# Patient Record
Sex: MALE | Race: WHITE | NOT HISPANIC OR LATINO | Employment: OTHER | URBAN - METROPOLITAN AREA
[De-identification: names, ages, dates, MRNs, and addresses within clinical notes are randomized per-mention and may not be internally consistent; named-entity substitution may affect disease eponyms.]

---

## 2021-01-25 ENCOUNTER — IMMUNIZATIONS (OUTPATIENT)
Dept: FAMILY MEDICINE CLINIC | Facility: HOSPITAL | Age: 86
End: 2021-01-25

## 2021-01-25 DIAGNOSIS — Z23 ENCOUNTER FOR IMMUNIZATION: Primary | ICD-10-CM

## 2021-01-25 PROCEDURE — 91301 SARS-COV-2 / COVID-19 MRNA VACCINE (MODERNA) 100 MCG: CPT | Performed by: INTERNAL MEDICINE

## 2021-01-25 PROCEDURE — 0011A SARS-COV-2 / COVID-19 MRNA VACCINE (MODERNA) 100 MCG: CPT | Performed by: INTERNAL MEDICINE

## 2021-02-22 ENCOUNTER — IMMUNIZATIONS (OUTPATIENT)
Dept: FAMILY MEDICINE CLINIC | Facility: HOSPITAL | Age: 86
End: 2021-02-22

## 2021-02-22 DIAGNOSIS — Z23 ENCOUNTER FOR IMMUNIZATION: Primary | ICD-10-CM

## 2021-02-22 PROCEDURE — 91301 SARS-COV-2 / COVID-19 MRNA VACCINE (MODERNA) 100 MCG: CPT

## 2021-02-22 PROCEDURE — 0012A SARS-COV-2 / COVID-19 MRNA VACCINE (MODERNA) 100 MCG: CPT

## 2021-12-16 ENCOUNTER — NURSE TRIAGE (OUTPATIENT)
Dept: OTHER | Facility: OTHER | Age: 86
End: 2021-12-16

## 2021-12-16 DIAGNOSIS — Z20.828 SARS-ASSOCIATED CORONAVIRUS EXPOSURE: Primary | ICD-10-CM

## 2021-12-16 LAB — SARS-COV-2 RNA RESP QL NAA+PROBE: NEGATIVE

## 2021-12-16 PROCEDURE — U0003 INFECTIOUS AGENT DETECTION BY NUCLEIC ACID (DNA OR RNA); SEVERE ACUTE RESPIRATORY SYNDROME CORONAVIRUS 2 (SARS-COV-2) (CORONAVIRUS DISEASE [COVID-19]), AMPLIFIED PROBE TECHNIQUE, MAKING USE OF HIGH THROUGHPUT TECHNOLOGIES AS DESCRIBED BY CMS-2020-01-R: HCPCS | Performed by: FAMILY MEDICINE

## 2021-12-16 PROCEDURE — U0005 INFEC AGEN DETEC AMPLI PROBE: HCPCS | Performed by: FAMILY MEDICINE

## 2021-12-17 ENCOUNTER — TELEPHONE (OUTPATIENT)
Dept: OTHER | Facility: OTHER | Age: 86
End: 2021-12-17

## 2022-05-25 ENCOUNTER — HOSPITAL ENCOUNTER (EMERGENCY)
Facility: HOSPITAL | Age: 87
Discharge: HOME | End: 2022-05-26
Attending: EMERGENCY MEDICINE
Payer: COMMERCIAL

## 2022-05-25 VITALS
OXYGEN SATURATION: 100 % | DIASTOLIC BLOOD PRESSURE: 81 MMHG | RESPIRATION RATE: 16 BRPM | HEART RATE: 63 BPM | TEMPERATURE: 96.9 F | SYSTOLIC BLOOD PRESSURE: 144 MMHG

## 2022-05-25 DIAGNOSIS — T81.9XXA POST-OPERATIVE COMPLICATION: ICD-10-CM

## 2022-05-25 DIAGNOSIS — H92.21 EAR BLEEDING, RIGHT: Primary | ICD-10-CM

## 2022-05-25 PROCEDURE — 99282 EMERGENCY DEPT VISIT SF MDM: CPT | Performed by: EMERGENCY MEDICINE

## 2022-05-25 PROCEDURE — 99283 EMERGENCY DEPT VISIT LOW MDM: CPT

## 2022-05-26 RX ORDER — METOPROLOL SUCCINATE 25 MG/1
25 TABLET, EXTENDED RELEASE ORAL DAILY
COMMUNITY

## 2022-05-26 RX ORDER — EZETIMIBE AND SIMVASTATIN 10; 20 MG/1; MG/1
1 TABLET ORAL
Status: DISCONTINUED | OUTPATIENT
Start: 2022-05-26 | End: 2022-05-26 | Stop reason: HOSPADM

## 2022-05-26 RX ORDER — OLMESARTAN MEDOXOMIL 40 MG/1
40 TABLET ORAL DAILY
COMMUNITY

## 2022-05-26 RX ORDER — EZETIMIBE AND SIMVASTATIN 10; 20 MG/1; MG/1
1 TABLET ORAL
COMMUNITY

## 2022-05-26 RX ORDER — METOPROLOL SUCCINATE 25 MG/1
25 TABLET, EXTENDED RELEASE ORAL DAILY
Status: DISCONTINUED | OUTPATIENT
Start: 2022-05-26 | End: 2022-05-26 | Stop reason: HOSPADM

## 2022-05-26 RX ORDER — OLMESARTAN MEDOXOMIL 20 MG/1
20 TABLET ORAL DAILY
Status: DISCONTINUED | OUTPATIENT
Start: 2022-05-26 | End: 2022-05-26 | Stop reason: HOSPADM

## 2022-05-26 NOTE — ED NOTES
Patient was wondering if we could give his night time medications that he missed while coming to the ED  Medication list from patient entered into the system   Dr Jose Russell made aware     Evelin Gordillo RN  05/26/22 6112

## 2022-05-26 NOTE — ED PROVIDER NOTES
History  Chief Complaint   Patient presents with    Ear Problem     Pt arrives via EMS following biopsy today of ear, pt reports right ear started to bleed tonight and would not stop, also taking blood thinners  Pt in the ER with c/o bleeding to his right ear after a biopsy earlier today  Patient is on aspirin, and an unknown blood thinner daily  Has a prior history of hypertension  She denies other somatic complaints  History provided by:  Patient   used: No        None       Past Medical History:   Diagnosis Date    Hypertension     Renal disorder        Past Surgical History:   Procedure Laterality Date    APPENDECTOMY      CARDIAC SURGERY      EYE SURGERY      HERNIA REPAIR      SKIN BIOPSY         History reviewed  No pertinent family history  I have reviewed and agree with the history as documented  E-Cigarette/Vaping     E-Cigarette/Vaping Substances     Social History     Tobacco Use    Smoking status: Former Smoker     Types: Cigarettes    Smokeless tobacco: Never Used   Substance Use Topics    Alcohol use: Yes     Comment: twice a year       Review of Systems   Constitutional: Negative for chills and fever  Respiratory: Negative for cough, shortness of breath and wheezing  Cardiovascular: Negative for chest pain and palpitations  Gastrointestinal: Negative for abdominal pain, constipation, diarrhea, nausea and vomiting  Genitourinary: Negative for dysuria, flank pain, hematuria and urgency  Musculoskeletal: Negative for back pain  Skin: Positive for wound  Negative for color change and rash  All other systems reviewed and are negative  Physical Exam  Physical Exam  Vitals and nursing note reviewed  Constitutional:       Appearance: He is well-developed  HENT:      Head: Normocephalic and atraumatic  Ears:     Eyes:      Extraocular Movements: Extraocular movements intact        Pupils: Pupils are equal, round, and reactive to light    Pulmonary:      Effort: Pulmonary effort is normal  No respiratory distress  Abdominal:      Tenderness: There is no abdominal tenderness  Musculoskeletal:      Cervical back: Normal range of motion and neck supple  Skin:     General: Skin is warm and dry  Capillary Refill: Capillary refill takes less than 2 seconds  Neurological:      Mental Status: He is alert and oriented to person, place, and time  Psychiatric:         Behavior: Behavior normal          Thought Content: Thought content normal          Judgment: Judgment normal          Vital Signs  ED Triage Vitals [05/25/22 2342]   Temperature Pulse Respirations Blood Pressure SpO2   (!) 96 9 °F (36 1 °C) 63 16 144/81 100 %      Temp Source Heart Rate Source Patient Position - Orthostatic VS BP Location FiO2 (%)   Tympanic Monitor Lying Left arm --      Pain Score       --           Vitals:    05/25/22 2342   BP: 144/81   Pulse: 63   Patient Position - Orthostatic VS: Lying         Visual Acuity      ED Medications  Medications - No data to display    Diagnostic Studies  Results Reviewed     None                 No orders to display              Procedures  Procedures         ED Course                                             MDM  Number of Diagnoses or Management Options  Ear bleeding, right: new and requires workup  Post-operative complication: new and requires workup  Diagnosis management comments: Surgery foam and pressure dressing applied to affected area  Bleeding now controlled  Patient be discharged to follow up with Derm tomorrow      Risk of Complications, Morbidity, and/or Mortality  Presenting problems: high  Diagnostic procedures: high  Management options: high    Patient Progress  Patient progress: improved      Disposition  Final diagnoses:   Ear bleeding, right   Post-operative complication     Time reflects when diagnosis was documented in both MDM as applicable and the Disposition within this note     Time User Action Codes Description Comment    5/26/2022 12:12 AM March Captain Add [H92 21] Ear bleeding, right     5/26/2022 12:12 AM Shirley Rao Add [T81  9XXA] Post-operative complication       ED Disposition     ED Disposition   Discharge    Condition   Stable    Date/Time   Thu May 26, 2022 12:12 AM    Comment   Shirley Puckett discharge to home/self care  Follow-up Information    None         Patient's Medications    No medications on file       No discharge procedures on file      PDMP Review     None          ED Provider  Electronically Signed by           Ivan Sexton DO  05/26/22 5745

## 2022-05-26 NOTE — DISCHARGE INSTRUCTIONS
Call your dermatologist in the morning for follow-up appointment    Return to the ER for further concerns or worsening symptoms  Follow up with your primary care physician in 1-2 days

## 2022-06-23 ENCOUNTER — TELEPHONE (OUTPATIENT)
Dept: DERMATOLOGY | Facility: CLINIC | Age: 87
End: 2022-06-23

## 2022-06-23 NOTE — TELEPHONE ENCOUNTER
Patient is calling stating he has been referred for MOHS surgery and would like a call back to schedule a consult, thanks

## 2022-06-23 NOTE — TELEPHONE ENCOUNTER
Telephone call  To patient  Spoke to patient in regards MOHS referral  Patient is aware we waiting on Path report, Notes and Photos   Patient aware will call to scheduled once we receive path report

## 2022-06-28 ENCOUNTER — CONSULT (OUTPATIENT)
Dept: DERMATOLOGY | Facility: CLINIC | Age: 87
End: 2022-06-28
Payer: COMMERCIAL

## 2022-06-28 VITALS — WEIGHT: 191 LBS | TEMPERATURE: 97.5 F | HEIGHT: 73 IN | BODY MASS INDEX: 25.31 KG/M2

## 2022-06-28 DIAGNOSIS — Z01.818 PRE-OPERATIVE EXAM: Primary | ICD-10-CM

## 2022-06-28 PROCEDURE — 99203 OFFICE O/P NEW LOW 30 MIN: CPT | Performed by: DERMATOLOGY

## 2022-06-28 RX ORDER — DICYCLOMINE HYDROCHLORIDE 10 MG/1
10 CAPSULE ORAL EVERY 6 HOURS
COMMUNITY
Start: 2022-03-28

## 2022-06-28 RX ORDER — ASPIRIN 81 MG/1
81 TABLET ORAL DAILY
COMMUNITY

## 2022-06-28 NOTE — PROGRESS NOTES
MOHS CONSULTATION NOTE #1    Chief Complaint: referral for biopsy proven skin cancer  Date of visit: 06/28/22    Subjective:    Shellie Garcia is a 80 y o  old male who presents in consultation for a biopsy-proven Basal cell carcinoma Right Antihelix on May 25, 2022  Referring physician, biopsy site, accession number of biopsy and diagnosis as follows:    Referring Physician:Dr Fuentes  Biopsy Location: Right Antihelix  Resulting Laboratory:   Case number: [id number]   Biopsy diagnosis: Basal cell carcinoma superficial     Additional Hx: No additional history    CONSTITUTIONAL:   Vitals:    06/28/22 1432   Temp: 97 5 °F (36 4 °C)       PSYCH: Normal mood and affect  EYES: Normal conjunctiva  ENT: Normal lips and oral mucosa  CARDIOVASCULAR: No edema  RESPIRATORY: Normal respirations  HEME/LYMPH/IMMUNO:  No regional lymphadenopathy except as noted below in "ASSESSMENT AND PLAN BY DIAGNOSIS"     SKIN:  FOCUSED ORGAN SYSTEM EXAM  Right Antihelix Normal except as noted below in Assessment     ASSESSMENT AND PLAN, BY DIAGNOSIS:    1  BIOPSY PROVEN Basal cell carcinoma superficial ON THE Right Antihelix    Physical Exam:   Anatomic Location Affected & Morphological Description:  0 5 cm x 0 6 cm Right antihelix; Pink scar       Reviewed directly with patient treatment options, specifically Mohs  Discussed procedure at length including possible surgical complications and alternatives  All questions were answered in full   Repair will require coordination with Plastic Surgery, ENT or Oculoplastic facial Plastic Surgery? No  o If coordination of Care with other provider (Danny Velasquez, ENT) required, our staff will reach out to you once this has been coordinated     Date Mohs was scheduled and provider scheduled with:    July 5, 2022 w/ Dr Jose Main at [address] questions reviewed as follows:     MOHS CONSULTATION NOTE #2    Chief Complaint: referral for biopsy proven skin cancer  Date of visit: 06/28/22    Subjective:    Jarred Sosa is a 80 y o  old male who presents in consultation for a biopsy-proven Left nasal ala; Basal cell carcinoma infiltrative on May 25, 2022  Referring physician, biopsy site, accession number of biopsy and diagnosis as follows:    Referring Physician:Dr Fuentes  Biopsy Location: Left nasal ala  Resulting Laboratory:   Case number: [id number]   Biopsy diagnosis: Basal cell carcinoma Infiltrative    Additional Hx: No additional history  Objective:     CONSTITUTIONAL:   Vitals:    06/28/22 1432   Temp: 97 5 °F (36 4 °C)       PSYCH: Normal mood and affect  EYES: Normal conjunctiva  ENT: Normal lips and oral mucosa  CARDIOVASCULAR: No edema  RESPIRATORY: Normal respirations  HEME/LYMPH/IMMUNO:  No regional lymphadenopathy except as noted below in "ASSESSMENT AND PLAN BY DIAGNOSIS"     SKIN:  FOCUSED ORGAN SYSTEM EXAM  Left nasal Ala  Normal except as noted below in Assessment     ASSESSMENT AND PLAN, BY DIAGNOSIS:    2  BIOPSY PROVEN Basal cell carcinoma Infiltrative ON THE Left nasal ala    Physical Exam:   Anatomic Location Affected & Morphological Description:  0 5 cm x 0 6 cm; Pink scar; Left nasal ala - BCC        Reviewed directly with patient treatment options, specifically Mohs  Discussed procedure at length including possible surgical complications and alternatives  All questions were answered in full   Repair will require coordination with Plastic Surgery, ENT or Oculoplastic facial Plastic Surgery? No  o If coordination of Care with other provider (Usman Rivas, ENT) required, our staff will reach out to you once this has been coordinated   Date Mohs was scheduled and provider scheduled with:    July 5, 2022 w/ Dr Tami Garcia @ 9 am   Pre-op questions reviewed as follows: Do you have a pacemaker or defibrillator? no    Do you take antibiotics before skin or dental procedures? Yes - Keflex 500 mg   Take 4 pills 1 hour before procedure  If yes, will likely require pre-operative antibiotics  Ask  the patient why they take the antibiotics (usually because of joint replacement)  Do you have a history of a joint replacements within the past 2 years? Valve on January 31, 2022   If yes, will likely require pre-operative antibiotics  Ask if orthopaedic surgeon has prescribed pre-operative antibiotics to take before procedures/dental work? Do you take any OTC medications that thin your blood (Aspirin, Aleve, Ibuprofen) or supplements that thin your blood (fish oil, garlic, vitamin E, Ginko Biloba)? yes: ASA 81 mg and no    Do you take any prescribed medications that thin your blood (Coumadin, Plavix, Xarelto, Eliquis or another prescribed blood thinner)? YES - Vasepa - Prescription Fish oil    Do you have an allergy to lidocaine or epinephrine? no    Do you have an allergy to iodine? no    Do you smoke? no      If yes,  patient to try and stop 2 days before surgery and 7 days after the surgery  Minimizing smoking as much as possible during this time will improve healing and the cosmetic result after surgery   Are there remaining tumors to be scheduled? no    BCC on right antihelix and left nasal ala  Given locations, they both qualify for mohs  Discussed treatment options and procedure in detail  Will schedule for next available visit    Scribe Attestation    I,:  Aarti Must am acting as a scribe while in the presence of the attending physician :       I,:  Lisset Oseguera MD personally performed the services described in this documentation    as scribed in my presence :

## 2022-06-29 RX ORDER — CEPHALEXIN 500 MG/1
CAPSULE ORAL
Qty: 8 CAPSULE | Refills: 0 | Status: SHIPPED | OUTPATIENT
Start: 2022-06-29 | End: 2022-06-29

## 2022-07-05 ENCOUNTER — PROCEDURE VISIT (OUTPATIENT)
Dept: DERMATOLOGY | Facility: CLINIC | Age: 87
End: 2022-07-05
Payer: COMMERCIAL

## 2022-07-05 VITALS
SYSTOLIC BLOOD PRESSURE: 114 MMHG | HEART RATE: 97 BPM | HEIGHT: 73 IN | WEIGHT: 197 LBS | BODY MASS INDEX: 26.11 KG/M2 | DIASTOLIC BLOOD PRESSURE: 72 MMHG | TEMPERATURE: 97.6 F

## 2022-07-05 DIAGNOSIS — C44.311 BASAL CELL CARCINOMA (BCC) OF LEFT SIDE OF NOSE: Primary | ICD-10-CM

## 2022-07-05 PROCEDURE — 17311 MOHS 1 STAGE H/N/HF/G: CPT | Performed by: DERMATOLOGY

## 2022-07-05 PROCEDURE — 14060 TIS TRNFR E/N/E/L 10 SQ CM/<: CPT | Performed by: DERMATOLOGY

## 2022-07-05 PROCEDURE — 17312 MOHS ADDL STAGE: CPT | Performed by: DERMATOLOGY

## 2022-07-05 NOTE — PATIENT INSTRUCTIONS
Mohs Microscopic Surgery After Care    WOUND CARE AFTER SURGERY:    Do NOT to remove the pressure bandage for 48 hours  Keep the area clean and dry while this bandage is on  After removing the bandage for the first time, gently clean the area with soap and water  If the bandage is difficult to remove, getting the bandage wet in the shower will sometimes help soften the adhesive and allow it to be removed more easily  You will now need to cleanse this area daily in the shower with gentle soap  There is no need to scrub the area  Apply plain Vaseline ointment (this is over the counter and not a prescription) to the site followed by a clean appropriately sized bandage to area  Non stick dressing and paper tape (or Hypafix) are recommended for sensitive skin but a bandaid is fine if it covers the area well  You will need to continue the above daily wound care until you return for suture removal in 7 days (generally 7 days for the face, 10-14 days off the face)      RESTRICTIONS:     For two DAYS:   - You will need to take it very easy as this time is highest risk for bleeding  Being a "couch potato" during these two days is generally recommended  - For surgeries on the face/neck/scalp: Avoid leaning down to pick things up off the floor as this brings blood up to your head  Instead, squat down to pick things up  For two WEEKS:   - No heavy lifting (anything greater than 10 pounds)   - You can start to do slow, gentle activities such as slow walking but nothing to increase your heart rate and blood pressure too much (such as cardiovascular exercise)  It is important to take it easy as there is still a risk for bleeding and a high risk popping of stitches open during this time  If we did surgery near the eyes (including the nose, forehead, front part of your scalp, cheeks): It is VERY common to get a large amount of swelling around your eyes (puffy eyes)   Although less frequent, this can be enough to swell your eyes shut and can also come along with bruising  This should not hurt and is very expected and normal  It is typically worst at ~ 3 days out from your surgery and dramatically better 1 week post-operatively  If we did surgery around your nose: No blowing your nose as this puts you at higher risk of popping stitches durign this time  Instead dab under your nose with a tissue or use a Q-tip inside your nose  this uses a lot of the muscles around your mouth and increases the risk of popping stitches during this time  MANAGING YOUR PAIN AFTER SURGERY     You can expect to have some pain after surgery  This is normal  The pain is typically worse the first two days after surgery, and quickly begins to get better  The best strategy for controlling your pain after surgery is around the clock pain control  You can take over the counter Acetaminophen (Tylenol) for discomfort, if no contraindications  If you are taking this at the maximum dose, you can alternate this with Motrin (ibuprofen or Advil) as well  Alternating these medications with each other allows you to maximize your pain control  In addition to Tylenol and Motrin, you can use heating pads or ice packs on your incisions to help reduce your pain  How will I alternate your regular strength over-the-counter pain medication? You will take a dose of pain medication every three hours  Start by taking 650 mg of Tylenol (2 pills of 325 mg)   3 hours later take 600 mg of Motrin (3 pills of 200 mg)   3 hours after taking the Motrin take 650 mg of Tylenol   3 hours after that take 600 mg of Motrin      See example - if your first dose of Tylenol is at 12:00 PM     12:00 PM  Tylenol 650 mg (2 pills of 325 mg)    3:00 PM  Motrin 600 mg (3 pills of 200 mg)    6:00 PM  Tylenol 650 mg (2 pills of 325 mg)    9:00 PM  Motrin 600 mg (3 pills of 200 mg)    Continue alternating every 3 hours      Important:   Do not take more than 4000mg of Tylenol or 3200mg of Motrin in a 24-hour period  What if I still have pain? If you have pain that is not controlled with the over-the-counter pain medications (Tylenol and Motrin or Advil), don't hesitate to call our staff using the number provided  We will help make sure you are managing your pain in the best way possible, and if necessary, we can provide a prescription for additional pain medication  CALL OUR OFFICE IMMEDIATELY FOR ANY SIGNS OF INFECTION:    This includes fever, chills, increased redness, warmth, tenderness, severe discomfort/pain, or pus or foul smell coming from the wound  [facility] directly at [phone] (SKIN)    IF BLEEDING IS NOTICED:    Place a clean cloth over the area and apply firm pressure for thirty minutes  Check the wound ONLY after 30 minutes of direct pressure; do not cheat and sneak a peak, as that does not count  If bleeding persists after 30 minutes of legitimate direct pressure, then try one more round of direct pressure to the area  Should the bleeding become heavier or not stop after the second attempt, call [facility] directly at [phone] (SKIN)  Your call will get routed to the dermatology surgeon on call even after hours

## 2022-07-05 NOTE — PROGRESS NOTES
MOHS Procedure Note    Patient: Elise Ortiz  : 12/3/1932  MRN: [id number]  Date: 2022    History of Present Illness:  The patient is a 80 y o  male who presents with complaints of Basal Cell Carcinoma, left nasal ala     Past Medical History:   Diagnosis Date    Basal cell carcinoma 2022    Left nasal ala    Basal cell carcinoma (BCC) of antihelix of right ear 2022    Right antihelix    Hypertension     Renal disorder        Past Surgical History:   Procedure Laterality Date    APPENDECTOMY      CARDIAC SURGERY      EYE SURGERY      HERNIA REPAIR      MOHS SURGERY Left 2022    Left nasal ala    MOHS SURGERY Right 2022    Right antihelix    SKIN BIOPSY           Current Outpatient Medications:     aspirin (ECOTRIN LOW STRENGTH) 81 mg EC tablet, Take 81 mg by mouth daily, Disp: , Rfl:     dicyclomine (BENTYL) 10 mg capsule, Take 10 mg by mouth every 6 (six) hours, Disp: , Rfl:     ezetimibe-simvastatin (VYTORIN) 10-20 mg per tablet, Take 1 tablet by mouth daily at bedtime, Disp: , Rfl:     metoprolol succinate (TOPROL-XL) 25 mg 24 hr tablet, Take 25 mg by mouth daily, Disp: , Rfl:     olmesartan (BENICAR) 40 mg tablet, Take 40 mg by mouth daily, Disp: , Rfl:     No Known Allergies    Physical Exam:   Vitals:    22 0904   BP: 114/72   Pulse: 97   Temp: 97 6 °F (36 4 °C)     General: Awake, Alert, Oriented x 3, Mood and affect appropriate  Respiratory: Respirations even and unlabored  Cardiovascular: Peripheral pulses intact; no edema  Musculoskeletal Exam: N/A    Assessment: 0 6 cm x0 6 cm pink scar, biopsy proven Basal Cell Carcinoma infitrative type     Plan: MOHS     MOHS Procedure Timeout    Flowsheet Row Most Recent Value   Timeout: 0679   Patient Identity Verified: Yes   Correct Site Verified: Yes   Correct Procedure Verified: Yes          MOHS Diagnosis/Indication/Location/ID    Flowsheet Row Most Recent Value   Pathology Type Basal cell carcinoma   Anatomic Site left nasal ala   Indications for MOHS tumor location   MOHS ID [id number]          MOHS Site/Accession/Pre-Post    Flowsheet Row Most Recent Value   Original Site Identified (as submitted by referring clinician) Referral  [Dr Harmon Pert   Biopsy Accession/Specimen # (as submitted by referring clincian) [id number]   Pre-MOHS Size Length (cm) 0 6   Pre-MOHS Size Width (cm) 0 6   Repair Type Bilobed transposition flap   Suture Type Vicryl, Prolene   Prolene Suture Size 5   Vicryl Suture Size 5   Flap/Graft area (cm2) 5 5   Anesthetic Used 1% Lidocaine with epinephrine          MOHS Tumor Stage 1 Information    Flowsheet Row Most Recent Value   Tissue Sections (blocks) 2   Microscopic Exam Section 1: Irregularly shaped islands of basaloid keratinocytes with peripheral palisading and retraction artifact consistent with basal cell carcinoma were noted on microscopic analysis  The cells have scant cytoplasm and round dark nuclei , Irregularly shaped cords and strands of basaloid keratinocytes infiltrate the dermis with a spiky growth pattern  The nuclei at the periphery of the islands have a palisaded arrangement  [address] are associated with a fibromyxoid stroma and clefting  Microscopic Exam Section 2: Irregularly shaped islands of basaloid keratinocytes with peripheral palisading and retraction artifact consistent with basal cell carcinoma were noted on microscopic analysis  The cells have scant cytoplasm and round dark nuclei , Irregularly shaped cords and strands of basaloid keratinocytes infiltrate the dermis with a spiky growth pattern  The nuclei at the periphery of the islands have a palisaded arrangement  [address] are associated with a fibromyxoid stroma and clefting  Tumor Clear After Stage I? No          MOHS Tumor Stage 2 Information    Flowsheet Row Most Recent Value   Tissue Sections (blocks) 2   Microscopic Exam Section 1: No tumor identified in section     Microscopic Exam Section 2: No tumor identified in section  Tumor Clear After Stage II? Yes                    Patient identified, procedure verified, site identified and verified  Time out completed  Surgical removal of the lesion discussed with the patient (risks and benefits, including possibility of scarring, infection, recurrence or potential for further treatment)  I have specifically identified the site with the patient  I have discussed the fact that the patient will have a scar after the procedure regardless of granulation or repair with sutures  I have discussed that the repair options can range from granulation in some cases to linear or curvilinear closures to larger flaps or grafts  There are sometimes flaps or grafts used that require multiples stages of surgery and will not be completed today, rather be completed over a series of appointments  I have discussed that occasionally due to location, size or depth of the lesion I may recommend consultation with and transfer of care for further removal or the reconstruction to another provider such as ophthalmology surgery, plastic surgery, ENT surgery, or surgical oncology  There are cases in which other testing such as imaging with MRI or CT scan or testing of lymph nodes is recommended because of the nature/depth/location of tumor seen during the removal  There is a risk of injury to nerves causing temporary or permanent numbness or the inability to move muscles full such as the inability to lift eyebrows  Questions answered and verbal and written consent was obtained  The tumor qualifies for Mohs based on AUC criteria  Dr Adrianna Dover served as the surgeon and pathologist during the procedure  With the patient in the supine position and under adequate local anesthesia with  1% lidocaine with epinephrine 1:200,000, the defect was scrubbed with Hibiclens  Sterile drapes were placed from the sterile tray        Because of the location of the surgical defect, a transposition flap was judged to give the best possible cosmetic and functional result  The edges of the defect were carefully debrided removing any dead or coagulated tissue  The edges of the defect and the area to be used for transposition of tissue at the base of the flap were widely undermined  A geometric form of the laterally based bilobed type was outlined on the skin with gentian violet  The outline of the geometric form was carefully planned so that the final scar lines would lie in the most favorable relaxed skin tension lines  The outlined flap was cut with a #15 scalpel blade  The flaps were undermined and hemostasis was carefully obtained with pinpoint electrocautery  The geometric flap was transposed into the defect and a tension bearing suture was placed  The flap was secured in place by a deep layer of buried sutures and a layer of superficial sutures to approximate the cutaneous margins, as noted above  Estimated blood loss was less than 5 mL  The patient tolerated the procedure well  The wound was dressed with petrolatum, a non-stick pad, and a compression dressing  Campos Herzog MD served as the surgeon and pathologist during the procedure  Postoperative care: Wound care discussed at length, and a wound care instruction sheet was given  Patient urged the patient to call us if any problems or question should arise  Complications: none  Post-op medications: none  Patient condition after procedure: stable  Discharge plans: Plan for suture removal 7 days at next scheduled appointment  BCC cleared with 2 stages of mohs and repaired with 5 5cm2 bilobed flap after detailed discussion of repair options  Well tolerated  S/R in 1 week  Scribe Attestation    I,:  Tatyana Diez MA am acting as a scribe while in the presence of the attending physician :       I,:  Campos Herzog MD personally performed the services described in this documentation    as scribed in my presence  :       '

## 2022-07-12 ENCOUNTER — PROCEDURE VISIT (OUTPATIENT)
Dept: DERMATOLOGY | Facility: CLINIC | Age: 87
End: 2022-07-12
Payer: COMMERCIAL

## 2022-07-12 VITALS
TEMPERATURE: 97.5 F | SYSTOLIC BLOOD PRESSURE: 118 MMHG | DIASTOLIC BLOOD PRESSURE: 72 MMHG | HEIGHT: 73 IN | WEIGHT: 195 LBS | BODY MASS INDEX: 25.84 KG/M2 | HEART RATE: 99 BPM

## 2022-07-12 DIAGNOSIS — Z48.02 ENCOUNTER FOR REMOVAL OF SUTURES: ICD-10-CM

## 2022-07-12 DIAGNOSIS — C44.212 BASAL CELL CARCINOMA (BCC) OF ANTIHELIX OF RIGHT EAR: Primary | ICD-10-CM

## 2022-07-12 PROCEDURE — 17312 MOHS ADDL STAGE: CPT | Performed by: DERMATOLOGY

## 2022-07-12 PROCEDURE — 17311 MOHS 1 STAGE H/N/HF/G: CPT | Performed by: DERMATOLOGY

## 2022-07-12 NOTE — PROGRESS NOTES
MOHS Procedure Note    Patient: Elise Ortiz  : 12/3/1932  MRN: [id number]  Date: 2022    History of Present Illness:  The patient is a 80 y o  male who presents with complaints of Basal Cell Carcinoma, right antihelix     Past Medical History:   Diagnosis Date    Basal cell carcinoma 2022    Left nasal ala    Basal cell carcinoma (BCC) of antihelix of right ear 2022    Right antihelix    Hypertension     Renal disorder        Past Surgical History:   Procedure Laterality Date    APPENDECTOMY      CARDIAC SURGERY      EYE SURGERY      HERNIA REPAIR      MOHS SURGERY Left 2022    Left nasal ala    MOHS SURGERY Right 2022    Right antihelix    MOHS SURGERY Right 2022    Right antihelix    SKIN BIOPSY           Current Outpatient Medications:     aspirin (ECOTRIN LOW STRENGTH) 81 mg EC tablet, Take 81 mg by mouth daily, Disp: , Rfl:     dicyclomine (BENTYL) 10 mg capsule, Take 10 mg by mouth every 6 (six) hours, Disp: , Rfl:     ezetimibe-simvastatin (VYTORIN) 10-20 mg per tablet, Take 1 tablet by mouth daily at bedtime, Disp: , Rfl:     metoprolol succinate (TOPROL-XL) 25 mg 24 hr tablet, Take 25 mg by mouth daily, Disp: , Rfl:     olmesartan (BENICAR) 40 mg tablet, Take 40 mg by mouth daily, Disp: , Rfl:     No Known Allergies    Physical Exam:   Vitals:    22 1126   BP: 118/72   Pulse: 99   Temp: 97 5 °F (36 4 °C)     General: Awake, Alert, Oriented x 3, Mood and affect appropriate  Respiratory: Respirations even and unlabored  Cardiovascular: Peripheral pulses intact; no edema  Musculoskeletal Exam: N/A    Assessment:  0 8 cm x 0 8 cm pink scar, biopsy proven Basal Cell Carcinoma, superficial type    Plan: MOHS     MOHS Procedure Timeout    Flowsheet Row Most Recent Value   Timeout: 1140   Patient Identity Verified: Yes   Correct Site Verified: Yes   Correct Procedure Verified: Yes          MOHS Diagnosis/Indication/Location/ID    Flowsheet Row Most Recent Value   Pathology Type Basal cell carcinoma   Anatomic Site --  [Right antihelix]   Indications for MOHS tumor location   MOHS ID [id number]          MOHS Site/Accession/Pre-Post    Flowsheet Row Most Recent Value   Original Site Identified (as submitted by referring clinician) Referral  [Dr Alisa Kevin   Biopsy Accession/Specimen # (as submitted by referring clincian) [id number]   Pre-MOHS Size Length (cm) 0 8   Pre-MOHS Size Width (cm) 0 8   Post-MOHS Size-Length (cm) 1   Post MOHS Size-Width (cm) 1 4   Repair Type Second intention   Anesthetic Used 1% Lidocaine with epinephrine          MOHS Tumor Stage 1 Information    Flowsheet Row Most Recent Value   Tissue Sections (blocks) 2   Microscopic Exam Section 1: No tumor identified in section  Microscopic Exam Section 2: Arising from the epidermis and superficial follicles are small, superficial, multicentric buds of basaloid keratinocytes associated with a fibromyxoid stroma and clefting  Nuclei at the periphery of the islands have a palisaded arrangement  Tumor Clear After Stage I? No          MOHS Tumor Stage 2 Information    Flowsheet Row Most Recent Value   Tissue Sections (blocks) 1   Microscopic Exam Section 1: No tumor identified in section  Microscopic Exam Section 2: No tumor identified in section  Tumor Clear After Stage II? Yes                   Patient identified, procedure verified, site identified and verified  Time out completed  Surgical removal of the lesion discussed with the patient (risks and benefits, including possibility of scarring, infection, recurrence or potential for further treatment)  I have specifically identified the site with the patient  I have discussed the fact that the patient will have a scar after the procedure regardless of granulation or repair with sutures  I have discussed that the repair options can range from granulation in some cases to linear or curvilinear closures to larger flaps or grafts    There are sometimes flaps or grafts used that require multiples stages of surgery and will not be completed today, rather be completed over a series of appointments  I have discussed that occasionally due to location, size or depth of the lesion I may recommend consultation with and transfer of care for further removal or the reconstruction to another provider such as ophthalmology surgery, plastic surgery, ENT surgery, or surgical oncology  There are cases in which other testing such as imaging with MRI or CT scan or testing of lymph nodes is recommended because of the nature/depth/location of tumor seen during the removal  There is a risk of injury to nerves causing temporary or permanent numbness or the inability to move muscles full such as the inability to lift eyebrows  Questions answered and verbal and written consent was obtained  The tumor qualifies for Mohs based on AUC criteria  Dr Chandra Jackson served as the surgeon and pathologist during the procedure  Suture removal    Date/Time: 7/12/2022 1:44 PM  Performed by: Isaac Corcoran MA  Authorized by: Yahir Paniagua MD   [facility] Protocol:  Procedure performed by:  Consent given by: patient  Timeout called at: 7/12/2022 11:00 AM   Patient understanding: patient states understanding of the procedure being performed  Patient consent: the patient's understanding of the procedure matches consent given  Procedure consent: procedure consent matches procedure scheduled  Relevant documents: relevant documents present and verified  Test results: test results not available  Site marked: the operative site was not marked  Radiology Images displayed and confirmed  If images not available, report reviewed: imaging studies not available  Patient identity confirmed: verbally with patient        Patient location:  Clinic  Location:     Laterality:  Left    Location:  Head/neck    Head/neck location:  Nose (Left nasal ala)  Procedure details:      Tools used:  Suture removal kit    Number of sutures removed:  10  Post-procedure details:     Post-removal:  Antibiotic ointment applied (vaseline)  Comments: Follow Up as scheduled 2 weeks         BCC cleared with 2 stages of mohs and allowed to heal secondarily given location  Well tolerated  Wound check in 2 weeks  Well healing scar, sutures removed  Discussed the need for ILK or revision in the future given bulkiness of anterior portion of the flap    Scribe Attestation    I,:  Josue Anderson MA am acting as a scribe while in the presence of the attending physician :       I,:  Jany Garcia MD personally performed the services described in this documentation    as scribed in my presence :

## 2022-07-12 NOTE — PATIENT INSTRUCTIONS
Mohs Surgery After Care    WOUND CARE AFTER SURGERY:    Try NOT to remove the pressure bandage for 48 hours  Keep the area clean and dry while this bandage is on  After removing the bandage for the first time, gently clean the area with soap and water  If the bandage is difficult to remove, getting the bandage wet in the shower will sometimes help soften the adhesive and allow it to be removed more easily  You will now need to cleanse this area daily in the shower with gentle soap  There is no need to scrub the area  There is no need for further wound care for 2 weeks  You will notice over this time that the glue will start to flake off  Do not apply and Vaseline or Aquaphor to the area as this will dissolve your skin glue  If you would like to keep the area covered, non stick dressing and paper tape (or Hypafix) are recommended for sensitive skin but a bandaid is fine if it covers the area well  After 2 weeks, you can go ahead and use some Vaseline or Aquaphor to help dissolve any remaining glue  RESTRICTIONS:     For two DAYS:   - You will need to take it very easy as this time is highest risk for bleeding  Being a "couch potato" during these two days is generally recommended  - For surgeries on the face/neck/scalp: Avoid leaning down to pick things up off the floor as this brings blood up to your head  Instead, squat down to pick things up  For two WEEKS:   - No heavy lifting (anything greater than 10 pounds)   - You can start to do slow, gentle activities such as slow walking but nothing to increase your heart rate and blood pressure too much (such as cardiovascular exercise)  It is important to take it easy as there is still a risk for bleeding and a high risk popping of stitches open during this time  If we did surgery near the eyes (including the nose, forehead, front part of your scalp, cheeks): It is VERY common to get a large amount of swelling around your eyes (puffy eyes)  Although less frequent, this can be enough to swell your eyes shut and can also come along with bruising  This should not hurt and is very expected and normal  It is typically worst at ~ 3 days out from your surgery and dramatically better 1 week post-operatively  MANAGING YOUR PAIN AFTER SURGERY     You can expect to have some pain after surgery  This is normal  The pain is typically worse the first two days after surgery, and quickly begins to get better  The best strategy for controlling your pain after surgery is around the clock pain control  You can take over the counter Acetaminophen (Tylenol) for discomfort, if no contraindications  If you are taking this at the maximum dose, you can alternate this with Motrin (ibuprofen or Advil) as well  Alternating these medications with each other allows you to maximize your pain control  In addition to Tylenol and Motrin, you can use heating pads or ice packs on your incisions to help reduce your pain  How will I alternate your regular strength over-the-counter pain medication? You will take a dose of pain medication every three hours  Start by taking 650 mg of Tylenol (2 pills of 325 mg)   3 hours later take 600 mg of Motrin (3 pills of 200 mg)   3 hours after taking the Motrin take 650 mg of Tylenol   3 hours after that take 600 mg of Motrin  See example - if your first dose of Tylenol is at 12:00 PM     12:00 PM  Tylenol 650 mg (2 pills of 325 mg)    3:00 PM  Motrin 600 mg (3 pills of 200 mg)    6:00 PM  Tylenol 650 mg (2 pills of 325 mg)    9:00 PM  Motrin 600 mg (3 pills of 200 mg)    Continue alternating every 3 hours      Important:   Do not take more than 4000mg of Tylenol or 3200mg of Motrin in a 24-hour period  What if I still have pain? If you have pain that is not controlled with the over-the-counter pain medications (Tylenol and Motrin or Advil), don't hesitate to call our staff using the number provided   We will help make sure you are managing your pain in the best way possible, and if necessary, we can provide a prescription for additional pain medication  CALL OUR OFFICE IMMEDIATELY FOR ANY SIGNS OF INFECTION:    This includes fever, chills, increased redness, warmth, tenderness, severe discomfort/pain, or pus or foul smell coming from the wound  [facility] directly at [phone] (SKIN)    IF BLEEDING IS NOTICED:    Place a clean cloth over the area and apply firm pressure for thirty minutes  Check the wound ONLY after 30 minutes of direct pressure; do not cheat and sneak a peak, as that does not count  If bleeding persists after 30 minutes of legitimate direct pressure, then try one more round of direct pressure to the area  Should the bleeding become heavier or not stop after the second attempt, call [facility] directly at [phone] (SKIN)  Your call will get routed to the dermatology surgeon on call even after hours

## 2022-08-01 ENCOUNTER — OFFICE VISIT (OUTPATIENT)
Dept: DERMATOLOGY | Facility: CLINIC | Age: 87
End: 2022-08-01

## 2022-08-01 DIAGNOSIS — Z48.89 ENCOUNTER FOR POST SURGICAL WOUND CHECK: Primary | ICD-10-CM

## 2022-08-01 PROCEDURE — 99024 POSTOP FOLLOW-UP VISIT: CPT | Performed by: DERMATOLOGY

## 2022-08-01 NOTE — PROGRESS NOTES
WOUND CHECK    Physical Exam:   Anatomic Location Affected:  Right Antihelix   Description of wound: Hard Black Crusting present on arrival  Over Granulation Tissue Present  Non-Tender, Non-Purulent  Wound Bed was pink and no signs of infection present at this time   Closure Type: Second Intention    Additional History of Present Condition:  S/P Mohs Procedure on 07/12/2022  Assessment and Plan:  Based on a thorough discussion of this condition and the management approach to it (including a comprehensive discussion of the known risks, side effects and potential benefits of treatment), the patient (family) agrees to implement the following specific plan:   Hard crusting was debrided from the wound using saline   Silver Nitrate was used on the over granulated tissue   Patient was instructed to continue to care for the wound until fully healed  As well as to avoid sun-exposure to the area   Patient was encourage to continue to follow up for regular full body skin exams as scheduled  On Arrival    Post Debridement    On Discharge    Well healing wound with excessive granulation tissue treated with silver nitrate and bandage  Discussed the need for further wound care and to follow up for wound check as needed  Discussed ILK for bulky flap on left nasal ala but patient refused at this time and return prn for bilobed flap follow up    Scribe Attestation    I,:  Trevor Bright RN am acting as a scribe while in the presence of the attending physician :       I,:  Darrius Wilkinson MD personally performed the services described in this documentation    as scribed in my presence :

## 2023-08-14 ENCOUNTER — OFFICE VISIT (OUTPATIENT)
Age: 88
End: 2023-08-14
Payer: COMMERCIAL

## 2023-08-14 VITALS — BODY MASS INDEX: 25.74 KG/M2 | HEIGHT: 73 IN | TEMPERATURE: 97.9 F | WEIGHT: 194.2 LBS

## 2023-08-14 DIAGNOSIS — L57.0 KERATOSIS, ACTINIC: Primary | ICD-10-CM

## 2023-08-14 PROCEDURE — 99214 OFFICE O/P EST MOD 30 MIN: CPT | Performed by: DERMATOLOGY

## 2023-08-14 PROCEDURE — 17004 DESTROY PREMAL LESIONS 15/>: CPT | Performed by: DERMATOLOGY

## 2023-08-14 RX ORDER — METOPROLOL SUCCINATE 25 MG/1
1 TABLET, EXTENDED RELEASE ORAL EVERY 24 HOURS
COMMUNITY

## 2023-08-14 RX ORDER — FAMOTIDINE 20 MG/1
TABLET, FILM COATED ORAL
COMMUNITY

## 2023-08-14 RX ORDER — ASPIRIN 81 MG/1
TABLET ORAL EVERY 24 HOURS
COMMUNITY

## 2023-08-14 RX ORDER — CLOPIDOGREL BISULFATE 75 MG/1
TABLET ORAL
COMMUNITY
Start: 2023-06-01

## 2023-08-14 RX ORDER — ICOSAPENT ETHYL 1000 MG/1
CAPSULE ORAL
COMMUNITY
Start: 2023-07-01

## 2023-08-14 RX ORDER — DUTASTERIDE 0.5 MG/1
CAPSULE, LIQUID FILLED ORAL
COMMUNITY
Start: 2023-06-01

## 2023-08-14 RX ORDER — FUROSEMIDE 20 MG/1
TABLET ORAL
COMMUNITY
Start: 2023-05-28

## 2023-08-14 NOTE — PROGRESS NOTES
[facility] Note     Patient Name: Cindy Riverah  Encounter Date: 8/14/23     Have you been cared for by a [facility]ologist in the last 3 years and, if so, which description applies to you? Yes. I have been here within the last 3 years, and my medical history has NOT changed since that time. I am MALE/not capable of bearing children. REVIEW OF SYSTEMS:  Have you recently had or currently have any of the following? · No changes in my recent health. PAST MEDICAL HISTORY:  Have you personally ever had or currently have any of the following? If "YES," then please provide more detail. · No changes in my medical history. FAMILY HISTORY:  Any "first degree relatives" (parent, brother, sister, or child) with the following? • No changes in my family's known health. PATIENT EXPERIENCE:    • Do you want the Dermatologist to perform a COMPLETE skin exam today including a clinical examination under the "bra and underwear" areas? Yes patient is only interested in getting his face checked. • If necessary, do we have your permission to call and leave a detailed message on your Preferred Phone number that includes your specific medical information?   Yes      No Known Allergies   Current Outpatient Medications:   •  aspirin (ECOTRIN LOW STRENGTH) 81 mg EC tablet, Take 81 mg by mouth daily, Disp: , Rfl:   •  dicyclomine (BENTYL) 10 mg capsule, Take 10 mg by mouth every 6 (six) hours, Disp: , Rfl:   •  ezetimibe-simvastatin (VYTORIN) 10-20 mg per tablet, Take 1 tablet by mouth daily at bedtime, Disp: , Rfl:   •  metoprolol succinate (TOPROL-XL) 25 mg 24 hr tablet, Take 25 mg by mouth daily, Disp: , Rfl:   •  olmesartan (BENICAR) 40 mg tablet, Take 40 mg by mouth daily, Disp: , Rfl:   •  apixaban (ELIQUIS) 2.5 mg, Take 2.5 mg by mouth 2 (two) times a day, Disp: , Rfl:   •  aspirin (Aspir-Low) 81 mg EC tablet, every 24 hours, Disp: , Rfl:   •  clopidogrel (PLAVIX) 75 mg tablet, , Disp: , Rfl:   • dutasteride (AVODART) 0.5 mg capsule, , Disp: , Rfl:   •  famotidine (Pepcid) 20 mg tablet, Pepcid, Disp: , Rfl:   •  FOLIC ACID-VITAMIN C PO, Take by mouth, Disp: , Rfl:   •  furosemide (LASIX) 20 mg tablet, , Disp: , Rfl:   •  metoprolol succinate (TOPROL-XL) 25 mg 24 hr tablet, 1 capsule every 24 hours, Disp: , Rfl:   •  Vascepa 1 g CAPS, , Disp: , Rfl:           • Whom besides the patient is providing clinical information about today's encounter?   o NO ADDITIONAL HISTORIAN (patient alone provided history)    Physical Exam and Assessment/Plan by Diagnosis:    ACTINIC KERATOSIS    Physical Exam:  • Anatomic Location Affected:  face  • Morphological Description:  Scaly pink papules  • Pertinent Positives: history of Basal cell carcinoma, history of actinic keratosis  • Pertinent Negatives:      Assessment and Plan:  Based on a thorough discussion of this condition and the management approach to it (including a comprehensive discussion of the known risks, side effects and potential benefits of treatment), the patient (family) agrees to implement the following specific plan:  • When outside we recommend using a wide brim hat, sunglasses, long sleeve and pants, sunscreen with SPF 02+ with reapplication every 2 hours, or SPF specific clothing   liquid nitrogen to treat areas. Consent obtained. Expect area to blister, crust, and then fall off within 2 weeks. Please use vaseline. PROCEDURE:  DESTRUCTION OF PRE-MALIGNANT LESIONS  After a thorough discussion of treatment options and risk/benefits/alternatives (including but not limited to local pain, scarring, dyspigmentation, blistering, and possible superinfection), verbal and written consent were obtained and the aforementioned lesions were treated on with cryotherapy using liquid nitrogen x 1 cycle for 5-10 seconds. TOTAL NUMBER of 16 pre-malignant lesions were treated today on the ANATOMIC LOCATION: face.      The patient tolerated the procedure well, and after-care instructions were provided.      Scribe Attestation    I,:  Rene Ly am acting as a scribe while in the presence of the attending physician.:       I,:  Kaden Grimes MD personally performed the services described in this documentation    as scribed in my presence.:

## 2024-03-05 ENCOUNTER — HOSPITAL ENCOUNTER (INPATIENT)
Facility: HOSPITAL | Age: 89
LOS: 15 days | Discharge: NURSING, REHAB OR LONG-TERM CARE | DRG: 243 | End: 2024-03-20
Attending: EMERGENCY MEDICINE | Admitting: FAMILY MEDICINE
Payer: COMMERCIAL

## 2024-03-05 ENCOUNTER — APPOINTMENT (EMERGENCY)
Dept: RADIOLOGY | Facility: HOSPITAL | Age: 89
DRG: 243 | End: 2024-03-05
Payer: COMMERCIAL

## 2024-03-05 DIAGNOSIS — N17.9 AKI (ACUTE KIDNEY INJURY) (HCC): ICD-10-CM

## 2024-03-05 DIAGNOSIS — I44.2 THIRD DEGREE HEART BLOCK (HCC): ICD-10-CM

## 2024-03-05 DIAGNOSIS — N17.9 ACUTE RENAL FAILURE SUPERIMPOSED ON STAGE 3B CHRONIC KIDNEY DISEASE (HCC): ICD-10-CM

## 2024-03-05 DIAGNOSIS — I21.4 NSTEMI (NON-ST ELEVATED MYOCARDIAL INFARCTION) (HCC): ICD-10-CM

## 2024-03-05 DIAGNOSIS — R07.9 CHEST PAIN: Primary | ICD-10-CM

## 2024-03-05 DIAGNOSIS — K21.9 GERD (GASTROESOPHAGEAL REFLUX DISEASE): ICD-10-CM

## 2024-03-05 DIAGNOSIS — I25.110 CORONARY ARTERY DISEASE INVOLVING NATIVE CORONARY ARTERY OF NATIVE HEART WITH UNSTABLE ANGINA PECTORIS (HCC): ICD-10-CM

## 2024-03-05 DIAGNOSIS — I48.92 ATRIAL FLUTTER, UNSPECIFIED TYPE (HCC): ICD-10-CM

## 2024-03-05 DIAGNOSIS — N18.32 ACUTE RENAL FAILURE SUPERIMPOSED ON STAGE 3B CHRONIC KIDNEY DISEASE (HCC): ICD-10-CM

## 2024-03-05 DIAGNOSIS — K59.00 CONSTIPATION: ICD-10-CM

## 2024-03-05 DIAGNOSIS — I21.9 TYPE 1 MYOCARDIAL INFARCTION (HCC): ICD-10-CM

## 2024-03-05 PROBLEM — I10 ESSENTIAL HYPERTENSION: Status: ACTIVE | Noted: 2024-03-05

## 2024-03-05 PROBLEM — N40.0 BPH (BENIGN PROSTATIC HYPERPLASIA): Status: ACTIVE | Noted: 2024-03-05

## 2024-03-05 PROBLEM — I25.10 CAD (CORONARY ARTERY DISEASE): Status: ACTIVE | Noted: 2024-03-05

## 2024-03-05 PROBLEM — N18.9 CKD (CHRONIC KIDNEY DISEASE): Status: ACTIVE | Noted: 2024-03-05

## 2024-03-05 PROBLEM — R79.89 ELEVATED LFTS: Status: ACTIVE | Noted: 2024-03-05

## 2024-03-05 PROBLEM — I35.0 NONRHEUMATIC AORTIC VALVE STENOSIS: Status: ACTIVE | Noted: 2017-12-27

## 2024-03-05 PROBLEM — I50.9 ACUTE CONGESTIVE HEART FAILURE (HCC): Status: ACTIVE | Noted: 2024-03-05

## 2024-03-05 PROBLEM — E87.5 HYPERKALEMIA: Status: ACTIVE | Noted: 2024-03-05

## 2024-03-05 LAB
2HR DELTA HS TROPONIN: 325 NG/L
4HR DELTA HS TROPONIN: 532 NG/L
ALBUMIN SERPL BCP-MCNC: 3.9 G/DL (ref 3.5–5)
ALBUMIN SERPL BCP-MCNC: 4.1 G/DL (ref 3.5–5)
ALP SERPL-CCNC: 76 U/L (ref 34–104)
ALP SERPL-CCNC: 77 U/L (ref 34–104)
ALT SERPL W P-5'-P-CCNC: 167 U/L (ref 7–52)
ALT SERPL W P-5'-P-CCNC: 198 U/L (ref 7–52)
ANION GAP SERPL CALCULATED.3IONS-SCNC: 8 MMOL/L
ANION GAP SERPL CALCULATED.3IONS-SCNC: 9 MMOL/L
AST SERPL W P-5'-P-CCNC: 200 U/L (ref 13–39)
AST SERPL W P-5'-P-CCNC: 210 U/L (ref 13–39)
BASOPHILS # BLD AUTO: 0.04 THOUSANDS/ÂΜL (ref 0–0.1)
BASOPHILS NFR BLD AUTO: 1 % (ref 0–1)
BILIRUB SERPL-MCNC: 0.45 MG/DL (ref 0.2–1)
BILIRUB SERPL-MCNC: 0.48 MG/DL (ref 0.2–1)
BNP SERPL-MCNC: 282 PG/ML (ref 0–100)
BUN SERPL-MCNC: 41 MG/DL (ref 5–25)
BUN SERPL-MCNC: 45 MG/DL (ref 5–25)
CALCIUM SERPL-MCNC: 9 MG/DL (ref 8.4–10.2)
CALCIUM SERPL-MCNC: 9.1 MG/DL (ref 8.4–10.2)
CARDIAC TROPONIN I PNL SERPL HS: 1276 NG/L
CARDIAC TROPONIN I PNL SERPL HS: 1601 NG/L
CARDIAC TROPONIN I PNL SERPL HS: 1808 NG/L
CHLORIDE SERPL-SCNC: 104 MMOL/L (ref 96–108)
CHLORIDE SERPL-SCNC: 106 MMOL/L (ref 96–108)
CO2 SERPL-SCNC: 20 MMOL/L (ref 21–32)
CO2 SERPL-SCNC: 22 MMOL/L (ref 21–32)
CREAT SERPL-MCNC: 2.01 MG/DL (ref 0.6–1.3)
CREAT SERPL-MCNC: 2.24 MG/DL (ref 0.6–1.3)
EOSINOPHIL # BLD AUTO: 0.11 THOUSAND/ÂΜL (ref 0–0.61)
EOSINOPHIL NFR BLD AUTO: 2 % (ref 0–6)
ERYTHROCYTE [DISTWIDTH] IN BLOOD BY AUTOMATED COUNT: 14.6 % (ref 11.6–15.1)
GFR SERPL CREATININE-BSD FRML MDRD: 24 ML/MIN/1.73SQ M
GFR SERPL CREATININE-BSD FRML MDRD: 28 ML/MIN/1.73SQ M
GLUCOSE SERPL-MCNC: 135 MG/DL (ref 65–140)
GLUCOSE SERPL-MCNC: 231 MG/DL (ref 65–140)
HCT VFR BLD AUTO: 34.6 % (ref 36.5–49.3)
HGB BLD-MCNC: 11.5 G/DL (ref 12–17)
IMM GRANULOCYTES # BLD AUTO: 0.03 THOUSAND/UL (ref 0–0.2)
IMM GRANULOCYTES NFR BLD AUTO: 1 % (ref 0–2)
LYMPHOCYTES # BLD AUTO: 0.71 THOUSANDS/ÂΜL (ref 0.6–4.47)
LYMPHOCYTES NFR BLD AUTO: 11 % (ref 14–44)
MCH RBC QN AUTO: 33.4 PG (ref 26.8–34.3)
MCHC RBC AUTO-ENTMCNC: 33.2 G/DL (ref 31.4–37.4)
MCV RBC AUTO: 101 FL (ref 82–98)
MONOCYTES # BLD AUTO: 0.57 THOUSAND/ÂΜL (ref 0.17–1.22)
MONOCYTES NFR BLD AUTO: 9 % (ref 4–12)
NEUTROPHILS # BLD AUTO: 5.05 THOUSANDS/ÂΜL (ref 1.85–7.62)
NEUTS SEG NFR BLD AUTO: 76 % (ref 43–75)
NRBC BLD AUTO-RTO: 0 /100 WBCS
PLATELET # BLD AUTO: 148 THOUSANDS/UL (ref 149–390)
PMV BLD AUTO: 11 FL (ref 8.9–12.7)
POTASSIUM SERPL-SCNC: 4.9 MMOL/L (ref 3.5–5.3)
POTASSIUM SERPL-SCNC: 5.4 MMOL/L (ref 3.5–5.3)
PROT SERPL-MCNC: 6.8 G/DL (ref 6.4–8.4)
PROT SERPL-MCNC: 7 G/DL (ref 6.4–8.4)
RBC # BLD AUTO: 3.44 MILLION/UL (ref 3.88–5.62)
SODIUM SERPL-SCNC: 134 MMOL/L (ref 135–147)
SODIUM SERPL-SCNC: 135 MMOL/L (ref 135–147)
WBC # BLD AUTO: 6.51 THOUSAND/UL (ref 4.31–10.16)

## 2024-03-05 PROCEDURE — 1123F ACP DISCUSS/DSCN MKR DOCD: CPT | Performed by: STUDENT IN AN ORGANIZED HEALTH CARE EDUCATION/TRAINING PROGRAM

## 2024-03-05 PROCEDURE — 36415 COLL VENOUS BLD VENIPUNCTURE: CPT | Performed by: EMERGENCY MEDICINE

## 2024-03-05 PROCEDURE — 71045 X-RAY EXAM CHEST 1 VIEW: CPT

## 2024-03-05 PROCEDURE — 93005 ELECTROCARDIOGRAM TRACING: CPT

## 2024-03-05 PROCEDURE — 85025 COMPLETE CBC W/AUTO DIFF WBC: CPT | Performed by: EMERGENCY MEDICINE

## 2024-03-05 PROCEDURE — 84484 ASSAY OF TROPONIN QUANT: CPT | Performed by: EMERGENCY MEDICINE

## 2024-03-05 PROCEDURE — 99285 EMERGENCY DEPT VISIT HI MDM: CPT

## 2024-03-05 PROCEDURE — 80053 COMPREHEN METABOLIC PANEL: CPT | Performed by: FAMILY MEDICINE

## 2024-03-05 PROCEDURE — 99223 1ST HOSP IP/OBS HIGH 75: CPT | Performed by: FAMILY MEDICINE

## 2024-03-05 PROCEDURE — 99285 EMERGENCY DEPT VISIT HI MDM: CPT | Performed by: EMERGENCY MEDICINE

## 2024-03-05 PROCEDURE — 83880 ASSAY OF NATRIURETIC PEPTIDE: CPT | Performed by: FAMILY MEDICINE

## 2024-03-05 PROCEDURE — 80053 COMPREHEN METABOLIC PANEL: CPT | Performed by: EMERGENCY MEDICINE

## 2024-03-05 RX ORDER — FUROSEMIDE 10 MG/ML
40 INJECTION INTRAMUSCULAR; INTRAVENOUS 2 TIMES DAILY
Status: DISCONTINUED | OUTPATIENT
Start: 2024-03-05 | End: 2024-03-06

## 2024-03-05 RX ORDER — METOPROLOL SUCCINATE 25 MG/1
25 TABLET, EXTENDED RELEASE ORAL EVERY 24 HOURS
Status: DISCONTINUED | OUTPATIENT
Start: 2024-03-05 | End: 2024-03-11

## 2024-03-05 RX ORDER — CHLORAL HYDRATE 500 MG
2000 CAPSULE ORAL DAILY
Status: DISCONTINUED | OUTPATIENT
Start: 2024-03-06 | End: 2024-03-20 | Stop reason: HOSPADM

## 2024-03-05 RX ORDER — ASPIRIN 81 MG/1
81 TABLET, CHEWABLE ORAL DAILY
Status: DISCONTINUED | OUTPATIENT
Start: 2024-03-06 | End: 2024-03-20 | Stop reason: HOSPADM

## 2024-03-05 RX ORDER — OXYCODONE HCL 5 MG/5 ML
5 SOLUTION, ORAL ORAL EVERY 8 HOURS PRN
Status: DISCONTINUED | OUTPATIENT
Start: 2024-03-05 | End: 2024-03-11

## 2024-03-05 RX ORDER — ONDANSETRON 2 MG/ML
4 INJECTION INTRAMUSCULAR; INTRAVENOUS EVERY 6 HOURS PRN
Status: DISCONTINUED | OUTPATIENT
Start: 2024-03-05 | End: 2024-03-09

## 2024-03-05 RX ORDER — LOSARTAN POTASSIUM 50 MG/1
50 TABLET ORAL DAILY
Status: DISCONTINUED | OUTPATIENT
Start: 2024-03-06 | End: 2024-03-05

## 2024-03-05 RX ORDER — FINASTERIDE 5 MG/1
5 TABLET, FILM COATED ORAL DAILY
Status: DISCONTINUED | OUTPATIENT
Start: 2024-03-06 | End: 2024-03-20 | Stop reason: HOSPADM

## 2024-03-05 RX ORDER — ENOXAPARIN SODIUM 100 MG/ML
1 INJECTION SUBCUTANEOUS
Status: DISCONTINUED | OUTPATIENT
Start: 2024-03-06 | End: 2024-03-06

## 2024-03-05 RX ORDER — PRAVASTATIN SODIUM 40 MG
40 TABLET ORAL
Status: DISCONTINUED | OUTPATIENT
Start: 2024-03-06 | End: 2024-03-20 | Stop reason: HOSPADM

## 2024-03-05 RX ORDER — EZETIMIBE 10 MG/1
10 TABLET ORAL
Status: DISCONTINUED | OUTPATIENT
Start: 2024-03-06 | End: 2024-03-20 | Stop reason: HOSPADM

## 2024-03-05 RX ORDER — ACETAMINOPHEN 325 MG/1
650 TABLET ORAL EVERY 4 HOURS PRN
Status: DISCONTINUED | OUTPATIENT
Start: 2024-03-05 | End: 2024-03-14

## 2024-03-05 RX ADMIN — ACETAMINOPHEN 650 MG: 325 TABLET ORAL at 23:35

## 2024-03-05 NOTE — Clinical Note
The PACER GENERATOR REVA XT DR KATIE TATUM - [id number] device was inserted. The leads were placed into the connector and visually verified to be in correct position. Injury current obtained.

## 2024-03-05 NOTE — Clinical Note
The saphenous vein graft was selectively engaged, injected and visualized. Multiple views of the injected vessel were taken.

## 2024-03-05 NOTE — Clinical Note
The ECG shows a paced rhythm. Pacer turned on. The patient has temporary pacemaker. Pacemaker at 3 mA. ECG rate  = 70 bpm.

## 2024-03-05 NOTE — ED PROVIDER NOTES
History  Chief Complaint   Patient presents with    Chest Pain     Cp prior to arrival, long cardiac history. 324 asa given by EMS     HPI  Patient is a 89-year-old male history of hypertension, renal disorder, CAD with CABG presenting for evaluation of chest pain.  Patient states that he had relatively mild substernal chest pain at approximately 1530 which lasted for about 30 minutes and resolved.  Patient is chest pain-free at this time, denies any associated nausea, vomiting, diaphoresis.  Patient states that he felt some lightheadedness earlier in the day which is since resolved.  Patient transiently hypotensive for EMS, intermittently in a junctional rhythm.  Patient with recent admission for NSTEMI at outside hospital.  Prior to Admission Medications   Prescriptions Last Dose Informant Patient Reported? Taking?   FOLIC ACID-VITAMIN C PO   Yes No   Sig: Take by mouth   Vascepa 1 g CAPS 3/5/2024  Yes Yes   apixaban (ELIQUIS) 2.5 mg   Yes No   Sig: Take 2.5 mg by mouth 2 (two) times a day   aspirin (Aspir-Low) 81 mg EC tablet   Yes No   Sig: every 24 hours   aspirin (ECOTRIN LOW STRENGTH) 81 mg EC tablet 3/5/2024  Yes Yes   Sig: Take 81 mg by mouth daily   clopidogrel (PLAVIX) 75 mg tablet   Yes No   dicyclomine (BENTYL) 10 mg capsule   Yes No   Sig: Take 10 mg by mouth every 6 (six) hours   dutasteride (AVODART) 0.5 mg capsule 3/4/2024  Yes Yes   ezetimibe-simvastatin (VYTORIN) 10-20 mg per tablet   Yes No   Sig: Take 1 tablet by mouth daily at bedtime   famotidine (Pepcid) 20 mg tablet   Yes No   Sig: Pepcid   furosemide (LASIX) 20 mg tablet   Yes No   metoprolol succinate (TOPROL-XL) 25 mg 24 hr tablet   Yes No   Sig: Take 25 mg by mouth daily   metoprolol succinate (TOPROL-XL) 25 mg 24 hr tablet 3/4/2024  Yes Yes   Si capsule every 24 hours   olmesartan (BENICAR) 40 mg tablet 3/4/2024  Yes Yes   Sig: Take 40 mg by mouth daily      Facility-Administered Medications: None       Past Medical History:    Diagnosis Date    Basal cell carcinoma 05/25/2022    Left nasal ala    Basal cell carcinoma (BCC) of antihelix of right ear 05/25/2022    Right antihelix    Coronary artery disease     Hypertension     Renal disorder        Past Surgical History:   Procedure Laterality Date    APPENDECTOMY      CARDIAC SURGERY      EYE SURGERY      HERNIA REPAIR      MOHS SURGERY Left 07/05/2022    Left nasal ala    MOHS SURGERY Right 07/05/2022    Right antihelix    MOHS SURGERY Right 07/12/2022    Right antihelix    SKIN BIOPSY         Family History   Problem Relation Age of Onset    Squamous cell carcinoma Daughter      I have reviewed and agree with the history as documented.    E-Cigarette/Vaping    E-Cigarette Use Never User      E-Cigarette/Vaping Substances    Nicotine No     THC No     CBD No     Flavoring No     Other No     Unknown No      Social History     Tobacco Use    Smoking status: Former     Types: Cigarettes    Smokeless tobacco: Never   Vaping Use    Vaping status: Never Used   Substance Use Topics    Alcohol use: Yes     Comment: twice a year       Review of Systems   Constitutional:  Negative for chills, fatigue and fever.   Respiratory:  Negative for cough and shortness of breath.    Cardiovascular:  Positive for chest pain.   Gastrointestinal:  Negative for diarrhea, nausea and vomiting.   Genitourinary:  Negative for flank pain.   Musculoskeletal:  Negative for arthralgias and myalgias.   Neurological:  Positive for light-headedness. Negative for syncope, weakness, numbness and headaches.   Psychiatric/Behavioral:  Negative for confusion.    All other systems reviewed and are negative.      Physical Exam  Physical Exam  Vitals and nursing note reviewed.   Constitutional:       General: He is not in acute distress.     Appearance: Normal appearance. He is not ill-appearing, toxic-appearing or diaphoretic.      Comments: Appears younger than stated age.  Nontoxic, nondistressed.   HENT:      Head:  Normocephalic and atraumatic.      Comments: Moist mucous membranes     Right Ear: External ear normal.      Left Ear: External ear normal.   Eyes:      General:         Right eye: No discharge.         Left eye: No discharge.   Cardiovascular:      Comments: Regular rate and rhythm however intermittently becoming bradycardic to a rate of about 50.  Patient denies any symptoms during this.  Pulmonary:      Effort: No respiratory distress.      Comments: No increased work of breathing.  Speaking in complete sentences.  Lungs clear to auscultation bilaterally without wheezes, rales, rhonchi.  Satting 98% on room air indicating adequate oxygenation.  Abdominal:      General: There is no distension.      Comments: Abdomen soft, nontender, nondistended without rigidity, rebound, guarding   Musculoskeletal:         General: No deformity.      Cervical back: Normal range of motion.   Skin:     Findings: No lesion or rash.   Neurological:      Mental Status: He is alert and oriented to person, place, and time. Mental status is at baseline.      Comments: Awake, alert, pleasant, interactive   Psychiatric:         Mood and Affect: Mood and affect normal.         Vital Signs  ED Triage Vitals   Temperature Pulse Respirations Blood Pressure SpO2   03/05/24 1821 03/05/24 1821 03/05/24 1821 03/05/24 1821 03/05/24 1821   98.6 °F (37 °C) 57 18 124/60 96 %      Temp Source Heart Rate Source Patient Position - Orthostatic VS BP Location FiO2 (%)   03/05/24 2036 03/05/24 1900 03/05/24 1900 03/05/24 1900 --   Oral Monitor Sitting Right arm       Pain Score       03/05/24 1821       No Pain           Vitals:    03/05/24 1821 03/05/24 1900 03/05/24 1930 03/05/24 2036   BP: 124/60 109/53 110/54 104/50   Pulse: 57 (!) 50 (!) 49 (!) 48   Patient Position - Orthostatic VS:  Sitting Sitting Lying         Visual Acuity      ED Medications  Medications   aspirin chewable tablet 81 mg (has no administration in time range)   finasteride (PROSCAR)  tablet 5 mg (has no administration in time range)   ezetimibe (ZETIA) tablet 10 mg (has no administration in time range)     And   pravastatin (PRAVACHOL) tablet 40 mg (has no administration in time range)   metoprolol succinate (TOPROL-XL) 24 hr tablet 25 mg (25 mg Oral Not Given 3/5/24 2129)   fish oil capsule 2,000 mg (has no administration in time range)   ondansetron (ZOFRAN) injection 4 mg (has no administration in time range)   enoxaparin (LOVENOX) subcutaneous injection 90 mg (has no administration in time range)   acetaminophen (TYLENOL) tablet 650 mg (has no administration in time range)   oxyCODONE (ROXICODONE) oral solution 5 mg (has no administration in time range)   furosemide (LASIX) injection 40 mg (40 mg Intravenous Not Given 3/5/24 2127)       Diagnostic Studies  Results Reviewed       Procedure Component Value Units Date/Time    HS Troponin I 2hr [[id number]]  (Abnormal) Collected: 03/05/24 2023    Lab Status: Final result Specimen: Blood from Arm, Right Updated: 03/05/24 2054     hs TnI 2hr 1,601 ng/L      Delta 2hr hsTnI 325 ng/L     B-Type Natriuretic Peptide(BNP) [[id number]]  (Abnormal) Collected: 03/05/24 1830    Lab Status: Final result Specimen: Blood from Arm, Left Updated: 03/05/24 2024      pg/mL     Comprehensive metabolic panel [[id number]]     Lab Status: No result Specimen: Blood     HS Troponin I 4hr [[id number]]     Lab Status: No result Specimen: Blood     HS Troponin 0hr (reflex protocol) [[id number]]  (Abnormal) Collected: 03/05/24 1830    Lab Status: Final result Specimen: Blood from Arm, Left Updated: 03/05/24 1909     hs TnI 0hr 1,276 ng/L     Comprehensive metabolic panel [[id number]]  (Abnormal) Collected: 03/05/24 1830    Lab Status: Final result Specimen: Blood from Arm, Left Updated: 03/05/24 1902     Sodium 134 mmol/L      Potassium 5.4 mmol/L      Chloride 104 mmol/L      CO2 22 mmol/L      ANION GAP 8 mmol/L      BUN 41 mg/dL      Creatinine 2.01 mg/dL      Glucose  231 mg/dL      Calcium 9.1 mg/dL       U/L       U/L      Alkaline Phosphatase 77 U/L      Total Protein 7.0 g/dL      Albumin 4.1 g/dL      Total Bilirubin 0.48 mg/dL      eGFR 28 ml/min/1.73sq m     Narrative:      National Kidney Disease Foundation guidelines for Chronic Kidney Disease (CKD):     Stage 1 with normal or high GFR (GFR > 90 mL/min/1.73 square meters)    Stage 2 Mild CKD (GFR = 60-89 mL/min/1.73 square meters)    Stage 3A Moderate CKD (GFR = 45-59 mL/min/1.73 square meters)    Stage 3B Moderate CKD (GFR = 30-44 mL/min/1.73 square meters)    Stage 4 Severe CKD (GFR = 15-29 mL/min/1.73 square meters)    Stage 5 End Stage CKD (GFR <15 mL/min/1.73 square meters)  Note: GFR calculation is accurate only with a steady state creatinine    CBC and differential [[id number]]  (Abnormal) Collected: 03/05/24 1830    Lab Status: Final result Specimen: Blood from Arm, Left Updated: 03/05/24 1838     WBC 6.51 Thousand/uL      RBC 3.44 Million/uL      Hemoglobin 11.5 g/dL      Hematocrit 34.6 %       fL      MCH 33.4 pg      MCHC 33.2 g/dL      RDW 14.6 %      MPV 11.0 fL      Platelets 148 Thousands/uL      nRBC 0 /100 WBCs      Neutrophils Relative 76 %      Immat GRANS % 1 %      Lymphocytes Relative 11 %      Monocytes Relative 9 %      Eosinophils Relative 2 %      Basophils Relative 1 %      Neutrophils Absolute 5.05 Thousands/µL      Immature Grans Absolute 0.03 Thousand/uL      Lymphocytes Absolute 0.71 Thousands/µL      Monocytes Absolute 0.57 Thousand/µL      Eosinophils Absolute 0.11 Thousand/µL      Basophils Absolute 0.04 Thousands/µL                    XR chest 1 view portable    (Results Pending)              Procedures  Procedures         ED Course                               SBIRT 20yo+      Flowsheet Row Most Recent Value   Initial Alcohol Screen: US AUDIT-C     1. How often do you have a drink containing alcohol? 0 Filed at: 03/05/2024 1821   2. How many drinks containing  alcohol do you have on a typical day you are drinking?  0 Filed at: 03/05/2024 1821   3a. Male UNDER 65: How often do you have five or more drinks on one occasion? 0 Filed at: 03/05/2024 1821   3b. FEMALE Any Age, or MALE 65+: How often do you have 4 or more drinks on one occassion? 0 Filed at: 03/05/2024 1821   Audit-C Score 0 Filed at: 03/05/2024 1821   DEBBIE: How many times in the past year have you...    Used an illegal drug or used a prescription medication for non-medical reasons? Never Filed at: 03/05/2024 1821                      Medical Decision Making  I obtained history from the patient.  I reviewed external medical documentation regarding patient's recent evaluation for NSTEMI.  Patient pain-free at time of evaluation with transient bradycardia, otherwise normal vital signs.  Differential diagnosis includes but is not limited to ACS, pneumonia, pneumothorax, musculoskeletal pain, GERD.  I ordered and reviewed lab work including CBC, CMP, troponin.  Patient with a grossly elevated initial high-sensitivity troponin.  I ordered and independently interpreted an EKG which demonstrates sinus bradycardia rate of 57 with a first-degree heart block and a right bundle branch block.  I discussed patient with Dr. Rehman with cardiology who reviewed the patient's EKG. Patient had received aspirin prehospital.  Patient remaining symptom-free in the emergency department.  I discussed patient with the hospitalist and admitted patient for further evaluation and management.    Amount and/or Complexity of Data Reviewed  Labs: ordered.  Radiology: ordered.    Risk  Decision regarding hospitalization.             Disposition  Final diagnoses:   Chest pain   NSTEMI (non-ST elevated myocardial infarction) (HCC)     Time reflects when diagnosis was documented in both MDM as applicable and the Disposition within this note       Time User Action Codes Description Comment    3/5/2024  7:31 PM Giorgio Quach Add [R07.9] Chest  pain     3/5/2024  7:31 PM Giorgio Quach Add [I21.4] NSTEMI (non-ST elevated myocardial infarction) (HCC)     3/5/2024  8:10 PM Ahsan Washburn Add [I48.92] Atrial flutter, unspecified type ([facility])           ED Disposition       ED Disposition   Admit    Condition   Stable    Date/Time   Tue Mar 5, 2024 1931    Comment   Case was discussed with LEO and the patient's admission status was agreed to be Admission Status: inpatient status to the service of Dr. Washburn .               Follow-up Information    None         Current Discharge Medication List        CONTINUE these medications which have NOT CHANGED    Details   !! aspirin (ECOTRIN LOW STRENGTH) 81 mg EC tablet Take 81 mg by mouth daily      dutasteride (AVODART) 0.5 mg capsule       !! metoprolol succinate (TOPROL-XL) 25 mg 24 hr tablet 1 capsule every 24 hours      olmesartan (BENICAR) 40 mg tablet Take 40 mg by mouth daily      Vascepa 1 g CAPS       apixaban (ELIQUIS) 2.5 mg Take 2.5 mg by mouth 2 (two) times a day      !! aspirin (Aspir-Low) 81 mg EC tablet every 24 hours      clopidogrel (PLAVIX) 75 mg tablet       dicyclomine (BENTYL) 10 mg capsule Take 10 mg by mouth every 6 (six) hours      ezetimibe-simvastatin (VYTORIN) 10-20 mg per tablet Take 1 tablet by mouth daily at bedtime      famotidine (Pepcid) 20 mg tablet Pepcid      FOLIC ACID-VITAMIN C PO Take by mouth      furosemide (LASIX) 20 mg tablet       !! metoprolol succinate (TOPROL-XL) 25 mg 24 hr tablet Take 25 mg by mouth daily       !! - Potential duplicate medications found. Please discuss with provider.          No discharge procedures on file.    PDMP Review       None            ED Provider  Electronically Signed by             Giorgio Quach MD  03/05/24 4177

## 2024-03-05 NOTE — Clinical Note
Temp pacer inserted via right femoral venous sheath. Temp pacer turned on and rate set to 70, with mA set to 2.5.

## 2024-03-06 ENCOUNTER — APPOINTMENT (INPATIENT)
Dept: NON INVASIVE DIAGNOSTICS | Facility: HOSPITAL | Age: 89
DRG: 243 | End: 2024-03-06
Payer: COMMERCIAL

## 2024-03-06 ENCOUNTER — APPOINTMENT (OUTPATIENT)
Dept: RADIOLOGY | Facility: HOSPITAL | Age: 89
DRG: 243 | End: 2024-03-06
Payer: COMMERCIAL

## 2024-03-06 ENCOUNTER — APPOINTMENT (INPATIENT)
Dept: RADIOLOGY | Facility: HOSPITAL | Age: 89
DRG: 243 | End: 2024-03-06
Payer: COMMERCIAL

## 2024-03-06 LAB
ANION GAP SERPL CALCULATED.3IONS-SCNC: 11 MMOL/L
AORTIC ROOT: 3 CM
AORTIC VALVE MEAN VELOCITY: 10.9 M/S
APICAL FOUR CHAMBER EJECTION FRACTION: 54 %
APTT PPP: 29 SECONDS (ref 23–37)
APTT PPP: 78 SECONDS (ref 23–37)
ATRIAL RATE: 23 BPM
ATRIAL RATE: 312 BPM
ATRIAL RATE: 42 BPM
ATRIAL RATE: 57 BPM
AV AREA BY CONTINUOUS VTI: 1.4 CM2
AV AREA PEAK VELOCITY: 1.2 CM2
AV LVOT MEAN GRADIENT: 1 MMHG
AV LVOT PEAK GRADIENT: 2 MMHG
AV MEAN GRADIENT: 6 MMHG
AV PEAK GRADIENT: 10 MMHG
AV VALVE AREA: 1.44 CM2
AV VELOCITY RATIO: 0.43
BSA FOR ECHO PROCEDURE: 2.17 M2
BUN SERPL-MCNC: 49 MG/DL (ref 5–25)
CALCIUM SERPL-MCNC: 8.4 MG/DL (ref 8.4–10.2)
CARDIAC TROPONIN I PNL SERPL HS: 3126 NG/L (ref 8–18)
CARDIAC TROPONIN I PNL SERPL HS: 5951 NG/L (ref 8–18)
CARDIAC TROPONIN I PNL SERPL HS: ABNORMAL NG/L (ref 8–18)
CARDIAC TROPONIN I PNL SERPL HS: ABNORMAL NG/L (ref 8–18)
CHLORIDE SERPL-SCNC: 107 MMOL/L (ref 96–108)
CO2 SERPL-SCNC: 17 MMOL/L (ref 21–32)
CREAT SERPL-MCNC: 2.65 MG/DL (ref 0.6–1.3)
DOP CALC AO PEAK VEL: 1.57 M/S
DOP CALC AO VTI: 26.96 CM
DOP CALC LVOT AREA: 2.83 CM2
DOP CALC LVOT CARDIAC INDEX: 1.07 L/MIN/M2
DOP CALC LVOT CARDIAC OUTPUT: 2.33 L/MIN
DOP CALC LVOT DIAMETER: 1.9 CM
DOP CALC LVOT PEAK VEL VTI: 13.74 CM
DOP CALC LVOT PEAK VEL: 0.68 M/S
DOP CALC LVOT STROKE INDEX: 17.5 ML/M2
DOP CALC LVOT STROKE VOLUME: 38.94
E WAVE DECELERATION TIME: 133 MS
E/A RATIO: 2.4
ERYTHROCYTE [DISTWIDTH] IN BLOOD BY AUTOMATED COUNT: 14.7 % (ref 11.6–15.1)
FRACTIONAL SHORTENING: 26 (ref 28–44)
GFR SERPL CREATININE-BSD FRML MDRD: 20 ML/MIN/1.73SQ M
GLUCOSE SERPL-MCNC: 175 MG/DL (ref 65–140)
HCT VFR BLD AUTO: 35 % (ref 36.5–49.3)
HGB BLD-MCNC: 11.5 G/DL (ref 12–17)
INTERVENTRICULAR SEPTUM IN DIASTOLE (PARASTERNAL SHORT AXIS VIEW): 1.3 CM
INTERVENTRICULAR SEPTUM: 1.3 CM (ref 0.6–1.1)
LAAS-AP2: 27.5 CM2
LAAS-AP4: 27.7 CM2
LEFT ATRIUM SIZE: 4.3 CM
LEFT ATRIUM VOLUME (MOD BIPLANE): 88 ML
LEFT ATRIUM VOLUME INDEX (MOD BIPLANE): 40.6 ML/M2
LEFT INTERNAL DIMENSION IN SYSTOLE: 2.5 CM (ref 2.1–4)
LEFT VENTRICULAR INTERNAL DIMENSION IN DIASTOLE: 3.4 CM (ref 3.5–6)
LEFT VENTRICULAR POSTERIOR WALL IN END DIASTOLE: 1.4 CM
LEFT VENTRICULAR STROKE VOLUME: 25 ML
LVSV (TEICH): 25 ML
MAGNESIUM SERPL-MCNC: 2.2 MG/DL (ref 1.9–2.7)
MCH RBC QN AUTO: 32.9 PG (ref 26.8–34.3)
MCHC RBC AUTO-ENTMCNC: 32.9 G/DL (ref 31.4–37.4)
MCV RBC AUTO: 100 FL (ref 82–98)
MV E'TISSUE VEL-SEP: 5 CM/S
MV PEAK A VEL: 0.47 M/S
MV PEAK E VEL: 113 CM/S
MV STENOSIS PRESSURE HALF TIME: 38 MS
MV VALVE AREA P 1/2 METHOD: 5.79
P AXIS: 52 DEGREES
P AXIS: 58 DEGREES
P AXIS: 70 DEGREES
PLATELET # BLD AUTO: 167 THOUSANDS/UL (ref 149–390)
PMV BLD AUTO: 11.1 FL (ref 8.9–12.7)
POTASSIUM SERPL-SCNC: 5.3 MMOL/L (ref 3.5–5.3)
PR INTERVAL: 380 MS
QRS AXIS: 19 DEGREES
QRS AXIS: 21 DEGREES
QRS AXIS: 28 DEGREES
QRS AXIS: 34 DEGREES
QRSD INTERVAL: 134 MS
QRSD INTERVAL: 138 MS
QRSD INTERVAL: 144 MS
QRSD INTERVAL: 146 MS
QT INTERVAL: 382 MS
QT INTERVAL: 404 MS
QT INTERVAL: 414 MS
QT INTERVAL: 420 MS
QTC INTERVAL: 353 MS
QTC INTERVAL: 371 MS
QTC INTERVAL: 375 MS
QTC INTERVAL: 379 MS
RBC # BLD AUTO: 3.5 MILLION/UL (ref 3.88–5.62)
RIGHT ATRIUM AREA SYSTOLE A4C: 42 CM2
RIGHT VENTRICLE ID DIMENSION: 4.7 CM
SL CV LEFT ATRIUM LENGTH A2C: 6.5 CM
SL CV LV EF: 55
SL CV PED ECHO LEFT VENTRICLE DIASTOLIC VOLUME (MOD BIPLANE) 2D: 49 ML
SL CV PED ECHO LEFT VENTRICLE SYSTOLIC VOLUME (MOD BIPLANE) 2D: 23 ML
SODIUM SERPL-SCNC: 135 MMOL/L (ref 135–147)
T WAVE AXIS: -35 DEGREES
T WAVE AXIS: 26 DEGREES
T WAVE AXIS: 54 DEGREES
T WAVE AXIS: 73 DEGREES
TRICUSPID ANNULAR PLANE SYSTOLIC EXCURSION: 0.8 CM
VENTRICULAR RATE: 44 BPM
VENTRICULAR RATE: 48 BPM
VENTRICULAR RATE: 53 BPM
VENTRICULAR RATE: 57 BPM
WBC # BLD AUTO: 7.92 THOUSAND/UL (ref 4.31–10.16)

## 2024-03-06 PROCEDURE — 93010 ELECTROCARDIOGRAM REPORT: CPT | Performed by: INTERNAL MEDICINE

## 2024-03-06 PROCEDURE — 84484 ASSAY OF TROPONIN QUANT: CPT | Performed by: FAMILY MEDICINE

## 2024-03-06 PROCEDURE — 76775 US EXAM ABDO BACK WALL LIM: CPT

## 2024-03-06 PROCEDURE — 93306 TTE W/DOPPLER COMPLETE: CPT | Performed by: INTERNAL MEDICINE

## 2024-03-06 PROCEDURE — A9539 TC99M PENTETATE: HCPCS

## 2024-03-06 PROCEDURE — 99223 1ST HOSP IP/OBS HIGH 75: CPT | Performed by: INTERNAL MEDICINE

## 2024-03-06 PROCEDURE — 84484 ASSAY OF TROPONIN QUANT: CPT | Performed by: NURSE PRACTITIONER

## 2024-03-06 PROCEDURE — 78582 LUNG VENTILAT&PERFUS IMAGING: CPT

## 2024-03-06 PROCEDURE — 85730 THROMBOPLASTIN TIME PARTIAL: CPT | Performed by: FAMILY MEDICINE

## 2024-03-06 PROCEDURE — 93306 TTE W/DOPPLER COMPLETE: CPT

## 2024-03-06 PROCEDURE — A9540 TC99M MAA: HCPCS

## 2024-03-06 PROCEDURE — 85730 THROMBOPLASTIN TIME PARTIAL: CPT | Performed by: STUDENT IN AN ORGANIZED HEALTH CARE EDUCATION/TRAINING PROGRAM

## 2024-03-06 PROCEDURE — 83735 ASSAY OF MAGNESIUM: CPT | Performed by: FAMILY MEDICINE

## 2024-03-06 PROCEDURE — 80048 BASIC METABOLIC PNL TOTAL CA: CPT | Performed by: FAMILY MEDICINE

## 2024-03-06 PROCEDURE — 85027 COMPLETE CBC AUTOMATED: CPT | Performed by: FAMILY MEDICINE

## 2024-03-06 PROCEDURE — 99232 SBSQ HOSP IP/OBS MODERATE 35: CPT | Performed by: STUDENT IN AN ORGANIZED HEALTH CARE EDUCATION/TRAINING PROGRAM

## 2024-03-06 RX ORDER — HEPARIN SODIUM 1000 [USP'U]/ML
4000 INJECTION, SOLUTION INTRAVENOUS; SUBCUTANEOUS EVERY 6 HOURS PRN
Status: DISCONTINUED | OUTPATIENT
Start: 2024-03-06 | End: 2024-03-10

## 2024-03-06 RX ORDER — NITROGLYCERIN 0.1MG/HR
0.1 PATCH, TRANSDERMAL 24 HOURS TRANSDERMAL DAILY
Status: DISCONTINUED | OUTPATIENT
Start: 2024-03-06 | End: 2024-03-20 | Stop reason: HOSPADM

## 2024-03-06 RX ORDER — NITROGLYCERIN 0.1MG/HR
0.1 PATCH, TRANSDERMAL 24 HOURS TRANSDERMAL DAILY
Status: DISCONTINUED | OUTPATIENT
Start: 2024-03-06 | End: 2024-03-06

## 2024-03-06 RX ORDER — HEPARIN SODIUM 1000 [USP'U]/ML
2000 INJECTION, SOLUTION INTRAVENOUS; SUBCUTANEOUS EVERY 6 HOURS PRN
Status: DISCONTINUED | OUTPATIENT
Start: 2024-03-06 | End: 2024-03-10

## 2024-03-06 RX ORDER — HEPARIN SODIUM 10000 [USP'U]/100ML
3-20 INJECTION, SOLUTION INTRAVENOUS
Status: DISCONTINUED | OUTPATIENT
Start: 2024-03-06 | End: 2024-03-10

## 2024-03-06 RX ADMIN — PRAVASTATIN SODIUM 40 MG: 40 TABLET ORAL at 17:52

## 2024-03-06 RX ADMIN — EZETIMIBE 10 MG: 10 TABLET ORAL at 17:52

## 2024-03-06 RX ADMIN — ASPIRIN 81 MG 81 MG: 81 TABLET ORAL at 09:38

## 2024-03-06 RX ADMIN — METOPROLOL SUCCINATE 25 MG: 25 TABLET, EXTENDED RELEASE ORAL at 21:53

## 2024-03-06 RX ADMIN — HEPARIN SODIUM 11.1 UNITS/KG/HR: 10000 INJECTION, SOLUTION INTRAVENOUS at 12:08

## 2024-03-06 RX ADMIN — OMEGA-3 FATTY ACIDS CAP 1000 MG 2000 MG: 1000 CAP at 09:38

## 2024-03-06 RX ADMIN — FINASTERIDE 5 MG: 5 TABLET, FILM COATED ORAL at 09:38

## 2024-03-06 RX ADMIN — MORPHINE SULFATE 2 MG: 2 INJECTION, SOLUTION INTRAMUSCULAR; INTRAVENOUS at 01:22

## 2024-03-06 RX ADMIN — SODIUM BICARBONATE 100 ML/HR: 84 INJECTION, SOLUTION INTRAVENOUS at 09:48

## 2024-03-06 RX ADMIN — NITROGLYCERIN 0.1 MG: 0.1 PATCH TRANSDERMAL at 01:10

## 2024-03-06 RX ADMIN — NITROGLYCERIN 0.1 MG: 0.1 PATCH TRANSDERMAL at 09:38

## 2024-03-06 NOTE — CASE MANAGEMENT
Case Management Assessment & Discharge Planning Note    Patient name Vladimir [facility]  Location [facility] [place] [facility] [place] [facility]-* MRN [id number]  : 12/3/1932 Date 3/6/2024       Current Admission Date: 3/5/2024  Current Admission Diagnosis:NSTEMI (non-ST elevated myocardial infarction) ([facility])   Patient Active Problem List    Diagnosis Date Noted    BPH (benign prostatic hyperplasia) 2024    CAD (coronary artery disease) 2024    CKD (chronic kidney disease) 2024    Essential hypertension 2024    Atrial flutter (HCC) 2024    TUAN (acute kidney injury) ([facility]) 2024    Hyperkalemia 2024    Elevated LFTs 2024    NSTEMI (non-ST elevated myocardial infarction) ([facility]) 2024    Nonrheumatic aortic valve stenosis 2017    S/P CABG (coronary artery bypass graft) 2016    Hyperlipidemia 2016      LOS (days): 1  Geometric Mean LOS (GMLOS) (days): 2.4  Days to GMLOS:1.5     OBJECTIVE:    Risk of Unplanned Readmission Score: 15.52         Current admission status: Inpatient       Preferred Pharmacy:   PATIENT/FAMILY REPORTS NO PREFERRED PHARMACY  No address on file      [id number] - [place], [place] - [address]  [address] [address]  Phone: [phone] Fax: [phone]    Primary Care Provider: No primary care provider on file.    Primary Insurance: [employer] REP  Secondary Insurance:     ASSESSMENT:  Active Health Care Proxies    There are no active Health Care Proxies on file.  Readmission Root Cause  30 Day Readmission: No    Patient Information  Admitted from:: Home  Mental Status: Alert  During Assessment patient was accompanied by: Not accompanied during assessment  Assessment information provided by:: Patient  Primary Caregiver: Self  Support Systems: Self, Son, Daughter, Friends/neighbors  County of Residence: [place]  What city do you live in?: [place], [place]  Home entry access options. Select all that apply.: No steps to enter  home  Type of Current Residence: [facility]  Living Arrangements: Lives Alone    Activities of Daily Living Prior to Admission  Functional Status: Independent  Completes ADLs independently?: Yes  Ambulates independently?: Yes  Does patient use assisted devices?: No  Does patient currently own DME?: Yes  What DME does the patient currently own?: [employer]  Does the patient have a history of Short-Term Rehab?: Yes  Does patient have a history of HHC?: No  Does patient currently have HHC?: No     Patient Information Continued  Income Source: Pension/[employer]  Does patient have prescription coverage?: Yes  Does patient receive dialysis treatments?: No     Means of Transportation  Means of Transport to Appts:: Drives Self    Social Determinants of Health (SDOH)      Flowsheet Row Most Recent Value   Housing Stability    In the last 12 months, was there a time when you were not able to pay the mortgage or rent on time? N   In the last 12 months, how many places have you lived? 1   In the last 12 months, was there a time when you did not have a steady place to sleep or slept in a shelter (including now)? N   Transportation Needs    In the past 12 months, has lack of transportation kept you from medical appointments or from getting medications? no   In the past 12 months, has lack of transportation kept you from meetings, work, or from getting things needed for daily living? No   Food Insecurity    Within the past 12 months, you worried that your food would run out before you got the money to buy more. Never true   Within the past 12 months, the food you bought just didn't last and you didn't have money to get more. Never true   Utilities    In the past 12 months has the electric, gas, oil, or water company threatened to shut off services in your home? No          DISCHARGE DETAILS:    Discharge planning discussed with:: Patient  Freedom of Choice: Yes  Comments - Freedom of Choice: SW met bedside with patient to introduce  role, complete assessment, and discuss discharge planning needs. Patient states he is choosing to return home at discharge. Patient is open to [facility] if needed, but states he would like to have further discussion with providers regarding their recommendations.     Were Treatment Team discharge recommendations reviewed with patient/caregiver?: Yes  Did patient/caregiver verbalize understanding of patient care needs?: Yes  Were patient/caregiver advised of the risks associated with not following Treatment Team discharge recommendations?: Yes    Contacts  Patient Contacts: Vladimir Jr Stew. (son)  Relationship to Patient:: Family  Contact Method: Phone  Phone Number: [phone]  Reason/Outcome: Emergency Contact    Discharge Destination Plan:: Home, Home with Home Health Care  Transport at Discharge : Family

## 2024-03-06 NOTE — QUICK NOTE
In follow-up with the patient, having some chest discomfort.  Will place the patient on a nitro patch.  Patient's blood pressure has dipped a little will discontinue his Lasix at this time.

## 2024-03-06 NOTE — CONSULTS
Consultation - Cardiology   Vladimir Mathias 89 y.o. male MRN: [id number]  Unit/Bed#: [address] [place] [address] Encounter: [id number]    Assessment/Plan     Assessment:  1. MI type I  2. Coronary artery disease with history of CABG  3. TUAN on chronic kidney disease  4. Hypertension  5. Paroxysmal atrial fibrillation flutter: patient has watchmen device  6. Aortic stenosis status post TAVR  7. History of epitaxis with anticoagulation (Xarelto): patient has watchmen device        Plan:  Patient has been admitted to the hospitalist service  1. Continue trend troponins until they peak    2. Will obtain 2D echocardiogram to evaluate cardiac function, EF on cardiac CTA in 2021 was documented as 70%.    3. Will consult nephrology to evaluate new acute kidney injury as patient may need diagnostic left heart Catheterization    4. Patient refusing any type of anticoagulation due to history of bleeding. Did discuss with him concerned that he is having an MI and that is recommended he be on anticoagulation. Patient still adamantly does not want heparin drip    5. Continue Toprol-XL 25 mg once a day    6. Continue low-dose nitroglycerin patch at 0.1 mg/hour on in the a.m. often the p.m.    7. Patient on Vytorin 10/40 mg at home, will continue at this time    8. Continue aspirin 81 mg once a day    9. Further orders as patient condition and testing warrant      History of Present Illness   Physician Requesting Consult: Shae Camacho DO  Reason for Consult / Principal Problem: chest discomfort with elevated high-sensitivity troponins concerning for MI type I      HPI: Vladimir Mathias is a 89 y.o. year old male who presented to the emergency room by EMS with complaints of intermittent chest heaviness associated with shortness of breath and general malaise. Patient also notes that he has been more short of breath lately with general activity. Patient lives by himself after his wife recently passed away late last year and he was concerned so he called EMS.  EMS administered full dose aspirin and brought him to [facility] for evaluation. Initial high-sensitivity troponin was 1276, this morning it was 3126. Will continue to monitor his troponins.    Of note patient was seen at [facility] on 3/3/2024 where he presented with similar symptoms. His admitted under observation, his discomfort was attributed to atrial flutter and he was discharged home. Of note high-sensitivity troponins on 3/3 were 196, 234 and 346.    Patient has a history for coronary artery disease with two vessel CABG in 2009 (Lima to LAD and saphenous vein graft marginal). He also has a history of aortic stenosis for which she underwent TAVR on 1/31/2021, paroxysmal atrial fibrillation flutter, bleeding with Xarelto and eliquis for which he now underwent a watchmen device in 2023. He follows with Dr. Walton at [facility], [phone] - [phone].    Other history is for hypertension, dyslipidemia, BPH, chronic kidney disease where baseline creatinine appears to be 1.44.    Patient had cardiac CTA on 12/15/2021 at that time it was noted that both bypass graphs were patent, he had chronic total occlusion of his LAD, diffuse plaque in both left circumflex and RCA.        Inpatient consult to Cardiology  Consult performed by: JESSICA Charles  Consult ordered by: Ahsan Washburn MD        Review of Systems   Constitutional: Negative.  Negative for activity change and fatigue.   HENT: Negative.  Negative for congestion, facial swelling, sinus pain and tinnitus.    Eyes: Negative.  Negative for photophobia and visual disturbance.   Respiratory:  Positive for chest tightness. Negative for shortness of breath.    Cardiovascular:  Positive for chest pain. Negative for palpitations and leg swelling.   Gastrointestinal:  Negative for abdominal distention, constipation, diarrhea, nausea and vomiting.   Endocrine: Negative.  Negative for polydipsia, polyphagia and  polyuria.   Genitourinary: Negative.  Negative for difficulty urinating.   Musculoskeletal: Negative.    Skin: Negative.    Neurological: Negative.  Negative for dizziness, syncope, weakness and light-headedness.   Hematological: Negative.    Psychiatric/Behavioral: Negative.         Historical Information   Past Medical History:   Diagnosis Date    Basal cell carcinoma 05/25/2022    Left nasal ala    Basal cell carcinoma (BCC) of antihelix of right ear 05/25/2022    Right antihelix    Coronary artery disease     Hypertension     Renal disorder      Past Surgical History:   Procedure Laterality Date    APPENDECTOMY      CARDIAC SURGERY      EYE SURGERY      HERNIA REPAIR      MOHS SURGERY Left 07/05/2022    Left nasal ala    MOHS SURGERY Right 07/05/2022    Right antihelix    MOHS SURGERY Right 07/12/2022    Right antihelix    SKIN BIOPSY       Social History     Substance and Sexual Activity   Alcohol Use Yes    Comment: twice a year     Social History     Substance and Sexual Activity   Drug Use Not on file     E-Cigarette/Vaping    E-Cigarette Use Never User      E-Cigarette/Vaping Substances    Nicotine No     THC No     CBD No     Flavoring No     Other No     Unknown No      Social History     Tobacco Use   Smoking Status Former    Types: Cigarettes   Smokeless Tobacco Never     Family History:   Family History   Problem Relation Age of Onset    Squamous cell carcinoma Daughter        Meds/Allergies   all current active meds have been reviewed, current meds:   Current Facility-Administered Medications   Medication Dose Route Frequency    acetaminophen (TYLENOL) tablet 650 mg  650 mg Oral Q4H PRN    aspirin chewable tablet 81 mg  81 mg Oral Daily    ezetimibe (ZETIA) tablet 10 mg  10 mg Oral Daily With Dinner    And    pravastatin (PRAVACHOL) tablet 40 mg  40 mg Oral Daily With Dinner    finasteride (PROSCAR) tablet 5 mg  5 mg Oral Daily    fish oil capsule 2,000 mg  2,000 mg Oral Daily    heparin (porcine)  25,000 units in 0.45% NaCl 250 mL infusion (premix)  3-20 Units/kg/hr (Order-Specific) Intravenous Titrated    heparin (porcine) injection 2,000 Units  2,000 Units Intravenous Q6H PRN    heparin (porcine) injection 4,000 Units  4,000 Units Intravenous Q6H PRN    metoprolol succinate (TOPROL-XL) 24 hr tablet 25 mg  25 mg Oral Q24H    morphine injection 2 mg  2 mg Intravenous Q6H PRN    nitroglycerin (NITRODUR) 0.1 mg/hr TD 24 hr patch  0.1 mg Transdermal Daily    ondansetron (ZOFRAN) injection 4 mg  4 mg Intravenous Q6H PRN    oxyCODONE (ROXICODONE) oral solution 5 mg  5 mg Oral Q8H PRN    sodium bicarbonate 150 mEq in dextrose 5 % 1,000 mL infusion  100 mL/hr Intravenous Continuous   , and PTA meds:   Prior to Admission Medications   Prescriptions Last Dose Informant Patient Reported? Taking?   FOLIC ACID-VITAMIN C PO Not Taking  Yes No   Sig: Take by mouth   Patient not taking: Reported on 3/6/2024   Vascepa 1 g CAPS 3/5/2024  Yes Yes   apixaban (ELIQUIS) 2.5 mg   Yes No   Sig: Take 2.5 mg by mouth 2 (two) times a day   aspirin (Aspir-Low) 81 mg EC tablet   Yes No   Sig: every 24 hours   aspirin (ECOTRIN LOW STRENGTH) 81 mg EC tablet 3/5/2024  Yes Yes   Sig: Take 81 mg by mouth daily   clopidogrel (PLAVIX) 75 mg tablet Unknown  Yes No   dicyclomine (BENTYL) 10 mg capsule Not Taking  Yes No   Sig: Take 10 mg by mouth every 6 (six) hours   Patient not taking: Reported on 3/6/2024   dutasteride (AVODART) 0.5 mg capsule 3/4/2024  Yes Yes   ezetimibe-simvastatin (VYTORIN) 10-20 mg per tablet 3/4/2024  Yes No   Sig: Take 1 tablet by mouth daily at bedtime   famotidine (Pepcid) 20 mg tablet Unknown  Yes No   Sig: Pepcid   furosemide (LASIX) 20 mg tablet Not Taking  Yes No   Patient not taking: Reported on 3/6/2024   metoprolol succinate (TOPROL-XL) 25 mg 24 hr tablet   Yes No   Sig: Take 25 mg by mouth daily   metoprolol succinate (TOPROL-XL) 25 mg 24 hr tablet 3/4/2024  Yes Yes   Si capsule every 24 hours  "  olmesartan (BENICAR) 40 mg tablet 3/4/2024  Yes Yes   Sig: Take 40 mg by mouth daily      Facility-Administered Medications: None     No Known Allergies    Objective   Vitals: Blood pressure 115/62, pulse 63, temperature (!) 97.1 °F (36.2 °C), resp. rate 18, height 6' 1\" (1.854 m), weight 93 kg (205 lb), SpO2 98%.  Orthostatic Blood Pressures      Flowsheet Row Most Recent Value   Blood Pressure 115/62 filed at 03/06/2024 0708   Patient Position - Orthostatic VS Lying filed at 03/06/2024 0100              Intake/Output Summary (Last 24 hours) at 3/6/2024 0800  Last data filed at 3/6/2024 0501  Gross per 24 hour   Intake 200 ml   Output 200 ml   Net 0 ml       Invasive Devices       Peripheral Intravenous Line  Duration             Peripheral IV 03/05/24 Left Antecubital <1 day    Peripheral IV 03/05/24 Left Forearm <1 day                    Physical Exam  Vitals and nursing note reviewed.   Constitutional:       General: He is not in acute distress.     Appearance: Normal appearance. He is normal weight.   HENT:      Right Ear: External ear normal.      Left Ear: External ear normal.   Eyes:      General: No scleral icterus.        Right eye: No discharge.         Left eye: No discharge.   Cardiovascular:      Rate and Rhythm: Normal rate and regular rhythm.      Pulses: Normal pulses.   Pulmonary:      Effort: Pulmonary effort is normal. No respiratory distress.      Breath sounds: Normal breath sounds.   Abdominal:      General: Bowel sounds are normal. There is no distension.      Palpations: Abdomen is soft.   Musculoskeletal:      Right lower leg: No edema.      Left lower leg: No edema.   Skin:     General: Skin is warm and dry.      Capillary Refill: Capillary refill takes less than 2 seconds.   Neurological:      General: No focal deficit present.      Mental Status: He is alert and oriented to person, place, and time. Mental status is at baseline.   Psychiatric:         Mood and Affect: Mood normal. "         Lab Results: I have personally reviewed pertinent lab results.    CBC with diff:   Results from last 7 days   Lab Units 03/06/24  0540   WBC Thousand/uL 7.92   RBC Million/uL 3.50*   HEMOGLOBIN g/dL 11.5*   HEMATOCRIT % 35.0*   MCV fL 100*   MCH pg 32.9   MCHC g/dL 32.9   RDW % 14.7   MPV fL 11.1   PLATELETS Thousands/uL 167     CMP:   Results from last 7 days   Lab Units 03/06/24  0540 03/05/24  2239   SODIUM mmol/L 135 135   CHLORIDE mmol/L 107 106   CO2 mmol/L 17* 20*   BUN mg/dL 49* 45*   CREATININE mg/dL 2.65* 2.24*   CALCIUM mg/dL 8.4 9.0   AST U/L  --  210*   ALT U/L  --  198*   ALK PHOS U/L  --  76   EGFR ml/min/1.73sq m 20 24     HS Troponin:   0   Lab Value Date/Time    HSTNI 3,126 (H) 03/06/2024 0540    HSTNI0 1,276 (H) 03/05/2024 1830    HSTNI2 1,601 (H) 03/05/2024 2023    HSTNI4 1,808 (H) 03/05/2024 2239     BNP:   Results from last 7 days   Lab Units 03/06/24  0540   POTASSIUM mmol/L 5.3   CHLORIDE mmol/L 107   CO2 mmol/L 17*   BUN mg/dL 49*   CREATININE mg/dL 2.65*   CALCIUM mg/dL 8.4   EGFR ml/min/1.73sq m 20       Magnesium:   Results from last 7 days   Lab Units 03/06/24  0540   MAGNESIUM mg/dL 2.2     Lipid Profile:     Imaging: I have personally reviewed pertinent reports.    EKG: initial EKG demonstrates sinus bradycardia with the marked first-degree AV heart block with MI interval measuring greater than 0.34 and right bundle branch block. I did find documentation from 1/31/2022 and a note from Dr. Franz that he at that time had right bundle branch block with first-degree heart block.  VTE Prophylaxis: Sequential compression device (Venodyne)  and Heparin    Code Status: Level 3 - DNAR and DNI  Advance Directive and Living Will:      Power of :    POLST:      Roula LOPEZ  Cardiology

## 2024-03-06 NOTE — H&P
[facility]  H&P  Name: Vladimir Mathias 89 y.o. male I MRN: [id number]  Unit/Bed#: [address] [place] [address] I Date of Admission: 3/5/2024   Date of Service: 3/5/2024 I Hospital Day: 0      Assessment/Plan   * NSTEMI (non-ST elevated myocardial infarction) (HCC)  Assessment & Plan  Patient with chest pain and dyspnea exertion x 4 days.  Recent admission at [facility] on March 3 and discharged same day.  At that time noticed that his chest pain was secondary to atrial flutter.  His troponins at that time were 196, 234, 346    Current troponin 1276, trend troponins  Patient received 324 mg of aspirin via EMS  Will place on Lovenox 1 mg/kg every 12 hours  Consult cardiology  Echocardiogram in the morning        Acute congestive heart failure (HCC)  Assessment & Plan  Chest x-ray shortness of pulmonary vascular congestion, the patient states that he has been having dyspnea on exertion x 4 days.  He also has elevated LFTs which could suggest hepatic congestion he used to be on Lasix which was discontinued because he did not have any further edema.  Also noting that his heart rate is in the 50s and his baseline heart rate at home is in the 80s.  Will do Lasix 40 mg IV every 12 hours  Obtain echocardiogram    Wt Readings from Last 3 Encounters:   03/05/24 86.2 kg (190 lb)   08/14/23 88.1 kg (194 lb 3.2 oz)   07/12/22 88.5 kg (195 lb)               Atrial flutter (HCC)  Assessment & Plan  Continue with Lopressor.  Patient states his heart rates are usually in the 80s, he is currently in the 50s.  Status post watchman flex (24 mm) in 2023    On his hospitalization at [facility] March 3, it seems likely discontinue Xarelto.  Unclear why    TUAN (acute kidney injury) (HCC)  Assessment & Plan  Current creatinine 2.01.  Baseline creatinine is 1.4  Will stop ACE inhibitor    CKD (chronic kidney disease)  Assessment & Plan  Patient's baseline creatinine is 1.4, currently 2.01.  He follows with  nephrology every 6 months      Lab Results   Component Value Date    EGFR 28 03/05/2024    CREATININE 2.01 (H) 03/05/2024       Hyperkalemia  Assessment & Plan  Current potassium is 5.4.  Will stop his ACE inhibitor at this time  Repeat CMP in the morning    Elevated LFTs  Assessment & Plan  Elevated AST and ALT which I think is secondary to hepatic congestion.  Will repeat LFTs in the morning    CAD (coronary artery disease)  Assessment & Plan  CAD status post CABG 2009. Lima to LAD and saphenous vein graft to circumflex marginal, Cardiac CTA 12/15/2021 revealed patent LIMA to LAD, patent vein graft to circumflex obtuse marginal #1 an extensive plaque in the native RCA,     Nonrheumatic aortic valve stenosis  Assessment & Plan  Status post Medtr[employer] evolute valve in 2022    S/P CABG (coronary artery bypass graft)  Assessment & Plan  CAD status post CABG 2009. Lima to LAD and saphenous vein graft to circumflex marginal, Cardiac CTA 12/15/2021 revealed patent LIMA to LAD, patent vein graft to circumflex obtuse marginal #1 an extensive plaque in the native RCA,     Continue aspirin.  Used to be on Plavix which was discontinued  Continue Vytorin    Essential hypertension  Assessment & Plan  Continue Lopressor, ACE inhibitor discontinued to TUAN and hyperkalemia    Hyperlipidemia  Assessment & Plan  Continue Vytorin    BPH (benign prostatic hyperplasia)  Assessment & Plan  Avodart substituted for finasteride         Disposition  #1 trend troponins  #2 Lovenox 1 mg/kg every 12 hours  #3 cardiology consultation  #4 Lasix 40 mg IV every 12 hours  #5 echocardiogram  #6 repeat CBC, CMP and magnesium in the morning  #7 monitor renal function        VTE Prophylaxis: Enoxaparin (Lovenox)  / sequential compression device   Code Status: Level 3 - DNAR and DNI       Anticipated Length of Stay:  Patient will be admitted on an Inpatient basis with an anticipated length of stay of greater than 2 midnights.   Justification for Hospital  Stay: Please see detailed plans noted above.    Chief Complaint:     Chest pain shortness of breath x 4 days  History of Present Illness:  Vladimir Mathias is a 89 y.o. male who has past medical history significant for CAD status post CABG 2009-2 vessels, atrial fibs/flutter status post Watchman in 2023, hearing stenosis status post valve replacement in 2022, hypertension, hyperlipidemia, BPH, who comes in for shortness of breath and just not feeling right today.  He lives by himself and call 9 1.  He is independent.  Patient was given aspirin via EMS and is chest pain-free.  He had elevated troponins in the emergency room.  Patient recently had a observation stay at [facility] on March 3, 2024 where they thought his chest pain was secondary to atrial flutter.  He was discharged home.  Patient has all his consultants at [facility] including cardiology and nephrology.  He tells me that he sees his cardiologist every 3 months and nephrologist every 6 months.  He has a home blood pressure monitor and he monitors blood pressure at home and his heart rate.  He states his heart rates are usually in the 80s, currently in the 50s here at this time.  He used to be on Lasix, but stopped taking that because he did not have any further edema.  He currently is not on anticoagulation.  He states that he is getting short of breath when walking which is new for him.  The gentleman is very independent, lives by himself, and drives a vehicle.      Review of Systems:    Constitutional:  Denies fever or chills   Eyes:  Denies change in visual acuity   HENT:  Denies nasal congestion or sore throat   Respiratory: Shortness of breath x 4 days on exertion  Cardiovascular: Chest pain  GI:  Denies abdominal pain or bloody stools  :  Denies dysuria   Musculoskeletal:  Denies back pain or joint pain   Integument:  Denies rash   Neurologic:  Denies headache or sensory changes   Endocrine:  Denies polyuria or  polydipsia   Lymphatic:  Denies swollen glands   Psychiatric:  Denies depression or anxiety     Past Medical and Surgical History:   Past Medical History:   Diagnosis Date    Basal cell carcinoma 05/25/2022    Left nasal ala    Basal cell carcinoma (BCC) of antihelix of right ear 05/25/2022    Right antihelix    Coronary artery disease     Hypertension     Renal disorder      Past Surgical History:   Procedure Laterality Date    APPENDECTOMY      CARDIAC SURGERY      EYE SURGERY      HERNIA REPAIR      MOHS SURGERY Left 07/05/2022    Left nasal ala    MOHS SURGERY Right 07/05/2022    Right antihelix    MOHS SURGERY Right 07/12/2022    Right antihelix    SKIN BIOPSY         Meds/Allergies:  Medications Prior to Admission   Medication Sig Dispense Refill Last Dose    apixaban (ELIQUIS) 2.5 mg Take 2.5 mg by mouth 2 (two) times a day       aspirin (Aspir-Low) 81 mg EC tablet every 24 hours       aspirin (ECOTRIN LOW STRENGTH) 81 mg EC tablet Take 81 mg by mouth daily       clopidogrel (PLAVIX) 75 mg tablet        dicyclomine (BENTYL) 10 mg capsule Take 10 mg by mouth every 6 (six) hours       dutasteride (AVODART) 0.5 mg capsule        ezetimibe-simvastatin (VYTORIN) 10-20 mg per tablet Take 1 tablet by mouth daily at bedtime       famotidine (Pepcid) 20 mg tablet Pepcid       FOLIC ACID-VITAMIN C PO Take by mouth       furosemide (LASIX) 20 mg tablet        metoprolol succinate (TOPROL-XL) 25 mg 24 hr tablet Take 25 mg by mouth daily       metoprolol succinate (TOPROL-XL) 25 mg 24 hr tablet 1 capsule every 24 hours       olmesartan (BENICAR) 40 mg tablet Take 40 mg by mouth daily       Vascepa 1 g CAPS           Allergies: No Known Allergies    History:  Marital Status: /Civil Union     Substance Use History:   Social History     Substance and Sexual Activity   Alcohol Use Yes    Comment: twice a year     Social History     Tobacco Use   Smoking Status Former    Types: Cigarettes   Smokeless Tobacco Never  "    Social History     Substance and Sexual Activity   Drug Use Not on file       Family History:  Family History   Problem Relation Age of Onset    Squamous cell carcinoma Daughter        Physical Exam:     Vitals:   Blood Pressure: 110/54 (Simultaneous filing. User may not have seen previous data.) (03/05/24 1930)  Pulse: (!) 49 (Simultaneous filing. User may not have seen previous data.) (03/05/24 1930)  Temperature: 98.6 °F (37 °C) (03/05/24 1821)  Respirations: 19 (Simultaneous filing. User may not have seen previous data.) (03/05/24 1930)  Height: 6' 1\" (185.4 cm) (03/05/24 1821)  Weight - Scale: 86.2 kg (190 lb) (03/05/24 1821)  SpO2: 99 % (Simultaneous filing. User may not have seen previous data.) (03/05/24 1930)    Constitutional:  Non-toxic appearance  Eyes:  EOMI, No scleral icterus   HENT:   oropharynx moist, external ears normal, external nose normal   Respiratory:  No respiratory distress, no wheezing decreased breath sounds  Cardiovascular: Bradycardic, irregular  GI:  Soft, nondistended, no guarding   :  No costovertebral angle tenderness   Musculoskeletal:  no tenderness, no deformities.   Integument:  no jaundice, no rash   Neurologic:  Alert &awake, communicative, CN 2-12 normal,  no focal deficits noted   Psychiatric:  Speech and behavior appropriate       Lab Results: I have personally reviewed pertinent reports.   and I have personally reviewed pertinent films in PACS    Results from last 7 days   Lab Units 03/05/24  1830   WBC Thousand/uL 6.51   HEMOGLOBIN g/dL 11.5*   HEMATOCRIT % 34.6*   PLATELETS Thousands/uL 148*   NEUTROS PCT % 76*   LYMPHS PCT % 11*   MONOS PCT % 9   EOS PCT % 2     Results from last 7 days   Lab Units 03/05/24  1830   POTASSIUM mmol/L 5.4*   CHLORIDE mmol/L 104   CO2 mmol/L 22   BUN mg/dL 41*   CREATININE mg/dL 2.01*   CALCIUM mg/dL 9.1   ALK PHOS U/L 77   ALT U/L 167*   AST U/L 200*             Imaging: I have personally reviewed pertinent reports.   and I have " personally reviewed pertinent films in PACS    XR chest 1 view    Result Date: 3/3/2024  Narrative: CHEST X-RAY  SINGLE VIEW: HISTORY: chest pain;   PRIORS: Chest radiograph on October 8, 2010. FINDINGS: LUNGS: Clear.  No pleural abnormality seen. HEART: Mild cardiomegaly. Sternotomy. MEDIASTINUM: Normal. OTHER FINDINGS:  None.        Impression:  No acute pulmonary abnormality.    MDM: High  Patient requires hospitalization for non-STEMI which if not treated can lead to death in a patient with extensive cardiac history  Reviewed CBC, CMP, troponin levels, chest x-ray on my read reveals some pulmonary vascular congestion  Reviewed echo cardiology, previous history and physical and admission to [facility]  Prescription drug management with IV Lasix and subcu Lovenox  Ordered CBC, CMP, magnesium and echocardiogram  Consult to cardiology    Epic Records Reviewed as well as Records in Care Everywhere    ** Please Note: Dragon 360 Dictation voice to text software was used in the creation of this document. **

## 2024-03-06 NOTE — ASSESSMENT & PLAN NOTE
"Patient with chest pain and dyspnea exertion x 4 days.  Recent admission at [facility] on March 3 and discharged same day.  At that time noticed that his chest pain was secondary to atrial flutter.  His troponins at that time were 196, 234, 346     Latest Reference Range & Units 03/05/24 18:30 03/05/24 20:23 03/05/24 22:39 03/06/24 05:40 03/06/24 08:12 03/06/24 12:31   hs TnI 0hr \"Refer to ACS Flowchart\"- see link ng/L 1,276 (H)        hs TnI 2hr \"Refer to ACS Flowchart\"- see link ng/L  1,601 (H)       Delta 2hr hsTnI <20 ng/L  325 (H)       hs TnI 4hr \"Refer to ACS Flowchart\"- see link ng/L   1,808 (H)      Delta 4hr hsTnI <20 ng/L   532 (H)      HS TnI random 8 - 18 ng/L    3,126 (H) 5,951 (H) 11,193 (H)   BNP 0 - 100 pg/mL 282 (H)        (H): Data is abnormally high    Patient received 324 mg of aspirin via EMS  Consult cardiology  TTE 3/6/24: EF 50% There is systolic flattening of the interventricular septum consistent with right ventricle pressure overload, There is systolic flattening of the interventricular septum consistent with right ventricle pressure overload. RA severely dilated, RV mildly dilated,     VQ scan ordered to assess for PE, not eligible for CT PE due to adam  Initially refused anticoagulation due to concerns for GI bleed but appears that patient had epistaxis.  Now agreeable to starting heparin drip      "

## 2024-03-06 NOTE — ASSESSMENT & PLAN NOTE
Patient with chest pain and dyspnea exertion x 4 days.  Recent admission at [facility] on March 3 and discharged same day.  At that time noticed that his chest pain was secondary to atrial flutter.  His troponins at that time were 196, 234, 346    Current troponin 1276, trend troponins  Patient received 324 mg of aspirin via EMS  Will place on Lovenox 1 mg/kg every 12 hours  Consult cardiology  Echocardiogram in the morning

## 2024-03-06 NOTE — ASSESSMENT & PLAN NOTE
CAD status post CABG 2009. [place]ima to LAD and saphenous vein graft to circumflex marginal, Cardiac CTA 12/15/2021 revealed patent LIMA to LAD, patent vein graft to circumflex obtuse marginal #1 an extensive plaque in the native RCA,     Continue aspirin.  Used to be on Plavix which was discontinued  Continue Vytorin

## 2024-03-06 NOTE — ASSESSMENT & PLAN NOTE
Chest x-ray shortness of pulmonary vascular congestion, the patient states that he has been having dyspnea on exertion x 4 days.  He also has elevated LFTs which could suggest hepatic congestion he used to be on Lasix which was discontinued because he did not have any further edema.  Also noting that his heart rate is in the 50s and his baseline heart rate at home is in the 80s.  Will do Lasix 40 mg IV every 12 hours  Obtain echocardiogram    Wt Readings from Last 3 Encounters:   03/05/24 86.2 kg (190 lb)   08/14/23 88.1 kg (194 lb 3.2 oz)   07/12/22 88.5 kg (195 lb)

## 2024-03-06 NOTE — ASSESSMENT & PLAN NOTE
Continue with Lopressor.  Patient states his heart rates are usually in the 80s, he is currently in the 50s.  Status post watchman flex (24 mm) in 2023    On his hospitalization at [facility] March 3, it seems likely discontinue Xarelto.  Unclear why

## 2024-03-06 NOTE — ASSESSMENT & PLAN NOTE
Elevated AST and ALT which I think is secondary to hepatic congestion.  Will repeat LFTs in the morning

## 2024-03-06 NOTE — ASSESSMENT & PLAN NOTE
Admission creatinine 2.01.  Baseline creatinine is 1.4  Trended up to 2.65  Nephrology consulted  Continue sodium bicarb gtt @75 ml/hr  F/U Am Cr  Monitor I/O  Retention protocol

## 2024-03-06 NOTE — ASSESSMENT & PLAN NOTE
CAD status post CABG 2009. [place]ima to LAD and saphenous vein graft to circumflex marginal, Cardiac CTA 12/15/2021 revealed patent LIMA to LAD, patent vein graft to circumflex obtuse marginal #1 an extensive plaque in the native RCA,     Continue aspirin.  Continue Vytorin

## 2024-03-06 NOTE — PLAN OF CARE
Problem: PAIN - ADULT  Goal: Verbalizes/displays adequate comfort level or baseline comfort level  Description: Interventions:  - Encourage patient to monitor pain and request assistance  - Assess pain using appropriate pain scale  - Administer analgesics based on type and severity of pain and evaluate response  - Implement non-pharmacological measures as appropriate and evaluate response  - Consider cultural and social influences on pain and pain management  - Notify physician/advanced practitioner if interventions unsuccessful or patient reports new pain  Outcome: Progressing     Problem: INFECTION - ADULT  Goal: Absence or prevention of progression during hospitalization  Description: INTERVENTIONS:  - Assess and monitor for signs and symptoms of infection  - Monitor lab/diagnostic results  - Monitor all insertion sites, i.e. indwelling lines, tubes, and drains  - Monitor endotracheal if appropriate and nasal secretions for changes in amount and color  - [place] appropriate cooling/warming therapies per order  - Administer medications as ordered  - Instruct and encourage patient and family to use good hand hygiene technique  - Identify and instruct in appropriate isolation precautions for identified infection/condition  Outcome: Progressing  Goal: Absence of fever/infection during neutropenic period  Description: INTERVENTIONS:  - Monitor WBC    Outcome: Progressing     Problem: SAFETY ADULT  Goal: Patient will remain free of falls  Description: INTERVENTIONS:  - Educate patient/family on patient safety including physical limitations  - Instruct patient to call for assistance with activity   - Consult OT/PT to assist with strengthening/mobility   - Keep Call bell within reach  - Keep bed low and locked with side rails adjusted as appropriate  - Keep care items and personal belongings within reach  - Initiate and maintain comfort rounds  - Make Fall Risk Sign visible to staff  - Offer Toileting every 2 Hours,  in advance of need  - Initiate/Maintain bed alarm  - Obtain necessary fall risk management equipment: yellow socks  - Apply yellow socks and bracelet for high fall risk patients  - Consider moving patient to room near nurses station  Outcome: Progressing  Goal: Maintain or return to baseline ADL function  Description: INTERVENTIONS:  -  Assess patient's ability to carry out ADLs; assess patient's baseline for ADL function and identify physical deficits which impact ability to perform ADLs (bathing, care of mouth/teeth, toileting, grooming, dressing, etc.)  - Assess/evaluate cause of self-care deficits   - Assess range of motion  - Assess patient's mobility; develop plan if impaired  - Assess patient's need for assistive devices and provide as appropriate  - Encourage maximum independence but intervene and supervise when necessary  - Involve family in performance of ADLs  - Assess for home care needs following discharge   - Consider OT consult to assist with ADL evaluation and planning for discharge  - Provide patient education as appropriate  Outcome: Progressing  Goal: Maintains/Returns to pre admission functional level  Description: INTERVENTIONS:  - Perform AM-PAC 6 Click Basic Mobility/ Daily Activity assessment daily.  - Set and communicate daily mobility goal to care team and patient/family/caregiver.   - Collaborate with rehabilitation services on mobility goals if consulted  - Perform Range of Motion 3 times a day.  - Dangle patient 3 times a day  - Stand patient 3 times a day  - Ambulate patient 3 times a day  - Out of bed to chair 3 times a day   - Out of bed for meals 3 times a day  - Out of bed for toileting  - Record patient progress and toleration of activity level   Outcome: Progressing     Problem: DISCHARGE PLANNING  Goal: Discharge to home or other facility with appropriate resources  Description: INTERVENTIONS:  - Identify barriers to discharge w/patient and caregiver  - Arrange for needed  discharge resources and transportation as appropriate  - Identify discharge learning needs (meds, wound care, etc.)  - Arrange for interpretive services to assist at discharge as needed  - Refer to Case Management Department for coordinating discharge planning if the patient needs post-hospital services based on physician/advanced practitioner order or complex needs related to functional status, cognitive ability, or social support system  Outcome: Progressing     Problem: Knowledge Deficit  Goal: Patient/family/caregiver demonstrates understanding of disease process, treatment plan, medications, and discharge instructions  Description: Complete learning assessment and assess knowledge base.  Interventions:  - Provide teaching at level of understanding  - Provide teaching via preferred learning methods  Outcome: Progressing

## 2024-03-06 NOTE — ASSESSMENT & PLAN NOTE
Continue with Lopressor.    Status post watchman flex (24 mm) in 2023    On his hospitalization at [facility] March 3, Xeralto discontinued due to epistaxis

## 2024-03-06 NOTE — ASSESSMENT & PLAN NOTE
Patient's baseline creatinine is 1.4, Admission Cr 2.01  Nephrology consulted due to worsening Cr      Lab Results   Component Value Date    EGFR 20 03/06/2024    EGFR 24 03/05/2024    EGFR 28 03/05/2024    CREATININE 2.65 (H) 03/06/2024    CREATININE 2.24 (H) 03/05/2024    CREATININE 2.01 (H) 03/05/2024

## 2024-03-06 NOTE — ASSESSMENT & PLAN NOTE
Patient's baseline creatinine is 1.4, currently 2.01.  He follows with nephrology every 6 months      Lab Results   Component Value Date    EGFR 28 03/05/2024    CREATININE 2.01 (H) 03/05/2024

## 2024-03-06 NOTE — PROGRESS NOTES
"[facility]  Progress Note  Name: Vladimir Mathias I  MRN: [id number]  Unit/Bed#: 4 [place] 414-01 I Date of Admission: 3/5/2024   Date of Service: 3/6/2024 I Hospital Day: 1    Assessment/Plan   * NSTEMI (non-ST elevated myocardial infarction) (HCC)  Assessment & Plan  Patient with chest pain and dyspnea exertion x 4 days.  Recent admission at [facility] on March 3 and discharged same day.  At that time noticed that his chest pain was secondary to atrial flutter.  His troponins at that time were 196, 234, 346     Latest Reference Range & Units 03/05/24 18:30 03/05/24 20:23 03/05/24 22:39 03/06/24 05:40 03/06/24 08:12 03/06/24 12:31   hs TnI 0hr \"Refer to ACS Flowchart\"- see link ng/L 1,276 (H)        hs TnI 2hr \"Refer to ACS Flowchart\"- see link ng/L  1,601 (H)       Delta 2hr hsTnI <20 ng/L  325 (H)       hs TnI 4hr \"Refer to ACS Flowchart\"- see link ng/L   1,808 (H)      Delta 4hr hsTnI <20 ng/L   532 (H)      HS TnI random 8 - 18 ng/L    3,126 (H) 5,951 (H) 11,193 (H)   BNP 0 - 100 pg/mL 282 (H)        (H): Data is abnormally high    Patient received 324 mg of aspirin via EMS  Consult cardiology  TTE 3/6/24: EF 50% There is systolic flattening of the interventricular septum consistent with right ventricle pressure overload, There is systolic flattening of the interventricular septum consistent with right ventricle pressure overload. RA severely dilated, RV mildly dilated,     VQ scan ordered to assess for PE, not eligible for CT PE due to tuan  Initially refused anticoagulation due to concerns for GI bleed but appears that patient had epistaxis.  Now agreeable to starting heparin drip        TUAN (acute kidney injury) (HCC)  Assessment & Plan  Admission creatinine 2.01.  Baseline creatinine is 1.4  Trended up to 2.65  Nephrology consulted  Continue sodium bicarb gtt @75 ml/hr  F/U Am Cr  Monitor I/O  Retention protocol    Atrial flutter (HCC)  Assessment & Plan  Continue with " Lopressor.    Status post watchman flex (24 mm) in 2023    On his hospitalization at [facility] March 3, Xeralto discontinued due to epistaxis    Nonrheumatic aortic valve stenosis  Assessment & Plan  Status post Medtronic evolute valve in 2022    S/P CABG (coronary artery bypass graft)  Assessment & Plan  CAD status post CABG 2009. [place] to LAD and saphenous vein graft to circumflex marginal, Cardiac CTA 12/15/2021 revealed patent LIMA to LAD, patent vein graft to circumflex obtuse marginal #1 an extensive plaque in the native RCA,     Continue aspirin.  Continue Vytorin    Essential hypertension  Assessment & Plan  Continue Lopressor, ACE inhibitor discontinued to TUAN     CKD (chronic kidney disease)  Assessment & Plan  Patient's baseline creatinine is 1.4, Admission Cr 2.01  Nephrology consulted due to worsening Cr      Lab Results   Component Value Date    EGFR 20 03/06/2024    EGFR 24 03/05/2024    EGFR 28 03/05/2024    CREATININE 2.65 (H) 03/06/2024    CREATININE 2.24 (H) 03/05/2024    CREATININE 2.01 (H) 03/05/2024       CAD (coronary artery disease)  Assessment & Plan  CAD status post CABG 2009. [place] to LAD and saphenous vein graft to circumflex marginal, Cardiac CTA 12/15/2021 revealed patent LIMA to LAD, patent vein graft to circumflex obtuse marginal #1 an extensive plaque in the native RCA,     BPH (benign prostatic hyperplasia)  Assessment & Plan  Avodart substituted for finasteride               VTE Pharmacologic Prophylaxis: VTE Score: 5 High Risk (Score >/= 5) - Pharmacological DVT Prophylaxis Ordered: heparin drip. Sequential Compression Devices Ordered.    Mobility:   Basic Mobility Inpatient Raw Score: 18  -HLM Goal: 6: Walk 10 steps or more  -HLM Achieved: 3: Sit at edge of bed  HLM Goal NOT achieved. Continue with multidisciplinary rounding and encourage appropriate mobility to improve upon HLM goals.    Patient Centered Rounds: I performed bedside rounds with nursing staff  today.   Discussions with Specialists or Other Care Team Provider: cardiology    Education and Discussions with Family / Patient: Patient declined call to .     Total Time Spent on Date of Encounter in care of patient: 35 mins. This time was spent on one or more of the following: performing physical exam; counseling and coordination of care; obtaining or reviewing history; documenting in the medical record; reviewing/ordering tests, medications or procedures; communicating with other healthcare professionals and discussing with patient's family/caregivers.    Current Length of Stay: 1 day(s)  Current Patient Status: Inpatient   Certification Statement: The patient will continue to require additional inpatient hospital stay due to NSTEMI  Discharge Plan: Anticipate discharge in 48-72 hrs to discharge location to be determined pending rehab evaluations.    Code Status: Level 3 - DNAR and DNI    Subjective:   Patient states that he is feeling better denies having chest pain or palpitations    Objective:     Vitals:   Temp (24hrs), Av.5 °F (36.4 °C), Min:97.1 °F (36.2 °C), Max:98.6 °F (37 °C)    Temp:  [97.1 °F (36.2 °C)-98.6 °F (37 °C)] 97.1 °F (36.2 °C)  HR:  [48-63] 58  Resp:  [18-20] 18  BP: ()/(49-62) 115/62  SpO2:  [94 %-100 %] 98 %  Body mass index is 27.05 kg/m².     Input and Output Summary (last 24 hours):     Intake/Output Summary (Last 24 hours) at 3/6/2024 1443  Last data filed at 3/6/2024 0501  Gross per 24 hour   Intake 200 ml   Output 200 ml   Net 0 ml       Physical Exam:   Physical Exam  Vitals and nursing note reviewed.   Constitutional:       General: He is not in acute distress.     Appearance: He is well-developed.   HENT:      Head: Normocephalic and atraumatic.   Eyes:      Conjunctiva/sclera: Conjunctivae normal.   Cardiovascular:      Rate and Rhythm: Normal rate and regular rhythm.      Heart sounds: No murmur heard.  Pulmonary:      Effort: Pulmonary effort is normal.  No respiratory distress.      Breath sounds: Normal breath sounds.   Abdominal:      Palpations: Abdomen is soft.      Tenderness: There is no abdominal tenderness.   Musculoskeletal:         General: No swelling.      Cervical back: Neck supple.   Skin:     General: Skin is warm and dry.   Neurological:      Mental Status: He is alert.   Psychiatric:         Mood and Affect: Mood normal.          Additional Data:     Labs:  Results from last 7 days   Lab Units 03/06/24  0540 03/05/24  1830   WBC Thousand/uL 7.92 6.51   HEMOGLOBIN g/dL 11.5* 11.5*   HEMATOCRIT % 35.0* 34.6*   PLATELETS Thousands/uL 167 148*   NEUTROS PCT %  --  76*   LYMPHS PCT %  --  11*   MONOS PCT %  --  9   EOS PCT %  --  2     Results from last 7 days   Lab Units 03/06/24  0540 03/05/24  2239   SODIUM mmol/L 135 135   POTASSIUM mmol/L 5.3 4.9   CHLORIDE mmol/L 107 106   CO2 mmol/L 17* 20*   BUN mg/dL 49* 45*   CREATININE mg/dL 2.65* 2.24*   ANION GAP mmol/L 11 9   CALCIUM mg/dL 8.4 9.0   ALBUMIN g/dL  --  3.9   TOTAL BILIRUBIN mg/dL  --  0.45   ALK PHOS U/L  --  76   ALT U/L  --  198*   AST U/L  --  210*   GLUCOSE RANDOM mg/dL 175* 135                       Lines/Drains:  Invasive Devices       Peripheral Intravenous Line  Duration             Peripheral IV 03/05/24 Left Antecubital <1 day    Peripheral IV 03/05/24 Left Forearm <1 day                      Telemetry:  Telemetry Orders (From admission, onward)               24 Hour Telemetry Monitoring  Continuous x 24 Hours (Telem)        Question:  Reason for 24 Hour Telemetry  Answer:  PCI/EP study (including pacer and ICD implementation), Cardiac surgery, MI, abnormal cardiac cath, and chest pain- rule out MI                     Telemetry Reviewed: Sinus Bradycardia  Indication for Continued Telemetry Use: Acute MI/Unstable Angina/Rule out ACS             Imaging: Reviewed radiology reports from this admission including: chest xray    Recent Cultures (last 7 days):         Last 24 Hours  Medication List:   Current Facility-Administered Medications   Medication Dose Route Frequency Provider Last Rate    acetaminophen  650 mg Oral Q4H PRN Ahsan Washburn MD      aspirin  81 mg Oral Daily Ahsan Washburn MD      ezetimibe  10 mg Oral Daily With Dinner Ahsan Washburn MD      And    pravastatin  40 mg Oral Daily With Dinner Ahsan Washburn MD      finasteride  5 mg Oral Daily Ahsan Washburn MD      fish oil  2,000 mg Oral Daily Ahsan Washburn MD      heparin (porcine)  3-20 Units/kg/hr (Order-Specific) Intravenous Titrated Ahsan Washburn MD 11.1 Units/kg/hr (03/06/24 1208)    heparin (porcine)  2,000 Units Intravenous Q6H PRN Ahsan Washburn MD      heparin (porcine)  4,000 Units Intravenous Q6H PRN Ahsan Washburn MD      metoprolol succinate  25 mg Oral Q24H Ahsan Washburn MD      morphine injection  2 mg Intravenous Q6H PRN Ahsan Washburn MD      nitroglycerin  0.1 mg Transdermal Daily Ahsan Washburn MD      ondansetron  4 mg Intravenous Q6H PRN Ahsan Washburn MD      oxyCODONE  5 mg Oral Q8H PRN Ahsan Washburn MD      sodium bicarbonate 150 mEq in dextrose 5 % 1,000 mL infusion  75 mL/hr Intravenous Continuous Adán Sheppard MD 75 mL/hr (03/06/24 1108)        Today, Patient Was Seen By: Shae Camacho DO    **Please Note: This note may have been constructed using a voice recognition system.**

## 2024-03-06 NOTE — ASSESSMENT & PLAN NOTE
CAD status post CABG 2009. [place] to LAD and saphenous vein graft to circumflex marginal, Cardiac CTA 12/15/2021 revealed patent LIMA to LAD, patent vein graft to circumflex obtuse marginal #1 an extensive plaque in the native RCA,

## 2024-03-06 NOTE — UTILIZATION REVIEW
Initial Clinical Review    Admission: Date/Time/Statement:   Admission Orders (From admission, onward)       Ordered        03/05/24 1932  INPATIENT ADMISSION  Once                          Orders Placed This Encounter   Procedures    INPATIENT ADMISSION     Standing Status:   Standing     Number of Occurrences:   1     Order Specific Question:   Level of Care     Answer:   Med Surg [16]     Order Specific Question:   Estimated length of stay     Answer:   More than 2 Midnights     Order Specific Question:   Certification     Answer:   I certify that inpatient services are medically necessary for this patient for a duration of greater than two midnights. See H&P and MD Progress Notes for additional information about the patient's course of treatment.     ED Arrival Information       Expected   -    Arrival   3/5/2024 18:14    Acuity   Emergent              Means of arrival   Ambulance    Escorted by   [facility]   Hospitalist    Admission type   Emergency              Arrival complaint   chest pain             Chief Complaint   Patient presents with    Chest Pain     Cp prior to arrival, long cardiac history. 324 asa given by EMS       Initial Presentation:   89 yom to ER from home via EMS for chest pain with resolved lightheadedness earlier in the day. Hx hypertension, renal disorder, CAD with CABG. Patient transiently hypotensive for EMS, intermittently in a junctional rhythm. Patient with 3/6 admission for NSTEMI at outside hospital.  Presents intermittently bradycardic to a rate of about 50 (asymptomatic). Admission Hgb 11.5, , Na 134, K 5.4, BUN 41, Cr 2.01, , , troponin 1276>1601, 325>1808, 532, .  Admitted to inpatient status for NSTEMI. Refusing IV Heparin gtt 2nd hx life threatening GI bleed on anticoagulation    Date: 3/6/24   Day 2:   Episode chest discomfort, IV Morphine given, nitro patch placed, lasix d/c'd 2nd hypotension. Started on Bicarb gtt for  metabolic acidosis. Now agreeable to IV Heparin gtt.    Per cardio: chest discomfort with elevated high-sensitivity troponins concerning for MI type   1. MI type I  2. Coronary artery disease with history of CABG  3. TUAN on chronic kidney disease  4. Hypertension  5. Paroxysmal atrial fibrillation flutter: patient has watchmen device  6. Aortic stenosis status post TAVR  7. History of life-threatening G.I. bleed with anticoagulation (Xarelto): patient has watchmen device  Plan:  1. Continue trend troponins until they peak  2. Will obtain 2D echocardiogram to evaluate cardiac function, EF on cardiac CTA in 2021 was documented as 70%.  3. Will consult nephrology to evaluate new acute kidney injury as patient may need diagnostic left heart Catheterization  4. Patient refusing any type of anticoagulation due to his history of life-threatening G.I. bleed. Did discuss with him concerned that he is having an MI and that is recommended he be on anticoagulation. Patient still adamantly does not want heparin drip  5. Continue Toprol-XL 25 mg once a day  6. Continue low-dose nitroglycerin patch at 0.1 mg/hour on in the a.m. often the p.m.  7. Patient on Vytorin 10/40 mg at home, will continue at this time  8. Continue aspirin 81 mg once a day    Per renal: Acute kidney injury, POA  Baseline creatinine:1.5-1.7, Admission creatinine: 2.01   -Etiology: Acute kidney injury likely due to hemodynamic changes/hypotension in the setting of NSTEMI  -Hospital Course: Renal Function has worsened to creatinine 2.65 mg/dL today likely due to hemodynamic changes/low blood pressure and prior to admission being on losartan which can contribute to autoregulatory failure.  Patient was ordered IV Lasix but never received and it was stopped today  -Plan:   Check UA urine microscopy, kidney ultrasound, postvoid residual/bladder scan  Continue to hold diuretics, losartan  Agree with bicarbonate drip but decreased rate to 75 mill per hour next 24  hours at least in the setting of metabolic acidosis and TUAN  Recommend avoiding cardiac catheterization for now unless it is urgent..  Renal function currently has been worsening, would recommend allowing it to stabilize before proceeding with cardiac catheterization which carry significant risk of further worsening of renal function and possibility of dialysis.  Discussed with patient about risk of further worsening of renal function and risk of dialysis and he verbalized understanding. Discussed with cardiology   Avoid nephrotoxins and dose all medications per EGFR.    Avoid hypotension.         Date: 3/7/24    Day 3: Has surpassed a 2nd midnight with active treatments and services.  Dx: NSTEMI. Mgt per cardio. Continue Toprol XL, Vytorin, ASA, NTG patch, Telemetry monitoring. High-sensitivity troponins greater than 23,000. Case discussed with nephrology, pt tentatively scheduled for diagnostic left heart catheterization on 3/11/2024. Renal following for TUAN. Cr improved to 2.4, diuretics & ARB's on hold at this time.  IV bicarb gtt changed to plasmalyte IVF@ 75cc/hr. IV Heparin gtt continued.     ED Triage Vitals   Temperature Pulse Respirations Blood Pressure SpO2   03/05/24 1821 03/05/24 1821 03/05/24 1821 03/05/24 1821 03/05/24 1821   98.6 °F (37 °C) 57 18 124/60 96 %      Temp Source Heart Rate Source Patient Position - Orthostatic VS BP Location FiO2 (%)   03/05/24 2036 03/05/24 1900 03/05/24 1900 03/05/24 1900 --   Oral Monitor Sitting Right arm       Pain Score       03/05/24 1821       No Pain          Wt Readings from Last 1 Encounters:   03/07/24 93.9 kg (207 lb)     Additional Vital Signs:  Date/Time Temp Pulse Resp BP MAP (mmHg) SpO2 O2 Device Patient Position - Orthostatic VS   03/06/24 0830 -- 58 -- 115/62 -- -- -- --   03/06/24 07:08:01 97.1 °F (36.2 °C) Abnormal  63 18 115/62 80 98 % -- --   03/06/24 03:31:59 97.1 °F (36.2 °C) Abnormal  54 Abnormal  18 108/59 75 97 % -- --   03/06/24 0100 -- 56 --  105/58 74 100 % -- Lying   03/06/24 00:46:13 -- 52 Abnormal  -- 90/49 Abnormal  63 Abnormal  100 % -- --   03/05/24 2300 -- -- -- -- -- 99 % None (Room air) --   03/05/24 20:36:45 97.3 °F (36.3 °C) Abnormal  48 Abnormal  18 104/50 68 98 % Nasal cannula Lying   03/05/24 2002 -- -- -- -- -- -- None (Room air) --   03/05/24 1930 -- 49 Abnormal   19  110/54  78  99 %  None (Room air) Sitting   03/05/24 1900 -- 50 Abnormal  20 109/53 77 94 % None (Room air) Sitting   03/05/24 1821 98.6 °F (37 °C) 57 18 124/60 -- 96 % -- --     Pertinent Labs/Diagnostic Test Results:   3/6 Echo=    Left Ventricle: Left ventricular cavity size is lower normal Wall thickness is moderately increased. The left ventricular ejection fraction is 55%. Systolic function is normal. Dyskinetic septum otherwise normal wall motion Unable to assess diastolic function due to atrial flutter.    IVS: There is abnormal septal motion consistent with post-operative status. There is systolic flattening of the interventricular septum consistent with right ventricle pressure overload.    Right Ventricle: Right ventricular cavity size is moderately dilated. Systolic function is moderately to severely reduced.    Left Atrium: The atrium is mildly dilated.    Right Atrium: The atrium is severely dilated.    Aortic Valve: There is a TAVR bioprosthetic valve. The prosthetic valve appears well-seated. There is no evidence of paravalvular regurgitation. The gradient recorded across the prosthetic aortic valve is within the expected range. The aortic valve peak velocity is 1.57 m/s. The aortic valve mean gradient is 6 mmHg.    Mitral Valve: There is mild regurgitation.    Tricuspid Valve: There is mild regurgitation.     Abnormal study, compared to prior echo report increased RV size and decreased systolic function. Elevated pulmonary pressures are suggested by LV systolic flattening.    XR chest 1 view portable   Final Result  (03/06 0605)      No radiographic  evidence of acute intrathoracic process.     3/5 EKG=  Sinus bradycardia with sinus arrhythmia with 1st degree A-V block  Right bundle branch block    Results from last 7 days   Lab Units 03/07/24 0604 03/06/24 0540 03/05/24  1830   WBC Thousand/uL 7.10 7.92 6.51   HEMOGLOBIN g/dL 10.7* 11.5* 11.5*   HEMATOCRIT % 31.4* 35.0* 34.6*   PLATELETS Thousands/uL 127* 167 148*   NEUTROS ABS Thousands/µL  --   --  5.05     Results from last 7 days   Lab Units 03/07/24 0604 03/06/24 0540 03/05/24 2239 03/05/24  1830   SODIUM mmol/L 133* 135 135 134*   POTASSIUM mmol/L 4.2 5.3 4.9 5.4*   CHLORIDE mmol/L 101 107 106 104   CO2 mmol/L 23 17* 20* 22   ANION GAP mmol/L 9 11 9 8   BUN mg/dL 58* 49* 45* 41*   CREATININE mg/dL 2.40* 2.65* 2.24* 2.01*   EGFR ml/min/1.73sq m 22 20 24 28   CALCIUM mg/dL 8.0* 8.4 9.0 9.1   MAGNESIUM mg/dL 2.1 2.2  --   --    PHOSPHORUS mg/dL 3.4  --   --   --      Results from last 7 days   Lab Units 03/07/24 0604 03/05/24 2239 03/05/24  1830   AST U/L  --  210* 200*   ALT U/L  --  198* 167*   ALK PHOS U/L  --  76 77   TOTAL PROTEIN g/dL  --  6.8 7.0   ALBUMIN g/dL 3.6 3.9 4.1   TOTAL BILIRUBIN mg/dL  --  0.45 0.48     Results from last 7 days   Lab Units 03/07/24 0604 03/06/24 0540 03/05/24 2239 03/05/24  1830   GLUCOSE RANDOM mg/dL 125 175* 135 231*     Results from last 7 days   Lab Units 03/05/24 2239 03/05/24 2023 03/05/24  1830   HS TNI 0HR ng/L  --   --  1,276*   HS TNI 2HR ng/L  --  1,601*  --    HSTNI D2 ng/L  --  325*  --    HS TNI 4HR ng/L 1,808*  --   --    HSTNI D4 ng/L 532*  --   --      Results from last 7 days   Lab Units 03/07/24  0604 03/07/24  0003 03/06/24  1812   PTT seconds 60* 74* 78*     Results from last 7 days   Lab Units 03/05/24  1830   BNP pg/mL 282*       Past Medical History:   Diagnosis Date    Basal cell carcinoma 05/25/2022    Left nasal ala    Basal cell carcinoma (BCC) of antihelix of right ear 05/25/2022    Right antihelix    Coronary artery disease      Hypertension     Renal disorder      Present on Admission:   Atrial flutter (HCC)   TUAN (acute kidney injury) ([facility])   CAD (coronary artery disease)   NSTEMI (non-ST elevated myocardial infarction) ([facility])   CKD (chronic kidney disease)   Essential hypertension   Nonrheumatic aortic valve stenosis   BPH (benign prostatic hyperplasia)      Admitting Diagnosis: Chest pain [R07.9]  NSTEMI (non-ST elevated myocardial infarction) ([facility]) [I21.4]  Atrial flutter, unspecified type ([facility]) [I48.92]  Age/Sex: 89 y.o. male  Admission Orders:  Telemetry  Consult cardio  Consult renal  Scd/foot pumps    Scheduled Medications:  Medications 02/27 02/28 02/29 03/01 03/02 03/03 03/04 03/05 03/06 03/07   aspirin chewable tablet 81 mg  Dose: 81 mg  Freq: Daily Route: PO  Start: 03/06/24 0900            0938      0942        enoxaparin (LOVENOX) subcutaneous injection 90 mg  Dose: 1 mg/kg  Weight Dosing Info: 86.2 kg  Freq: Every 24 hours scheduled Route: SC  Start: 03/06/24 0900 End: 03/06/24 0644   Admin Instructions:               0644-D/C'd        ezetimibe (ZETIA) tablet 10 mg  Dose: 10 mg  Freq: Daily with dinner Route: PO  Start: 03/06/24 1630   Admin Instructions:               1752      1630        And  pravastatin (PRAVACHOL) tablet 40 mg  Dose: 40 mg  Freq: Daily with dinner Route: PO  Start: 03/06/24 1630            1752      1630        finasteride (PROSCAR) tablet 5 mg  Dose: 5 mg  Freq: Daily Route: PO  Start: 03/06/24 0900   Admin Instructions:               0938      0942        fish oil capsule 2,000 mg  Dose: 2,000 mg  Freq: Daily Route: PO  Start: 03/06/24 0900   Admin Instructions:               0938      0942        furosemide (LASIX) injection 40 mg  Dose: 40 mg  Freq: 2 times daily Route: IV  Start: 03/05/24 2030 End: 03/06/24 0104   Admin Instructions:      Order specific questions:              (2127) [C]      0104-D/C'd       heparin (ACS LOW)  Freq: Once Route: IV  Start: 03/06/24 0700 End: 03/06/24 0655   Admin  Instructions:      Order specific questions:               0655-D/C'd       losartan (COZAAR) tablet 50 mg  Dose: 50 mg  Freq: Daily Route: PO  Start: 03/06/24 0900 End: 03/05/24 2023   Order specific questions:              2023-D/C'd        metoprolol succinate (TOPROL-XL) 24 hr tablet 25 mg  Dose: 25 mg  Freq: Every 24 hours Route: PO  Start: 03/05/24 2030   Admin Instructions:      Order specific questions:              [id number]) [C]     2153 [C]      2030        nitroglycerin (NITRODUR) 0.1 mg/hr TD 24 hr patch  Dose: 0.1 mg  Freq: Daily Route: TD  Start: 03/06/24 0100   Admin Instructions:               0110 [C]     0928     0984 1131 1007     2143        nitroglycerin (NITRODUR) 0.1 mg/hr TD 24 hr patch  Dose: 0.1 mg  Freq: Daily Route: TD  Start: 03/06/24 0900 End: 03/06/24 0053   Admin Instructions:               0053-D/C'd       Legend:       [id number]/2802/2903/0103/0203/0303/0403/0503/0603/07        Continuous Meds Sorted by Name  for Stew Vladimir as of 02/27/24 through 3/7/24  Legend:       Medications 02/27 02/28 02/29 03/01 03/02 03/03 03/04 03/05 03/06 03/07   heparin (porcine) 25,000 units in 0.45% NaCl 250 mL infusion (premix)  Rate: 2.7-18 mL/hr Dose: 3-20 Units/kg/hr  Weight Dosing Info: 90 kg (Order-Specific)  Freq: Titrated Route: IV  Last Dose: 11.1 Units/kg/hr (03/07/24 0049)  Start: 03/06/24 0700   Admin Instructions:      Order specific questions:               9858 [phone]      0003 [C]     0049 [C]        multi-electrolyte (PLASMALYTE-A/ISOLYTE-S PH 7.4) IV solution  Rate: 75 mL/hr Dose: 75 mL/hr  Freq: Continuous Route: IV  Start: 03/07/24 1030             1030        sodium bicarbonate 150 mEq in dextrose 5 % 1,000 mL infusion  Rate: 75 mL/hr Dose: 75 mL/hr  Freq: Continuous Route: IV  Last Dose: 75 mL/hr (03/07/24 0001)  Start: 03/06/24 0900 End: 03/07/24 1024   Admin Instructions:               0948     1108     2000      0001     1024-D/C'd       Legend:       [id number]/2702/2802/2903/0103/0203/0303/0403/0503/0603/07        PRN Meds Sorted by Name  for Vladimir Mathias as of 02/27/24 through 3/7/24  Legend:         Medications 02/27 02/28 02/29 03/01 03/02 03/03 03/04 03/05 03/06 03/07   acetaminophen (TYLENOL) tablet 650 mg  Dose: 650 mg  Freq: Every 4 hours PRN Route: PO  PRN Reasons: mild pain,headaches,fever  Start: 03/05/24 2016           2335          heparin (porcine) injection 2,000 Units  Dose: 2,000 Units  Freq: Every 6 hours PRN Route: IV  PRN Comment: PTT 43 - 59 seconds  Start: 03/06/24 0654   Admin Instructions:                   heparin (porcine) injection 4,000 Units  Dose: 4,000 Units  Freq: Every 6 hours PRN Route: IV  PRN Comment: PTT less than or equal to 42 seconds  Start: 03/06/24 0654   Admin Instructions:                   morphine injection 2 mg  Dose: 2 mg  Freq: Every 6 hours PRN Route: IV  PRN Reasons: moderate pain,severe pain  PRN Comment: chest pain  Start: 03/06/24 0053   Admin Instructions:               0122         ondansetron (ZOFRAN) injection 4 mg  Dose: 4 mg  Freq: Every 6 hours PRN Route: IV  PRN Reasons: nausea,vomiting  Start: 03/05/24 2016   Admin Instructions:                   oxyCODONE (ROXICODONE) oral solution 5 mg  Dose: 5 mg  Freq: Every 8 hours PRN Route: PO  PRN Reason: moderate pain  Start: 03/05/24 2016   Admin Instructions:                       Network Utilization Review Department  ATTENTION: Please call with any questions or concerns to [phone] and carefully listen to the prompts so that you are directed to the right person. All voicemails are confidential.   For Discharge needs, contact Care Management DC Support Team at [phone] opt. 2  Send all requests for admission clinical reviews, approved or denied determinations and any other requests to dedicated fax number below belonging to the campus where the patient is receiving treatment. List of dedicated fax numbers for the  Facilities:  FACILITY NAME UR FAX NUMBER   ADMISSION DENIALS (Administrative/Medical Necessity) [phone]   DISCHARGE SUPPORT TEAM (NETWORK) [phone]   PARENT CHILD HEALTH (Maternity/NICU/Pediatrics) [phone]   [facility] [phone]   [facility] [phone]   [facility] [phone]   [facility] [phone]   [facility] [phone]   [facility] [phone]   [facility] [phone]   [facility] [phone]   [facility] [phone]   [facility] [phone]   [facility] [phone]   [facility] [phone]

## 2024-03-06 NOTE — CONSULTS
Consultation - Nephrology   Vladimir Mathias 89 y.o. male MRN: [id number]  Unit/Bed#: [address] [place] [address] Encounter: [id number]    ASSESSMENT and PLAN:  Acute kidney injury, POA  -Baseline creatinine:1.5-1.7 mg/dl based on labs from [facility] dating back to 2019.  Creatinine was 1.47 mg/dL in March 2024  -Admission creatinine: 2.01mg/dl  - Work up:   UA with microscopy: Ordered  -Etiology: Acute kidney injury likely due to hemodynamic changes/hypotension in the setting of NSTEMI  -Hospital Course: Renal Function has worsened to creatinine 2.65 mg/dL today likely due to hemodynamic changes/low blood pressure and prior to admission being on losartan which can contribute to autoregulatory failure.  Patient was ordered IV Lasix but never received and it was stopped today  -Plan:   Check UA urine microscopy, kidney ultrasound, postvoid residual/bladder scan  Continue to hold diuretics, losartan  Agree with bicarbonate drip but decreased rate to 75 mill per hour next 24 hours at least in the setting of metabolic acidosis and TUAN  Recommend avoiding cardiac catheterization for now unless it is urgent..  Renal function currently has been worsening, would recommend allowing it to stabilize before proceeding with cardiac catheterization which carry significant risk of further worsening of renal function and possibility of dialysis.  Discussed with patient about risk of further worsening of renal function and risk of dialysis and he verbalized understanding. Discussed with cardiology   Avoid nephrotoxins and dose all medications per EGFR.    Avoid hypotension.                                            Chronic Kidney Disease Stage 3b  -Outpatient Nephrologist:  Dr Barrientos at [facility]  -Baseline Creatinine: 1.5-1.7   -Etiology: Chronic kidney disease likely due to hypertensive nephrosclerosis and age-related nephron loss  -Avoid Nephrotoxins and Dose all medications per eGFR  -Will need outpatient follow up after  discharge.    Metabolic acidosis: Admission bicarb level was normal at 22 but today has trended down to 17 with normal anion gap  -Agree with treatment with bicarbonate drip but will decrease rate to 75 mill per hour to prevent fluid overload    Primary hypertension: Blood pressure currently soft continue to hold losartan in the setting of acute kidney injury.  Continue metoprolol with hold parameters    Hyperkalemia: Admission potassium was 5.4  -Due to acute kidney injury and use of losartan prior to admission.  Also due to metabolic acidosis  -Currently still on the higher side at 5.3 meq/L recommend low potassium diet    Anemia, unspecified   hemoglobin stable at 11.5 g/dL    Volume status: Chest x-ray without any evidence of fluid overload    History of BPH: On Flomax.  Check bladder scan for possibility of retention    Above plan regarding management of acute kidney injury and acidosis with bicarbonate drip and plan for kidney ultrasound was discussed with primary team and agreed with the plan      HISTORY OF PRESENT ILLNESS:  Requesting Physician: Shae Camacho DO  Reason for Consult: Acute kidney injury    Vladimir Mathias is a 89 y.o. male with history of CAD status post CABG, A-fib/a flutter, hypertension, hyperlipidemia, BPH, aortic valve stenosis status post valve replacement in 2022,  who was admitted to [facility] after presenting with complaint of  chest heaviness and shortness of breath and not feeling well.  He called EMS who gave him aspirin.  Workup done in the ED suggestive of elevated troponin.  Patient follows with nephrologist at [facility].  He was suspected of having NSTEMI, was started on anticoagulation.  Cardiology consulted was planning for echocardiogram.  As per cardiology note patient may need left heart cath as well and also cardiology note mentions that patient refusing anticoagulation due to life-threatening GI bleed in the past.  Admission creatinine was 2.01 mg/dL  and is trended up to 2.65 mg/dL today and so nephrology consulted for TUNA  No NSAIds intake     PAST MEDICAL HISTORY:  Past Medical History:   Diagnosis Date    Basal cell carcinoma 05/25/2022    Left nasal ala    Basal cell carcinoma (BCC) of antihelix of right ear 05/25/2022    Right antihelix    Coronary artery disease     Hypertension     Renal disorder        PAST SURGICAL HISTORY:  Past Surgical History:   Procedure Laterality Date    APPENDECTOMY      CARDIAC SURGERY      EYE SURGERY      HERNIA REPAIR      MOHS SURGERY Left 07/05/2022    Left nasal ala    MOHS SURGERY Right 07/05/2022    Right antihelix    MOHS SURGERY Right 07/12/2022    Right antihelix    SKIN BIOPSY         SOCIAL HISTORY:  Social History     Substance and Sexual Activity   Alcohol Use Yes    Comment: twice a year     Social History     Substance and Sexual Activity   Drug Use Not on file     Social History     Tobacco Use   Smoking Status Former    Types: Cigarettes   Smokeless Tobacco Never       FAMILY HISTORY:  Family History   Problem Relation Age of Onset    Squamous cell carcinoma Daughter        ALLERGIES:  No Known Allergies    MEDICATIONS:    Current Facility-Administered Medications:     acetaminophen (TYLENOL) tablet 650 mg, 650 mg, Oral, Q4H PRN, Ahsan Washburn MD, 650 mg at 03/05/24 2335    aspirin chewable tablet 81 mg, 81 mg, Oral, Daily, Ahsan Washburn MD, 81 mg at 03/06/24 0938    ezetimibe (ZETIA) tablet 10 mg, 10 mg, Oral, Daily With Dinner **AND** pravastatin (PRAVACHOL) tablet 40 mg, 40 mg, Oral, Daily With Dinner, Ahsan Washburn MD    finasteride (PROSCAR) tablet 5 mg, 5 mg, Oral, Daily, Ahsan Washburn MD, 5 mg at 03/06/24 0938    fish oil capsule 2,000 mg, 2,000 mg, Oral, Daily, Ahsan Washburn MD, 2,000 mg at 03/06/24 0938    heparin (porcine) 25,000 units in 0.45% NaCl 250 mL infusion (premix), 3-20 Units/kg/hr (Order-Specific), Intravenous, Titrated, Ahsan Washburn MD     heparin (porcine) injection 2,000 Units, 2,000 Units, Intravenous, Q6H PRN, Ahsan Washburn MD    heparin (porcine) injection 4,000 Units, 4,000 Units, Intravenous, Q6H PRN, Ahsan Washburn MD    metoprolol succinate (TOPROL-XL) 24 hr tablet 25 mg, 25 mg, Oral, Q24H, Ahsan Washburn MD    morphine injection 2 mg, 2 mg, Intravenous, Q6H PRN, Ahsan Washburn MD, 2 mg at 03/06/24 0122    nitroglycerin (NITRODUR) 0.1 mg/hr TD 24 hr patch, 0.1 mg, Transdermal, Daily, Ahsan Washburn MD, 0.1 mg at 03/06/24 0938    ondansetron (ZOFRAN) injection 4 mg, 4 mg, Intravenous, Q6H PRN, Ahsan Washburn MD    oxyCODONE (ROXICODONE) oral solution 5 mg, 5 mg, Oral, Q8H PRN, Ahsan Washburn MD    sodium bicarbonate 150 mEq in dextrose 5 % 1,000 mL infusion, 100 mL/hr, Intravenous, Continuous, Shae Camacho DO    REVIEW OF SYSTEMS:   Review of Systems   Constitutional:  Negative for chills and fever.   HENT:  Negative for ear pain and sore throat.    Eyes:  Negative for pain and visual disturbance.   Respiratory:  Negative for cough and shortness of breath.    Cardiovascular:  Positive for chest pain. Negative for palpitations.   Gastrointestinal:  Negative for abdominal pain and vomiting.   Genitourinary:  Negative for dysuria and hematuria.   Musculoskeletal:  Negative for arthralgias and back pain.   Skin:  Negative for color change and rash.   Neurological:  Negative for seizures and syncope.   All other systems reviewed and are negative.      All the systems were reviewed and were negative except as documented on the HPI.    PHYSICAL EXAM:  Current Weight: Weight - Scale: 93 kg (205 lb)  First Weight: Weight - Scale: 86.2 kg (190 lb)  Vitals:    03/06/24 0331 03/06/24 0542 03/06/24 0708 03/06/24 0830   BP: 108/59  115/62 115/62   BP Location:       Pulse: (!) 54  63 58   Resp: 18  18    Temp: (!) 97.1 °F (36.2 °C)  (!) 97.1 °F (36.2 °C)    TempSrc:       SpO2: 97%  98%    Weight:  93 kg (205 lb)   "93 kg (205 lb)   Height:    6' 1\" (1.854 m)       Intake/Output Summary (Last 24 hours) at 3/6/2024 0920  Last data filed at 3/6/2024 0501  Gross per 24 hour   Intake 200 ml   Output 200 ml   Net 0 ml     Physical Exam  Constitutional:       General: He is not in acute distress.     Appearance: He is well-developed. He is not diaphoretic.   HENT:      Head: Normocephalic and atraumatic.      Mouth/Throat:      Mouth: Mucous membranes are moist.   Eyes:      General: No scleral icterus.     Conjunctiva/sclera: Conjunctivae normal.      Pupils: Pupils are equal, round, and reactive to light.   Neck:      Thyroid: No thyromegaly.   Cardiovascular:      Rate and Rhythm: Normal rate and regular rhythm.      Heart sounds: Normal heart sounds. No murmur heard.     No friction rub.   Pulmonary:      Effort: Pulmonary effort is normal. No respiratory distress.      Breath sounds: Normal breath sounds. No wheezing or rales.   Abdominal:      General: Bowel sounds are normal. There is no distension.      Palpations: Abdomen is soft.      Tenderness: There is no abdominal tenderness.   Musculoskeletal:         General: No deformity.      Cervical back: Neck supple.      Right lower leg: No edema.      Left lower leg: No edema.   Lymphadenopathy:      Cervical: No cervical adenopathy.   Skin:     Coloration: Skin is not pale.      Nails: There is no clubbing.   Neurological:      Mental Status: He is alert and oriented to person, place, and time. He is not disoriented.   Psychiatric:         Mood and Affect: Mood normal. Mood is not anxious. Affect is not inappropriate.         Behavior: Behavior normal.         Thought Content: Thought content normal.           Invasive Devices:        Lab Results:   Results from last 7 days   Lab Units 03/06/24  0540 03/05/24  2239 03/05/24  1830   WBC Thousand/uL 7.92  --  6.51   HEMOGLOBIN g/dL 11.5*  --  11.5*   HEMATOCRIT % 35.0*  --  34.6*   PLATELETS Thousands/uL 167  --  148* " "  POTASSIUM mmol/L 5.3 4.9 5.4*   CHLORIDE mmol/L 107 106 104   CO2 mmol/L 17* 20* 22   BUN mg/dL 49* 45* 41*   CREATININE mg/dL 2.65* 2.24* 2.01*   CALCIUM mg/dL 8.4 9.0 9.1   MAGNESIUM mg/dL 2.2  --   --    ALK PHOS U/L  --  76 77   ALT U/L  --  198* 167*   AST U/L  --  210* 200*       Other Studies:  XR CHEST PORTABLE     INDICATION: chest pain.     COMPARISON: None     FINDINGS:     No airspace consolidation, pneumothorax, pulmonary edema, or pleural effusion. Trace left apical scarring..     Prior CABG. Mild cardiomegaly allowing for portable AP technique. Aortic calcification is present.     Mild degenerative changes of bilateral shoulders.     Normal upper abdomen.     IMPRESSION:     No radiographic evidence of acute intrathoracic process.      Portions of the record may have been created with voice recognition software. Occasional wrong word or \"sound a like\" substitutions may have occurred due to the inherent limitations of voice recognition software. Read the chart carefully and recognize, using context, where substitutions have occurred.If you have any questions, please contact the dictating provider.   "

## 2024-03-06 NOTE — PLAN OF CARE
Problem: PAIN - ADULT  Goal: Verbalizes/displays adequate comfort level or baseline comfort level  Description: Interventions:  - Encourage patient to monitor pain and request assistance  - Assess pain using appropriate pain scale  - Administer analgesics based on type and severity of pain and evaluate response  - Implement non-pharmacological measures as appropriate and evaluate response  - Consider cultural and social influences on pain and pain management  - Notify physician/advanced practitioner if interventions unsuccessful or patient reports new pain  Outcome: Progressing     Problem: INFECTION - ADULT  Goal: Absence or prevention of progression during hospitalization  Description: INTERVENTIONS:  - Assess and monitor for signs and symptoms of infection  - Monitor lab/diagnostic results  - Monitor all insertion sites, i.e. indwelling lines, tubes, and drains  - Monitor endotracheal if appropriate and nasal secretions for changes in amount and color  - Delta appropriate cooling/warming therapies per order  - Administer medications as ordered  - Instruct and encourage patient and family to use good hand hygiene technique  - Identify and instruct in appropriate isolation precautions for identified infection/condition  Outcome: Progressing  Goal: Absence of fever/infection during neutropenic period  Description: INTERVENTIONS:  - Monitor WBC    Outcome: Progressing     Problem: SAFETY ADULT  Goal: Patient will remain free of falls  Description: INTERVENTIONS:  - Educate patient/family on patient safety including physical limitations  - Instruct patient to call for assistance with activity   - Consult OT/PT to assist with strengthening/mobility   - Keep Call bell within reach  - Keep bed low and locked with side rails adjusted as appropriate  - Keep care items and personal belongings within reach  - Initiate and maintain comfort rounds  - Make Fall Risk Sign visible to staff  - Offer Toileting every 2 Hours,  in advance of need  - Initiate/Maintain  alarm  - Obtain necessary fall risk management equipment: yes  - Apply yellow socks and bracelet for high fall risk patients  - Consider moving patient to room near nurses station  Outcome: Progressing  Goal: Maintain or return to baseline ADL function  Description: INTERVENTIONS:  -  Assess patient's ability to carry out ADLs; assess patient's baseline for ADL function and identify physical deficits which impact ability to perform ADLs (bathing, care of mouth/teeth, toileting, grooming, dressing, etc.)  - Assess/evaluate cause of self-care deficits   - Assess range of motion  - Assess patient's mobility; develop plan if impaired  - Assess patient's need for assistive devices and provide as appropriate  - Encourage maximum independence but intervene and supervise when necessary  - Involve family in performance of ADLs  - Assess for home care needs following discharge   - Consider OT consult to assist with ADL evaluation and planning for discharge  - Provide patient education as appropriate  Outcome: Progressing  Goal: Maintains/Returns to pre admission functional level  Description: INTERVENTIONS:  - Perform AM-PAC 6 Click Basic Mobility/ Daily Activity assessment daily.  - Set and communicate daily mobility goal to care team and patient/family/caregiver.   - Collaborate with rehabilitation services on mobility goals if consulted  - Perform Range of Motion  times a day.  - Reposition patient every 3 hours.  - Dangle patient 3 times a day  - Stand patient 3 times a day  - Out of bed to chair 3 times a day   - Out of bed for meals 3 times a day  - Out of bed for toileting  - Record patient progress and toleration of activity level   Outcome: Progressing     Problem: DISCHARGE PLANNING  Goal: Discharge to home or other facility with appropriate resources  Description: INTERVENTIONS:  - Identify barriers to discharge w/patient and caregiver  - Arrange for needed discharge  resources and transportation as appropriate  - Identify discharge learning needs (meds, wound care, etc.)  - Arrange for interpretive services to assist at discharge as needed  - Refer to Case Management Department for coordinating discharge planning if the patient needs post-hospital services based on physician/advanced practitioner order or complex needs related to functional status, cognitive ability, or social support system  Outcome: Progressing     Problem: Knowledge Deficit  Goal: Patient/family/caregiver demonstrates understanding of disease process, treatment plan, medications, and discharge instructions  Description: Complete learning assessment and assess knowledge base.  Interventions:  - Provide teaching at level of understanding  - Provide teaching via preferred learning methods  Outcome: Progressing     Problem: Prexisting or High Potential for Compromised Skin Integrity  Goal: Skin integrity is maintained or improved  Description: INTERVENTIONS:  - Identify patients at risk for skin breakdown  - Assess and monitor skin integrity  - Assess and monitor nutrition and hydration status  - Monitor labs   - Assess for incontinence   - Turn and reposition patient  - Assist with mobility/ambulation  - Relieve pressure over bony prominences  - Avoid friction and shearing  - Provide appropriate hygiene as needed including keeping skin clean and dry  - Evaluate need for skin moisturizer/barrier cream  - Collaborate with interdisciplinary team   - Patient/family teaching  - Consider wound care consult   Outcome: Progressing

## 2024-03-07 ENCOUNTER — PREP FOR PROCEDURE (OUTPATIENT)
Dept: NON INVASIVE DIAGNOSTICS | Facility: HOSPITAL | Age: 89
End: 2024-03-07

## 2024-03-07 DIAGNOSIS — I25.110 CORONARY ARTERY DISEASE INVOLVING NATIVE CORONARY ARTERY OF NATIVE HEART WITH UNSTABLE ANGINA PECTORIS (HCC): Primary | ICD-10-CM

## 2024-03-07 LAB
ALBUMIN SERPL BCP-MCNC: 3.6 G/DL (ref 3.5–5)
ANION GAP SERPL CALCULATED.3IONS-SCNC: 9 MMOL/L
APTT PPP: 60 SECONDS (ref 23–37)
APTT PPP: 74 SECONDS (ref 23–37)
BACTERIA UR QL AUTO: ABNORMAL /HPF
BILIRUB UR QL STRIP: NEGATIVE
BUN SERPL-MCNC: 58 MG/DL (ref 5–25)
CALCIUM SERPL-MCNC: 8 MG/DL (ref 8.4–10.2)
CARDIAC TROPONIN I PNL SERPL HS: ABNORMAL NG/L (ref 8–18)
CHLORIDE SERPL-SCNC: 101 MMOL/L (ref 96–108)
CLARITY UR: CLEAR
CO2 SERPL-SCNC: 23 MMOL/L (ref 21–32)
COLOR UR: ABNORMAL
CREAT SERPL-MCNC: 2.4 MG/DL (ref 0.6–1.3)
ERYTHROCYTE [DISTWIDTH] IN BLOOD BY AUTOMATED COUNT: 14.9 % (ref 11.6–15.1)
GFR SERPL CREATININE-BSD FRML MDRD: 22 ML/MIN/1.73SQ M
GLUCOSE SERPL-MCNC: 125 MG/DL (ref 65–140)
GLUCOSE UR STRIP-MCNC: NEGATIVE MG/DL
HCT VFR BLD AUTO: 31.4 % (ref 36.5–49.3)
HGB BLD-MCNC: 10.7 G/DL (ref 12–17)
HGB UR QL STRIP.AUTO: NEGATIVE
KETONES UR STRIP-MCNC: NEGATIVE MG/DL
LEUKOCYTE ESTERASE UR QL STRIP: NEGATIVE
MAGNESIUM SERPL-MCNC: 2.1 MG/DL (ref 1.9–2.7)
MCH RBC QN AUTO: 33.8 PG (ref 26.8–34.3)
MCHC RBC AUTO-ENTMCNC: 34.1 G/DL (ref 31.4–37.4)
MCV RBC AUTO: 99 FL (ref 82–98)
NITRITE UR QL STRIP: NEGATIVE
NON-SQ EPI CELLS URNS QL MICRO: ABNORMAL /HPF
PH UR STRIP.AUTO: 6 [PH]
PHOSPHATE SERPL-MCNC: 3.4 MG/DL (ref 2.3–4.1)
PLATELET # BLD AUTO: 127 THOUSANDS/UL (ref 149–390)
PMV BLD AUTO: 11 FL (ref 8.9–12.7)
POTASSIUM SERPL-SCNC: 4.2 MMOL/L (ref 3.5–5.3)
PROT UR STRIP-MCNC: NEGATIVE MG/DL
RBC # BLD AUTO: 3.17 MILLION/UL (ref 3.88–5.62)
RBC #/AREA URNS AUTO: ABNORMAL /HPF
SODIUM SERPL-SCNC: 133 MMOL/L (ref 135–147)
SP GR UR STRIP.AUTO: 1.01 (ref 1–1.03)
UROBILINOGEN UR QL STRIP.AUTO: 0.2 E.U./DL
WBC # BLD AUTO: 7.1 THOUSAND/UL (ref 4.31–10.16)
WBC #/AREA URNS AUTO: ABNORMAL /HPF

## 2024-03-07 PROCEDURE — 84300 ASSAY OF URINE SODIUM: CPT

## 2024-03-07 PROCEDURE — 99232 SBSQ HOSP IP/OBS MODERATE 35: CPT | Performed by: INTERNAL MEDICINE

## 2024-03-07 PROCEDURE — 85027 COMPLETE CBC AUTOMATED: CPT | Performed by: STUDENT IN AN ORGANIZED HEALTH CARE EDUCATION/TRAINING PROGRAM

## 2024-03-07 PROCEDURE — 80069 RENAL FUNCTION PANEL: CPT | Performed by: STUDENT IN AN ORGANIZED HEALTH CARE EDUCATION/TRAINING PROGRAM

## 2024-03-07 PROCEDURE — 99232 SBSQ HOSP IP/OBS MODERATE 35: CPT | Performed by: STUDENT IN AN ORGANIZED HEALTH CARE EDUCATION/TRAINING PROGRAM

## 2024-03-07 PROCEDURE — 85730 THROMBOPLASTIN TIME PARTIAL: CPT | Performed by: STUDENT IN AN ORGANIZED HEALTH CARE EDUCATION/TRAINING PROGRAM

## 2024-03-07 PROCEDURE — 81001 URINALYSIS AUTO W/SCOPE: CPT | Performed by: INTERNAL MEDICINE

## 2024-03-07 PROCEDURE — 82436 ASSAY OF URINE CHLORIDE: CPT

## 2024-03-07 PROCEDURE — 84484 ASSAY OF TROPONIN QUANT: CPT | Performed by: NURSE PRACTITIONER

## 2024-03-07 PROCEDURE — 83735 ASSAY OF MAGNESIUM: CPT | Performed by: STUDENT IN AN ORGANIZED HEALTH CARE EDUCATION/TRAINING PROGRAM

## 2024-03-07 PROCEDURE — 84133 ASSAY OF URINE POTASSIUM: CPT

## 2024-03-07 RX ORDER — SODIUM CHLORIDE, SODIUM GLUCONATE, SODIUM ACETATE, POTASSIUM CHLORIDE, MAGNESIUM CHLORIDE, SODIUM PHOSPHATE, DIBASIC, AND POTASSIUM PHOSPHATE .53; .5; .37; .037; .03; .012; .00082 G/100ML; G/100ML; G/100ML; G/100ML; G/100ML; G/100ML; G/100ML
75 INJECTION, SOLUTION INTRAVENOUS CONTINUOUS
Status: DISCONTINUED | OUTPATIENT
Start: 2024-03-07 | End: 2024-03-08

## 2024-03-07 RX ADMIN — OMEGA-3 FATTY ACIDS CAP 1000 MG 2000 MG: 1000 CAP at 09:42

## 2024-03-07 RX ADMIN — NITROGLYCERIN 0.1 MG: 0.1 PATCH TRANSDERMAL at 09:42

## 2024-03-07 RX ADMIN — METOPROLOL SUCCINATE 25 MG: 25 TABLET, EXTENDED RELEASE ORAL at 20:28

## 2024-03-07 RX ADMIN — SODIUM CHLORIDE, SODIUM GLUCONATE, SODIUM ACETATE, POTASSIUM CHLORIDE, MAGNESIUM CHLORIDE, SODIUM PHOSPHATE, DIBASIC, AND POTASSIUM PHOSPHATE 75 ML/HR: .53; .5; .37; .037; .03; .012; .00082 INJECTION, SOLUTION INTRAVENOUS at 13:21

## 2024-03-07 RX ADMIN — SODIUM BICARBONATE 75 ML/HR: 84 INJECTION, SOLUTION INTRAVENOUS at 00:01

## 2024-03-07 RX ADMIN — PRAVASTATIN SODIUM 40 MG: 40 TABLET ORAL at 16:39

## 2024-03-07 RX ADMIN — ACETAMINOPHEN 650 MG: 325 TABLET ORAL at 20:28

## 2024-03-07 RX ADMIN — EZETIMIBE 10 MG: 10 TABLET ORAL at 16:39

## 2024-03-07 RX ADMIN — HEPARIN SODIUM 11.1 UNITS/KG/HR: 10000 INJECTION, SOLUTION INTRAVENOUS at 15:16

## 2024-03-07 RX ADMIN — ASPIRIN 81 MG 81 MG: 81 TABLET ORAL at 09:42

## 2024-03-07 RX ADMIN — FINASTERIDE 5 MG: 5 TABLET, FILM COATED ORAL at 09:42

## 2024-03-07 NOTE — PROGRESS NOTES
NEPHROLOGY PROGRESS NOTE   Vladimir Mathias 89 y.o. male MRN: [id number]  Unit/Bed#: [address] [place] [address] Encounter: [id number]    ASSESSMENT & PLAN:  89 y/o male with history of afib s/p watchman, CAD, CKD stage 3, HTN, BPH presenting to the hospital for SOB. Found to have elevated troponin and TUAN.    Acute Kidney Injury  Admission Cr 2.01  UA ordered, has not been collected  No I/Os recorded, patient reports he has been urinating several times a day  PVR 0mL  TUAN likely secondary to hypotension/decreased renal perfusion in setting of NSTEMI  Plan:  Renal function improving, Cr today 2.40  Continue holding diuretics, ARBs  IVFs: Isolyte 75mL/hr  Spoke to RN about obtaining UA and I/Os  Recommend avoiding cardiac catheterization at this time unless emergent. If renal function remains stable or improves can proceed with cardiac catheterization tomorrow. Risks vs. benefits discussed with the patient. Plan discussed with both hospitalist and cardiology team.   BMP in AM  Hold nephrotoxins/dose medications per GFR  Avoid hypotension    CKD stage III-b  Outpatient nephrologist is Dr. Barrientos  Baseline Cr 1.5-1.7  Continue outpatient follow up with nephrology    Volume Status  Appears volume depleted clinically  CXR did not reveal signs of fluid overload  Echo revealing EF 55%, inability to assess diastolic function due to a flutter. IVC appeared dilated.  IVFs Isolyte 75mL/hr    Metabolic Acidosis  CO2 17 on admission requiring bicarb drip  Acidosis improved, CO2 23 today  Will stop bicarb drip and monitor    Hypertension:  Continues to have soft BPs  Home Rx: Metoprolol 25mg daily and olmesartan 40mg daily  Hold Olmesartan in setting of TUAN  Avoid hypotension    NSTEMI:  Elevated troponin on admission  Cardiology following    Hyperkalemia  In setting of TUAN and ARB use  Continue to hold losartan  Low K+ diet    Anemia:  Stable Hgb    BPH  Continue Flomax  PVR 0mL      SUBJECTIVE:  Patient seen and evaluated at bedside, he was in no  acute distress  He is eating and drinking well, denies any chest pain, SOB, palpitations  No other acute concerns    OBJECTIVE:  Current Weight: Weight - Scale: 93.9 kg (207 lb)  Vitals:    03/07/24 0405   BP: 134/57   Pulse: 68   Resp:    Temp:    SpO2: 94%     No intake or output data in the 24 hours ending 03/07/24 1213      Physical Exam  Vitals reviewed.   Constitutional:       General: He is not in acute distress.     Appearance: Normal appearance.   HENT:      Mouth/Throat:      Mouth: Mucous membranes are dry.   Cardiovascular:      Rate and Rhythm: Normal rate and regular rhythm.      Heart sounds: Normal heart sounds.   Pulmonary:      Effort: Pulmonary effort is normal. No respiratory distress.      Breath sounds: Normal breath sounds.   Abdominal:      General: Bowel sounds are normal.      Palpations: Abdomen is soft.      Tenderness: There is no abdominal tenderness.   Musculoskeletal:      Right lower leg: No edema.      Left lower leg: No edema.   Neurological:      Mental Status: He is alert.         Medications:    Current Facility-Administered Medications:     acetaminophen (TYLENOL) tablet 650 mg, 650 mg, Oral, Q4H PRN, Ahsan Washburn MD, 650 mg at 03/05/24 2335    aspirin chewable tablet 81 mg, 81 mg, Oral, Daily, Ahsan Washburn MD, 81 mg at 03/07/24 0942    ezetimibe (ZETIA) tablet 10 mg, 10 mg, Oral, Daily With Dinner, 10 mg at 03/06/24 1752 **AND** pravastatin (PRAVACHOL) tablet 40 mg, 40 mg, Oral, Daily With Dinner, Ahsan Washburn MD, 40 mg at 03/06/24 1752    finasteride (PROSCAR) tablet 5 mg, 5 mg, Oral, Daily, Ahsan Washburn MD, 5 mg at 03/07/24 0942    fish oil capsule 2,000 mg, 2,000 mg, Oral, Daily, Ahsan Washburn MD, 2,000 mg at 03/07/24 0942    heparin (porcine) 25,000 units in 0.45% NaCl 250 mL infusion (premix), 3-20 Units/kg/hr (Order-Specific), Intravenous, Titrated, Ahsan Washburn MD, Last Rate: 10 mL/hr at 03/07/24 0049, 11.1 Units/kg/hr at  "03/07/24 0049    heparin (porcine) injection 2,000 Units, 2,000 Units, Intravenous, Q6H PRN, Ahsan Washburn MD    heparin (porcine) injection 4,000 Units, 4,000 Units, Intravenous, Q6H PRN, Ahsan Washburn MD    metoprolol succinate (TOPROL-XL) 24 hr tablet 25 mg, 25 mg, Oral, Q24H, Ahsan Washburn MD, 25 mg at 03/06/24 2153    morphine injection 2 mg, 2 mg, Intravenous, Q6H PRN, Ahsan Washburn MD, 2 mg at 03/06/24 0122    multi-electrolyte (PLASMALYTE-A/ISOLYTE-S PH 7.4) IV solution, 75 mL/hr, Intravenous, Continuous, Phoebe Quinn MD    nitroglycerin (NITRODUR) 0.1 mg/hr TD 24 hr patch, 0.1 mg, Transdermal, Daily, Ahsan Washburn MD, 0.1 mg at 03/07/24 0942    ondansetron (ZOFRAN) injection 4 mg, 4 mg, Intravenous, Q6H PRN, Ahsan Washburn MD    oxyCODONE (ROXICODONE) oral solution 5 mg, 5 mg, Oral, Q8H PRN, Ahsan Washburn MD    Invasive Devices:        Lab Results:   Results from last 7 days   Lab Units 03/07/24  0604 03/06/24  0540 03/05/24  2239 03/05/24  1830   WBC Thousand/uL 7.10 7.92  --  6.51   HEMOGLOBIN g/dL 10.7* 11.5*  --  11.5*   HEMATOCRIT % 31.4* 35.0*  --  34.6*   PLATELETS Thousands/uL 127* 167  --  148*   POTASSIUM mmol/L 4.2 5.3 4.9 5.4*   CHLORIDE mmol/L 101 107 106 104   CO2 mmol/L 23 17* 20* 22   BUN mg/dL 58* 49* 45* 41*   CREATININE mg/dL 2.40* 2.65* 2.24* 2.01*   CALCIUM mg/dL 8.0* 8.4 9.0 9.1   MAGNESIUM mg/dL 2.1 2.2  --   --    PHOSPHORUS mg/dL 3.4  --   --   --    ALK PHOS U/L  --   --  76 77   ALT U/L  --   --  198* 167*   AST U/L  --   --  210* 200*         Phoebe Quinn MD        Portions of the record may have been created with voice recognition software. Occasional wrong word or \"sound a like\" substitutions may have occurred due to the inherent limitations of voice recognition software. Read the chart carefully and recognize, using context, where substitutions have occurred.If you have any questions, please contact the " dictating provider.

## 2024-03-07 NOTE — ASSESSMENT & PLAN NOTE
"Patient with chest pain and dyspnea exertion x 4 days.  Recent admission at [facility] on March 3 and discharged same day.  At that time noticed that his chest pain was secondary to atrial flutter.  His troponins at that time were 196, 234, 346     Latest Reference Range & Units 03/05/24 18:30 03/05/24 20:23 03/05/24 22:39 03/06/24 05:40 03/06/24 08:12 03/06/24 12:31   hs TnI 0hr \"Refer to ACS Flowchart\"- see link ng/L 1,276 (H)        hs TnI 2hr \"Refer to ACS Flowchart\"- see link ng/L  1,601 (H)       Delta 2hr hsTnI <20 ng/L  325 (H)       hs TnI 4hr \"Refer to ACS Flowchart\"- see link ng/L   1,808 (H)      Delta 4hr hsTnI <20 ng/L   532 (H)      HS TnI random 8 - 18 ng/L    3,126 (H) 5,951 (H) 11,193 (H)   BNP 0 - 100 pg/mL 282 (H)        (H): Data is abnormally high    Patient received 324 mg of aspirin via EMS  Consulted cardiology  TTE 3/6/24: EF 50% There is systolic flattening of the interventricular septum consistent with right ventricle pressure overload, There is systolic flattening of the interventricular septum consistent with right ventricle pressure overload. RA severely dilated, RV mildly dilated,     3/6/2024 VQ Scan: low probability of PE    -   Continue Toprol-XL 25 mg once a day    -   continue Vytorin 10/40 mg daily    -   continue aspirin 81 mg once a day    -start Brillinta as per cardiology  Patient had been on heparin gtt post cath which showed a mid LAD lesion 100% stenosed, first marginal lesion 100% stenosed, OST RCA to proximal RCA lesion 90% stenosed which had thrombus with temporary decrease in flow to RCA.  Patient was also noted to have intermittent A-V block which a TVP was placed which was removed on 3/12 as per cardiology.    -   continue nitroglycerin patch 0.1 mg per hour on in the a.m. often the p.m.  3/8/2024 Lexiscan nuclear stress test: partially reversible inferior defect noted. Patient with known native RCA disease which was not bypass.    -   Patient " loaded with Plavix 300 mg times one and started Plavix 75 mg daily  Cath performed on 3/11 as noted above  Continue fluids per nephro recommendations  Patient did have a bump in his creatinine to 2.11 on 3/13, gentle IV hydration as per nephrology.

## 2024-03-07 NOTE — ASSESSMENT & PLAN NOTE
Baseline creatinine:1.5-1.7 mg/dl based on labs from [facility] dating back to 2019   Admission Cr 2.01  Nephrology consulted due to worsening Cr up to 2.4, Improved to baseline with IVF.  Cr lashaun to 1.9 after lasix 20 mg iv on 3/9  Patient underwent cardiac catheterization 3/11, nephrology following.  Creatinine noted to rise to 2.11, receiving gentle IV hydration on 3/13  Repeat BMP in a.m.        Lab Results   Component Value Date    EGFR 29 03/11/2024    EGFR 28 03/10/2024    EGFR 32 03/09/2024    CREATININE 1.93 (H) 03/11/2024    CREATININE 1.98 (H) 03/10/2024    CREATININE 1.78 (H) 03/09/2024

## 2024-03-07 NOTE — PLAN OF CARE
Problem: PAIN - ADULT  Goal: Verbalizes/displays adequate comfort level or baseline comfort level  Description: Interventions:  - Encourage patient to monitor pain and request assistance  - Assess pain using appropriate pain scale  - Administer analgesics based on type and severity of pain and evaluate response  - Implement non-pharmacological measures as appropriate and evaluate response  - Consider cultural and social influences on pain and pain management  - Notify physician/advanced practitioner if interventions unsuccessful or patient reports new pain  Outcome: Progressing     Problem: INFECTION - ADULT  Goal: Absence or prevention of progression during hospitalization  Description: INTERVENTIONS:  - Assess and monitor for signs and symptoms of infection  - Monitor lab/diagnostic results  - Monitor all insertion sites, i.e. indwelling lines, tubes, and drains  - Monitor endotracheal if appropriate and nasal secretions for changes in amount and color  - [place] appropriate cooling/warming therapies per order  - Administer medications as ordered  - Instruct and encourage patient and family to use good hand hygiene technique  - Identify and instruct in appropriate isolation precautions for identified infection/condition  Outcome: Progressing  Goal: Absence of fever/infection during neutropenic period  Description: INTERVENTIONS:  - Monitor WBC    Outcome: Progressing     Problem: SAFETY ADULT  Goal: Patient will remain free of falls  Description: INTERVENTIONS:  - Educate patient/family on patient safety including physical limitations  - Instruct patient to call for assistance with activity   - Consult OT/PT to assist with strengthening/mobility   - Keep Call bell within reach  - Keep bed low and locked with side rails adjusted as appropriate  - Keep care items and personal belongings within reach  - Initiate and maintain comfort rounds  - Make Fall Risk Sign visible to staff  - Offer Toileting every 3 Hours,  in advance of need  - Initiate/Maintain bed alarm  - Obtain necessary fall risk management equipment: bed/ chair   - Apply yellow socks and bracelet for high fall risk patients  - Consider moving patient to room near nurses station  Outcome: Progressing  Goal: Maintain or return to baseline ADL function  Description: INTERVENTIONS:  -  Assess patient's ability to carry out ADLs; assess patient's baseline for ADL function and identify physical deficits which impact ability to perform ADLs (bathing, care of mouth/teeth, toileting, grooming, dressing, etc.)  - Assess/evaluate cause of self-care deficits   - Assess range of motion  - Assess patient's mobility; develop plan if impaired  - Assess patient's need for assistive devices and provide as appropriate  - Encourage maximum independence but intervene and supervise when necessary  - Involve family in performance of ADLs  - Assess for home care needs following discharge   - Consider OT consult to assist with ADL evaluation and planning for discharge  - Provide patient education as appropriate  Outcome: Progressing  Goal: Maintains/Returns to pre admission functional level  Description: INTERVENTIONS:  - Perform AM-PAC 6 Click Basic Mobility/ Daily Activity assessment daily.  - Set and communicate daily mobility goal to care team and patient/family/caregiver.   - Collaborate with rehabilitation services on mobility goals if consulted  - Perform Range of Motion 3 times a day.  - Reposition patient every 3 hours.  - Dangle patient 3 times a day  - Stand patient 3 times a day  - Ambulate patient 3 times a day  - Out of bed to chair 3 times a day   - Out of bed for meals 3 times a day  - Out of bed for toileting  - Record patient progress and toleration of activity level   Outcome: Progressing     Problem: DISCHARGE PLANNING  Goal: Discharge to home or other facility with appropriate resources  Description: INTERVENTIONS:  - Identify barriers to discharge w/patient and  caregiver  - Arrange for needed discharge resources and transportation as appropriate  - Identify discharge learning needs (meds, wound care, etc.)  - Arrange for interpretive services to assist at discharge as needed  - Refer to Case Management Department for coordinating discharge planning if the patient needs post-hospital services based on physician/advanced practitioner order or complex needs related to functional status, cognitive ability, or social support system  Outcome: Progressing     Problem: Knowledge Deficit  Goal: Patient/family/caregiver demonstrates understanding of disease process, treatment plan, medications, and discharge instructions  Description: Complete learning assessment and assess knowledge base.  Interventions:  - Provide teaching at level of understanding  - Provide teaching via preferred learning methods  Outcome: Progressing     Problem: Prexisting or High Potential for Compromised Skin Integrity  Goal: Skin integrity is maintained or improved  Description: INTERVENTIONS:  - Identify patients at risk for skin breakdown  - Assess and monitor skin integrity  - Assess and monitor nutrition and hydration status  - Monitor labs   - Assess for incontinence   - Turn and reposition patient  - Assist with mobility/ambulation  - Relieve pressure over bony prominences  - Avoid friction and shearing  - Provide appropriate hygiene as needed including keeping skin clean and dry  - Evaluate need for skin moisturizer/barrier cream  - Collaborate with interdisciplinary team   - Patient/family teaching  - Consider wound care consult   Outcome: Progressing

## 2024-03-07 NOTE — ASSESSMENT & PLAN NOTE
CAD status post CABG 2009. [place] to LAD and saphenous vein graft to circumflex marginal, Cardiac CTA 12/15/2021 revealed patent LIMA to LAD, patent vein graft to circumflex obtuse marginal #1 an extensive plaque in the native RCA    Cardiac cath performed on 3/11 as noted above.  Continue aspirin, Vytorin and Brilinta

## 2024-03-07 NOTE — PLAN OF CARE
Problem: INFECTION - ADULT  Goal: Absence or prevention of progression during hospitalization  Description: INTERVENTIONS:  - Assess and monitor for signs and symptoms of infection  - Monitor lab/diagnostic results  - Monitor all insertion sites, i.e. indwelling lines, tubes, and drains  - Monitor endotracheal if appropriate and nasal secretions for changes in amount and color  - [place] appropriate cooling/warming therapies per order  - Administer medications as ordered  - Instruct and encourage patient and family to use good hand hygiene technique  - Identify and instruct in appropriate isolation precautions for identified infection/condition  Outcome: Progressing    Problem: SAFETY ADULT  Goal: Maintain or return to baseline ADL function  Description: INTERVENTIONS:  -  Assess patient's ability to carry out ADLs; assess patient's baseline for ADL function and identify physical deficits which impact ability to perform ADLs (bathing, care of mouth/teeth, toileting, grooming, dressing, etc.)  - Assess/evaluate cause of self-care deficits   - Assess range of motion  - Assess patient's mobility; develop plan if impaired  - Assess patient's need for assistive devices and provide as appropriate  - Encourage maximum independence but intervene and supervise when necessary  - Involve family in performance of ADLs  - Assess for home care needs following discharge   - Consider OT consult to assist with ADL evaluation and planning for discharge  - Provide patient education as appropriate  Outcome: Progressing   of activity level   Outcome: Progressing

## 2024-03-07 NOTE — ASSESSMENT & PLAN NOTE
Continue with Lopressor.    Status post watchman flex (24 mm) in 2023    On his hospitalization at [facility] March 3, Xeralto discontinued due to epistaxis  Heparin gtt had been initiated on admission, stopped on 3/9.  Placed back on heparin drip 3/11 with cardiac catheterization revealing no thrombus noted OST RCA to proximal RCA  Transition to Brilinta and aspirin on 3/13

## 2024-03-07 NOTE — ASSESSMENT & PLAN NOTE
Admission creatinine 2.01.   Baseline Creatinine: 1.5-1.7    Trended up to 2.65 on hospital day 1  Nephrology consulted  Placed on  sodium bicarb gtt @75 ml/hr for 1 day then isolyte for 1 day with Cr improvement down to baseline. 1.7  S/P Lasix 20 mg IV x 1 on 3/9 with slight Cr bump to 1.9  Remains at 1.9

## 2024-03-07 NOTE — ASSESSMENT & PLAN NOTE
CAD status post CABG 2009. [place] to LAD and saphenous vein graft to circumflex marginal, Cardiac CTA 12/15/2021 revealed patent LIMA to LAD, patent vein graft to circumflex obtuse marginal #1 an extensive plaque in the native RCA,     Cardiac cath performed on 3/11 as noted above    Continue aspirin.    Continue Vytorin  Started Brilinta 3/13

## 2024-03-07 NOTE — PROGRESS NOTES
Progress Note - Cardiology   [facility]     Vladimir Mathias 89 y.o. male MRN: [id number]  : 12/3/1932  Unit/Bed#: [address] [place], [place] [id number] Encounter: [id number]    Assessment and Plan:   1. MI type I:  hs trop: 1276 (0hr), 1601 (2hr), 1808 (4hr)    -   hs trops random:  3126, 5951,11,193, >22973 x2    -   patient initially hesitant for IV anticoagulation secondary to history of epitaxsis, but was agreeable after long conversation and IV heparin was initiated on 3/6/2024 at approximately 1 PM.    -   3/6/2024 TTE: EF visibly estimated at 55% with disconnect except him but otherwise abnormal wall motion. IVS is consistent with postoperative status, there is systolic flattening consistent with RV pressure overload. RV cavity is moderately dilated with moderate to severe reduction in systolic function. Left atrium is mildly dilated, right atrium severely dilated and mild mitral and tricuspid valve regurgitation. Bio prosthetic valve in the aortic position appears to be well seated with no evidence of para valvular regurgitation and gradient within expected range.    -   3/6/2024 VQ Scan: low probability of PE    -   Continue Toprol-XL 25 mg once a day    -   continue Vytorin 10/40 mg daily    -   continue aspirin 81 mg once a day    -   continue nitroglycerin patch 0.1 mg per hour on in the a.m. often the p.m.    -   patient tentatively for left heart catheterization a.m. 3/8/2024    2. Coronary artery disease with history of CABG: a history of CABG times two in  (Lima to LAD and saphenous vein graft to marginal)    -   follows with Dr. Steven Walton at [facility] in [place] [phone]    -   12/15/2021 cardiac CTA: noted both bypass grafts were patent with chronic total occlusion of his LAD and diffuse plaque in both left circumflex and RCA.    -   Continue Toprol-XL 25 mg once a day    -   continue Vytorin 10/40 mg daily    -   continue aspirin 81 mg once a day    -   continue  "nitroglycerin patch 0.1 mg per hour on in the a.m. off in the p.m.    3. TUAN on chronic kidney disease: review records from [facility], appears his baseline creatinine is 1.4 - 1.7    -   follows with nephrologist at [facility]    -   seen by Dr. Sheppard nephrology and consultation appreciated    -   creatinine on presentation was 2.65 and improved overnight to 2.4 with bicarb drip.    -   Fluids changed to Isolyte per nephrology today    -   May proceed with cardiac catheterization if creatinine on 3/11/2024.    4. Hypertension: blood pressure stable    -   continue Toprol and nitroglycerin with close monitoring    5. Paroxysmal atrial fibrillation flutter: patient has watchmen device    -   telemetry reviewed and notes bradycardia with intermittent Mobitz 2 type I and Mobitz 2 type II    6. Aortic stenosis status post TAVR: TAVR performed in 3/31/2021 at [facility]    -   3/6/2024 TTE: Bio prosthetic valve in the aortic position appears to be well seated with no evidence of para valvular regurgitation and gradient within expected range.    7. History of epitaxis with anticoagulation (Xarelto): patient has watchmen device    Subjective / Objective:   Patient seen and examined. No further complaints of chest discomfort overnight. High-sensitivity troponins greater than 23,000. Case discussed with nephrology and he is tentatively scheduled for diagnostic left heart catheterization on 3/11/2024.    Vitals: Blood pressure 134/57, pulse 68, temperature (!) 97.3 °F (36.3 °C), temperature source Oral, resp. rate 16, height 6' 1\" (1.854 m), weight 93.9 kg (207 lb), SpO2 94%.  Vitals:    03/06/24 0830 03/07/24 0614   Weight: 93 kg (205 lb) 93.9 kg (207 lb)     Body mass index is 27.31 kg/m².  BP Readings from Last 3 Encounters:   03/07/24 134/57   07/12/22 118/72   07/05/22 114/72     Orthostatic Blood Pressures      Flowsheet Row Most Recent Value   Blood Pressure 134/57 filed at 03/07/2024 0405   Patient Position " - Orthostatic VS Lying filed at 03/07/2024 0307          I/O         03/05 0701  03/06 0700 03/06 0701  03/07 0700 03/07 0701  03/08 0700    P.O. 200      Total Intake(mL/kg) 200 (2.2)      Urine (mL/kg/hr) 200      Total Output 200      Net 0                   Invasive Devices       Peripheral Intravenous Line  Duration             Peripheral IV 03/05/24 Left Antecubital 1 day    Peripheral IV 03/05/24 Left Forearm 1 day                    No intake or output data in the 24 hours ending 03/07/24 1019      Physical Exam:   Physical Exam  Vitals and nursing note reviewed.   Constitutional:       General: He is not in acute distress.     Appearance: Normal appearance. He is normal weight.   HENT:      Right Ear: External ear normal.      Left Ear: External ear normal.   Eyes:      General: No scleral icterus.        Right eye: No discharge.         Left eye: No discharge.   Cardiovascular:      Rate and Rhythm: Normal rate and regular rhythm.      Pulses: Normal pulses.      Heart sounds: Murmur heard.   Pulmonary:      Effort: Pulmonary effort is normal. No respiratory distress.      Breath sounds: Normal breath sounds.   Abdominal:      General: Bowel sounds are normal. There is no distension.      Palpations: Abdomen is soft.   Musculoskeletal:      Right lower leg: No edema.      Left lower leg: No edema.   Skin:     General: Skin is warm and dry.      Capillary Refill: Capillary refill takes less than 2 seconds.   Neurological:      General: No focal deficit present.      Mental Status: He is alert and oriented to person, place, and time. Mental status is at baseline.   Psychiatric:         Mood and Affect: Mood normal.              Medications/ Allergies:     Current Facility-Administered Medications   Medication Dose Route Frequency Provider Last Rate    acetaminophen  650 mg Oral Q4H PRN Ahsan Washburn MD      aspirin  81 mg Oral Daily Ahsan Washburn MD      ezetimibe  10 mg Oral Daily With Dinner  Ahsan Washburn MD      And    pravastatin  40 mg Oral Daily With Dinner Ahsan Washburn MD      finasteride  5 mg Oral Daily Ahsan Washburn MD      fish oil  2,000 mg Oral Daily Ahsan Washburn MD      heparin (porcine)  3-20 Units/kg/hr (Order-Specific) Intravenous Titrated Ahsan Washburn MD 11.1 Units/kg/hr (03/07/24 0049)    heparin (porcine)  2,000 Units Intravenous Q6H PRN Ahsan Washburn MD      heparin (porcine)  4,000 Units Intravenous Q6H PRN Ahsan Washburn MD      metoprolol succinate  25 mg Oral Q24H Ahsan Washburn MD      morphine injection  2 mg Intravenous Q6H PRN Ahsan Washburn MD      nitroglycerin  0.1 mg Transdermal Daily Ahsan Washburn MD      ondansetron  4 mg Intravenous Q6H PRN Ahsan Washburn MD      oxyCODONE  5 mg Oral Q8H PRN Ahsan Washburn MD      sodium bicarbonate 150 mEq in dextrose 5 % 1,000 mL infusion  75 mL/hr Intravenous Continuous Adán Sheppard MD 75 mL/hr (03/07/24 0001)     acetaminophen, 650 mg, Q4H PRN  heparin (porcine), 2,000 Units, Q6H PRN  heparin (porcine), 4,000 Units, Q6H PRN  morphine injection, 2 mg, Q6H PRN  ondansetron, 4 mg, Q6H PRN  oxyCODONE, 5 mg, Q8H PRN      No Known Allergies    VTE Pharmacologic Prophylaxis:   Sequential compression device (Venodyne)     Labs:   Troponins:  Results from last 7 days   Lab Units 03/07/24  0604 03/06/24  1812 03/06/24  1231 03/06/24  0540 03/05/24  2239 03/05/24  2023   HS TNI RAND ng/L >22,973* >22,973* 11,193*   < >  --   --    HSTNI D2 ng/L  --   --   --   --   --  325*   HSTNI D4 ng/L  --   --   --   --  532*  --     < > = values in this interval not displayed.     CBC with diff:  Results from last 7 days   Lab Units 03/07/24  0604 03/06/24  0540 03/05/24  1830   WBC Thousand/uL 7.10 7.92 6.51   HEMOGLOBIN g/dL 10.7* 11.5* 11.5*   HEMATOCRIT % 31.4* 35.0* 34.6*   MCV fL 99* 100* 101*   PLATELETS Thousands/uL 127* 167 148*   RBC Million/uL 3.17* 3.50* 3.44*    MCH pg 33.8 32.9 33.4   MCHC g/dL 34.1 32.9 33.2   RDW % 14.9 14.7 14.6   MPV fL 11.0 11.1 11.0   NRBC AUTO /100 WBCs  --   --  0     CMP:  Results from last 7 days   Lab Units 03/07/24  0604 03/06/24  0540 03/05/24  2239 03/05/24  1830   SODIUM mmol/L 133* 135 135 134*   POTASSIUM mmol/L 4.2 5.3 4.9 5.4*   CHLORIDE mmol/L 101 107 106 104   CO2 mmol/L 23 17* 20* 22   ANION GAP mmol/L 9 11 9 8   BUN mg/dL 58* 49* 45* 41*   CREATININE mg/dL 2.40* 2.65* 2.24* 2.01*   CALCIUM mg/dL 8.0* 8.4 9.0 9.1   AST U/L  --   --  210* 200*   ALT U/L  --   --  198* 167*   ALK PHOS U/L  --   --  76 77   TOTAL PROTEIN g/dL  --   --  6.8 7.0   ALBUMIN g/dL 3.6  --  3.9 4.1   TOTAL BILIRUBIN mg/dL  --   --  0.45 0.48   EGFR ml/min/1.73sq m 22 20 24 28     Magnesium:  Results from last 7 days   Lab Units 03/07/24  0604 03/06/24  0540   MAGNESIUM mg/dL 2.1 2.2     Coags:  Results from last 7 days   Lab Units 03/07/24  0604 03/07/24  0003 03/06/24  1812 03/06/24  0812   PTT seconds 60* 74* 78* 29       Imaging & Testing   I have personally reviewed pertinent reports.    US kidney and bladder    Result Date: 3/6/2024  Narrative: RENAL ULTRASOUND INDICATION: Renal failure. COMPARISON: None TECHNIQUE: Ultrasound of the retroperitoneum was performed with a curvilinear transducer utilizing volumetric sweeps and still imaging techniques. FINDINGS: KIDNEYS: Symmetric and normal size. Right kidney: 11.0 x 5.0 x 5.2 cm. Volume 149.8 mL Left kidney: 10.8 x 5.4 x 4.5 cm. Volume 136.3 mL Right kidney Normal echogenicity and contour. No mass is identified. No hydronephrosis. No shadowing calculi. Trace perinephric simple free fluid. Left kidney Normal echogenicity and contour. No mass is identified. No hydronephrosis. No shadowing calculi. No perinephric fluid collections. URETERS: Nonvisualized. BLADDER: Normally distended. No focal thickening or mass lesions. The right ureteral jet is seen. The left ureteral jet is not visualized.     Impression:  Normal. Workstation performed: [id number]     NM lung ventilation / perfusion    Result Date: 3/6/2024  Narrative: VENTILATION AND PERFUSION SCAN INDICATION: RV enlargement on Echo - new,  Question PE COMPARISON:  Chest radiograph 3/5/2023 TECHNIQUE:  Posterior ventilation imaging was performed after the inhalation of 30.1 mCi nebulized Tc-99m DTPA with deposition of less than 1 mCi of activity within the lungs. Multiplanar perfusion imaging was next performed following the intravenous administration of 4.35 mCi Tc-99m labeled MAA. FINDINGS: Ventilation imaging demonstrates partial deposition of radiopharmaceutical activity within the central bronchi. Perfusion imaging demonstrates a matching subsegmental defect in the posterior aspect of the right lower lobe. There are no suspicious ventilation/perfusion mismatches.     Impression: The probability for pulmonary embolus is low. Workstation performed: [id number]     Echo complete    Result Date: 3/6/2024  Narrative:   Left Ventricle: Left ventricular cavity size is lower normal Wall thickness is moderately increased. The left ventricular ejection fraction is 55%. Systolic function is normal. Dyskinetic septum otherwise normal wall motion Unable to assess diastolic function due to atrial flutter.   IVS: There is abnormal septal motion consistent with post-operative status. There is systolic flattening of the interventricular septum consistent with right ventricle pressure overload.   Right Ventricle: Right ventricular cavity size is moderately dilated. Systolic function is moderately to severely reduced.   Left Atrium: The atrium is mildly dilated.   Right Atrium: The atrium is severely dilated.   Aortic Valve: There is a TAVR bioprosthetic valve. The prosthetic valve appears well-seated. There is no evidence of paravalvular regurgitation. The gradient recorded across the prosthetic aortic valve is within the expected range. The aortic valve peak velocity is 1.57  "m/s. The aortic valve mean gradient is 6 mmHg.   Mitral Valve: There is mild regurgitation.   Tricuspid Valve: There is mild regurgitation. Abnormal study, compared to prior echo report increased RV size and decreased systolic function. Elevated pulmonary pressures are suggested by LV systolic flattening.     XR chest 1 view portable    Result Date: 3/6/2024  Narrative: XR CHEST PORTABLE INDICATION: chest pain. COMPARISON: None FINDINGS: No airspace consolidation, pneumothorax, pulmonary edema, or pleural effusion. Trace left apical scarring.. Prior CABG. Mild cardiomegaly allowing for portable AP technique. Aortic calcification is present. Mild degenerative changes of bilateral shoulders. Normal upper abdomen.     Impression: No radiographic evidence of acute intrathoracic process. Workstation performed: [id number]     XR chest 1 view    Result Date: 3/3/2024  Narrative: CHEST X-RAY  SINGLE VIEW: HISTORY: chest pain;   PRIORS: Chest radiograph on October 8, 2010. FINDINGS: LUNGS: Clear.  No pleural abnormality seen. HEART: Mild cardiomegaly. Sternotomy. MEDIASTINUM: Normal. OTHER FINDINGS:  None.        Impression:  No acute pulmonary abnormality.       EKG / Monitor: Personally reviewed.    Telemetry reviewed, patient was sinus rhythm, occasional unifocal and multifocal PVC and patient did have short run VT approximately six beats.            Roula LOPEZ  Cardiology      \"This note was completed in part utilizing [employer] fluency direct voice recognition software.   Grammatical errors, random word insertion, spelling mistakes, and incomplete sentences may be an occasional consequence of the system secondary to software limitations, ambient noise and hardware issues.    Please read the chart carefully and recognize, using context, where substitutions have occurred.  If you have any questions or concerns about the context, text or information contained within the body of this dictation, please contact myself, " "the provider, for further clarification.\"  "

## 2024-03-07 NOTE — PROGRESS NOTES
"[facility]  Progress Note  Name: Vladimir Mathias I  MRN: [id number]  Unit/Bed#: 4 [address] 414-01 I Date of Admission: 3/5/2024   Date of Service: 3/7/2024 I Hospital Day: 2    Assessment/Plan   * NSTEMI (non-ST elevated myocardial infarction) ([facility])  Assessment & Plan  Patient with chest pain and dyspnea exertion x 4 days.  Recent admission at [facility] on March 3 and discharged same day.  At that time noticed that his chest pain was secondary to atrial flutter.  His troponins at that time were 196, 234, 346     Latest Reference Range & Units 03/05/24 18:30 03/05/24 20:23 03/05/24 22:39 03/06/24 05:40 03/06/24 08:12 03/06/24 12:31   hs TnI 0hr \"Refer to ACS Flowchart\"- see link ng/L 1,276 (H)        hs TnI 2hr \"Refer to ACS Flowchart\"- see link ng/L  1,601 (H)       Delta 2hr hsTnI <20 ng/L  325 (H)       hs TnI 4hr \"Refer to ACS Flowchart\"- see link ng/L   1,808 (H)      Delta 4hr hsTnI <20 ng/L   532 (H)      HS TnI random 8 - 18 ng/L    3,126 (H) 5,951 (H) 11,193 (H)   BNP 0 - 100 pg/mL 282 (H)        (H): Data is abnormally high    Patient received 324 mg of aspirin via EMS  Consult cardiology  TTE 3/6/24: EF 50% There is systolic flattening of the interventricular septum consistent with right ventricle pressure overload, There is systolic flattening of the interventricular septum consistent with right ventricle pressure overload. RA severely dilated, RV mildly dilated,     VQ scan ordered to assess for PE: Low probability of PE  Initially refused anticoagulation due to concerns for GI bleed but appears that patient had epistaxis in the past and was agreeable after cardiology spoke with patient  Continue heparin drip  Continue toprol xl 25 mg daily  Plan for stress test tomorrow        TUAN (acute kidney injury) ([facility])  Assessment & Plan  Admission creatinine 2.01.   Baseline Creatinine: 1.5-1.7    Trended up to 2.65 on hospital day 1  Nephrology consulted  Placed on  sodium " bicarb gtt @75 ml/hr with improvement in Cr to 2.4  Nephrology switched fluids to isolyte today    Atrial flutter (H[facility])  Assessment & Plan  Continue with Lopressor.    Status post watchman flex (24 mm) in 2023    On his hospitalization at [facility] March 3, Xeralto discontinued due to epistaxis    Nonrheumatic aortic valve stenosis  Assessment & Plan  Status post Medtronic evolute valve in 2022    S/P CABG (coronary artery bypass graft)  Assessment & Plan  CAD status post CABG 2009. Lima to LAD and saphenous vein graft to circumflex marginal, Cardiac CTA 12/15/2021 revealed patent LIMA to LAD, patent vein graft to circumflex obtuse marginal #1 an extensive plaque in the native RCA,     Continue aspirin.  Continue Vytorin    Essential hypertension  Assessment & Plan  Continue Lopressor, ACE inhibitor discontinued to TUAN     CKD (chronic kidney disease)  Assessment & Plan  Patient's baseline creatinine is 1.4, Admission Cr 2.01  Nephrology consulted due to worsening Cr  Improving with IV fluid hydration down to 2.4      Lab Results   Component Value Date    EGFR 22 03/07/2024    EGFR 20 03/06/2024    EGFR 24 03/05/2024    CREATININE 2.40 (H) 03/07/2024    CREATININE 2.65 (H) 03/06/2024    CREATININE 2.24 (H) 03/05/2024       CAD (coronary artery disease)  Assessment & Plan  CAD status post CABG 2009. Lima to LAD and saphenous vein graft to circumflex marginal, Cardiac CTA 12/15/2021 revealed patent LIMA to LAD, patent vein graft to circumflex obtuse marginal #1 an extensive plaque in the native RCA,     BPH (benign prostatic hyperplasia)  Assessment & Plan  Avodart substituted for finasteride               VTE Pharmacologic Prophylaxis: VTE Score: 5 High Risk (Score >/= 5) - Pharmacological DVT Prophylaxis Ordered: heparin drip. Sequential Compression Devices Ordered.    Mobility:   Basic Mobility Inpatient Raw Score: 18  JH-HLM Goal: 6: Walk 10 steps or more  JH-HLM Achieved: 3: Sit at edge of  bed  HLM Goal NOT achieved. Continue with multidisciplinary rounding and encourage appropriate mobility to improve upon HLM goals.    Patient Centered Rounds: I performed bedside rounds with nursing staff today.   Discussions with Specialists or Other Care Team Provider: cardiology, nephrology    Education and Discussions with Family / Patient: Patient declined call to .     Total Time Spent on Date of Encounter in care of patient: 35 mins. This time was spent on one or more of the following: performing physical exam; counseling and coordination of care; obtaining or reviewing history; documenting in the medical record; reviewing/ordering tests, medications or procedures; communicating with other healthcare professionals and discussing with patient's family/caregivers.    Current Length of Stay: 2 day(s)  Current Patient Status: Inpatient   Certification Statement: The patient will continue to require additional inpatient hospital stay due to NSTEMI  Discharge Plan: Anticipate discharge in 48-72 hrs to discharge location to be determined pending rehab evaluations.    Code Status: Level 3 - DNAR and DNI    Subjective:   Denies chest pain    Objective:     Vitals:   Temp (24hrs), Av.3 °F (36.3 °C), Min:97.1 °F (36.2 °C), Max:97.8 °F (36.6 °C)    Temp:  [97.1 °F (36.2 °C)-97.8 °F (36.6 °C)] 97.1 °F (36.2 °C)  HR:  [65-72] 72  Resp:  [16-20] 18  BP: ()/(43-70) 114/55  SpO2:  [93 %-100 %] 93 %  Body mass index is 27.31 kg/m².     Input and Output Summary (last 24 hours):   No intake or output data in the 24 hours ending 24 8435      Physical Exam:   Physical Exam  Vitals and nursing note reviewed.   Constitutional:       General: He is not in acute distress.     Appearance: He is well-developed.   HENT:      Head: Normocephalic and atraumatic.   Eyes:      Conjunctiva/sclera: Conjunctivae normal.   Cardiovascular:      Rate and Rhythm: Normal rate and regular rhythm.      Heart sounds: No  murmur heard.  Pulmonary:      Effort: Pulmonary effort is normal. No respiratory distress.      Breath sounds: Normal breath sounds.   Abdominal:      Palpations: Abdomen is soft.      Tenderness: There is no abdominal tenderness.   Musculoskeletal:         General: No swelling.      Cervical back: Neck supple.   Skin:     General: Skin is warm and dry.   Neurological:      Mental Status: He is alert. Mental status is at baseline.   Psychiatric:         Mood and Affect: Mood normal.          Additional Data:     Labs:  Results from last 7 days   Lab Units 03/07/24  0604 03/06/24  0540 03/05/24  1830   WBC Thousand/uL 7.10   < > 6.51   HEMOGLOBIN g/dL 10.7*   < > 11.5*   HEMATOCRIT % 31.4*   < > 34.6*   PLATELETS Thousands/uL 127*   < > 148*   NEUTROS PCT %  --   --  76*   LYMPHS PCT %  --   --  11*   MONOS PCT %  --   --  9   EOS PCT %  --   --  2    < > = values in this interval not displayed.     Results from last 7 days   Lab Units 03/07/24  0604 03/06/24  0540 03/05/24  2239   SODIUM mmol/L 133*   < > 135   POTASSIUM mmol/L 4.2   < > 4.9   CHLORIDE mmol/L 101   < > 106   CO2 mmol/L 23   < > 20*   BUN mg/dL 58*   < > 45*   CREATININE mg/dL 2.40*   < > 2.24*   ANION GAP mmol/L 9   < > 9   CALCIUM mg/dL 8.0*   < > 9.0   ALBUMIN g/dL 3.6  --  3.9   TOTAL BILIRUBIN mg/dL  --   --  0.45   ALK PHOS U/L  --   --  76   ALT U/L  --   --  198*   AST U/L  --   --  210*   GLUCOSE RANDOM mg/dL 125   < > 135    < > = values in this interval not displayed.                       Lines/Drains:  Invasive Devices       Peripheral Intravenous Line  Duration             Peripheral IV 03/05/24 Left Forearm 1 day                      Telemetry:  Telemetry Orders (From admission, onward)               24 Hour Telemetry Monitoring  Continuous x 24 Hours (Telem)        Question:  Reason for 24 Hour Telemetry  Answer:  PCI/EP study (including pacer and ICD implementation), Cardiac surgery, MI, abnormal cardiac cath, and chest pain- rule  out MI                     Telemetry Reviewed:  sinus rhythm, occasional PVC's, 6 beat run of VT  Indication for Continued Telemetry Use: Acute MI/Unstable Angina/Rule out ACS             Imaging: Reviewed radiology reports from this admission including: chest xray    Recent Cultures (last 7 days):         Last 24 Hours Medication List:   Current Facility-Administered Medications   Medication Dose Route Frequency Provider Last Rate    acetaminophen  650 mg Oral Q4H PRN Ahsan Washburn MD      aspirin  81 mg Oral Daily Ahsan Washburn MD      ezetimibe  10 mg Oral Daily With Dinner Ahsan Washburn MD      And    pravastatin  40 mg Oral Daily With Dinner Ahsan Washburn MD      finasteride  5 mg Oral Daily Ahsan Washburn MD      fish oil  2,000 mg Oral Daily Ahsan Washburn MD      heparin (porcine)  3-20 Units/kg/hr (Order-Specific) Intravenous Titrated Ahsan Washburn MD 11.1 Units/kg/hr (03/07/24 1516)    heparin (porcine)  2,000 Units Intravenous Q6H PRN Ahsan Washburn MD      heparin (porcine)  4,000 Units Intravenous Q6H PRN Ahsan Washburn MD      metoprolol succinate  25 mg Oral Q24H Ahsan Washburn MD      morphine injection  2 mg Intravenous Q6H PRN Ahsan Washburn MD      multi-electrolyte  75 mL/hr Intravenous Continuous Phoebe Antonio Quinn MD 75 mL/hr (03/07/24 1321)    nitroglycerin  0.1 mg Transdermal Daily Ahsan Washburn MD      ondansetron  4 mg Intravenous Q6H PRN Ahsan Washburn MD      oxyCODONE  5 mg Oral Q8H PRN Ahsan Washburn MD          Today, Patient Was Seen By: Shae Camacho DO    **Please Note: This note may have been constructed using a voice recognition system.**

## 2024-03-08 ENCOUNTER — APPOINTMENT (INPATIENT)
Dept: RADIOLOGY | Facility: HOSPITAL | Age: 89
DRG: 243 | End: 2024-03-08
Payer: COMMERCIAL

## 2024-03-08 ENCOUNTER — APPOINTMENT (INPATIENT)
Dept: NON INVASIVE DIAGNOSTICS | Facility: HOSPITAL | Age: 89
DRG: 243 | End: 2024-03-08
Payer: COMMERCIAL

## 2024-03-08 ENCOUNTER — HOSPITAL ENCOUNTER (INPATIENT)
Dept: RADIOLOGY | Facility: HOSPITAL | Age: 89
DRG: 243 | End: 2024-03-08
Payer: COMMERCIAL

## 2024-03-08 LAB
ALBUMIN SERPL BCP-MCNC: 3.5 G/DL (ref 3.5–5)
ANION GAP SERPL CALCULATED.3IONS-SCNC: 10 MMOL/L
APTT PPP: 74 SECONDS (ref 23–37)
BUN SERPL-MCNC: 54 MG/DL (ref 5–25)
CALCIUM SERPL-MCNC: 8.1 MG/DL (ref 8.4–10.2)
CHEST PAIN STATEMENT: NORMAL
CHLORIDE SERPL-SCNC: 101 MMOL/L (ref 96–108)
CHLORIDE UR-SCNC: <15 MMOL/L
CO2 SERPL-SCNC: 23 MMOL/L (ref 21–32)
CREAT SERPL-MCNC: 2.07 MG/DL (ref 0.6–1.3)
ERYTHROCYTE [DISTWIDTH] IN BLOOD BY AUTOMATED COUNT: 14.7 % (ref 11.6–15.1)
GFR SERPL CREATININE-BSD FRML MDRD: 27 ML/MIN/1.73SQ M
GLUCOSE SERPL-MCNC: 128 MG/DL (ref 65–140)
HCT VFR BLD AUTO: 32.8 % (ref 36.5–49.3)
HGB BLD-MCNC: 10.7 G/DL (ref 12–17)
MAGNESIUM SERPL-MCNC: 2.2 MG/DL (ref 1.9–2.7)
MAX DIASTOLIC BP: 65 MMHG
MAX PREDICTED HEART RATE: 129 BPM
MCH RBC QN AUTO: 32.6 PG (ref 26.8–34.3)
MCHC RBC AUTO-ENTMCNC: 32.6 G/DL (ref 31.4–37.4)
MCV RBC AUTO: 100 FL (ref 82–98)
PHOSPHATE SERPL-MCNC: 3.5 MG/DL (ref 2.3–4.1)
PLATELET # BLD AUTO: 131 THOUSANDS/UL (ref 149–390)
PMV BLD AUTO: 10.9 FL (ref 8.9–12.7)
POTASSIUM SERPL-SCNC: 4 MMOL/L (ref 3.5–5.3)
POTASSIUM UR-SCNC: 66.6 MMOL/L
PROTOCOL NAME: NORMAL
RBC # BLD AUTO: 3.28 MILLION/UL (ref 3.88–5.62)
REASON FOR TERMINATION: NORMAL
SODIUM 24H UR-SCNC: 17 MOL/L
SODIUM SERPL-SCNC: 134 MMOL/L (ref 135–147)
STRESS POST EXERCISE DUR MIN: 1 MIN
STRESS POST EXERCISE DUR SEC: 0 SEC
STRESS POST PEAK HR: 70 BPM
STRESS POST PEAK SYSTOLIC BP: 147 MMHG
TARGET HR FORMULA: NORMAL
TEST INDICATION: NORMAL
WBC # BLD AUTO: 8.26 THOUSAND/UL (ref 4.31–10.16)

## 2024-03-08 PROCEDURE — 99232 SBSQ HOSP IP/OBS MODERATE 35: CPT | Performed by: INTERNAL MEDICINE

## 2024-03-08 PROCEDURE — 93018 CV STRESS TEST I&R ONLY: CPT | Performed by: INTERNAL MEDICINE

## 2024-03-08 PROCEDURE — 78452 HT MUSCLE IMAGE SPECT MULT: CPT

## 2024-03-08 PROCEDURE — 93016 CV STRESS TEST SUPVJ ONLY: CPT | Performed by: INTERNAL MEDICINE

## 2024-03-08 PROCEDURE — 83735 ASSAY OF MAGNESIUM: CPT | Performed by: STUDENT IN AN ORGANIZED HEALTH CARE EDUCATION/TRAINING PROGRAM

## 2024-03-08 PROCEDURE — 85027 COMPLETE CBC AUTOMATED: CPT | Performed by: STUDENT IN AN ORGANIZED HEALTH CARE EDUCATION/TRAINING PROGRAM

## 2024-03-08 PROCEDURE — A9502 TC99M TETROFOSMIN: HCPCS

## 2024-03-08 PROCEDURE — 78452 HT MUSCLE IMAGE SPECT MULT: CPT | Performed by: INTERNAL MEDICINE

## 2024-03-08 PROCEDURE — 85730 THROMBOPLASTIN TIME PARTIAL: CPT | Performed by: STUDENT IN AN ORGANIZED HEALTH CARE EDUCATION/TRAINING PROGRAM

## 2024-03-08 PROCEDURE — 80069 RENAL FUNCTION PANEL: CPT | Performed by: STUDENT IN AN ORGANIZED HEALTH CARE EDUCATION/TRAINING PROGRAM

## 2024-03-08 PROCEDURE — 93017 CV STRESS TEST TRACING ONLY: CPT

## 2024-03-08 PROCEDURE — 99232 SBSQ HOSP IP/OBS MODERATE 35: CPT | Performed by: STUDENT IN AN ORGANIZED HEALTH CARE EDUCATION/TRAINING PROGRAM

## 2024-03-08 RX ORDER — REGADENOSON 0.08 MG/ML
0.4 INJECTION, SOLUTION INTRAVENOUS ONCE
Status: COMPLETED | OUTPATIENT
Start: 2024-03-08 | End: 2024-03-08

## 2024-03-08 RX ADMIN — METOPROLOL SUCCINATE 25 MG: 25 TABLET, EXTENDED RELEASE ORAL at 21:01

## 2024-03-08 RX ADMIN — PRAVASTATIN SODIUM 40 MG: 40 TABLET ORAL at 17:19

## 2024-03-08 RX ADMIN — EZETIMIBE 10 MG: 10 TABLET ORAL at 17:19

## 2024-03-08 RX ADMIN — REGADENOSON 0.4 MG: 0.08 INJECTION, SOLUTION INTRAVENOUS at 10:00

## 2024-03-08 RX ADMIN — SODIUM CHLORIDE, SODIUM GLUCONATE, SODIUM ACETATE, POTASSIUM CHLORIDE, MAGNESIUM CHLORIDE, SODIUM PHOSPHATE, DIBASIC, AND POTASSIUM PHOSPHATE 75 ML/HR: .53; .5; .37; .037; .03; .012; .00082 INJECTION, SOLUTION INTRAVENOUS at 06:09

## 2024-03-08 RX ADMIN — ASPIRIN 81 MG 81 MG: 81 TABLET ORAL at 11:54

## 2024-03-08 RX ADMIN — NITROGLYCERIN 0.1 MG: 0.1 PATCH TRANSDERMAL at 11:54

## 2024-03-08 RX ADMIN — HEPARIN SODIUM 11.1 UNITS/KG/HR: 10000 INJECTION, SOLUTION INTRAVENOUS at 13:34

## 2024-03-08 RX ADMIN — OMEGA-3 FATTY ACIDS CAP 1000 MG 2000 MG: 1000 CAP at 11:54

## 2024-03-08 RX ADMIN — FINASTERIDE 5 MG: 5 TABLET, FILM COATED ORAL at 11:54

## 2024-03-08 NOTE — PLAN OF CARE
Problem: PAIN - ADULT  Goal: Verbalizes/displays adequate comfort level or baseline comfort level  Description: Interventions:  - Encourage patient to monitor pain and request assistance  - Assess pain using appropriate pain scale  - Administer analgesics based on type and severity of pain and evaluate response  - Implement non-pharmacological measures as appropriate and evaluate response  - Consider cultural and social influences on pain and pain management  - Notify physician/advanced practitioner if interventions unsuccessful or patient reports new pain  Outcome: Progressing     Problem: INFECTION - ADULT  Goal: Absence or prevention of progression during hospitalization  Description: INTERVENTIONS:  - Assess and monitor for signs and symptoms of infection  - Monitor lab/diagnostic results  - Monitor all insertion sites, i.e. indwelling lines, tubes, and drains  - Monitor endotracheal if appropriate and nasal secretions for changes in amount and color  - [place] appropriate cooling/warming therapies per order  - Administer medications as ordered  - Instruct and encourage patient and family to use good hand hygiene technique  - Identify and instruct in appropriate isolation precautions for identified infection/condition  Outcome: Progressing  Goal: Absence of fever/infection during neutropenic period  Description: INTERVENTIONS:  - Monitor WBC    Outcome: Progressing     Problem: SAFETY ADULT  Goal: Patient will remain free of falls  Description: INTERVENTIONS:  - Educate patient/family on patient safety including physical limitations  - Instruct patient to call for assistance with activity   - Consult OT/PT to assist with strengthening/mobility   - Keep Call bell within reach  - Keep bed low and locked with side rails adjusted as appropriate  - Keep care items and personal belongings within reach  - Initiate and maintain comfort rounds  - Make Fall Risk Sign visible to staff  - Offer Toileting every 2 Hours,  in advance of need  - Initiate/Maintain bed alarm  - Obtain necessary fall risk management equipment: yellow socks  - Apply yellow socks and bracelet for high fall risk patients  - Consider moving patient to room near nurses station  Outcome: Progressing  Goal: Maintain or return to baseline ADL function  Description: INTERVENTIONS:  -  Assess patient's ability to carry out ADLs; assess patient's baseline for ADL function and identify physical deficits which impact ability to perform ADLs (bathing, care of mouth/teeth, toileting, grooming, dressing, etc.)  - Assess/evaluate cause of self-care deficits   - Assess range of motion  - Assess patient's mobility; develop plan if impaired  - Assess patient's need for assistive devices and provide as appropriate  - Encourage maximum independence but intervene and supervise when necessary  - Involve family in performance of ADLs  - Assess for home care needs following discharge   - Consider OT consult to assist with ADL evaluation and planning for discharge  - Provide patient education as appropriate  Outcome: Progressing  Goal: Maintains/Returns to pre admission functional level  Description: INTERVENTIONS:  - Perform AM-PAC 6 Click Basic Mobility/ Daily Activity assessment daily.  - Set and communicate daily mobility goal to care team and patient/family/caregiver.   - Collaborate with rehabilitation services on mobility goals if consulted  - Perform Range of Motion 4 times a day.  - Reposition patient every 2 hours.  - Dangle patient 3 times a day  - Stand patient 3 times a day  - Ambulate patient 3 times a day  - Out of bed to chair 3 times a day   - Out of bed for meals 3 times a day  - Out of bed for toileting  - Record patient progress and toleration of activity level   Outcome: Progressing     Problem: DISCHARGE PLANNING  Goal: Discharge to home or other facility with appropriate resources  Description: INTERVENTIONS:  - Identify barriers to discharge w/patient and  caregiver  - Arrange for needed discharge resources and transportation as appropriate  - Identify discharge learning needs (meds, wound care, etc.)  - Arrange for interpretive services to assist at discharge as needed  - Refer to Case Management Department for coordinating discharge planning if the patient needs post-hospital services based on physician/advanced practitioner order or complex needs related to functional status, cognitive ability, or social support system  Outcome: Progressing     Problem: Knowledge Deficit  Goal: Patient/family/caregiver demonstrates understanding of disease process, treatment plan, medications, and discharge instructions  Description: Complete learning assessment and assess knowledge base.  Interventions:  - Provide teaching at level of understanding  - Provide teaching via preferred learning methods  Outcome: Progressing     Problem: Prexisting or High Potential for Compromised Skin Integrity  Goal: Skin integrity is maintained or improved  Description: INTERVENTIONS:  - Identify patients at risk for skin breakdown  - Assess and monitor skin integrity  - Assess and monitor nutrition and hydration status  - Monitor labs   - Assess for incontinence   - Turn and reposition patient  - Assist with mobility/ambulation  - Relieve pressure over bony prominences  - Avoid friction and shearing  - Provide appropriate hygiene as needed including keeping skin clean and dry  - Evaluate need for skin moisturizer/barrier cream  - Collaborate with interdisciplinary team   - Patient/family teaching  - Consider wound care consult   Outcome: Progressing

## 2024-03-08 NOTE — PROGRESS NOTES
NEPHROLOGY PROGRESS NOTE   Vladimir Mathias 89 y.o. male MRN: [id number]  Unit/Bed#: [address] [place] [address] Encounter: [id number]    ASSESSMENT & PLAN:  89 y/o male with history of afib s/p watchman, CAD, CKD stage 3, HTN, BPH presenting to the hospital for SOB. Found to have elevated troponin and TUAN.    Acute Kidney Injury  Admission Cr 2.01  UA wnl  PVR 0mL  Kidney US: No hydronephrosis  TUAN likely secondary to hypotension/decreased renal perfusion in setting of NSTEMI  Plan:  Renal function improving, Cr today 2.07  May proceed with cardiac catheterization as renal function improving. Risks vs. benefits discussed with the patient.  IVFs: Start Isolyte 75mL/hr for hydration 1hr prior to cath  Continue holding diuretics, ARBs  BMP in AM  Hold nephrotoxins/dose medications per GFR  Avoid hypotension    CKD stage III-b  Outpatient nephrologist is Dr. Barrientos  Baseline Cr 1.5-1.7  Continue outpatient follow up with nephrology    Volume Status  Euvolemic clinically  CXR did not reveal signs of fluid overload  Echo revealing EF 55%, inability to assess diastolic function due to a flutter. IVC appeared dilated.    Non Anion Gap Metabolic Acidosis  CO2 17 on admission requiring bicarb drip which was discontinued after improvement of acidosis  Urine electrolytes ordered and Urine AG calculated  UAG elevated pointing as RTA as likely cause of patient's metabolic acidosis  CO2 23 today, continue to monitor    Hypertension:  Continues to have soft BPs  Home Rx: Metoprolol 25mg daily and olmesartan 40mg daily  Hold Olmesartan in setting of TUAN  Avoid hypotension    NSTEMI:  Elevated troponin on admission  Cardiology following    Hyperkalemia  In setting of TUAN and ARB use  Continue to hold losartan  Low K+ diet    Anemia:  Stable Hgb    BPH  Continue Flomax  PVR 0mL      SUBJECTIVE:  Patient seen and evaluated at bedside, he was sitting comfortably in chair  He is eating and drinking well, denies any chest pain, SOB, palpitations  No other  acute concerns    OBJECTIVE:  Current Weight: Weight - Scale: 93.5 kg (206 lb 2.1 oz)  Vitals:    03/08/24 0728   BP: 123/58   Pulse: 68   Resp:    Temp:    SpO2: 91%       Intake/Output Summary (Last 24 hours) at 3/8/2024 1156  Last data filed at 3/7/2024 2026  Gross per 24 hour   Intake --   Output 200 ml   Net -200 ml         Physical Exam  Vitals reviewed.   Constitutional:       General: He is not in acute distress.     Appearance: Normal appearance.   Cardiovascular:      Rate and Rhythm: Normal rate and regular rhythm.      Heart sounds: Normal heart sounds.   Pulmonary:      Effort: Pulmonary effort is normal. No respiratory distress.      Breath sounds: Normal breath sounds.   Abdominal:      General: Bowel sounds are normal.      Palpations: Abdomen is soft.      Tenderness: There is no abdominal tenderness.   Musculoskeletal:      Right lower leg: No edema.      Left lower leg: No edema.   Neurological:      Mental Status: He is alert.         Medications:    Current Facility-Administered Medications:     acetaminophen (TYLENOL) tablet 650 mg, 650 mg, Oral, Q4H PRN, Ahsan Washburn MD, 650 mg at 03/07/24 2028    aspirin chewable tablet 81 mg, 81 mg, Oral, Daily, Ahsan Washburn MD, 81 mg at 03/07/24 0942    ezetimibe (ZETIA) tablet 10 mg, 10 mg, Oral, Daily With Dinner, 10 mg at 03/07/24 1639 **AND** pravastatin (PRAVACHOL) tablet 40 mg, 40 mg, Oral, Daily With Dinner, Ahsan Washburn MD, 40 mg at 03/07/24 1639    finasteride (PROSCAR) tablet 5 mg, 5 mg, Oral, Daily, Ahsan Washburn MD, 5 mg at 03/07/24 0942    fish oil capsule 2,000 mg, 2,000 mg, Oral, Daily, Ahsan Washburn MD, 2,000 mg at 03/07/24 0942    heparin (porcine) 25,000 units in 0.45% NaCl 250 mL infusion (premix), 3-20 Units/kg/hr (Order-Specific), Intravenous, Titrated, Ahsan Washburn MD, Last Rate: 10 mL/hr at 03/07/24 1516, 11.1 Units/kg/hr at 03/07/24 1516    heparin (porcine) injection 2,000 Units, 2,000  "Units, Intravenous, Q6H PRN, Ahsan Washburn MD    heparin (porcine) injection 4,000 Units, 4,000 Units, Intravenous, Q6H PRN, Ahsan Washburn MD    metoprolol succinate (TOPROL-XL) 24 hr tablet 25 mg, 25 mg, Oral, Q24H, Ahsan Washburn MD, 25 mg at 03/07/24 2028    morphine injection 2 mg, 2 mg, Intravenous, Q6H PRN, Ahsan Washburn MD    nitroglycerin (NITRODUR) 0.1 mg/hr TD 24 hr patch, 0.1 mg, Transdermal, Daily, Ahsan Washburn MD, 0.1 mg at 03/07/24 0942    ondansetron (ZOFRAN) injection 4 mg, 4 mg, Intravenous, Q6H PRN, Ahsan Washburn MD    oxyCODONE (ROXICODONE) oral solution 5 mg, 5 mg, Oral, Q8H PRN, Ahsan Washburn MD    Invasive Devices:        Lab Results:   Results from last 7 days   Lab Units 03/08/24  0607 03/07/24  0604 03/06/24  0540 03/05/24  2239 03/05/24  1830   WBC Thousand/uL 8.26 7.10 7.92  --  6.51   HEMOGLOBIN g/dL 10.7* 10.7* 11.5*  --  11.5*   HEMATOCRIT % 32.8* 31.4* 35.0*  --  34.6*   PLATELETS Thousands/uL 131* 127* 167  --  148*   POTASSIUM mmol/L 4.0 4.2 5.3 4.9 5.4*   CHLORIDE mmol/L 101 101 107 106 104   CO2 mmol/L 23 23 17* 20* 22   BUN mg/dL 54* 58* 49* 45* 41*   CREATININE mg/dL 2.07* 2.40* 2.65* 2.24* 2.01*   CALCIUM mg/dL 8.1* 8.0* 8.4 9.0 9.1   MAGNESIUM mg/dL 2.2 2.1 2.2  --   --    PHOSPHORUS mg/dL 3.5 3.4  --   --   --    ALK PHOS U/L  --   --   --  76 77   ALT U/L  --   --   --  198* 167*   AST U/L  --   --   --  210* 200*         Phoebe Quinn MD        Portions of the record may have been created with voice recognition software. Occasional wrong word or \"sound a like\" substitutions may have occurred due to the inherent limitations of voice recognition software. Read the chart carefully and recognize, using context, where substitutions have occurred.If you have any questions, please contact the dictating provider.    "

## 2024-03-08 NOTE — PROGRESS NOTES
Progress Note - Cardiology   [facility]     Vladimir Mathias 89 y.o. male MRN: [id number]  : 12/3/1932  Unit/Bed#: [address] [place], [place] [id number] Encounter: [id number]    Assessment and Plan:   1. MI type I:  hs trop: 1276 (0hr), 1601 (2hr), 1808 (4hr)    -   hs trops random:  3126, 5951,11,193, >22973 x2    -   patient initially hesitant for IV anticoagulation secondary to history of epitaxsis, but was agreeable after long conversation and IV heparin was initiated on 3/6/2024 at approximately 1 PM.    -   3/6/2024 TTE: EF visibly estimated at 55% with disconnect except him but otherwise abnormal wall motion. IVS is consistent with postoperative status, there is systolic flattening consistent with RV pressure overload. RV cavity is moderately dilated with moderate to severe reduction in systolic function. Left atrium is mildly dilated, right atrium severely dilated and mild mitral and tricuspid valve regurgitation. Bio prosthetic valve in the aortic position appears to be well seated with no evidence of para valvular regurgitation and gradient within expected range.    -   3/6/2024 VQ Scan: low probability of PE    -   Continue Toprol-XL 25 mg once a day    -   continue Vytorin 10/40 mg daily    -   continue aspirin 81 mg once a day    -   continue nitroglycerin patch 0.1 mg per hour on in the a.m. often the p.m.    -   patient for Lexiscan nuclear stress test today to evaluate for ischemia versus scar     2. Coronary artery disease with history of CABG: a history of CABG times two in  ([place] to LAD and saphenous vein graft to marginal)    -   follows with Dr. Steven Walton at [facility] in [place],  [phone].    -   12/15/2021 cardiac CTA: noted both bypass grafts were patent with chronic total occlusion of his LAD and diffuse plaque in both left circumflex and RCA.    -   Continue Toprol-XL 25 mg once a day    -   continue Vytorin 10/40 mg daily    -   continue aspirin 81 mg once a day    " -   continue nitroglycerin patch 0.1 mg per hour on in the a.m. off in the p.m.    -   care as per #1     3. TUAN on chronic kidney disease: review records from [facility], appears his baseline creatinine is 1.4 - 1.7    -   follows with nephrologist at [place]    -   seen by Dr. Sheppard nephrology and consultation appreciated    -   creatinine on presentation was 2.65, slowly improving with creatinine of 2.07 today    -   IV fluids discontinued 3/8/2024 by nephrology     4. Hypertension: blood pressure stable    -   continue Toprol and nitroglycerin with close monitoring     5. Paroxysmal atrial fibrillation flutter: patient has watchmen device    -   telemetry reviewed on initial admission, patient appears to be changing between Mobitz II type 1 and Mobitz II Type 2    -   3/8/2024 telemetry reviewed and patient appears to be sinus rhythm with first-degree AV heart block.    -   Continue to monitor telemetry     6. Aortic stenosis status post TAVR: TAVR performed in 3/31/2021 at [facility]    -   3/6/2024 TTE: Bio prosthetic valve in the aortic position appears to be well seated with no evidence of para valvular regurgitation and gradient within expected range.     7. History of epitaxis with anticoagulation (Xarelto): patient has watchmen device       Subjective / Objective:   Chart reviewed and patient seen and evaluated. No further complaints of chest pain. IV heparin continues. Patient for stress test today to evaluate for ischemia versus scar    Vitals: Blood pressure 123/58, pulse 68, temperature (!) 97.4 °F (36.3 °C), resp. rate 18, height 6' 1\" (1.854 m), weight 93.5 kg (206 lb 2.1 oz), SpO2 91%.  Vitals:    03/07/24 0614 03/08/24 0556   Weight: 93.9 kg (207 lb) 93.5 kg (206 lb 2.1 oz)     Body mass index is 27.2 kg/m².  BP Readings from Last 3 Encounters:   03/08/24 123/58   07/12/22 118/72   07/05/22 114/72     Orthostatic Blood Pressures      Flowsheet Row Most Recent Value   Blood Pressure " 123/58 filed at 03/08/2024 0728   Patient Position - Orthostatic VS Lying filed at 03/07/2024 0804          I/O         03/06 0701  03/07 0700 03/07 0701  03/08 0700 03/08 0701 03/09 0700    P.O.       Total Intake(mL/kg)       Urine (mL/kg/hr)  200 (0.1)     Total Output  200     Net  -200                  Invasive Devices       Peripheral Intravenous Line  Duration             Peripheral IV 03/05/24 Left Forearm 2 days    Peripheral IV 03/07/24 Dorsal (posterior);Left Arm 1 day                      Intake/Output Summary (Last 24 hours) at 3/8/2024 0940  Last data filed at 3/7/2024 2026  Gross per 24 hour   Intake --   Output 200 ml   Net -200 ml         Physical Exam:   Physical Exam  Vitals and nursing note reviewed.   Constitutional:       General: He is not in acute distress.     Appearance: Normal appearance. He is normal weight.   HENT:      Right Ear: External ear normal.      Left Ear: External ear normal.   Eyes:      General: No scleral icterus.        Right eye: No discharge.         Left eye: No discharge.   Cardiovascular:      Rate and Rhythm: Normal rate and regular rhythm.      Pulses: Normal pulses.      Heart sounds: Murmur heard.   Pulmonary:      Effort: Pulmonary effort is normal.      Breath sounds: Normal breath sounds.   Abdominal:      General: Bowel sounds are normal. There is no distension.      Palpations: Abdomen is soft.   Musculoskeletal:      Right lower leg: No edema.      Left lower leg: No edema.   Skin:     General: Skin is warm and dry.      Capillary Refill: Capillary refill takes less than 2 seconds.   Neurological:      General: No focal deficit present.      Mental Status: He is alert. Mental status is at baseline.   Psychiatric:         Mood and Affect: Mood normal.              Medications/ Allergies:     Current Facility-Administered Medications   Medication Dose Route Frequency Provider Last Rate    acetaminophen  650 mg Oral Q4H PRN Ahsan Washburn MD      aspirin   81 mg Oral Daily Ahsan Washburn MD      ezetimibe  10 mg Oral Daily With Dinner Ahsan Washburn MD      And    pravastatin  40 mg Oral Daily With Dinner Ahsan Washburn MD      finasteride  5 mg Oral Daily Ahsan Washburn MD      fish oil  2,000 mg Oral Daily Ahsan Washburn MD      heparin (porcine)  3-20 Units/kg/hr (Order-Specific) Intravenous Titrated Ahsan Washburn MD 11.1 Units/kg/hr (03/07/24 1516)    heparin (porcine)  2,000 Units Intravenous Q6H PRN Ahsan Washburn MD      heparin (porcine)  4,000 Units Intravenous Q6H PRN Ahsan Washburn MD      metoprolol succinate  25 mg Oral Q24H Ahsan Washburn MD      morphine injection  2 mg Intravenous Q6H PRN Ahsan Washburn MD      nitroglycerin  0.1 mg Transdermal Daily Ahsan Washburn MD      ondansetron  4 mg Intravenous Q6H PRN Ahsan Washburn MD      oxyCODONE  5 mg Oral Q8H PRN Ahsna Washburn MD       acetaminophen, 650 mg, Q4H PRN  heparin (porcine), 2,000 Units, Q6H PRN  heparin (porcine), 4,000 Units, Q6H PRN  morphine injection, 2 mg, Q6H PRN  ondansetron, 4 mg, Q6H PRN  oxyCODONE, 5 mg, Q8H PRN      No Known Allergies    VTE Pharmacologic Prophylaxis:   Sequential compression device (Venodyne)     Labs:   Troponins:  Results from last 7 days   Lab Units 03/07/24  0604 03/06/24  1812 03/06/24  1231 03/06/24  0540 03/05/24  2239 03/05/24  2023   HS TNI RAND ng/L >22,973* >22,973* 11,193*   < >  --   --    HSTNI D2 ng/L  --   --   --   --   --  325*   HSTNI D4 ng/L  --   --   --   --  532*  --     < > = values in this interval not displayed.     CBC with diff:  Results from last 7 days   Lab Units 03/08/24  0607 03/07/24  0604 03/06/24  0540 03/05/24  1830   WBC Thousand/uL 8.26 7.10 7.92 6.51   HEMOGLOBIN g/dL 10.7* 10.7* 11.5* 11.5*   HEMATOCRIT % 32.8* 31.4* 35.0* 34.6*   MCV fL 100* 99* 100* 101*   PLATELETS Thousands/uL 131* 127* 167 148*   RBC Million/uL 3.28* 3.17* 3.50* 3.44*    MCH pg 32.6 33.8 32.9 33.4   MCHC g/dL 32.6 34.1 32.9 33.2   RDW % 14.7 14.9 14.7 14.6   MPV fL 10.9 11.0 11.1 11.0   NRBC AUTO /100 WBCs  --   --   --  0     CMP:  Results from last 7 days   Lab Units 03/08/24  0607 03/07/24  0604 03/06/24  0540 03/05/24  2239 03/05/24  1830   SODIUM mmol/L 134* 133* 135 135 134*   POTASSIUM mmol/L 4.0 4.2 5.3 4.9 5.4*   CHLORIDE mmol/L 101 101 107 106 104   CO2 mmol/L 23 23 17* 20* 22   ANION GAP mmol/L 10 9 11 9 8   BUN mg/dL 54* 58* 49* 45* 41*   CREATININE mg/dL 2.07* 2.40* 2.65* 2.24* 2.01*   CALCIUM mg/dL 8.1* 8.0* 8.4 9.0 9.1   AST U/L  --   --   --  210* 200*   ALT U/L  --   --   --  198* 167*   ALK PHOS U/L  --   --   --  76 77   TOTAL PROTEIN g/dL  --   --   --  6.8 7.0   ALBUMIN g/dL 3.5 3.6  --  3.9 4.1   TOTAL BILIRUBIN mg/dL  --   --   --  0.45 0.48   EGFR ml/min/1.73sq m 27 22 20 24 28     Magnesium:  Results from last 7 days   Lab Units 03/08/24  0607 03/07/24  0604 03/06/24  0540   MAGNESIUM mg/dL 2.2 2.1 2.2     Coags:  Results from last 7 days   Lab Units 03/08/24  0607 03/07/24  0604 03/07/24  0003 03/06/24  1812 03/06/24  0812   PTT seconds 74* 60* 74* 78* 29       Imaging & Testing   I have personally reviewed pertinent reports.    US kidney and bladder    Result Date: 3/6/2024  Narrative: RENAL ULTRASOUND INDICATION: Renal failure. COMPARISON: None TECHNIQUE: Ultrasound of the retroperitoneum was performed with a curvilinear transducer utilizing volumetric sweeps and still imaging techniques. FINDINGS: KIDNEYS: Symmetric and normal size. Right kidney: 11.0 x 5.0 x 5.2 cm. Volume 149.8 mL Left kidney: 10.8 x 5.4 x 4.5 cm. Volume 136.3 mL Right kidney Normal echogenicity and contour. No mass is identified. No hydronephrosis. No shadowing calculi. Trace perinephric simple free fluid. Left kidney Normal echogenicity and contour. No mass is identified. No hydronephrosis. No shadowing calculi. No perinephric fluid collections. URETERS: Nonvisualized. BLADDER:  Normally distended. No focal thickening or mass lesions. The right ureteral jet is seen. The left ureteral jet is not visualized.     Impression: Normal. Workstation performed: [id number]     NM lung ventilation / perfusion    Result Date: 3/6/2024  Narrative: VENTILATION AND PERFUSION SCAN INDICATION: RV enlargement on Echo - new,  Question PE COMPARISON:  Chest radiograph 3/5/2023 TECHNIQUE:  Posterior ventilation imaging was performed after the inhalation of 30.1 mCi nebulized Tc-99m DTPA with deposition of less than 1 mCi of activity within the lungs. Multiplanar perfusion imaging was next performed following the intravenous administration of 4.35 mCi Tc-99m labeled MAA. FINDINGS: Ventilation imaging demonstrates partial deposition of radiopharmaceutical activity within the central bronchi. Perfusion imaging demonstrates a matching subsegmental defect in the posterior aspect of the right lower lobe. There are no suspicious ventilation/perfusion mismatches.     Impression: The probability for pulmonary embolus is low. Workstation performed: [id number]     Echo complete    Result Date: 3/6/2024  Narrative:   Left Ventricle: Left ventricular cavity size is lower normal Wall thickness is moderately increased. The left ventricular ejection fraction is 55%. Systolic function is normal. Dyskinetic septum otherwise normal wall motion Unable to assess diastolic function due to atrial flutter.   IVS: There is abnormal septal motion consistent with post-operative status. There is systolic flattening of the interventricular septum consistent with right ventricle pressure overload.   Right Ventricle: Right ventricular cavity size is moderately dilated. Systolic function is moderately to severely reduced.   Left Atrium: The atrium is mildly dilated.   Right Atrium: The atrium is severely dilated.   Aortic Valve: There is a TAVR bioprosthetic valve. The prosthetic valve appears well-seated. There is no evidence of  "paravalvular regurgitation. The gradient recorded across the prosthetic aortic valve is within the expected range. The aortic valve peak velocity is 1.57 m/s. The aortic valve mean gradient is 6 mmHg.   Mitral Valve: There is mild regurgitation.   Tricuspid Valve: There is mild regurgitation. Abnormal study, compared to prior echo report increased RV size and decreased systolic function. Elevated pulmonary pressures are suggested by LV systolic flattening.     XR chest 1 view portable    Result Date: 3/6/2024  Narrative: XR CHEST PORTABLE INDICATION: chest pain. COMPARISON: None FINDINGS: No airspace consolidation, pneumothorax, pulmonary edema, or pleural effusion. Trace left apical scarring.. Prior CABG. Mild cardiomegaly allowing for portable AP technique. Aortic calcification is present. Mild degenerative changes of bilateral shoulders. Normal upper abdomen.     Impression: No radiographic evidence of acute intrathoracic process. Workstation performed: [id number]     XR chest 1 view    Result Date: 3/3/2024  Narrative: CHEST X-RAY  SINGLE VIEW: HISTORY: chest pain;   PRIORS: Chest radiograph on October 8, 2010. FINDINGS: LUNGS: Clear.  No pleural abnormality seen. HEART: Mild cardiomegaly. Sternotomy. MEDIASTINUM: Normal. OTHER FINDINGS:  None.        Impression:  No acute pulmonary abnormality.       EKG / Monitor: Personally reviewed.    Appears to be sinus rhythm with first-degree AV heart block, right bundle branch block which is chronic      Roula LOPEZ  Cardiology      \"This note was completed in part utilizing [employer] direct voice recognition software.   Grammatical errors, random word insertion, spelling mistakes, and incomplete sentences may be an occasional consequence of the system secondary to software limitations, ambient noise and hardware issues.    Please read the chart carefully and recognize, using context, where substitutions have occurred.  If you have any questions or concerns " "about the context, text or information contained within the body of this dictation, please contact myself, the provider, for further clarification.\"  "

## 2024-03-08 NOTE — PLAN OF CARE
Problem: PAIN - ADULT  Goal: Verbalizes/displays adequate comfort level or baseline comfort level  Description: Interventions:  - Encourage patient to monitor pain and request assistance  - Assess pain using appropriate pain scale  - Administer analgesics based on type and severity of pain and evaluate response  - Implement non-pharmacological measures as appropriate and evaluate response  - Consider cultural and social influences on pain and pain management  - Notify physician/advanced practitioner if interventions unsuccessful or patient reports new pain  Outcome: Progressing     Problem: INFECTION - ADULT  Goal: Absence or prevention of progression during hospitalization  Description: INTERVENTIONS:  - Assess and monitor for signs and symptoms of infection  - Monitor lab/diagnostic results  - Monitor all insertion sites, i.e. indwelling lines, tubes, and drains  - Monitor endotracheal if appropriate and nasal secretions for changes in amount and color  - Ring[place] appropriate cooling/warming therapies per order  - Administer medications as ordered  - Instruct and encourage patient and family to use good hand hygiene technique  - Identify and instruct in appropriate isolation precautions for identified infection/condition  Outcome: Progressing  Goal: Absence of fever/infection during neutropenic period  Description: INTERVENTIONS:  - Monitor WBC    Outcome: Progressing     Problem: SAFETY ADULT  Goal: Patient will remain free of falls  Description: INTERVENTIONS:  - Educate patient/family on patient safety including physical limitations  - Instruct patient to call for assistance with activity   - Consult OT/PT to assist with strengthening/mobility   - Keep Call bell within reach  - Keep bed low and locked with side rails adjusted as appropriate  - Keep care items and personal belongings within reach  - Initiate and maintain comfort rounds  - Make Fall Risk Sign visible to staff  - Offer Toileting every 2 Hours,  in advance of need  - Initiate/Maintain bed alarm  - Obtain necessary fall risk management equipment: fall risk sign on door  - Apply yellow socks and bracelet for high fall risk patients  - Consider moving patient to room near nurses station  Outcome: Progressing  Goal: Maintain or return to baseline ADL function  Description: INTERVENTIONS:  -  Assess patient's ability to carry out ADLs; assess patient's baseline for ADL function and identify physical deficits which impact ability to perform ADLs (bathing, care of mouth/teeth, toileting, grooming, dressing, etc.)  - Assess/evaluate cause of self-care deficits   - Assess range of motion  - Assess patient's mobility; develop plan if impaired  - Assess patient's need for assistive devices and provide as appropriate  - Encourage maximum independence but intervene and supervise when necessary  - Involve family in performance of ADLs  - Assess for home care needs following discharge   - Consider OT consult to assist with ADL evaluation and planning for discharge  - Provide patient education as appropriate  Outcome: Progressing  Goal: Maintains/Returns to pre admission functional level  Description: INTERVENTIONS:  - Perform AM-PAC 6 Click Basic Mobility/ Daily Activity assessment daily.  - Set and communicate daily mobility goal to care team and patient/family/caregiver.   - Collaborate with rehabilitation services on mobility goals if consulted  - Perform Range of Motion 2 times a day.  - Reposition patient every 2 hours.  - Dangle patient 2 times a day  - Stand patient 2 times a day  - Ambulate patient 3 times a day  - Out of bed to chair 3 times a day   - Out of bed for meals 3 times a day  - Out of bed for toileting  - Record patient progress and toleration of activity level   Outcome: Progressing     Problem: DISCHARGE PLANNING  Goal: Discharge to home or other facility with appropriate resources  Description: INTERVENTIONS:  - Identify barriers to discharge  w/patient and caregiver  - Arrange for needed discharge resources and transportation as appropriate  - Identify discharge learning needs (meds, wound care, etc.)  - Arrange for interpretive services to assist at discharge as needed  - Refer to Case Management Department for coordinating discharge planning if the patient needs post-hospital services based on physician/advanced practitioner order or complex needs related to functional status, cognitive ability, or social support system  Outcome: Progressing     Problem: Knowledge Deficit  Goal: Patient/family/caregiver demonstrates understanding of disease process, treatment plan, medications, and discharge instructions  Description: Complete learning assessment and assess knowledge base.  Interventions:  - Provide teaching at level of understanding  - Provide teaching via preferred learning methods  Outcome: Progressing     Problem: Prexisting or High Potential for Compromised Skin Integrity  Goal: Skin integrity is maintained or improved  Description: INTERVENTIONS:  - Identify patients at risk for skin breakdown  - Assess and monitor skin integrity  - Assess and monitor nutrition and hydration status  - Monitor labs   - Assess for incontinence   - Turn and reposition patient  - Assist with mobility/ambulation  - Relieve pressure over bony prominences  - Avoid friction and shearing  - Provide appropriate hygiene as needed including keeping skin clean and dry  - Evaluate need for skin moisturizer/barrier cream  - Collaborate with interdisciplinary team   - Patient/family teaching  - Consider wound care consult   Outcome: Progressing

## 2024-03-08 NOTE — PROGRESS NOTES
"[facility]  Progress Note  Name: Vladimir Mathias I  MRN: [id number]  Unit/Bed#: 4 [place] 414-01 I Date of Admission: 3/5/2024   Date of Service: 3/8/2024 I Hospital Day: 3    Assessment/Plan   * NSTEMI (non-ST elevated myocardial infarction) ([facility])  Assessment & Plan  Patient with chest pain and dyspnea exertion x 4 days.  Recent admission at [facility] on March 3 and discharged same day.  At that time noticed that his chest pain was secondary to atrial flutter.  His troponins at that time were 196, 234, 346     Latest Reference Range & Units 03/05/24 18:30 03/05/24 20:23 03/05/24 22:39 03/06/24 05:40 03/06/24 08:12 03/06/24 12:31   hs TnI 0hr \"Refer to ACS Flowchart\"- see link ng/L 1,276 (H)        hs TnI 2hr \"Refer to ACS Flowchart\"- see link ng/L  1,601 (H)       Delta 2hr hsTnI <20 ng/L  325 (H)       hs TnI 4hr \"Refer to ACS Flowchart\"- see link ng/L   1,808 (H)      Delta 4hr hsTnI <20 ng/L   532 (H)      HS TnI random 8 - 18 ng/L    3,126 (H) 5,951 (H) 11,193 (H)   BNP 0 - 100 pg/mL 282 (H)        (H): Data is abnormally high    Patient received 324 mg of aspirin via EMS  Consult cardiology  TTE 3/6/24: EF 50% There is systolic flattening of the interventricular septum consistent with right ventricle pressure overload, There is systolic flattening of the interventricular septum consistent with right ventricle pressure overload. RA severely dilated, RV mildly dilated,     VQ scan ordered to assess for PE: Low probability of PE  Initially refused anticoagulation due to concerns for GI bleed but appears that patient had epistaxis in the past and was agreeable after cardiology spoke with patient  Continue heparin drip  Continue toprol xl 25 mg daily  Stress test pending        TUAN (acute kidney injury) ([facility])  Assessment & Plan  Admission creatinine 2.01.   Baseline Creatinine: 1.5-1.7    Trended up to 2.65 on hospital day 1  Nephrology consulted  Placed on  sodium bicarb gtt @75 " ml/hr with improvement in Cr to 2.4  Placed on isolyte 3/7 with Cr imporvement to baseline today  IVF stopped by nephro    Atrial flutter (HCC)  Assessment & Plan  Continue with Lopressor.    Status post watchman flex (24 mm) in 2023    On his hospitalization at [facility] March 3, Xeralto discontinued due to epistaxis  Currently on heparin gtt    Nonrheumatic aortic valve stenosis  Assessment & Plan  Status post Medtronic evolute valve in 2022    S/P CABG (coronary artery bypass graft)  Assessment & Plan  CAD status post CABG 2009. Lima to LAD and saphenous vein graft to circumflex marginal, Cardiac CTA 12/15/2021 revealed patent LIMA to LAD, patent vein graft to circumflex obtuse marginal #1 an extensive plaque in the native RCA,     Continue aspirin.  Continue Vytorin    Essential hypertension  Assessment & Plan  Continue Lopressor, ACE inhibitor discontinued to TUAN     CKD (chronic kidney disease)  Assessment & Plan  Patient's baseline creatinine is 1.4, Admission Cr 2.01  Nephrology consulted due to worsening Cr  Improving with IV fluid hydration down to 2.07      Lab Results   Component Value Date    EGFR 27 03/08/2024    EGFR 22 03/07/2024    EGFR 20 03/06/2024    CREATININE 2.07 (H) 03/08/2024    CREATININE 2.40 (H) 03/07/2024    CREATININE 2.65 (H) 03/06/2024       CAD (coronary artery disease)  Assessment & Plan  CAD status post CABG 2009. Lima to LAD and saphenous vein graft to circumflex marginal, Cardiac CTA 12/15/2021 revealed patent LIMA to LAD, patent vein graft to circumflex obtuse marginal #1 an extensive plaque in the native RCA,     BPH (benign prostatic hyperplasia)  Assessment & Plan  Avodart substituted for finasteride               VTE Pharmacologic Prophylaxis: VTE Score: 5 High Risk (Score >/= 5) - Pharmacological DVT Prophylaxis Ordered: heparin drip. Sequential Compression Devices Ordered.    Mobility:   Basic Mobility Inpatient Raw Score: 18  JH-M Goal: 6: Walk 10 steps  or more  JH-HLM Achieved: 4: Move to chair/commode  HLM Goal NOT achieved. Continue with multidisciplinary rounding and encourage appropriate mobility to improve upon HLM goals.    Patient Centered Rounds: I performed bedside rounds with nursing staff today.   Discussions with Specialists or Other Care Team Provider: cardiology, nephrology    Education and Discussions with Family / Patient: Patient declined call to .     Total Time Spent on Date of Encounter in care of patient: 35 mins. This time was spent on one or more of the following: performing physical exam; counseling and coordination of care; obtaining or reviewing history; documenting in the medical record; reviewing/ordering tests, medications or procedures; communicating with other healthcare professionals and discussing with patient's family/caregivers.    Current Length of Stay: 3 day(s)  Current Patient Status: Inpatient   Certification Statement: The patient will continue to require additional inpatient hospital stay due to pending stress test NSTEMI  Discharge Plan: Anticipate discharge in 48-72 hrs to discharge location to be determined pending rehab evaluations.    Code Status: Level 3 - DNAR and DNI    Subjective:   States he's feeling well. Complimented RN overnight with excellent care.    Objective:     Vitals:   Temp (24hrs), Av.4 °F (36.3 °C), Min:97.1 °F (36.2 °C), Max:97.7 °F (36.5 °C)    Temp:  [97.1 °F (36.2 °C)-97.7 °F (36.5 °C)] 97.4 °F (36.3 °C)  HR:  [66-76] 68  Resp:  [18-20] 18  BP: (114-123)/(55-58) 123/58  SpO2:  [91 %-97 %] 91 %  Body mass index is 27.2 kg/m².     Input and Output Summary (last 24 hours):     Intake/Output Summary (Last 24 hours) at 3/8/2024 0903  Last data filed at 3/7/2024 2026  Gross per 24 hour   Intake --   Output 200 ml   Net -200 ml         Physical Exam:   Physical Exam  Vitals and nursing note reviewed.   Constitutional:       General: He is not in acute distress.     Appearance: He is  well-developed.   HENT:      Head: Normocephalic and atraumatic.   Eyes:      Conjunctiva/sclera: Conjunctivae normal.   Cardiovascular:      Rate and Rhythm: Normal rate and regular rhythm.      Heart sounds: No murmur heard.  Pulmonary:      Effort: Pulmonary effort is normal. No respiratory distress.      Breath sounds: Normal breath sounds.   Abdominal:      Palpations: Abdomen is soft.      Tenderness: There is no abdominal tenderness.   Musculoskeletal:         General: No swelling.      Cervical back: Neck supple.   Skin:     General: Skin is warm and dry.   Neurological:      Mental Status: He is alert. Mental status is at baseline.   Psychiatric:         Mood and Affect: Mood normal.          Additional Data:     Labs:  Results from last 7 days   Lab Units 03/08/24  0607 03/06/24  0540 03/05/24  1830   WBC Thousand/uL 8.26   < > 6.51   HEMOGLOBIN g/dL 10.7*   < > 11.5*   HEMATOCRIT % 32.8*   < > 34.6*   PLATELETS Thousands/uL 131*   < > 148*   NEUTROS PCT %  --   --  76*   LYMPHS PCT %  --   --  11*   MONOS PCT %  --   --  9   EOS PCT %  --   --  2    < > = values in this interval not displayed.     Results from last 7 days   Lab Units 03/08/24  0607 03/06/24  0540 03/05/24  2239   SODIUM mmol/L 134*   < > 135   POTASSIUM mmol/L 4.0   < > 4.9   CHLORIDE mmol/L 101   < > 106   CO2 mmol/L 23   < > 20*   BUN mg/dL 54*   < > 45*   CREATININE mg/dL 2.07*   < > 2.24*   ANION GAP mmol/L 10   < > 9   CALCIUM mg/dL 8.1*   < > 9.0   ALBUMIN g/dL 3.5   < > 3.9   TOTAL BILIRUBIN mg/dL  --   --  0.45   ALK PHOS U/L  --   --  76   ALT U/L  --   --  198*   AST U/L  --   --  210*   GLUCOSE RANDOM mg/dL 128   < > 135    < > = values in this interval not displayed.                       Lines/Drains:  Invasive Devices       Peripheral Intravenous Line  Duration             Peripheral IV 03/05/24 Left Forearm 2 days    Peripheral IV 03/07/24 Dorsal (posterior);Left Arm 1 day                      Telemetry:  Telemetry Orders  (From admission, onward)               24 Hour Telemetry Monitoring  Continuous x 24 Hours (Telem)        Question:  Reason for 24 Hour Telemetry  Answer:  PCI/EP study (including pacer and ICD implementation), Cardiac surgery, MI, abnormal cardiac cath, and chest pain- rule out MI                     Telemetry Reviewed: PVCs and 4 beat run of VT  Indication for Continued Telemetry Use: Acute MI/Unstable Angina/Rule out ACS             Imaging: Reviewed radiology reports from this admission including: chest xray    Recent Cultures (last 7 days):         Last 24 Hours Medication List:   Current Facility-Administered Medications   Medication Dose Route Frequency Provider Last Rate    acetaminophen  650 mg Oral Q4H PRN Ahsan Washburn MD      aspirin  81 mg Oral Daily Ahsan Washburn MD      ezetimibe  10 mg Oral Daily With Dinner Ahsan Washburn MD      And    pravastatin  40 mg Oral Daily With Dinner Ahsan Washburn MD      finasteride  5 mg Oral Daily Ahsan Washburn MD      fish oil  2,000 mg Oral Daily Ahsan Washburn MD      heparin (porcine)  3-20 Units/kg/hr (Order-Specific) Intravenous Titrated Ahsan Washburn MD 11.1 Units/kg/hr (03/07/24 1516)    heparin (porcine)  2,000 Units Intravenous Q6H PRN Ahsan Washburn MD      heparin (porcine)  4,000 Units Intravenous Q6H PRN Ahsan Washburn MD      metoprolol succinate  25 mg Oral Q24H Ahsan Washburn MD      morphine injection  2 mg Intravenous Q6H PRN Ahsan Washburn MD      multi-electrolyte  75 mL/hr Intravenous Continuous Phoebe Quinn MD 75 mL/hr (03/08/24 0609)    nitroglycerin  0.1 mg Transdermal Daily Ahsan Washburn MD      ondansetron  4 mg Intravenous Q6H PRN Ahsan Washburn MD      oxyCODONE  5 mg Oral Q8H PRN Ahsan Washburn MD          Today, Patient Was Seen By: Shae Camacho DO    **Please Note: This note may have been constructed using a voice recognition system.**

## 2024-03-09 LAB
ALBUMIN SERPL BCP-MCNC: 3.6 G/DL (ref 3.5–5)
ANION GAP SERPL CALCULATED.3IONS-SCNC: 10 MMOL/L
APTT PPP: 73 SECONDS (ref 23–37)
BUN SERPL-MCNC: 50 MG/DL (ref 5–25)
CALCIUM SERPL-MCNC: 8.6 MG/DL (ref 8.4–10.2)
CHLORIDE SERPL-SCNC: 102 MMOL/L (ref 96–108)
CO2 SERPL-SCNC: 23 MMOL/L (ref 21–32)
CREAT SERPL-MCNC: 1.78 MG/DL (ref 0.6–1.3)
ERYTHROCYTE [DISTWIDTH] IN BLOOD BY AUTOMATED COUNT: 14.6 % (ref 11.6–15.1)
GFR SERPL CREATININE-BSD FRML MDRD: 32 ML/MIN/1.73SQ M
GLUCOSE SERPL-MCNC: 125 MG/DL (ref 65–140)
HCT VFR BLD AUTO: 31.1 % (ref 36.5–49.3)
HGB BLD-MCNC: 10.5 G/DL (ref 12–17)
MAGNESIUM SERPL-MCNC: 2.2 MG/DL (ref 1.9–2.7)
MAX HR PERCENT: 54 %
MAX HR: 70 BPM
MCH RBC QN AUTO: 33.7 PG (ref 26.8–34.3)
MCHC RBC AUTO-ENTMCNC: 33.8 G/DL (ref 31.4–37.4)
MCV RBC AUTO: 100 FL (ref 82–98)
NUC STRESS EJECTION FRACTION: 65 %
PHOSPHATE SERPL-MCNC: 3.3 MG/DL (ref 2.3–4.1)
PLATELET # BLD AUTO: 137 THOUSANDS/UL (ref 149–390)
PMV BLD AUTO: 10.7 FL (ref 8.9–12.7)
POTASSIUM SERPL-SCNC: 3.9 MMOL/L (ref 3.5–5.3)
RATE PRESSURE PRODUCT: 6510
RBC # BLD AUTO: 3.12 MILLION/UL (ref 3.88–5.62)
SL CV REST NUCLEAR ISOTOPE DOSE: 11 MCI
SL CV STRESS NUCLEAR ISOTOPE DOSE: 32.2 MCI
SL CV STRESS RECOVERY BP: NORMAL MMHG
SL CV STRESS RECOVERY HR: 67 BPM
SL CV STRESS RECOVERY O2 SAT: 94 %
SODIUM SERPL-SCNC: 135 MMOL/L (ref 135–147)
STRESS ANGINA INDEX: 0
STRESS BASELINE BP: NORMAL MMHG
STRESS BASELINE HR: 64 BPM
STRESS O2 SAT REST: 94 %
STRESS PEAK HR: 62 BPM
STRESS POST ESTIMATED WORKLOAD: 1 METS
STRESS POST EXERCISE DUR MIN: 1 MIN
STRESS POST O2 SAT PEAK: 98 %
STRESS POST PEAK BP: 105 MMHG
WBC # BLD AUTO: 7.35 THOUSAND/UL (ref 4.31–10.16)

## 2024-03-09 PROCEDURE — 80069 RENAL FUNCTION PANEL: CPT | Performed by: STUDENT IN AN ORGANIZED HEALTH CARE EDUCATION/TRAINING PROGRAM

## 2024-03-09 PROCEDURE — 99232 SBSQ HOSP IP/OBS MODERATE 35: CPT | Performed by: INTERNAL MEDICINE

## 2024-03-09 PROCEDURE — 99232 SBSQ HOSP IP/OBS MODERATE 35: CPT | Performed by: STUDENT IN AN ORGANIZED HEALTH CARE EDUCATION/TRAINING PROGRAM

## 2024-03-09 PROCEDURE — 85027 COMPLETE CBC AUTOMATED: CPT | Performed by: STUDENT IN AN ORGANIZED HEALTH CARE EDUCATION/TRAINING PROGRAM

## 2024-03-09 PROCEDURE — C9113 INJ PANTOPRAZOLE SODIUM, VIA: HCPCS | Performed by: STUDENT IN AN ORGANIZED HEALTH CARE EDUCATION/TRAINING PROGRAM

## 2024-03-09 PROCEDURE — 83735 ASSAY OF MAGNESIUM: CPT | Performed by: STUDENT IN AN ORGANIZED HEALTH CARE EDUCATION/TRAINING PROGRAM

## 2024-03-09 PROCEDURE — 85730 THROMBOPLASTIN TIME PARTIAL: CPT | Performed by: STUDENT IN AN ORGANIZED HEALTH CARE EDUCATION/TRAINING PROGRAM

## 2024-03-09 RX ORDER — CLOPIDOGREL BISULFATE 75 MG/1
75 TABLET ORAL DAILY
Status: DISCONTINUED | OUTPATIENT
Start: 2024-03-10 | End: 2024-03-13

## 2024-03-09 RX ORDER — MAGNESIUM HYDROXIDE/ALUMINUM HYDROXICE/SIMETHICONE 120; 1200; 1200 MG/30ML; MG/30ML; MG/30ML
30 SUSPENSION ORAL EVERY 4 HOURS PRN
Status: DISCONTINUED | OUTPATIENT
Start: 2024-03-09 | End: 2024-03-20 | Stop reason: HOSPADM

## 2024-03-09 RX ORDER — PANTOPRAZOLE SODIUM 40 MG/1
40 TABLET, DELAYED RELEASE ORAL
Status: DISCONTINUED | OUTPATIENT
Start: 2024-03-10 | End: 2024-03-20 | Stop reason: HOSPADM

## 2024-03-09 RX ORDER — PANTOPRAZOLE SODIUM 40 MG/10ML
40 INJECTION, POWDER, LYOPHILIZED, FOR SOLUTION INTRAVENOUS ONCE
Status: COMPLETED | OUTPATIENT
Start: 2024-03-09 | End: 2024-03-09

## 2024-03-09 RX ORDER — CLOPIDOGREL BISULFATE 75 MG/1
300 TABLET ORAL ONCE
Status: COMPLETED | OUTPATIENT
Start: 2024-03-09 | End: 2024-03-09

## 2024-03-09 RX ORDER — MAGNESIUM HYDROXIDE/ALUMINUM HYDROXICE/SIMETHICONE 120; 1200; 1200 MG/30ML; MG/30ML; MG/30ML
30 SUSPENSION ORAL ONCE
Status: COMPLETED | OUTPATIENT
Start: 2024-03-09 | End: 2024-03-09

## 2024-03-09 RX ORDER — METOCLOPRAMIDE HYDROCHLORIDE 5 MG/ML
10 INJECTION INTRAMUSCULAR; INTRAVENOUS EVERY 6 HOURS PRN
Status: DISCONTINUED | OUTPATIENT
Start: 2024-03-09 | End: 2024-03-14

## 2024-03-09 RX ORDER — FUROSEMIDE 10 MG/ML
20 INJECTION INTRAMUSCULAR; INTRAVENOUS ONCE
Status: COMPLETED | OUTPATIENT
Start: 2024-03-09 | End: 2024-03-09

## 2024-03-09 RX ADMIN — OMEGA-3 FATTY ACIDS CAP 1000 MG 2000 MG: 1000 CAP at 11:23

## 2024-03-09 RX ADMIN — ONDANSETRON 4 MG: 2 INJECTION INTRAMUSCULAR; INTRAVENOUS at 04:54

## 2024-03-09 RX ADMIN — METOPROLOL SUCCINATE 25 MG: 25 TABLET, EXTENDED RELEASE ORAL at 20:34

## 2024-03-09 RX ADMIN — PRAVASTATIN SODIUM 40 MG: 40 TABLET ORAL at 15:32

## 2024-03-09 RX ADMIN — ASPIRIN 81 MG 81 MG: 81 TABLET ORAL at 11:24

## 2024-03-09 RX ADMIN — CLOPIDOGREL 300 MG: 75 TABLET ORAL at 11:23

## 2024-03-09 RX ADMIN — ALUMINUM HYDROXIDE, MAGNESIUM HYDROXIDE, AND DIMETHICONE 30 ML: 200; 20; 200 SUSPENSION ORAL at 11:23

## 2024-03-09 RX ADMIN — PANTOPRAZOLE SODIUM 40 MG: 40 INJECTION, POWDER, FOR SOLUTION INTRAVENOUS at 11:24

## 2024-03-09 RX ADMIN — EZETIMIBE 10 MG: 10 TABLET ORAL at 15:32

## 2024-03-09 RX ADMIN — HEPARIN SODIUM 11.1 UNITS/KG/HR: 10000 INJECTION, SOLUTION INTRAVENOUS at 15:32

## 2024-03-09 RX ADMIN — FUROSEMIDE 20 MG: 10 INJECTION, SOLUTION INTRAMUSCULAR; INTRAVENOUS at 11:24

## 2024-03-09 RX ADMIN — NITROGLYCERIN 0.1 MG: 0.1 PATCH TRANSDERMAL at 11:25

## 2024-03-09 RX ADMIN — ACETAMINOPHEN 650 MG: 325 TABLET ORAL at 20:34

## 2024-03-09 RX ADMIN — FINASTERIDE 5 MG: 5 TABLET, FILM COATED ORAL at 11:24

## 2024-03-09 NOTE — PROGRESS NOTES
Progress Note - Cardiology   [facility]     Vladimir Mathias 89 y.o. male MRN: [id number]  : 12/3/1932  Unit/Bed#: [address] [place], [place] [address] Encounter: [id number]    Assessment and Plan:   1. MI type I:  hs trop: 1276 (0hr), 1601 (2hr), 1808 (4hr)    -   hs trops random:  3126, 5951,11,193, >22973 x2    -   patient initially hesitant for IV anticoagulation secondary to history of epitaxsis, but was agreeable after long conversation and IV heparin was initiated on 3/6/2024 at approximately 1 PM.    -   3/6/2024 TTE: EF visibly estimated at 55% with disconnect except him but otherwise abnormal wall motion. IVS is consistent with postoperative status, there is systolic flattening consistent with RV pressure overload. RV cavity is moderately dilated with moderate to severe reduction in systolic function. Left atrium is mildly dilated, right atrium severely dilated and mild mitral and tricuspid valve regurgitation. Bio prosthetic valve in the aortic position appears to be well seated with no evidence of para valvular regurgitation and gradient within expected range.    -   3/6/2024 VQ Scan: low probability of PE    -   Continue Toprol-XL 25 mg once a day    -   continue Vytorin 10/40 mg daily    -   continue aspirin 81 mg once a day    -   continue nitroglycerin patch 0.1 mg per hour on in the a.m. often the p.m.    -   3/8/2024 Lexiscan nuclear stress test: partially reversible inferior defect noted. Patient with known native RCA disease which was not bypass.    -   Patient loaded with Plavix 300 mg times one and will start Plavix 75 mg daily    -   scheduled for diagnostic left heart catheterization on 3/11/2024: consent sent to Cath Lab.     2. Coronary artery disease with history of CABG: a history of CABG times two in  (Lima to LAD and saphenous vein graft to marginal)    -   follows with Dr. Steven Walton at [facility] in [place],  [phone].    -   12/15/2021 cardiac CTA: noted both  bypass grafts were patent with chronic total occlusion of his LAD and diffuse plaque in both left circumflex and RCA.    -   Continue Toprol-XL 25 mg once a day    -   continue Vytorin 10/40 mg daily    -   continue aspirin 81 mg once a day    -   continue nitroglycerin patch 0.1 mg per hour on in the a.m. off in the p.m.    -   care as per #1     3. TUAN on chronic kidney disease: review records from [facility], appears his baseline creatinine is 1.4 - 1.7    -   follows with nephrologist at [place]    -   seen by Dr. Sheppard nephrology and consultation appreciated    -   creatinine on presentation was 2.65, slowly improving with creatinine of 1.78 today    -   IV fluids discontinued 3/8/2024 by nephrology    -   patient with nausea and vomiting since 5 AM 3/9/2024. Appears to be slightly volume overloaded. Discussed with nephrology, will give Lasix 20 mg IV times one and continue to monitor     4. Hypertension: blood pressure stable    -   continue Toprol and nitroglycerin with close monitoring     5. Paroxysmal atrial fibrillation flutter: patient has watchmen device    -   telemetry reviewed on initial admission, patient appears to be changing between Mobitz II type 1 and Mobitz II Type 2    -   3/8/2024 telemetry reviewed and patient appears to be sinus rhythm with first-degree AV heart block.    -   Continue to monitor telemetry     6. Aortic stenosis status post TAVR: TAVR performed in 3/31/2021 at [facility]    -   3/6/2024 TTE: Bio prosthetic valve in the aortic position appears to be well seated with no evidence of para valvular regurgitation and gradient within expected range.     7. History of epitaxis with anticoagulation (Xarelto): patient has watchmen device     Subjective / Objective:   Chart reviewed, patient seen and examined. He denies any chest discomfort, but notes since approximately 5 AM he has been having persistent nausea which was not relieved with IV Zofran. He did vomit small  "amount of stomach fluid with minimal relief and nausea. Weight and blood pressures elevated, patient denies any abdominal discomfort, but he does appear to be slightly distended. We have been holding his home dose of Lasix 20 mg PO since admission due to TUAN. Did discuss with nephrology by a tiger text and will give patient single dose of IV Lasix and continue to monitor.    Lexiscan nuclear stress test performed yesterday demonstrated partially reversible inferior wall defect. Patient for diagnostic left heart catheterization on 3/11/2024    Vitals: Blood pressure 151/74, pulse 71, temperature (!) 97.3 °F (36.3 °C), resp. rate 18, height 6' 1\" (1.854 m), weight 95.7 kg (211 lb), SpO2 95%.  Vitals:    03/08/24 0556 03/09/24 0600   Weight: 93.5 kg (206 lb 2.1 oz) 95.7 kg (211 lb)     Body mass index is 27.84 kg/m².  BP Readings from Last 3 Encounters:   03/09/24 151/74   07/12/22 118/72   07/05/22 114/72     Orthostatic Blood Pressures      Flowsheet Row Most Recent Value   Blood Pressure 151/74 filed at 03/09/2024 0814   Patient Position - Orthostatic VS Lying filed at 03/07/2024 0804          I/O         03/07 0701  03/08 0700 03/08 0701  03/09 0700 03/09 0701  03/10 0700    Urine (mL/kg/hr) 200 (0.1) 200 (0.1)     Total Output 200 200     Net -200 -200                  Invasive Devices       Peripheral Intravenous Line  Duration             Peripheral IV 03/05/24 Left Forearm 3 days    Peripheral IV 03/07/24 Dorsal (posterior);Left Arm 2 days                      Intake/Output Summary (Last 24 hours) at 3/9/2024 1026  Last data filed at 3/9/2024 0456  Gross per 24 hour   Intake --   Output 200 ml   Net -200 ml         Physical Exam:   Physical Exam         Medications/ Allergies:     Current Facility-Administered Medications   Medication Dose Route Frequency Provider Last Rate    acetaminophen  650 mg Oral Q4H PRN Ahsan Washburn MD      aspirin  81 mg Oral Daily Ahsan Washburn MD      clopidogrel  300 " mg Oral Once JESSICA Charles      [START ON 3/10/2024] clopidogrel  75 mg Oral Daily JESSICA Charles      ezetimibe  10 mg Oral Daily With Dinner Ahsan Washburn MD      And    pravastatin  40 mg Oral Daily With Dinner Ahsan Washburn MD      finasteride  5 mg Oral Daily Ahsan Washburn MD      fish oil  2,000 mg Oral Daily Ahsan Washburn MD      heparin (porcine)  3-20 Units/kg/hr (Order-Specific) Intravenous Titrated Ahsan Washburn MD 11.1 Units/kg/hr (03/08/24 1334)    heparin (porcine)  2,000 Units Intravenous Q6H PRN Ahsan Washburn MD      heparin (porcine)  4,000 Units Intravenous Q6H PRN Ahsan Washburn MD      metoprolol succinate  25 mg Oral Q24H Ahsan Washburn MD      morphine injection  2 mg Intravenous Q6H PRN Ahsan Washburn MD      nitroglycerin  0.1 mg Transdermal Daily Ahsan Washburn MD      ondansetron  4 mg Intravenous Q6H PRN Ahsan Washburn MD      oxyCODONE  5 mg Oral Q8H PRN Ahsan Washburn MD       acetaminophen, 650 mg, Q4H PRN  heparin (porcine), 2,000 Units, Q6H PRN  heparin (porcine), 4,000 Units, Q6H PRN  morphine injection, 2 mg, Q6H PRN  ondansetron, 4 mg, Q6H PRN  oxyCODONE, 5 mg, Q8H PRN      No Known Allergies    VTE Pharmacologic Prophylaxis:   Sequential compression device (Venodyne)     Labs:   Troponins:  Results from last 7 days   Lab Units 03/07/24  0604 03/06/24  1812 03/06/24  1231 03/06/24  0540 03/05/24  2239 03/05/24 2023   HS TNI RAND ng/L >22,973* >22,973* 11,193*   < >  --   --    HSTNI D2 ng/L  --   --   --   --   --  325*   HSTNI D4 ng/L  --   --   --   --  532*  --     < > = values in this interval not displayed.     CBC with diff:  Results from last 7 days   Lab Units 03/09/24  0457 03/08/24  0607 03/07/24  0604 03/06/24  0540 03/05/24  1830   WBC Thousand/uL 7.35 8.26 7.10 7.92 6.51   HEMOGLOBIN g/dL 10.5* 10.7* 10.7* 11.5* 11.5*   HEMATOCRIT % 31.1* 32.8* 31.4* 35.0* 34.6*   MCV fL 100* 100*  99* 100* 101*   PLATELETS Thousands/uL 137* 131* 127* 167 148*   RBC Million/uL 3.12* 3.28* 3.17* 3.50* 3.44*   MCH pg 33.7 32.6 33.8 32.9 33.4   MCHC g/dL 33.8 32.6 34.1 32.9 33.2   RDW % 14.6 14.7 14.9 14.7 14.6   MPV fL 10.7 10.9 11.0 11.1 11.0   NRBC AUTO /100 WBCs  --   --   --   --  0     CMP:  Results from last 7 days   Lab Units 03/09/24  0457 03/08/24  0607 03/07/24  0604 03/06/24  0540 03/05/24  2239 03/05/24  1830   SODIUM mmol/L 135 134* 133* 135 135 134*   POTASSIUM mmol/L 3.9 4.0 4.2 5.3 4.9 5.4*   CHLORIDE mmol/L 102 101 101 107 106 104   CO2 mmol/L 23 23 23 17* 20* 22   ANION GAP mmol/L 10 10 9 11 9 8   BUN mg/dL 50* 54* 58* 49* 45* 41*   CREATININE mg/dL 1.78* 2.07* 2.40* 2.65* 2.24* 2.01*   CALCIUM mg/dL 8.6 8.1* 8.0* 8.4 9.0 9.1   AST U/L  --   --   --   --  210* 200*   ALT U/L  --   --   --   --  198* 167*   ALK PHOS U/L  --   --   --   --  76 77   TOTAL PROTEIN g/dL  --   --   --   --  6.8 7.0   ALBUMIN g/dL 3.6 3.5 3.6  --  3.9 4.1   TOTAL BILIRUBIN mg/dL  --   --   --   --  0.45 0.48   EGFR ml/min/1.73sq m 32 27 22 20 24 28     Magnesium:  Results from last 7 days   Lab Units 03/09/24  0457 03/08/24  0607 03/07/24  0604 03/06/24  0540   MAGNESIUM mg/dL 2.2 2.2 2.1 2.2     Coags:  Results from last 7 days   Lab Units 03/09/24  0457 03/08/24  0607 03/07/24  0604 03/07/24  0003 03/06/24  1812 03/06/24  0812   PTT seconds 73* 74* 60* 74* 78* 29       Imaging & Testing   I have personally reviewed pertinent reports.    Stress strip    Result Date: 3/8/2024  Narrative: Confirmed by MAISHA FREEMAN ([id number]),  Marilu Rothman ([id number]) on 3/8/2024 10:37:07 AM    US kidney and bladder    Result Date: 3/6/2024  Narrative: RENAL ULTRASOUND INDICATION: Renal failure. COMPARISON: None TECHNIQUE: Ultrasound of the retroperitoneum was performed with a curvilinear transducer utilizing volumetric sweeps and still imaging techniques. FINDINGS: KIDNEYS: Symmetric and normal size. Right kidney: 11.0 x 5.0 x 5.2 cm.  Volume 149.8 mL Left kidney: 10.8 x 5.4 x 4.5 cm. Volume 136.3 mL Right kidney Normal echogenicity and contour. No mass is identified. No hydronephrosis. No shadowing calculi. Trace perinephric simple free fluid. Left kidney Normal echogenicity and contour. No mass is identified. No hydronephrosis. No shadowing calculi. No perinephric fluid collections. URETERS: Nonvisualized. BLADDER: Normally distended. No focal thickening or mass lesions. The right ureteral jet is seen. The left ureteral jet is not visualized.     Impression: Normal. Workstation performed: [id number]     NM lung ventilation / perfusion    Result Date: 3/6/2024  Narrative: VENTILATION AND PERFUSION SCAN INDICATION: RV enlargement on Echo - new,  Question PE COMPARISON:  Chest radiograph 3/5/2023 TECHNIQUE:  Posterior ventilation imaging was performed after the inhalation of 30.1 mCi nebulized Tc-99m DTPA with deposition of less than 1 mCi of activity within the lungs. Multiplanar perfusion imaging was next performed following the intravenous administration of 4.35 mCi Tc-99m labeled MAA. FINDINGS: Ventilation imaging demonstrates partial deposition of radiopharmaceutical activity within the central bronchi. Perfusion imaging demonstrates a matching subsegmental defect in the posterior aspect of the right lower lobe. There are no suspicious ventilation/perfusion mismatches.     Impression: The probability for pulmonary embolus is low. Workstation performed: [id number]     Echo complete    Result Date: 3/6/2024  Narrative:   Left Ventricle: Left ventricular cavity size is lower normal Wall thickness is moderately increased. The left ventricular ejection fraction is 55%. Systolic function is normal. Dyskinetic septum otherwise normal wall motion Unable to assess diastolic function due to atrial flutter.   IVS: There is abnormal septal motion consistent with post-operative status. There is systolic flattening of the interventricular septum  "consistent with right ventricle pressure overload.   Right Ventricle: Right ventricular cavity size is moderately dilated. Systolic function is moderately to severely reduced.   Left Atrium: The atrium is mildly dilated.   Right Atrium: The atrium is severely dilated.   Aortic Valve: There is a TAVR bioprosthetic valve. The prosthetic valve appears well-seated. There is no evidence of paravalvular regurgitation. The gradient recorded across the prosthetic aortic valve is within the expected range. The aortic valve peak velocity is 1.57 m/s. The aortic valve mean gradient is 6 mmHg.   Mitral Valve: There is mild regurgitation.   Tricuspid Valve: There is mild regurgitation. Abnormal study, compared to prior echo report increased RV size and decreased systolic function. Elevated pulmonary pressures are suggested by LV systolic flattening.     XR chest 1 view portable    Result Date: 3/6/2024  Narrative: XR CHEST PORTABLE INDICATION: chest pain. COMPARISON: None FINDINGS: No airspace consolidation, pneumothorax, pulmonary edema, or pleural effusion. Trace left apical scarring.. Prior CABG. Mild cardiomegaly allowing for portable AP technique. Aortic calcification is present. Mild degenerative changes of bilateral shoulders. Normal upper abdomen.     Impression: No radiographic evidence of acute intrathoracic process. Workstation performed: [id number]     XR chest 1 view    Result Date: 3/3/2024  Narrative: CHEST X-RAY  SINGLE VIEW: HISTORY: chest pain;   PRIORS: Chest radiograph on October 8, 2010. FINDINGS: LUNGS: Clear.  No pleural abnormality seen. HEART: Mild cardiomegaly. Sternotomy. MEDIASTINUM: Normal. OTHER FINDINGS:  None.        Impression:  No acute pulmonary abnormality.       EKG / Monitor: Personally reviewed.    Sinus rhythm with first-degree AV heart block          Roula LOPEZ  Cardiology      \"This note was completed in part utilizing m-modal fluency direct voice recognition software.   " "Grammatical errors, random word insertion, spelling mistakes, and incomplete sentences may be an occasional consequence of the system secondary to software limitations, ambient noise and hardware issues.    Please read the chart carefully and recognize, using context, where substitutions have occurred.  If you have any questions or concerns about the context, text or information contained within the body of this dictation, please contact myself, the provider, for further clarification.\"  "

## 2024-03-09 NOTE — PLAN OF CARE
Problem: PAIN - ADULT  Goal: Verbalizes/displays adequate comfort level or baseline comfort level  Description: Interventions:  - Encourage patient to monitor pain and request assistance  - Assess pain using appropriate pain scale  - Administer analgesics based on type and severity of pain and evaluate response  - Implement non-pharmacological measures as appropriate and evaluate response  - Consider cultural and social influences on pain and pain management  - Notify physician/advanced practitioner if interventions unsuccessful or patient reports new pain  3/9/2024 1543 by Selene Dinh RN  Outcome: Progressing  3/9/2024 1543 by Selene Dinh RN  Outcome: Progressing       Problem: SAFETY ADULT  Goal: Maintain or return to baseline ADL function  Description: INTERVENTIONS:  -  Assess patient's ability to carry out ADLs; assess patient's baseline for ADL function and identify physical deficits which impact ability to perform ADLs (bathing, care of mouth/teeth, toileting, grooming, dressing, etc.)  - Assess/evaluate cause of self-care deficits   - Assess range of motion  - Assess patient's mobility; develop plan if impaired  - Assess patient's need for assistive devices and provide as appropriate  - Encourage maximum independence but intervene and supervise when necessary  - Involve family in performance of ADLs  - Assess for home care needs following discharge   - Consider OT consult to assist with ADL evaluation and planning for discharge  - Provide patient education as appropriate  3/9/2024 1543 by Selene Dinh RN  Outcome: Progressing  3/9/2024 1543 by Selene Dinh RN  Outcome: Progressing

## 2024-03-09 NOTE — PROGRESS NOTES
"[facility]  Progress Note  Name: Vladimir Mathias I  MRN: [id number]  Unit/Bed#: 4 [place] 414-01 I Date of Admission: 3/5/2024   Date of Service: 3/9/2024 I Hospital Day: 4    Assessment/Plan   * NSTEMI (non-ST elevated myocardial infarction) (HCC)  Assessment & Plan  Patient with chest pain and dyspnea exertion x 4 days.  Recent admission at [facility] on March 3 and discharged same day.  At that time noticed that his chest pain was secondary to atrial flutter.  His troponins at that time were 196, 234, 346     Latest Reference Range & Units 03/05/24 18:30 03/05/24 20:23 03/05/24 22:39 03/06/24 05:40 03/06/24 08:12 03/06/24 12:31   hs TnI 0hr \"Refer to ACS Flowchart\"- see link ng/L 1,276 (H)        hs TnI 2hr \"Refer to ACS Flowchart\"- see link ng/L  1,601 (H)       Delta 2hr hsTnI <20 ng/L  325 (H)       hs TnI 4hr \"Refer to ACS Flowchart\"- see link ng/L   1,808 (H)      Delta 4hr hsTnI <20 ng/L   532 (H)      HS TnI random 8 - 18 ng/L    3,126 (H) 5,951 (H) 11,193 (H)   BNP 0 - 100 pg/mL 282 (H)        (H): Data is abnormally high    Patient received 324 mg of aspirin via EMS  Consult cardiology  TTE 3/6/24: EF 50% There is systolic flattening of the interventricular septum consistent with right ventricle pressure overload, There is systolic flattening of the interventricular septum consistent with right ventricle pressure overload. RA severely dilated, RV mildly dilated,     3/6/2024 VQ Scan: low probability of PE    -   Continue Toprol-XL 25 mg once a day    -   continue Vytorin 10/40 mg daily    -   continue aspirin 81 mg once a day    -   continue nitroglycerin patch 0.1 mg per hour on in the a.m. often the p.m.  3/8/2024 Lexiscan nuclear stress test: partially reversible inferior defect noted. Patient with known native RCA disease which was not bypass.    -   Patient loaded with Plavix 300 mg times one and will start Plavix 75 mg daily    -   scheduled for diagnostic left " heart catheterization on 3/11/2024: consent sent to Cath Lab  On heparin gtt    TUAN (acute kidney injury) (HCC)  Assessment & Plan  Admission creatinine 2.01.   Baseline Creatinine: 1.5-1.7    Trended up to 2.65 on hospital day 1  Nephrology consulted  Placed on  sodium bicarb gtt @75 ml/hr for 1 day then isolyte for 1 day with Cr improvement down to baseline.  S/P Lasix 20 mg IV x 1 today      Atrial flutter (HCC)  Assessment & Plan  Continue with Lopressor.    Status post watchman flex (24 mm) in 2023    On his hospitalization at [facility] March 3, Xeralto discontinued due to epistaxis  Currently on heparin gtt    Nonrheumatic aortic valve stenosis  Assessment & Plan  Status post Medtronic evolute valve in 2022    S/P CABG (coronary artery bypass graft)  Assessment & Plan  CAD status post CABG 2009. [place] to LAD and saphenous vein graft to circumflex marginal, Cardiac CTA 12/15/2021 revealed patent LIMA to LAD, patent vein graft to circumflex obtuse marginal #1 an extensive plaque in the native RCA,     Continue aspirin.  Continue Vytorin    Essential hypertension  Assessment & Plan  Continue Lopressor, ACE inhibitor discontinued to TUAN     CKD (chronic kidney disease)  Assessment & Plan  Patient's baseline creatinine is 1.4, Admission Cr 2.01  Nephrology consulted due to worsening Cr up to 2.4  Improved to baseline with IVF      Lab Results   Component Value Date    EGFR 32 03/09/2024    EGFR 27 03/08/2024    EGFR 22 03/07/2024    CREATININE 1.78 (H) 03/09/2024    CREATININE 2.07 (H) 03/08/2024    CREATININE 2.40 (H) 03/07/2024       CAD (coronary artery disease)  Assessment & Plan  CAD status post CABG 2009. [place] to LAD and saphenous vein graft to circumflex marginal, Cardiac CTA 12/15/2021 revealed patent LIMA to LAD, patent vein graft to circumflex obtuse marginal #1 an extensive plaque in the native RCA,     BPH (benign prostatic hyperplasia)  Assessment & Plan  Avodart substituted for  finasteride               VTE Pharmacologic Prophylaxis: VTE Score: 5 High Risk (Score >/= 5) - Pharmacological DVT Prophylaxis Ordered: heparin drip. Sequential Compression Devices Ordered.    Mobility:   Basic Mobility Inpatient Raw Score: 18  JH-HLM Goal: 6: Walk 10 steps or more  JH-HLM Achieved: 4: Move to chair/commode  HLM Goal NOT achieved. Continue with multidisciplinary rounding and encourage appropriate mobility to improve upon HLM goals.    Patient Centered Rounds: I performed bedside rounds with nursing staff today.   Discussions with Specialists or Other Care Team Provider: cardiology, nephrology    Education and Discussions with Family / Patient: Patient declined call to .     Total Time Spent on Date of Encounter in care of patient: 35 mins. This time was spent on one or more of the following: performing physical exam; counseling and coordination of care; obtaining or reviewing history; documenting in the medical record; reviewing/ordering tests, medications or procedures; communicating with other healthcare professionals and discussing with patient's family/caregivers.    Current Length of Stay: 4 day(s)  Current Patient Status: Inpatient   Certification Statement: The patient will continue to require additional inpatient hospital stay due to planned cath on monday  Discharge Plan: Anticipate discharge in 48-72 hrs to discharge location to be determined pending rehab evaluations.    Code Status: Level 3 - DNAR and DNI    Subjective:   States he's feeling well. Admits to having nausea and one episode of vomiting this morning. Given maalox and protonix 40 iv x 1.    Objective:     Vitals:   Temp (24hrs), Av.6 °F (36.4 °C), Min:97.3 °F (36.3 °C), Max:97.8 °F (36.6 °C)    Temp:  [97.3 °F (36.3 °C)-97.8 °F (36.6 °C)] 97.3 °F (36.3 °C)  HR:  [65-72] 71  Resp:  [18] 18  BP: (117-151)/(54-74) 151/74  SpO2:  [95 %-100 %] 95 %  Body mass index is 27.84 kg/m².     Input and Output Summary  (last 24 hours):     Intake/Output Summary (Last 24 hours) at 3/9/2024 1223  Last data filed at 3/9/2024 0456  Gross per 24 hour   Intake --   Output 200 ml   Net -200 ml         Physical Exam:   Physical Exam  Vitals and nursing note reviewed.   Constitutional:       General: He is not in acute distress.     Appearance: He is well-developed.   HENT:      Head: Normocephalic and atraumatic.   Eyes:      Conjunctiva/sclera: Conjunctivae normal.   Cardiovascular:      Rate and Rhythm: Normal rate and regular rhythm.      Heart sounds: No murmur heard.  Pulmonary:      Effort: Pulmonary effort is normal. No respiratory distress.      Breath sounds: Normal breath sounds.   Abdominal:      Palpations: Abdomen is soft.      Tenderness: There is no abdominal tenderness.   Musculoskeletal:         General: No swelling.      Cervical back: Neck supple.   Skin:     General: Skin is warm and dry.   Neurological:      Mental Status: He is alert. Mental status is at baseline.   Psychiatric:         Mood and Affect: Mood normal.          Additional Data:     Labs:  Results from last 7 days   Lab Units 03/09/24 0457 03/06/24  0540 03/05/24  1830   WBC Thousand/uL 7.35   < > 6.51   HEMOGLOBIN g/dL 10.5*   < > 11.5*   HEMATOCRIT % 31.1*   < > 34.6*   PLATELETS Thousands/uL 137*   < > 148*   NEUTROS PCT %  --   --  76*   LYMPHS PCT %  --   --  11*   MONOS PCT %  --   --  9   EOS PCT %  --   --  2    < > = values in this interval not displayed.     Results from last 7 days   Lab Units 03/09/24 0457 03/06/24  0540 03/05/24  2239   SODIUM mmol/L 135   < > 135   POTASSIUM mmol/L 3.9   < > 4.9   CHLORIDE mmol/L 102   < > 106   CO2 mmol/L 23   < > 20*   BUN mg/dL 50*   < > 45*   CREATININE mg/dL 1.78*   < > 2.24*   ANION GAP mmol/L 10   < > 9   CALCIUM mg/dL 8.6   < > 9.0   ALBUMIN g/dL 3.6   < > 3.9   TOTAL BILIRUBIN mg/dL  --   --  0.45   ALK PHOS U/L  --   --  76   ALT U/L  --   --  198*   AST U/L  --   --  210*   GLUCOSE RANDOM mg/dL  125   < > 135    < > = values in this interval not displayed.                       Lines/Drains:  Invasive Devices       Peripheral Intravenous Line  Duration             Peripheral IV 03/05/24 Left Forearm 3 days    Peripheral IV 03/07/24 Dorsal (posterior);Left Arm 2 days                      Telemetry:  Telemetry Orders (From admission, onward)               24 Hour Telemetry Monitoring  Continuous x 24 Hours (Telem)        Question:  Reason for 24 Hour Telemetry  Answer:  PCI/EP study (including pacer and ICD implementation), Cardiac surgery, MI, abnormal cardiac cath, and chest pain- rule out MI                     Telemetry Reviewed: PVCs and 4 beat run of VT  Indication for Continued Telemetry Use: Acute MI/Unstable Angina/Rule out ACS             Imaging: Reviewed radiology reports from this admission including: chest xray    Recent Cultures (last 7 days):         Last 24 Hours Medication List:   Current Facility-Administered Medications   Medication Dose Route Frequency Provider Last Rate    acetaminophen  650 mg Oral Q4H PRN Ahsan Washburn MD      aluminum-magnesium hydroxide-simethicone  30 mL Oral Q4H PRN Shae Camacho DO      aspirin  81 mg Oral Daily Ahsan Washburn MD      [START ON 3/10/2024] clopidogrel  75 mg Oral Daily JESSICA Charles      ezetimibe  10 mg Oral Daily With Dinner Ahsan Washburn MD      And    pravastatin  40 mg Oral Daily With Dinner Ahsan Washburn MD      finasteride  5 mg Oral Daily Ahsan Washburn MD      fish oil  2,000 mg Oral Daily Ahsan Washburn MD      heparin (porcine)  3-20 Units/kg/hr (Order-Specific) Intravenous Titrated Ahsan Washburn MD 11.1 Units/kg/hr (03/08/24 1334)    heparin (porcine)  2,000 Units Intravenous Q6H PRN Ahsan Washburn MD      heparin (porcine)  4,000 Units Intravenous Q6H PRN Ahsan Washburn MD      metoclopramide  10 mg Intravenous Q6H PRN Shae Camacho DO      metoprolol succinate  25 mg Oral  Q24H Ahsan Washburn MD      morphine injection  2 mg Intravenous Q6H PRN Ahsan Washburn MD      nitroglycerin  0.1 mg Transdermal Daily Ahsan Washburn MD      oxyCODONE  5 mg Oral Q8H PRN Ahsan Washubrn MD      [START ON 3/10/2024] pantoprazole  40 mg Oral Early Morning Shae Camacho DO          Today, Patient Was Seen By: Shae Camacho DO    **Please Note: This note may have been constructed using a voice recognition system.**

## 2024-03-09 NOTE — PLAN OF CARE
Problem: PAIN - ADULT  Goal: Verbalizes/displays adequate comfort level or baseline comfort level  Description: Interventions:  - Encourage patient to monitor pain and request assistance  - Assess pain using appropriate pain scale  - Administer analgesics based on type and severity of pain and evaluate response  - Implement non-pharmacological measures as appropriate and evaluate response  - Consider cultural and social influences on pain and pain management  - Notify physician/advanced practitioner if interventions unsuccessful or patient reports new pain  Outcome: Progressing     Problem: INFECTION - ADULT  Goal: Absence or prevention of progression during hospitalization  Description: INTERVENTIONS:  - Assess and monitor for signs and symptoms of infection  - Monitor lab/diagnostic results  - Monitor all insertion sites, i.e. indwelling lines, tubes, and drains  - Monitor endotracheal if appropriate and nasal secretions for changes in amount and color  - Lane appropriate cooling/warming therapies per order  - Administer medications as ordered  - Instruct and encourage patient and family to use good hand hygiene technique  - Identify and instruct in appropriate isolation precautions for identified infection/condition  Outcome: Progressing  Goal: Absence of fever/infection during neutropenic period  Description: INTERVENTIONS:  - Monitor WBC    Outcome: Progressing     Problem: SAFETY ADULT  Goal: Patient will remain free of falls  Description: INTERVENTIONS:  - Educate patient/family on patient safety including physical limitations  - Instruct patient to call for assistance with activity   - Consult OT/PT to assist with strengthening/mobility   - Keep Call bell within reach  - Keep bed low and locked with side rails adjusted as appropriate  - Keep care items and personal belongings within reach  - Initiate and maintain comfort rounds  - Make Fall Risk Sign visible to staff  - Offer Toileting every 4 Hours,  in advance of need  - Initiate/Maintain bed/chair alarm  - Apply yellow socks and bracelet for high fall risk patients  - Consider moving patient to room near nurses station  Outcome: Progressing  Goal: Maintain or return to baseline ADL function  Description: INTERVENTIONS:  -  Assess patient's ability to carry out ADLs; assess patient's baseline for ADL function and identify physical deficits which impact ability to perform ADLs (bathing, care of mouth/teeth, toileting, grooming, dressing, etc.)  - Assess/evaluate cause of self-care deficits   - Assess range of motion  - Assess patient's mobility; develop plan if impaired  - Assess patient's need for assistive devices and provide as appropriate  - Encourage maximum independence but intervene and supervise when necessary  - Involve family in performance of ADLs  - Assess for home care needs following discharge   - Consider OT consult to assist with ADL evaluation and planning for discharge  - Provide patient education as appropriate  Outcome: Progressing  Goal: Maintains/Returns to pre admission functional level  Description: INTERVENTIONS:  - Perform AM-PAC 6 Click Basic Mobility/ Daily Activity assessment daily.  - Set and communicate daily mobility goal to care team and patient/family/caregiver.   - Collaborate with rehabilitation services on mobility goals if consulted  - Perform Range of Motion 3 times a day.  - Reposition patient every 2 hours.  - Dangle patient 3 times a day  - Stand patient 3 times a day  - Ambulate patient 3 times a day  - Out of bed to chair 3 times a day   - Out of bed for meals 3 times a day  - Out of bed for toileting  - Record patient progress and toleration of activity level   Outcome: Progressing     Problem: DISCHARGE PLANNING  Goal: Discharge to home or other facility with appropriate resources  Description: INTERVENTIONS:  - Identify barriers to discharge w/patient and caregiver  - Arrange for needed discharge resources and  transportation as appropriate  - Identify discharge learning needs (meds, wound care, etc.)  - Arrange for interpretive services to assist at discharge as needed  - Refer to Case Management Department for coordinating discharge planning if the patient needs post-hospital services based on physician/advanced practitioner order or complex needs related to functional status, cognitive ability, or social support system  Outcome: Progressing     Problem: Knowledge Deficit  Goal: Patient/family/caregiver demonstrates understanding of disease process, treatment plan, medications, and discharge instructions  Description: Complete learning assessment and assess knowledge base.  Interventions:  - Provide teaching at level of understanding  - Provide teaching via preferred learning methods  Outcome: Progressing     Problem: Prexisting or High Potential for Compromised Skin Integrity  Goal: Skin integrity is maintained or improved  Description: INTERVENTIONS:  - Identify patients at risk for skin breakdown  - Assess and monitor skin integrity  - Assess and monitor nutrition and hydration status  - Monitor labs   - Assess for incontinence   - Turn and reposition patient  - Assist with mobility/ambulation  - Relieve pressure over bony prominences  - Avoid friction and shearing  - Provide appropriate hygiene as needed including keeping skin clean and dry  - Evaluate need for skin moisturizer/barrier cream  - Collaborate with interdisciplinary team   - Patient/family teaching  - Consider wound care consult   Outcome: Progressing

## 2024-03-09 NOTE — PROGRESS NOTES
NEPHROLOGY PROGRESS NOTE   Vladimir Mathias 89 y.o. male MRN: [id number]  Unit/Bed#: [address] [place] [address] Encounter: [id number]    ASSESSMENT & PLAN:  Acute kidney injury, POA  -Baseline creatinine:1.5-1.7 mg/dl based on labs from [facility] dating back to 2019.  Creatinine was 1.47 mg/dL in March 2024  -Admission creatinine: 2.01mg/dl  - Work up:   UA with microscopy: UA with no RBC, urine sodium 17  Kidney ultrasound did not show any hydronephrosis  -Etiology: Acute kidney injury likely due to hemodynamic changes/hypotension in the setting of NSTEMI  -Hospital Course: Renal Function has worsened to creatinine 2.65 mg/dL  due to hemodynamic changes/low blood pressure and prior to admission being on losartan which can contribute to autoregulatory failure.  Patient  started on bicarb drip on 3/6.  Bicarb drip was changed to Isolyte on 3/7 and stopped on 3/8  -Plan:   Renal function improving to creatinine 1.78 mg/dL today which is back to baseline, weight trending up but clinically appears euvolemic, discussed with cardiology who wanted to give IV Lasix 20 mg, agree with IV Lasix.  Noted plan for cardiac catheterization on Monday, recommend IV hydration with Isolyte at 100 mill per hour 1 hour prior to cardiac cath followed by hydration 6 hours postcardiac cath.  Okay for cardiac catheterization from nephrology side if renal function remains stable at creatinine around 2  Patient is aware about the risk of contrast nephropathy and possibility of dialysis, was discussed in detail on 3/7  Continue to hold losartan and diuretics  Avoid nephrotoxins and dose all medications per EGFR.    Avoid hypotension.                                      Chronic Kidney Disease Stage 3b  -Outpatient Nephrologist:  Dr Barrientos at [facility]  -Baseline Creatinine: 1.5-1.7   -Etiology: Chronic kidney disease likely due to hypertensive nephrosclerosis and age-related nephron loss  -Avoid Nephrotoxins and Dose all medications per eGFR  -Will  need outpatient follow up after discharge.     Metabolic acidosis: Admission bicarb level was normal at 22, dropped to 17 with normal anion gap on 3/6, urine anion gap was found to be positive at 60 indicating RTA, urine pH 6.0 indicating possibility of distal RTA  -Bicarb level currently at normal range status post treatment with bicarbonate drip followed by Isolyte drip.  No need for sodium bicarbonate tablet at this time continue to monitor.     Primary hypertension: Blood pressure improved to normal range.  - Continue metoprolol with hold parameters.  If blood pressure trends up further may need to add calcium channel blocker, would avoid losartan due to hyperkalemia.      Hyperkalemia: Admission potassium was 5.4  -Due to acute kidney injury and use of losartan prior to admission.  Also due to metabolic acidosis  -Potassium level improved to normal range at 3.9, recommend low potassium diet and recommend holding losartan      Hyponatremia: Sodium level improved to normal range with stopping IV fluids, likely due to free water intake in the setting of CKD     Anemia, unspecified   hemoglobin stable at 10.5 g/dL.  Continue to monitor     Volume status: Chest x-ray without any evidence of fluid overload     History of BPH: On Flomax.  Bladder scan negative     Discussed with primary team and cardiology that renal function improving and okay with IV Lasix and agreed with the plan    SUBJECTIVE:  Complain of some nausea vomiting today a.m.  No chest pain or SOB.     OBJECTIVE:  Current Weight: Weight - Scale: 95.7 kg (211 lb)  Vitals:    03/09/24 0814   BP: 151/74   Pulse: 71   Resp:    Temp: (!) 97.3 °F (36.3 °C)   SpO2: 95%       Intake/Output Summary (Last 24 hours) at 3/9/2024 1306  Last data filed at 3/9/2024 0456  Gross per 24 hour   Intake --   Output 200 ml   Net -200 ml       Physical Exam  General:  Ill looking, awake.  Eyes: Conjunctivae pink,  Sclera anicteric  ENT: lips and mucous membranes moist  Neck:  supple   Chest: Clear to Auscultation both lungs,  no crackles, ronchus or wheezing.  CVS: S1 & S2 present, normal rate, regular rhythm,   Abdomen: soft, non-tender, non-distended, Bowel sounds normoactive  Extremities: no edema of  legs  Skin: no rash  Neuro: awake, alert, oriented x 3   Psych: Mood and affect appropriate      Medications:    Current Facility-Administered Medications:     acetaminophen (TYLENOL) tablet 650 mg, 650 mg, Oral, Q4H PRN, Ahsan Washburn MD, 650 mg at 03/07/24 2028    aluminum-magnesium hydroxide-simethicone (MAALOX) oral suspension 30 mL, 30 mL, Oral, Q4H PRN, Shae Camacho DO    aspirin chewable tablet 81 mg, 81 mg, Oral, Daily, Ahsan Washburn MD, 81 mg at 03/09/24 1124    [START ON 3/10/2024] clopidogrel (PLAVIX) tablet 75 mg, 75 mg, Oral, Daily, JESSICA Charles    ezetimibe (ZETIA) tablet 10 mg, 10 mg, Oral, Daily With Dinner, 10 mg at 03/08/24 1719 **AND** pravastatin (PRAVACHOL) tablet 40 mg, 40 mg, Oral, Daily With Dinner, Ahsan Washburn MD, 40 mg at 03/08/24 1719    finasteride (PROSCAR) tablet 5 mg, 5 mg, Oral, Daily, Ahsan Washburn MD, 5 mg at 03/09/24 1124    fish oil capsule 2,000 mg, 2,000 mg, Oral, Daily, Ahsan Washburn MD, 2,000 mg at 03/09/24 1123    heparin (porcine) 25,000 units in 0.45% NaCl 250 mL infusion (premix), 3-20 Units/kg/hr (Order-Specific), Intravenous, Titrated, Ahsan Washburn MD, Last Rate: 10 mL/hr at 03/08/24 1334, 11.1 Units/kg/hr at 03/08/24 1334    heparin (porcine) injection 2,000 Units, 2,000 Units, Intravenous, Q6H PRN, Ahsan Washburn MD    heparin (porcine) injection 4,000 Units, 4,000 Units, Intravenous, Q6H PRN, Ahsan Washburn MD    metoclopramide (REGLAN) injection 10 mg, 10 mg, Intravenous, Q6H PRN, Shae Camacho DO    metoprolol succinate (TOPROL-XL) 24 hr tablet 25 mg, 25 mg, Oral, Q24H, Ahsan Washburn MD, 25 mg at 03/08/24 2101    morphine injection 2 mg, 2 mg, Intravenous, Q6H PRN,  "Ahsan Washburn MD    nitroglycerin (NITRODUR) 0.1 mg/hr TD 24 hr patch, 0.1 mg, Transdermal, Daily, Ahsan Washburn MD, 0.1 mg at 03/09/24 1125    oxyCODONE (ROXICODONE) oral solution 5 mg, 5 mg, Oral, Q8H PRN, Ahsan Washburn MD    [START ON 3/10/2024] pantoprazole (PROTONIX) EC tablet 40 mg, 40 mg, Oral, Early Morning, Shae Camacho DO    Invasive Devices:        Lab Results:   Results from last 7 days   Lab Units 03/09/24  0457 03/08/24  0607 03/07/24  0604 03/06/24  0540 03/05/24  2239 03/05/24  1830   WBC Thousand/uL 7.35 8.26 7.10   < >  --  6.51   HEMOGLOBIN g/dL 10.5* 10.7* 10.7*   < >  --  11.5*   HEMATOCRIT % 31.1* 32.8* 31.4*   < >  --  34.6*   PLATELETS Thousands/uL 137* 131* 127*   < >  --  148*   POTASSIUM mmol/L 3.9 4.0 4.2   < > 4.9 5.4*   CHLORIDE mmol/L 102 101 101   < > 106 104   CO2 mmol/L 23 23 23   < > 20* 22   BUN mg/dL 50* 54* 58*   < > 45* 41*   CREATININE mg/dL 1.78* 2.07* 2.40*   < > 2.24* 2.01*   CALCIUM mg/dL 8.6 8.1* 8.0*   < > 9.0 9.1   MAGNESIUM mg/dL 2.2 2.2 2.1   < >  --   --    PHOSPHORUS mg/dL 3.3 3.5 3.4  --   --   --    ALK PHOS U/L  --   --   --   --  76 77   ALT U/L  --   --   --   --  198* 167*   AST U/L  --   --   --   --  210* 200*    < > = values in this interval not displayed.       Previous work up:         Portions of the record may have been created with voice recognition software. Occasional wrong word or \"sound a like\" substitutions may have occurred due to the inherent limitations of voice recognition software. Read the chart carefully and recognize, using context, where substitutions have occurred.If you have any questions, please contact the dictating provider.    "

## 2024-03-10 PROBLEM — N17.9 AKI (ACUTE KIDNEY INJURY) (HCC): Status: RESOLVED | Noted: 2024-03-05 | Resolved: 2024-03-10

## 2024-03-10 LAB
ANION GAP SERPL CALCULATED.3IONS-SCNC: 7 MMOL/L
APTT PPP: 82 SECONDS (ref 23–37)
BUN SERPL-MCNC: 46 MG/DL (ref 5–25)
CALCIUM SERPL-MCNC: 8.8 MG/DL (ref 8.4–10.2)
CHLORIDE SERPL-SCNC: 102 MMOL/L (ref 96–108)
CO2 SERPL-SCNC: 27 MMOL/L (ref 21–32)
CREAT SERPL-MCNC: 1.98 MG/DL (ref 0.6–1.3)
ERYTHROCYTE [DISTWIDTH] IN BLOOD BY AUTOMATED COUNT: 14.6 % (ref 11.6–15.1)
GFR SERPL CREATININE-BSD FRML MDRD: 28 ML/MIN/1.73SQ M
GLUCOSE SERPL-MCNC: 130 MG/DL (ref 65–140)
HCT VFR BLD AUTO: 31.4 % (ref 36.5–49.3)
HGB BLD-MCNC: 10.7 G/DL (ref 12–17)
MCH RBC QN AUTO: 34.2 PG (ref 26.8–34.3)
MCHC RBC AUTO-ENTMCNC: 34.1 G/DL (ref 31.4–37.4)
MCV RBC AUTO: 100 FL (ref 82–98)
PLATELET # BLD AUTO: 131 THOUSANDS/UL (ref 149–390)
PMV BLD AUTO: 10.4 FL (ref 8.9–12.7)
POTASSIUM SERPL-SCNC: 4 MMOL/L (ref 3.5–5.3)
RBC # BLD AUTO: 3.13 MILLION/UL (ref 3.88–5.62)
SODIUM SERPL-SCNC: 136 MMOL/L (ref 135–147)
WBC # BLD AUTO: 6.57 THOUSAND/UL (ref 4.31–10.16)

## 2024-03-10 PROCEDURE — 99232 SBSQ HOSP IP/OBS MODERATE 35: CPT | Performed by: INTERNAL MEDICINE

## 2024-03-10 PROCEDURE — 85027 COMPLETE CBC AUTOMATED: CPT | Performed by: STUDENT IN AN ORGANIZED HEALTH CARE EDUCATION/TRAINING PROGRAM

## 2024-03-10 PROCEDURE — 85730 THROMBOPLASTIN TIME PARTIAL: CPT | Performed by: STUDENT IN AN ORGANIZED HEALTH CARE EDUCATION/TRAINING PROGRAM

## 2024-03-10 PROCEDURE — 99232 SBSQ HOSP IP/OBS MODERATE 35: CPT | Performed by: STUDENT IN AN ORGANIZED HEALTH CARE EDUCATION/TRAINING PROGRAM

## 2024-03-10 PROCEDURE — 80048 BASIC METABOLIC PNL TOTAL CA: CPT | Performed by: STUDENT IN AN ORGANIZED HEALTH CARE EDUCATION/TRAINING PROGRAM

## 2024-03-10 RX ORDER — DOCUSATE SODIUM 100 MG/1
100 CAPSULE, LIQUID FILLED ORAL DAILY
Status: DISCONTINUED | OUTPATIENT
Start: 2024-03-10 | End: 2024-03-20 | Stop reason: HOSPADM

## 2024-03-10 RX ORDER — POLYETHYLENE GLYCOL 3350 17 G/17G
17 POWDER, FOR SOLUTION ORAL DAILY PRN
Status: DISCONTINUED | OUTPATIENT
Start: 2024-03-10 | End: 2024-03-12

## 2024-03-10 RX ORDER — SODIUM CHLORIDE, SODIUM GLUCONATE, SODIUM ACETATE, POTASSIUM CHLORIDE, MAGNESIUM CHLORIDE, SODIUM PHOSPHATE, DIBASIC, AND POTASSIUM PHOSPHATE .53; .5; .37; .037; .03; .012; .00082 G/100ML; G/100ML; G/100ML; G/100ML; G/100ML; G/100ML; G/100ML
100 INJECTION, SOLUTION INTRAVENOUS CONTINUOUS
Status: DISCONTINUED | OUTPATIENT
Start: 2024-03-11 | End: 2024-03-10

## 2024-03-10 RX ORDER — SODIUM CHLORIDE 9 MG/ML
75 INJECTION, SOLUTION INTRAVENOUS CONTINUOUS
Status: DISCONTINUED | OUTPATIENT
Start: 2024-03-10 | End: 2024-03-11

## 2024-03-10 RX ADMIN — FINASTERIDE 5 MG: 5 TABLET, FILM COATED ORAL at 08:49

## 2024-03-10 RX ADMIN — PRAVASTATIN SODIUM 40 MG: 40 TABLET ORAL at 15:31

## 2024-03-10 RX ADMIN — POLYETHYLENE GLYCOL 3350 17 G: 17 POWDER, FOR SOLUTION ORAL at 15:12

## 2024-03-10 RX ADMIN — ASPIRIN 81 MG 81 MG: 81 TABLET ORAL at 08:49

## 2024-03-10 RX ADMIN — OMEGA-3 FATTY ACIDS CAP 1000 MG 2000 MG: 1000 CAP at 08:49

## 2024-03-10 RX ADMIN — EZETIMIBE 10 MG: 10 TABLET ORAL at 15:31

## 2024-03-10 RX ADMIN — SODIUM CHLORIDE 75 ML/HR: 0.9 INJECTION, SOLUTION INTRAVENOUS at 21:41

## 2024-03-10 RX ADMIN — PANTOPRAZOLE SODIUM 40 MG: 40 TABLET, DELAYED RELEASE ORAL at 06:08

## 2024-03-10 RX ADMIN — DOCUSATE SODIUM 100 MG: 100 CAPSULE, LIQUID FILLED ORAL at 15:12

## 2024-03-10 RX ADMIN — CLOPIDOGREL 75 MG: 75 TABLET ORAL at 08:49

## 2024-03-10 RX ADMIN — NITROGLYCERIN 0.1 MG: 0.1 PATCH TRANSDERMAL at 08:51

## 2024-03-10 NOTE — PROGRESS NOTES
"[facility]  Progress Note  Name: Vladimir Mathias I  MRN: [id number]  Unit/Bed#: 4 [place] 414-01 I Date of Admission: 3/5/2024   Date of Service: 3/10/2024 I Hospital Day: 5    Assessment/Plan   * NSTEMI (non-ST elevated myocardial infarction) (HCC)  Assessment & Plan  Patient with chest pain and dyspnea exertion x 4 days.  Recent admission at [facility] on March 3 and discharged same day.  At that time noticed that his chest pain was secondary to atrial flutter.  His troponins at that time were 196, 234, 346     Latest Reference Range & Units 03/05/24 18:30 03/05/24 20:23 03/05/24 22:39 03/06/24 05:40 03/06/24 08:12 03/06/24 12:31   hs TnI 0hr \"Refer to ACS Flowchart\"- see link ng/L 1,276 (H)        hs TnI 2hr \"Refer to ACS Flowchart\"- see link ng/L  1,601 (H)       Delta 2hr hsTnI <20 ng/L  325 (H)       hs TnI 4hr \"Refer to ACS Flowchart\"- see link ng/L   1,808 (H)      Delta 4hr hsTnI <20 ng/L   532 (H)      HS TnI random 8 - 18 ng/L    3,126 (H) 5,951 (H) 11,193 (H)   BNP 0 - 100 pg/mL 282 (H)        (H): Data is abnormally high    Patient received 324 mg of aspirin via EMS  Consult cardiology  TTE 3/6/24: EF 50% There is systolic flattening of the interventricular septum consistent with right ventricle pressure overload, There is systolic flattening of the interventricular septum consistent with right ventricle pressure overload. RA severely dilated, RV mildly dilated,     3/6/2024 VQ Scan: low probability of PE    -   Continue Toprol-XL 25 mg once a day    -   continue Vytorin 10/40 mg daily    -   continue aspirin 81 mg once a day    -   continue nitroglycerin patch 0.1 mg per hour on in the a.m. often the p.m.  3/8/2024 Lexiscan nuclear stress test: partially reversible inferior defect noted. Patient with known native RCA disease which was not bypass.    -   Patient loaded with Plavix 300 mg times one and will start Plavix 75 mg daily  Heparin gtt stopped evening of " 3/9  scheduled for diagnostic left heart catheterization on 3/11/2024  Precath isolyte 100ml/hr one hour prior to procedure has been ordered      TUAN (acute kidney injury) (HCC)-resolved as of 3/10/2024  Assessment & Plan  Admission creatinine 2.01.   Baseline Creatinine: 1.5-1.7    Trended up to 2.65 on hospital day 1  Nephrology consulted  Placed on  sodium bicarb gtt @75 ml/hr for 1 day then isolyte for 1 day with Cr improvement down to baseline. 1.7  S/P Lasix 20 mg IV x 1 on 3/9 with slight Cr bump to 1.9      Atrial flutter (HCC)  Assessment & Plan  Continue with Lopressor.    Status post watchman flex (24 mm) in 2023    On his hospitalization at [facility] March 3, Xeralto discontinued due to epistaxis  Heparin gtt finished on 3/9/24    Nonrheumatic aortic valve stenosis  Assessment & Plan  Status post [employer] evolute valve in 2022    S/P CABG (coronary artery bypass graft)  Assessment & Plan  CAD status post CABG 2009. [place] to LAD and saphenous vein graft to circumflex marginal, Cardiac CTA 12/15/2021 revealed patent LIMA to LAD, patent vein graft to circumflex obtuse marginal #1 an extensive plaque in the native RCA,     Continue aspirin.  Continue Vytorin    Essential hypertension  Assessment & Plan  Continue Lopressor, ACE inhibitor discontinued to TUAN     CKD (chronic kidney disease)  Assessment & Plan  Baseline creatinine:1.5-1.7 mg/dl based on labs from [facility] dating back to 2019   Admission Cr 2.01  Nephrology consulted due to worsening Cr up to 2.4, Improved to baseline with IVF.  Cr slightly above baseline today after lasix 20 mg iv on 3/9        Lab Results   Component Value Date    EGFR 28 03/10/2024    EGFR 32 03/09/2024    EGFR 27 03/08/2024    CREATININE 1.98 (H) 03/10/2024    CREATININE 1.78 (H) 03/09/2024    CREATININE 2.07 (H) 03/08/2024       CAD (coronary artery disease)  Assessment & Plan  CAD status post CABG 2009. [place] to LAD and saphenous vein graft to  circumflex marginal, Cardiac CTA 12/15/2021 revealed patent LIMA to LAD, patent vein graft to circumflex obtuse marginal #1 an extensive plaque in the native RCA,     BPH (benign prostatic hyperplasia)  Assessment & Plan  Avodart substituted for finasteride               VTE Pharmacologic Prophylaxis: VTE Score: 5 High Risk (Score >/= 5) - Pharmacological DVT Prophylaxis Ordered: heparin drip. Sequential Compression Devices Ordered.    Mobility:   Basic Mobility Inpatient Raw Score: 18  JH-HLM Goal: 6: Walk 10 steps or more  JH-HLM Achieved: 6: Walk 10 steps or more  HLM Goal NOT achieved. Continue with multidisciplinary rounding and encourage appropriate mobility to improve upon HLM goals.    Patient Centered Rounds: I performed bedside rounds with nursing staff today.   Discussions with Specialists or Other Care Team Provider: cardiology, nephrology    Education and Discussions with Family / Patient: Patient declined call to .     Total Time Spent on Date of Encounter in care of patient: 35 mins. This time was spent on one or more of the following: performing physical exam; counseling and coordination of care; obtaining or reviewing history; documenting in the medical record; reviewing/ordering tests, medications or procedures; communicating with other healthcare professionals and discussing with patient's family/caregivers.    Current Length of Stay: 5 day(s)  Current Patient Status: Inpatient   Certification Statement: The patient will continue to require additional inpatient hospital stay due to planned cath on monday  Discharge Plan: Anticipate discharge in 48-72 hrs to discharge location to be determined pending rehab evaluations.    Code Status: Level 3 - DNAR and DNI    Subjective:   States his nausea and abdominal discomfort resolved with protonix and maalox. No acute complaints. States he's ready for cardiac cath tomorrow.    Objective:     Vitals:   Temp (24hrs), Av.5 °F (36.4 °C),  Min:97.3 °F (36.3 °C), Max:97.7 °F (36.5 °C)    Temp:  [97.3 °F (36.3 °C)-97.7 °F (36.5 °C)] 97.3 °F (36.3 °C)  HR:  [42-82] 79  Resp:  [18] 18  BP: (108-139)/(47-80) 121/71  SpO2:  [85 %-97 %] 97 %  Body mass index is 27.84 kg/m².     Input and Output Summary (last 24 hours):     Intake/Output Summary (Last 24 hours) at 3/10/2024 1008  Last data filed at 3/10/2024 0401  Gross per 24 hour   Intake 600 ml   Output 1150 ml   Net -550 ml         Physical Exam:   Physical Exam  Vitals and nursing note reviewed.   Constitutional:       General: He is not in acute distress.     Appearance: He is well-developed.   HENT:      Head: Normocephalic and atraumatic.   Eyes:      Conjunctiva/sclera: Conjunctivae normal.   Cardiovascular:      Rate and Rhythm: Normal rate and regular rhythm.      Heart sounds: No murmur heard.  Pulmonary:      Effort: Pulmonary effort is normal. No respiratory distress.      Breath sounds: Normal breath sounds.   Abdominal:      Palpations: Abdomen is soft.      Tenderness: There is no abdominal tenderness.   Musculoskeletal:         General: No swelling.      Cervical back: Neck supple.   Skin:     General: Skin is warm and dry.   Neurological:      Mental Status: He is alert. Mental status is at baseline.   Psychiatric:         Mood and Affect: Mood normal.          Additional Data:     Labs:  Results from last 7 days   Lab Units 03/10/24  0615 03/06/24  0540 03/05/24  1830   WBC Thousand/uL 6.57   < > 6.51   HEMOGLOBIN g/dL 10.7*   < > 11.5*   HEMATOCRIT % 31.4*   < > 34.6*   PLATELETS Thousands/uL 131*   < > 148*   NEUTROS PCT %  --   --  76*   LYMPHS PCT %  --   --  11*   MONOS PCT %  --   --  9   EOS PCT %  --   --  2    < > = values in this interval not displayed.     Results from last 7 days   Lab Units 03/10/24  0615 03/09/24  0457 03/06/24  0540 03/05/24  2239   SODIUM mmol/L 136 135   < > 135   POTASSIUM mmol/L 4.0 3.9   < > 4.9   CHLORIDE mmol/L 102 102   < > 106   CO2 mmol/L 27 23   <  > 20*   BUN mg/dL 46* 50*   < > 45*   CREATININE mg/dL 1.98* 1.78*   < > 2.24*   ANION GAP mmol/L 7 10   < > 9   CALCIUM mg/dL 8.8 8.6   < > 9.0   ALBUMIN g/dL  --  3.6   < > 3.9   TOTAL BILIRUBIN mg/dL  --   --   --  0.45   ALK PHOS U/L  --   --   --  76   ALT U/L  --   --   --  198*   AST U/L  --   --   --  210*   GLUCOSE RANDOM mg/dL 130 125   < > 135    < > = values in this interval not displayed.                       Lines/Drains:  Invasive Devices       Peripheral Intravenous Line  Duration             Peripheral IV 03/05/24 Left Forearm 4 days    Peripheral IV 03/07/24 Dorsal (posterior);Left Arm 3 days                      Telemetry:  Telemetry Orders (From admission, onward)               24 Hour Telemetry Monitoring  Continuous x 24 Hours (Telem)        Question:  Reason for 24 Hour Telemetry  Answer:  PCI/EP study (including pacer and ICD implementation), Cardiac surgery, MI, abnormal cardiac cath, and chest pain- rule out MI                     Telemetry Reviewed:  sinus rhythm with first degree AV heart block  Indication for Continued Telemetry Use: Acute MI/Unstable Angina/Rule out ACS             Imaging: Reviewed radiology reports from this admission including: chest xray    Recent Cultures (last 7 days):         Last 24 Hours Medication List:   Current Facility-Administered Medications   Medication Dose Route Frequency Provider Last Rate    acetaminophen  650 mg Oral Q4H PRN Ahsan Washburn MD      aluminum-magnesium hydroxide-simethicone  30 mL Oral Q4H PRN Shae Camacho DO      aspirin  81 mg Oral Daily Ahsan Washburn MD      clopidogrel  75 mg Oral Daily JESSICA Charles      ezetimibe  10 mg Oral Daily With Dinner Ahsan Washburn MD      And    pravastatin  40 mg Oral Daily With Dinner Ahsan Washburn MD      finasteride  5 mg Oral Daily Ahsan Washburn MD      fish oil  2,000 mg Oral Daily Ahsan Washburn MD      metoclopramide  10 mg Intravenous Q6H PRN Shae  DO Janice      metoprolol succinate  25 mg Oral Q24H Ahsan Washburn MD      morphine injection  2 mg Intravenous Q6H PRN Ahsan Washburn MD      [START ON 3/11/2024] multi-electrolyte  100 mL/hr Intravenous Continuous JESSICA Charles      nitroglycerin  0.1 mg Transdermal Daily Ahsan Washburn MD      oxyCODONE  5 mg Oral Q8H PRN Ahsan Washburn MD      pantoprazole  40 mg Oral Early Morning Shae Camacho DO          Today, Patient Was Seen By: Shae Camacho DO    **Please Note: This note may have been constructed using a voice recognition system.**

## 2024-03-10 NOTE — PROGRESS NOTES
Progress Note - Cardiology   [facility]     Vladimir Mathias 89 y.o. male MRN: [id number]  : 12/3/1932  Unit/Bed#: [address] [place], [place] [address] Encounter: [id number]    Assessment and Plan:   1. MI type I:  hs trop: 1276 (0hr), 1601 (2hr), 1808 (4hr)    -   hs trops random:  3126, 5951,11,193, >22973 x2    -   patient initially hesitant for IV anticoagulation secondary to history of epitaxsis, but was agreeable after long conversation and IV heparin was initiated on 3/6/2024 at approximately 1 PM. Completed heparin 3/9/2024    -   3/6/2024 TTE: EF visibly estimated at 55% with disconnect except him but otherwise abnormal wall motion. IVS is consistent with postoperative status, there is systolic flattening consistent with RV pressure overload. RV cavity is moderately dilated with moderate to severe reduction in systolic function. Left atrium is mildly dilated, right atrium severely dilated and mild mitral and tricuspid valve regurgitation. Bio prosthetic valve in the aortic position appears to be well seated with no evidence of para valvular regurgitation and gradient within expected range.    -   3/6/2024 VQ Scan: low probability of PE    -   Continue Toprol-XL 25 mg once a day    -   continue Vytorin 10/40 mg daily    -   continue aspirin 81 mg once a day    -   continue nitroglycerin patch 0.1 mg per hour on in the a.m. often the p.m.    -   3/8/2024 Lexiscan nuclear stress test: partially reversible inferior defect noted. Patient with known native RCA disease which was not bypass.    -   Patient loaded with Plavix 300 mg times one and will start Plavix 75 mg daily    -   scheduled for diagnostic left heart catheterization on 3/11/2024: consent sent to Cath Lab.     2. Coronary artery disease with history of CABG: a history of CABG times two in  ([place] to LAD and saphenous vein graft to marginal)    -   follows with Dr. Steven Walton at [facility] in [place],  [phone].    -    12/15/2021 cardiac CTA: noted both bypass grafts were patent with chronic total occlusion of his LAD and diffuse plaque in both left circumflex and RCA.    -   Continue Toprol-XL 25 mg once a day    -   continue Vytorin 10/40 mg daily    -   continue aspirin 81 mg once a day    -   continue nitroglycerin patch 0.1 mg per hour on in the a.m. off in the p.m.    -   care as per #1     3. TUAN on chronic kidney disease: review records from [facility], appears his baseline creatinine is 1.4 - 1.7    -   follows with nephrologist at [place]    -   seen by Dr. Sheppard nephrology and consultation appreciated    -   creatinine on presentation was 2.65, slowly improving with creatinine of 1.78 today    -   IV fluids discontinued 3/8/2024 by nephrology    -   patient with nausea and vomiting since 5 AM 3/9/2024. Appears to be slightly volume overloaded. Discussed with nephrology, will give Lasix 20 mg IV times one and continue to monitor    -   3/10/2024 creatinine 1.98. Nephrology note reviewed and patient to start on isolyte in the a.m. at 100 mL per hour one hour prior to cardiac catheterization. Orders placed in chart and discussed with nurse.     4. Hypertension: blood pressure stable    -   continue Toprol and nitroglycerin with close monitoring     5. Paroxysmal atrial fibrillation flutter: patient has watchmen device    -   telemetry reviewed on initial admission, patient appears to be changing between Mobitz II type 1 and Mobitz II Type 2    -   3/8/2024 telemetry reviewed and patient appears to be sinus rhythm with first-degree AV heart block.    -   Continue to monitor telemetry     6. Aortic stenosis status post TAVR: TAVR performed in 3/31/2021 at [facility]    -   3/6/2024 TTE: Bio prosthetic valve in the aortic position appears to be well seated with no evidence of para valvular regurgitation and gradient within expected range.     7. History of epitaxis with anticoagulation (Xarelto): patient has  "watchmen device       Subjective / Objective:   Chart reviewed, patient seen and examined. He is in good spirits, and notes no further nausea or vomiting. He notes he is feeling much better. He is for diagnostic left heart catheterization in the a.m.    Vitals: Blood pressure 121/71, pulse 79, temperature (!) 97.3 °F (36.3 °C), resp. rate 18, height 6' 1\" (1.854 m), weight 95.7 kg (211 lb), SpO2 97%.  Vitals:    03/08/24 0556 03/09/24 0600   Weight: 93.5 kg (206 lb 2.1 oz) 95.7 kg (211 lb)     Body mass index is 27.84 kg/m².  BP Readings from Last 3 Encounters:   03/10/24 121/71   07/12/22 118/72   07/05/22 114/72     Orthostatic Blood Pressures      Flowsheet Row Most Recent Value   Blood Pressure 121/71 filed at 03/10/2024 0754   Patient Position - Orthostatic VS Lying filed at 03/07/2024 0804          I/O         03/08 0701  03/09 0700 03/09 0701  03/10 0700 03/10 0701  03/11 0700    P.O.  600     Total Intake(mL/kg)  600 (6.3)     Urine (mL/kg/hr) 200 (0.1) 1150 (0.5)     Total Output 200 1150     Net -200 -550                  Invasive Devices       Peripheral Intravenous Line  Duration             Peripheral IV 03/05/24 Left Forearm 4 days    Peripheral IV 03/07/24 Dorsal (posterior);Left Arm 3 days                      Intake/Output Summary (Last 24 hours) at 3/10/2024 0956  Last data filed at 3/10/2024 0401  Gross per 24 hour   Intake 600 ml   Output 1150 ml   Net -550 ml         Physical Exam:   Physical Exam  Vitals and nursing note reviewed.   Constitutional:       General: He is not in acute distress.     Appearance: Normal appearance. He is normal weight.   HENT:      Right Ear: External ear normal.      Left Ear: External ear normal.      Nose: Nose normal.   Eyes:      General: No scleral icterus.        Right eye: No discharge.         Left eye: No discharge.   Cardiovascular:      Rate and Rhythm: Normal rate and regular rhythm.      Pulses: Normal pulses.      Heart sounds: Murmur heard. "   Pulmonary:      Effort: Pulmonary effort is normal. No respiratory distress.      Breath sounds: Normal breath sounds.   Abdominal:      General: Bowel sounds are normal. There is no distension.      Palpations: Abdomen is soft.   Musculoskeletal:      Right lower leg: No edema.      Left lower leg: No edema.   Skin:     General: Skin is warm and dry.      Capillary Refill: Capillary refill takes less than 2 seconds.   Neurological:      General: No focal deficit present.      Mental Status: He is alert and oriented to person, place, and time. Mental status is at baseline.   Psychiatric:         Mood and Affect: Mood normal.              Medications/ Allergies:     Current Facility-Administered Medications   Medication Dose Route Frequency Provider Last Rate    acetaminophen  650 mg Oral Q4H PRN Ahsan Washburn MD      aluminum-magnesium hydroxide-simethicone  30 mL Oral Q4H PRN Shae Camacho DO      aspirin  81 mg Oral Daily Ahsan Washburn MD      clopidogrel  75 mg Oral Daily JESSICA Charles      ezetimibe  10 mg Oral Daily With Dinner Ahsan Washburn MD      And    pravastatin  40 mg Oral Daily With Dinner Ahsan Washburn MD      finasteride  5 mg Oral Daily Ahsan Washburn MD      fish oil  2,000 mg Oral Daily Ahsan Washburn MD      metoclopramide  10 mg Intravenous Q6H PRN Shae Camacho DO      metoprolol succinate  25 mg Oral Q24H Ahsan Washburn MD      morphine injection  2 mg Intravenous Q6H PRN Ahsan Washburn MD      nitroglycerin  0.1 mg Transdermal Daily Ahsan Washburn MD      oxyCODONE  5 mg Oral Q8H PRN Ahsan Washburn MD      pantoprazole  40 mg Oral Early Morning Shae Camacho DO       acetaminophen, 650 mg, Q4H PRN  aluminum-magnesium hydroxide-simethicone, 30 mL, Q4H PRN  metoclopramide, 10 mg, Q6H PRN  morphine injection, 2 mg, Q6H PRN  oxyCODONE, 5 mg, Q8H PRN      No Known Allergies    VTE Pharmacologic Prophylaxis:   Sequential  compression device (Venodyne)     Labs:   Troponins:  Results from last 7 days   Lab Units 03/07/24  0604 03/06/24  1812 03/06/24  1231 03/06/24 0540 03/05/24 2239 03/05/24 2023   HS TNI RAND ng/L >22,973* >22,973* 11,193*   < >  --   --    HSTNI D2 ng/L  --   --   --   --   --  325*   HSTNI D4 ng/L  --   --   --   --  532*  --     < > = values in this interval not displayed.     CBC with diff:  Results from last 7 days   Lab Units 03/10/24  0615 03/09/24  0457 03/08/24 0607 03/07/24 0604 03/06/24 0540 03/05/24  1830   WBC Thousand/uL 6.57 7.35 8.26 7.10 7.92 6.51   HEMOGLOBIN g/dL 10.7* 10.5* 10.7* 10.7* 11.5* 11.5*   HEMATOCRIT % 31.4* 31.1* 32.8* 31.4* 35.0* 34.6*   MCV fL 100* 100* 100* 99* 100* 101*   PLATELETS Thousands/uL 131* 137* 131* 127* 167 148*   RBC Million/uL 3.13* 3.12* 3.28* 3.17* 3.50* 3.44*   MCH pg 34.2 33.7 32.6 33.8 32.9 33.4   MCHC g/dL 34.1 33.8 32.6 34.1 32.9 33.2   RDW % 14.6 14.6 14.7 14.9 14.7 14.6   MPV fL 10.4 10.7 10.9 11.0 11.1 11.0   NRBC AUTO /100 WBCs  --   --   --   --   --  0     CMP:  Results from last 7 days   Lab Units 03/10/24  0615 03/09/24  0457 03/08/24  0607 03/07/24  0604 03/06/24 0540 03/05/24 2239 03/05/24  1830   SODIUM mmol/L 136 135 134* 133* 135 135 134*   POTASSIUM mmol/L 4.0 3.9 4.0 4.2 5.3 4.9 5.4*   CHLORIDE mmol/L 102 102 101 101 107 106 104   CO2 mmol/L 27 23 23 23 17* 20* 22   ANION GAP mmol/L 7 10 10 9 11 9 8   BUN mg/dL 46* 50* 54* 58* 49* 45* 41*   CREATININE mg/dL 1.98* 1.78* 2.07* 2.40* 2.65* 2.24* 2.01*   CALCIUM mg/dL 8.8 8.6 8.1* 8.0* 8.4 9.0 9.1   AST U/L  --   --   --   --   --  210* 200*   ALT U/L  --   --   --   --   --  198* 167*   ALK PHOS U/L  --   --   --   --   --  76 77   TOTAL PROTEIN g/dL  --   --   --   --   --  6.8 7.0   ALBUMIN g/dL  --  3.6 3.5 3.6  --  3.9 4.1   TOTAL BILIRUBIN mg/dL  --   --   --   --   --  0.45 0.48   EGFR ml/min/1.73sq m 28 32 27 22 20 24 28     Magnesium:  Results from last 7 days   Lab Units  03/09/24  0457 03/08/24  0607 03/07/24  0604 03/06/24  0540   MAGNESIUM mg/dL 2.2 2.2 2.1 2.2     Coags:  Results from last 7 days   Lab Units 03/10/24  0615 03/09/24  0457 03/08/24  0607 03/07/24  0604 03/07/24  0003 03/06/24  1812 03/06/24  0812   PTT seconds 82* 73* 74* 60* 74* 78* 29       Imaging & Testing   I have personally reviewed pertinent reports.    NM Myocardial Perfusion Spect (Pharmacological Induced Stress and/or Rest)    Result Date: 3/9/2024  Narrative:   Stress ECG: Right bundle branch block was seen. The stress ECG is equivocal for ischemia after pharmacologic vasodilation.   Perfusion: There is a left ventricular perfusion defect present in the mid to basal inferior and inferoseptal location(s) that is partially reversible.   Stress Combined Conclusion: Left ventricular perfusion is abnormal.   Stress Function: Left ventricular function post-stress is normal. Stress ejection fraction is 65%. Abnormal study, Perfusion: There is a left ventricular perfusion defect present in the mid to basal inferior and inferoseptal location(s) that is partially reversible. SSS 7 SRS 5 SDS 2     Stress strip    Result Date: 3/8/2024  Narrative: Confirmed by MAISHA FREEMAN ([id number]),  Marilu Rothman ([id number]) on 3/8/2024 10:37:07 AM    US kidney and bladder    Result Date: 3/6/2024  Narrative: RENAL ULTRASOUND INDICATION: Renal failure. COMPARISON: None TECHNIQUE: Ultrasound of the retroperitoneum was performed with a curvilinear transducer utilizing volumetric sweeps and still imaging techniques. FINDINGS: KIDNEYS: Symmetric and normal size. Right kidney: 11.0 x 5.0 x 5.2 cm. Volume 149.8 mL Left kidney: 10.8 x 5.4 x 4.5 cm. Volume 136.3 mL Right kidney Normal echogenicity and contour. No mass is identified. No hydronephrosis. No shadowing calculi. Trace perinephric simple free fluid. Left kidney Normal echogenicity and contour. No mass is identified. No hydronephrosis. No shadowing calculi. No perinephric fluid  collections. URETERS: Nonvisualized. BLADDER: Normally distended. No focal thickening or mass lesions. The right ureteral jet is seen. The left ureteral jet is not visualized.     Impression: Normal. Workstation performed: [id number]     NM lung ventilation / perfusion    Result Date: 3/6/2024  Narrative: VENTILATION AND PERFUSION SCAN INDICATION: RV enlargement on Echo - new,  Question PE COMPARISON:  Chest radiograph 3/5/2023 TECHNIQUE:  Posterior ventilation imaging was performed after the inhalation of 30.1 mCi nebulized Tc-99m DTPA with deposition of less than 1 mCi of activity within the lungs. Multiplanar perfusion imaging was next performed following the intravenous administration of 4.35 mCi Tc-99m labeled MAA. FINDINGS: Ventilation imaging demonstrates partial deposition of radiopharmaceutical activity within the central bronchi. Perfusion imaging demonstrates a matching subsegmental defect in the posterior aspect of the right lower lobe. There are no suspicious ventilation/perfusion mismatches.     Impression: The probability for pulmonary embolus is low. Workstation performed: [id number]     Echo complete    Result Date: 3/6/2024  Narrative:   Left Ventricle: Left ventricular cavity size is lower normal Wall thickness is moderately increased. The left ventricular ejection fraction is 55%. Systolic function is normal. Dyskinetic septum otherwise normal wall motion Unable to assess diastolic function due to atrial flutter.   IVS: There is abnormal septal motion consistent with post-operative status. There is systolic flattening of the interventricular septum consistent with right ventricle pressure overload.   Right Ventricle: Right ventricular cavity size is moderately dilated. Systolic function is moderately to severely reduced.   Left Atrium: The atrium is mildly dilated.   Right Atrium: The atrium is severely dilated.   Aortic Valve: There is a TAVR bioprosthetic valve. The prosthetic valve appears  "well-seated. There is no evidence of paravalvular regurgitation. The gradient recorded across the prosthetic aortic valve is within the expected range. The aortic valve peak velocity is 1.57 m/s. The aortic valve mean gradient is 6 mmHg.   Mitral Valve: There is mild regurgitation.   Tricuspid Valve: There is mild regurgitation. Abnormal study, compared to prior echo report increased RV size and decreased systolic function. Elevated pulmonary pressures are suggested by LV systolic flattening.     XR chest 1 view portable    Result Date: 3/6/2024  Narrative: XR CHEST PORTABLE INDICATION: chest pain. COMPARISON: None FINDINGS: No airspace consolidation, pneumothorax, pulmonary edema, or pleural effusion. Trace left apical scarring.. Prior CABG. Mild cardiomegaly allowing for portable AP technique. Aortic calcification is present. Mild degenerative changes of bilateral shoulders. Normal upper abdomen.     Impression: No radiographic evidence of acute intrathoracic process. Workstation performed: [id number]     XR chest 1 view    Result Date: 3/3/2024  Narrative: CHEST X-RAY  SINGLE VIEW: HISTORY: chest pain;   PRIORS: Chest radiograph on October 8, 2010. FINDINGS: LUNGS: Clear.  No pleural abnormality seen. HEART: Mild cardiomegaly. Sternotomy. MEDIASTINUM: Normal. OTHER FINDINGS:  None.        Impression:  No acute pulmonary abnormality.       EKG / Monitor: Personally reviewed.    Sinus rhythm with first-degree AV heart block          Roula LOPEZ  Cardiology      \"This note was completed in part utilizing m-modal fluency direct voice recognition software.   Grammatical errors, random word insertion, spelling mistakes, and incomplete sentences may be an occasional consequence of the system secondary to software limitations, ambient noise and hardware issues.    Please read the chart carefully and recognize, using context, where substitutions have occurred.  If you have any questions or concerns about the " "context, text or information contained within the body of this dictation, please contact myself, the provider, for further clarification.\"  "

## 2024-03-10 NOTE — PROGRESS NOTES
NEPHROLOGY PROGRESS NOTE   Vladimir Mathias 89 y.o. male MRN: [id number]  Unit/Bed#: [address] [place] [address] Encounter: [id number]    ASSESSMENT & PLAN:  Acute kidney injury, POA  -Baseline creatinine:1.5-1.7 mg/dl based on labs from [facility] dating back to 2019.  Creatinine was 1.47 mg/dL in March 2024  -Admission creatinine: 2.01mg/dl  - Work up:   UA with microscopy: UA with no RBC, urine sodium 17  Kidney ultrasound did not show any hydronephrosis  -Etiology: Acute kidney injury likely due to hemodynamic changes/hypotension in the setting of NSTEMI  -Hospital Course: Renal Function has worsened to creatinine 2.65 mg/dL  due to hemodynamic changes/low blood pressure and prior to admission being on losartan which can contribute to autoregulatory failure.  Patient  started on bicarb drip on 3/6.  Bicarb drip was changed to Isolyte on 3/7 and stopped on 3/8  -Plan:   Renal function has improved to creatinine 1.78 mg/dL on 3/9 after which patient was given IV Lasix and creatinine trended up to 1.98 mg/dL which is acceptable.  Would avoid further IV Lasix today, continue to monitor renal function.  If renal function is stable, okay for cardiac cath tomorrow from nephrology side.  Considering that creatinine has trended up, will start on IV hydration tonight using IV normal saline at 75 mill per hour and to continue for 6 hours postcardiac catheter to minimize the risk of contrast nephropathy  Patient is aware about the risk of contrast nephropathy and possibility of dialysis, was discussed in detail on 3/7  Continue to hold losartan and diuretics  Avoid nephrotoxins and dose all medications per EGFR.    Avoid hypotension.                                      Chronic Kidney Disease Stage 3b  -Outpatient Nephrologist:  Dr Barrientos at [facility]  -Baseline Creatinine: 1.5-1.7   -Etiology: Chronic kidney disease likely due to hypertensive nephrosclerosis and age-related nephron loss  -Avoid Nephrotoxins and Dose all  medications per eGFR  -Will need outpatient follow up after discharge.     Metabolic acidosis: Admission bicarb level was normal at 22, dropped to 17 with normal anion gap on 3/6, urine anion gap was found to be positive at 60 indicating RTA, urine pH 6.0 indicating possibility of distal RTA  -Bicarb level currently at normal range status post treatment with bicarbonate drip followed by Isolyte drip.  No need for sodium bicarbonate tablet at this time continue to monitor.  Bicarb level today was 27     Primary hypertension: Blood pressure stable and is at goal  - Continue metoprolol with hold parameters.  If blood pressure trends up further may need to add calcium channel blocker, would avoid losartan due to hyperkalemia.      Hyperkalemia: Admission potassium was 5.4  -Due to acute kidney injury and use of losartan prior to admission.  Also due to metabolic acidosis  -Potassium level improved to normal range at 4.0 recommend low potassium diet and recommend holding losartan       Anemia, unspecified   hemoglobin stable at 10.7 g/dL.  Continue to monitor     Volume status: Chest x-ray without any evidence of fluid overload.  Continue to monitor volume status, status post IV Lasix on 3/9     History of BPH: On Flomax.  Bladder scan negative     Discussed with primary team regarding plan to continue to monitor renal function as creatinine trending up and to give IV fluids starting tonight for hydration for cardiac cath tomorrow.  Primary team agreed with the plan    SUBJECTIVE:  No new complaints no nausea vomiting no shortness of breath    OBJECTIVE:  Current Weight: Weight - Scale: 95.9 kg (211 lb 6.4 oz)  Vitals:    03/10/24 0754   BP: 121/71   Pulse: 79   Resp:    Temp:    SpO2: 97%       Intake/Output Summary (Last 24 hours) at 3/10/2024 1333  Last data filed at 3/10/2024 0401  Gross per 24 hour   Intake 600 ml   Output 1150 ml   Net -550 ml       Physical Exam  General:  Ill looking, awake.  Eyes: Conjunctivae  pink,  Sclera anicteric  ENT: lips and mucous membranes moist  Neck: supple   Chest: Clear to Auscultation both lungs,  no crackles, ronchus or wheezing.  CVS: S1 & S2 present, normal rate, regular rhythm, no murmur.  Abdomen: soft, non-tender, non-distended, Bowel sounds normoactive  Extremities: no edema of  legs  Skin: no rash  Neuro: awake, alert, oriented  Psych: Mood and affect appropriate      Medications:    Current Facility-Administered Medications:     acetaminophen (TYLENOL) tablet 650 mg, 650 mg, Oral, Q4H PRN, Ahsan Washburn MD, 650 mg at 03/09/24 2034    aluminum-magnesium hydroxide-simethicone (MAALOX) oral suspension 30 mL, 30 mL, Oral, Q4H PRN, Shae Camacho DO    aspirin chewable tablet 81 mg, 81 mg, Oral, Daily, Ahsan Washburn MD, 81 mg at 03/10/24 0849    clopidogrel (PLAVIX) tablet 75 mg, 75 mg, Oral, Daily, JESSICA Charles, 75 mg at 03/10/24 0849    ezetimibe (ZETIA) tablet 10 mg, 10 mg, Oral, Daily With Dinner, 10 mg at 03/09/24 1532 **AND** pravastatin (PRAVACHOL) tablet 40 mg, 40 mg, Oral, Daily With Dinner, Ahsan Washburn MD, 40 mg at 03/09/24 1532    finasteride (PROSCAR) tablet 5 mg, 5 mg, Oral, Daily, Ahsan Washburn MD, 5 mg at 03/10/24 0849    fish oil capsule 2,000 mg, 2,000 mg, Oral, Daily, Ahsan Washburn MD, 2,000 mg at 03/10/24 0849    metoclopramide (REGLAN) injection 10 mg, 10 mg, Intravenous, Q6H PRN, Shae Camacho DO    metoprolol succinate (TOPROL-XL) 24 hr tablet 25 mg, 25 mg, Oral, Q24H, Ahsan Washburn MD, 25 mg at 03/09/24 2034    morphine injection 2 mg, 2 mg, Intravenous, Q6H PRN, Ahsan Washburn MD    [START ON 3/11/2024] multi-electrolyte (PLASMALYTE-A/ISOLYTE-S PH 7.4) IV solution, 100 mL/hr, Intravenous, Continuous, JESSICA Charles    nitroglycerin (NITRODUR) 0.1 mg/hr TD 24 hr patch, 0.1 mg, Transdermal, Daily, Ahsan Washburn MD, 0.1 mg at 03/10/24 0851    oxyCODONE (ROXICODONE) oral solution 5 mg, 5 mg, Oral, Q8H  "Ahsan IGNACIO MD    pantoprazole (PROTONIX) EC tablet 40 mg, 40 mg, Oral, Early Morning, Shae Camacho DO, 40 mg at 03/10/24 0608    Invasive Devices:        Lab Results:   Results from last 7 days   Lab Units 03/10/24  0615 03/09/24  0457 03/08/24  0607 03/07/24  0604 03/06/24  0540 03/05/24  2239 03/05/24  1830   WBC Thousand/uL 6.57 7.35 8.26 7.10   < >  --  6.51   HEMOGLOBIN g/dL 10.7* 10.5* 10.7* 10.7*   < >  --  11.5*   HEMATOCRIT % 31.4* 31.1* 32.8* 31.4*   < >  --  34.6*   PLATELETS Thousands/uL 131* 137* 131* 127*   < >  --  148*   POTASSIUM mmol/L 4.0 3.9 4.0 4.2   < > 4.9 5.4*   CHLORIDE mmol/L 102 102 101 101   < > 106 104   CO2 mmol/L 27 23 23 23   < > 20* 22   BUN mg/dL 46* 50* 54* 58*   < > 45* 41*   CREATININE mg/dL 1.98* 1.78* 2.07* 2.40*   < > 2.24* 2.01*   CALCIUM mg/dL 8.8 8.6 8.1* 8.0*   < > 9.0 9.1   MAGNESIUM mg/dL  --  2.2 2.2 2.1   < >  --   --    PHOSPHORUS mg/dL  --  3.3 3.5 3.4  --   --   --    ALK PHOS U/L  --   --   --   --   --  76 77   ALT U/L  --   --   --   --   --  198* 167*   AST U/L  --   --   --   --   --  210* 200*    < > = values in this interval not displayed.       Previous work up:         Portions of the record may have been created with voice recognition software. Occasional wrong word or \"sound a like\" substitutions may have occurred due to the inherent limitations of voice recognition software. Read the chart carefully and recognize, using context, where substitutions have occurred.If you have any questions, please contact the dictating provider.    "

## 2024-03-10 NOTE — PLAN OF CARE
Problem: PAIN - ADULT  Goal: Verbalizes/displays adequate comfort level or baseline comfort level  Description: Interventions:  - Encourage patient to monitor pain and request assistance  - Assess pain using appropriate pain scale  - Administer analgesics based on type and severity of pain and evaluate response  - Implement non-pharmacological measures as appropriate and evaluate response  - Consider cultural and social influences on pain and pain management  - Notify physician/advanced practitioner if interventions unsuccessful or patient reports new pain  Outcome: Progressing    Problem: SAFETY ADULT  Goal: Maintain or return to baseline ADL function  Description: INTERVENTIONS:  -  Assess patient's ability to carry out ADLs; assess patient's baseline for ADL function and identify physical deficits which impact ability to perform ADLs (bathing, care of mouth/teeth, toileting, grooming, dressing, etc.)  - Assess/evaluate cause of self-care deficits   - Assess range of motion  - Assess patient's mobility; develop plan if impaired  - Assess patient's need for assistive devices and provide as appropriate  - Encourage maximum independence but intervene and supervise when necessary  - Involve family in performance of ADLs  - Assess for home care needs following discharge   - Consider OT consult to assist with ADL evaluation and planning for discharge  - Provide patient education as appropriate  Outcome: Progressing

## 2024-03-10 NOTE — PLAN OF CARE
Problem: PAIN - ADULT  Goal: Verbalizes/displays adequate comfort level or baseline comfort level  Description: Interventions:  - Encourage patient to monitor pain and request assistance  - Assess pain using appropriate pain scale  - Administer analgesics based on type and severity of pain and evaluate response  - Implement non-pharmacological measures as appropriate and evaluate response  - Consider cultural and social influences on pain and pain management  - Notify physician/advanced practitioner if interventions unsuccessful or patient reports new pain  Outcome: Progressing     Problem: INFECTION - ADULT  Goal: Absence or prevention of progression during hospitalization  Description: INTERVENTIONS:  - Assess and monitor for signs and symptoms of infection  - Monitor lab/diagnostic results  - Monitor all insertion sites, i.e. indwelling lines, tubes, and drains  - Monitor endotracheal if appropriate and nasal secretions for changes in amount and color  - [place] appropriate cooling/warming therapies per order  - Administer medications as ordered  - Instruct and encourage patient and family to use good hand hygiene technique  - Identify and instruct in appropriate isolation precautions for identified infection/condition  Outcome: Progressing  Goal: Absence of fever/infection during neutropenic period  Description: INTERVENTIONS:  - Monitor WBC    Outcome: Progressing     Problem: SAFETY ADULT  Goal: Patient will remain free of falls  Description: INTERVENTIONS:  - Educate patient/family on patient safety including physical limitations  - Instruct patient to call for assistance with activity   - Consult OT/PT to assist with strengthening/mobility   - Keep Call bell within reach  - Keep bed low and locked with side rails adjusted as appropriate  - Keep care items and personal belongings within reach  - Initiate and maintain comfort rounds  - Make Fall Risk Sign visible to staff  - Offer Toileting every 4 Hours,  in advance of need  - Initiate/Maintain bed/chair alarm  - Apply yellow socks and bracelet for high fall risk patients  - Consider moving patient to room near nurses station  Outcome: Progressing  Goal: Maintain or return to baseline ADL function  Description: INTERVENTIONS:  -  Assess patient's ability to carry out ADLs; assess patient's baseline for ADL function and identify physical deficits which impact ability to perform ADLs (bathing, care of mouth/teeth, toileting, grooming, dressing, etc.)  - Assess/evaluate cause of self-care deficits   - Assess range of motion  - Assess patient's mobility; develop plan if impaired  - Assess patient's need for assistive devices and provide as appropriate  - Encourage maximum independence but intervene and supervise when necessary  - Involve family in performance of ADLs  - Assess for home care needs following discharge   - Consider OT consult to assist with ADL evaluation and planning for discharge  - Provide patient education as appropriate  Outcome: Progressing  Goal: Maintains/Returns to pre admission functional level  Description: INTERVENTIONS:  - Perform AM-PAC 6 Click Basic Mobility/ Daily Activity assessment daily.  - Set and communicate daily mobility goal to care team and patient/family/caregiver.   - Collaborate with rehabilitation services on mobility goals if consulted  - Stand patient 3 times a day  - Ambulate patient 3 times a day  - Out of bed to chair 3 times a day   - Out of bed for meals 3 times a day  - Out of bed for toileting  - Record patient progress and toleration of activity level   Outcome: Progressing     Problem: DISCHARGE PLANNING  Goal: Discharge to home or other facility with appropriate resources  Description: INTERVENTIONS:  - Identify barriers to discharge w/patient and caregiver  - Arrange for needed discharge resources and transportation as appropriate  - Identify discharge learning needs (meds, wound care, etc.)  - Arrange for  interpretive services to assist at discharge as needed  - Refer to Case Management Department for coordinating discharge planning if the patient needs post-hospital services based on physician/advanced practitioner order or complex needs related to functional status, cognitive ability, or social support system  Outcome: Progressing     Problem: Knowledge Deficit  Goal: Patient/family/caregiver demonstrates understanding of disease process, treatment plan, medications, and discharge instructions  Description: Complete learning assessment and assess knowledge base.  Interventions:  - Provide teaching at level of understanding  - Provide teaching via preferred learning methods  Outcome: Progressing     Problem: Prexisting or High Potential for Compromised Skin Integrity  Goal: Skin integrity is maintained or improved  Description: INTERVENTIONS:  - Identify patients at risk for skin breakdown  - Assess and monitor skin integrity  - Assess and monitor nutrition and hydration status  - Monitor labs   - Assess for incontinence   - Turn and reposition patient  - Assist with mobility/ambulation  - Relieve pressure over bony prominences  - Avoid friction and shearing  - Provide appropriate hygiene as needed including keeping skin clean and dry  - Evaluate need for skin moisturizer/barrier cream  - Collaborate with interdisciplinary team   - Patient/family teaching  - Consider wound care consult   Outcome: Progressing

## 2024-03-11 PROBLEM — I44.2 THIRD DEGREE HEART BLOCK (HCC): Status: ACTIVE | Noted: 2024-03-11

## 2024-03-11 LAB
ANION GAP SERPL CALCULATED.3IONS-SCNC: 10 MMOL/L
ANION GAP SERPL CALCULATED.3IONS-SCNC: 5 MMOL/L
APTT PPP: 141 SECONDS (ref 23–37)
APTT PPP: 62 SECONDS (ref 23–37)
APTT PPP: >210 SECONDS (ref 23–37)
ATRIAL RATE: 79 BPM
BUN SERPL-MCNC: 42 MG/DL (ref 5–25)
BUN SERPL-MCNC: 42 MG/DL (ref 5–25)
CALCIUM SERPL-MCNC: 8.4 MG/DL (ref 8.4–10.2)
CALCIUM SERPL-MCNC: 8.8 MG/DL (ref 8.4–10.2)
CHLORIDE SERPL-SCNC: 103 MMOL/L (ref 96–108)
CHLORIDE SERPL-SCNC: 104 MMOL/L (ref 96–108)
CO2 SERPL-SCNC: 20 MMOL/L (ref 21–32)
CO2 SERPL-SCNC: 27 MMOL/L (ref 21–32)
CREAT SERPL-MCNC: 1.82 MG/DL (ref 0.6–1.3)
CREAT SERPL-MCNC: 1.93 MG/DL (ref 0.6–1.3)
ERYTHROCYTE [DISTWIDTH] IN BLOOD BY AUTOMATED COUNT: 14.4 % (ref 11.6–15.1)
GFR SERPL CREATININE-BSD FRML MDRD: 29 ML/MIN/1.73SQ M
GFR SERPL CREATININE-BSD FRML MDRD: 31 ML/MIN/1.73SQ M
GLUCOSE SERPL-MCNC: 120 MG/DL (ref 65–140)
GLUCOSE SERPL-MCNC: 196 MG/DL (ref 65–140)
HCT VFR BLD AUTO: 31.6 % (ref 36.5–49.3)
HGB BLD-MCNC: 10.6 G/DL (ref 12–17)
MCH RBC QN AUTO: 33.5 PG (ref 26.8–34.3)
MCHC RBC AUTO-ENTMCNC: 33.5 G/DL (ref 31.4–37.4)
MCV RBC AUTO: 100 FL (ref 82–98)
P AXIS: 251 DEGREES
PLATELET # BLD AUTO: 145 THOUSANDS/UL (ref 149–390)
PMV BLD AUTO: 10.7 FL (ref 8.9–12.7)
POTASSIUM SERPL-SCNC: 4 MMOL/L (ref 3.5–5.3)
POTASSIUM SERPL-SCNC: 4.1 MMOL/L (ref 3.5–5.3)
QRS AXIS: 32 DEGREES
QRSD INTERVAL: 140 MS
QT INTERVAL: 392 MS
QTC INTERVAL: 371 MS
RBC # BLD AUTO: 3.16 MILLION/UL (ref 3.88–5.62)
SODIUM SERPL-SCNC: 134 MMOL/L (ref 135–147)
SODIUM SERPL-SCNC: 135 MMOL/L (ref 135–147)
T WAVE AXIS: 100 DEGREES
VENTRICULAR RATE: 54 BPM
WBC # BLD AUTO: 6.34 THOUSAND/UL (ref 4.31–10.16)

## 2024-03-11 PROCEDURE — 33210 INSERT ELECTRD/PM CATH SNGL: CPT | Performed by: INTERNAL MEDICINE

## 2024-03-11 PROCEDURE — C1769 GUIDE WIRE: HCPCS | Performed by: INTERNAL MEDICINE

## 2024-03-11 PROCEDURE — 99232 SBSQ HOSP IP/OBS MODERATE 35: CPT | Performed by: STUDENT IN AN ORGANIZED HEALTH CARE EDUCATION/TRAINING PROGRAM

## 2024-03-11 PROCEDURE — 99291 CRITICAL CARE FIRST HOUR: CPT | Performed by: STUDENT IN AN ORGANIZED HEALTH CARE EDUCATION/TRAINING PROGRAM

## 2024-03-11 PROCEDURE — 93455 CORONARY ART/GRFT ANGIO S&I: CPT | Performed by: INTERNAL MEDICINE

## 2024-03-11 PROCEDURE — 02HK3JZ INSERTION OF PACEMAKER LEAD INTO RIGHT VENTRICLE, PERCUTANEOUS APPROACH: ICD-10-PCS | Performed by: INTERNAL MEDICINE

## 2024-03-11 PROCEDURE — 80048 BASIC METABOLIC PNL TOTAL CA: CPT | Performed by: NURSE PRACTITIONER

## 2024-03-11 PROCEDURE — 99152 MOD SED SAME PHYS/QHP 5/>YRS: CPT | Performed by: INTERNAL MEDICINE

## 2024-03-11 PROCEDURE — 80048 BASIC METABOLIC PNL TOTAL CA: CPT | Performed by: STUDENT IN AN ORGANIZED HEALTH CARE EDUCATION/TRAINING PROGRAM

## 2024-03-11 PROCEDURE — B218YZZ FLUOROSCOPY OF LEFT INTERNAL MAMMARY BYPASS GRAFT USING OTHER CONTRAST: ICD-10-PCS | Performed by: INTERNAL MEDICINE

## 2024-03-11 PROCEDURE — 87081 CULTURE SCREEN ONLY: CPT | Performed by: NURSE PRACTITIONER

## 2024-03-11 PROCEDURE — 85730 THROMBOPLASTIN TIME PARTIAL: CPT | Performed by: STUDENT IN AN ORGANIZED HEALTH CARE EDUCATION/TRAINING PROGRAM

## 2024-03-11 PROCEDURE — C1751 CATH, INF, PER/CENT/MIDLINE: HCPCS | Performed by: INTERNAL MEDICINE

## 2024-03-11 PROCEDURE — NC001 PR NO CHARGE: Performed by: INTERNAL MEDICINE

## 2024-03-11 PROCEDURE — 93010 ELECTROCARDIOGRAM REPORT: CPT | Performed by: INTERNAL MEDICINE

## 2024-03-11 PROCEDURE — B211YZZ FLUOROSCOPY OF MULTIPLE CORONARY ARTERIES USING OTHER CONTRAST: ICD-10-PCS | Performed by: INTERNAL MEDICINE

## 2024-03-11 PROCEDURE — C1894 INTRO/SHEATH, NON-LASER: HCPCS | Performed by: INTERNAL MEDICINE

## 2024-03-11 PROCEDURE — 93005 ELECTROCARDIOGRAM TRACING: CPT

## 2024-03-11 PROCEDURE — 85027 COMPLETE CBC AUTOMATED: CPT | Performed by: STUDENT IN AN ORGANIZED HEALTH CARE EDUCATION/TRAINING PROGRAM

## 2024-03-11 PROCEDURE — 99232 SBSQ HOSP IP/OBS MODERATE 35: CPT | Performed by: INTERNAL MEDICINE

## 2024-03-11 PROCEDURE — B212YZZ FLUOROSCOPY OF SINGLE CORONARY ARTERY BYPASS GRAFT USING OTHER CONTRAST: ICD-10-PCS | Performed by: INTERNAL MEDICINE

## 2024-03-11 PROCEDURE — 99153 MOD SED SAME PHYS/QHP EA: CPT | Performed by: INTERNAL MEDICINE

## 2024-03-11 PROCEDURE — 85730 THROMBOPLASTIN TIME PARTIAL: CPT | Performed by: NURSE PRACTITIONER

## 2024-03-11 PROCEDURE — 99233 SBSQ HOSP IP/OBS HIGH 50: CPT | Performed by: INTERNAL MEDICINE

## 2024-03-11 RX ORDER — FENTANYL CITRATE 50 UG/ML
INJECTION, SOLUTION INTRAMUSCULAR; INTRAVENOUS CODE/TRAUMA/SEDATION MEDICATION
Status: DISCONTINUED | OUTPATIENT
Start: 2024-03-11 | End: 2024-03-11 | Stop reason: HOSPADM

## 2024-03-11 RX ORDER — ATROPINE SULFATE 0.1 MG/ML
INJECTION, SOLUTION ENDOTRACHEAL; INTRAMUSCULAR; INTRAVENOUS; SUBCUTANEOUS CODE/TRAUMA/SEDATION MEDICATION
Status: DISCONTINUED | OUTPATIENT
Start: 2024-03-11 | End: 2024-03-11 | Stop reason: HOSPADM

## 2024-03-11 RX ORDER — LIDOCAINE WITH 8.4% SOD BICARB 0.9%(10ML)
SYRINGE (ML) INJECTION CODE/TRAUMA/SEDATION MEDICATION
Status: DISCONTINUED | OUTPATIENT
Start: 2024-03-11 | End: 2024-03-11 | Stop reason: HOSPADM

## 2024-03-11 RX ORDER — HEPARIN SODIUM 1000 [USP'U]/ML
2000 INJECTION, SOLUTION INTRAVENOUS; SUBCUTANEOUS EVERY 6 HOURS PRN
Status: DISCONTINUED | OUTPATIENT
Start: 2024-03-11 | End: 2024-03-13

## 2024-03-11 RX ORDER — SODIUM CHLORIDE 9 MG/ML
20 INJECTION, SOLUTION INTRAVENOUS CONTINUOUS
Status: DISCONTINUED | OUTPATIENT
Start: 2024-03-11 | End: 2024-03-11

## 2024-03-11 RX ORDER — HEPARIN SODIUM 1000 [USP'U]/ML
4000 INJECTION, SOLUTION INTRAVENOUS; SUBCUTANEOUS ONCE
Status: COMPLETED | OUTPATIENT
Start: 2024-03-11 | End: 2024-03-11

## 2024-03-11 RX ORDER — MIDAZOLAM HYDROCHLORIDE 2 MG/2ML
INJECTION, SOLUTION INTRAMUSCULAR; INTRAVENOUS CODE/TRAUMA/SEDATION MEDICATION
Status: DISCONTINUED | OUTPATIENT
Start: 2024-03-11 | End: 2024-03-11 | Stop reason: HOSPADM

## 2024-03-11 RX ORDER — HEPARIN SODIUM 10000 [USP'U]/100ML
3-20 INJECTION, SOLUTION INTRAVENOUS
Status: DISCONTINUED | OUTPATIENT
Start: 2024-03-11 | End: 2024-03-13

## 2024-03-11 RX ORDER — IODIXANOL 320 MG/ML
INJECTION, SOLUTION INTRAVASCULAR CODE/TRAUMA/SEDATION MEDICATION
Status: DISCONTINUED | OUTPATIENT
Start: 2024-03-11 | End: 2024-03-11 | Stop reason: HOSPADM

## 2024-03-11 RX ORDER — HEPARIN SODIUM 1000 [USP'U]/ML
4000 INJECTION, SOLUTION INTRAVENOUS; SUBCUTANEOUS EVERY 6 HOURS PRN
Status: DISCONTINUED | OUTPATIENT
Start: 2024-03-11 | End: 2024-03-13

## 2024-03-11 RX ORDER — HEPARIN SODIUM 1000 [USP'U]/ML
INJECTION, SOLUTION INTRAVENOUS; SUBCUTANEOUS CODE/TRAUMA/SEDATION MEDICATION
Status: DISCONTINUED | OUTPATIENT
Start: 2024-03-11 | End: 2024-03-11 | Stop reason: HOSPADM

## 2024-03-11 RX ORDER — CHLORHEXIDINE GLUCONATE ORAL RINSE 1.2 MG/ML
15 SOLUTION DENTAL EVERY 12 HOURS SCHEDULED
Status: DISCONTINUED | OUTPATIENT
Start: 2024-03-11 | End: 2024-03-12

## 2024-03-11 RX ORDER — FUROSEMIDE 10 MG/ML
INJECTION INTRAMUSCULAR; INTRAVENOUS CODE/TRAUMA/SEDATION MEDICATION
Status: DISCONTINUED | OUTPATIENT
Start: 2024-03-11 | End: 2024-03-11 | Stop reason: HOSPADM

## 2024-03-11 RX ORDER — SODIUM CHLORIDE 9 MG/ML
75 INJECTION, SOLUTION INTRAVENOUS CONTINUOUS
Status: DISCONTINUED | OUTPATIENT
Start: 2024-03-11 | End: 2024-03-12

## 2024-03-11 RX ADMIN — OMEGA-3 FATTY ACIDS CAP 1000 MG 2000 MG: 1000 CAP at 16:25

## 2024-03-11 RX ADMIN — PRAVASTATIN SODIUM 40 MG: 40 TABLET ORAL at 16:19

## 2024-03-11 RX ADMIN — CLOPIDOGREL 75 MG: 75 TABLET ORAL at 08:00

## 2024-03-11 RX ADMIN — SODIUM CHLORIDE 75 ML/HR: 0.9 INJECTION, SOLUTION INTRAVENOUS at 10:16

## 2024-03-11 RX ADMIN — HEPARIN SODIUM 11.1 UNITS/KG/HR: 10000 INJECTION, SOLUTION INTRAVENOUS at 10:06

## 2024-03-11 RX ADMIN — HEPARIN SODIUM 4000 UNITS: 1000 INJECTION INTRAVENOUS; SUBCUTANEOUS at 10:38

## 2024-03-11 RX ADMIN — FINASTERIDE 5 MG: 5 TABLET, FILM COATED ORAL at 08:00

## 2024-03-11 RX ADMIN — EZETIMIBE 10 MG: 10 TABLET ORAL at 16:19

## 2024-03-11 RX ADMIN — CHLORHEXIDINE GLUCONATE 15 ML: 1.2 SOLUTION ORAL at 10:23

## 2024-03-11 RX ADMIN — ACETAMINOPHEN 650 MG: 325 TABLET ORAL at 11:21

## 2024-03-11 RX ADMIN — ASPIRIN 81 MG 81 MG: 81 TABLET ORAL at 08:00

## 2024-03-11 RX ADMIN — SODIUM CHLORIDE 75 ML/HR: 0.9 INJECTION, SOLUTION INTRAVENOUS at 23:20

## 2024-03-11 RX ADMIN — CHLORHEXIDINE GLUCONATE 15 ML: 1.2 SOLUTION ORAL at 21:57

## 2024-03-11 RX ADMIN — DOCUSATE SODIUM 100 MG: 100 CAPSULE, LIQUID FILLED ORAL at 08:00

## 2024-03-11 RX ADMIN — NITROGLYCERIN 0.1 MG: 0.1 PATCH TRANSDERMAL at 08:00

## 2024-03-11 NOTE — PROGRESS NOTES
Progress Note - Cardiology   [facility]     Vladimir Mathias 89 y.o. male MRN: [id number]  : 12/3/1932  Unit/Bed#: ICU 01 Encounter: [id number]    Assessment and Plan:   1. MI type I:  hs trop: 1276 (0hr), 1601 (2hr), 1808 (4hr)    -   hs trops random:  3126, 5951,11,193, >22973 x2    -   patient initially hesitant for IV anticoagulation secondary to history of epitaxsis, but was agreeable after long conversation and IV heparin was initiated on 3/6/2024 at approximately 1 PM. Completed heparin 3/9/2024    -   3/6/2024 TTE: EF visibly estimated at 55% with disconnect except him but otherwise abnormal wall motion. IVS is consistent with postoperative status, there is systolic flattening consistent with RV pressure overload. RV cavity is moderately dilated with moderate to severe reduction in systolic function. Left atrium is mildly dilated, right atrium severely dilated and mild mitral and tricuspid valve regurgitation. Bio prosthetic valve in the aortic position appears to be well seated with no evidence of para valvular regurgitation and gradient within expected range.    -   3/6/2024 VQ Scan: low probability of PE    -   Continue Toprol-XL 25 mg once a day    -   continue Vytorin 10/40 mg daily    -   continue aspirin 81 mg once a day    -   continue nitroglycerin patch 0.1 mg per hour on in the a.m. often the p.m.    -   3/8/2024 Lexiscan nuclear stress test: partially reversible inferior defect noted. Patient with known native RCA disease which was not bypass.    -   Patient loaded with Plavix 300 mg times one and will start Plavix 75 mg daily    -   3/11/2024 LHC: Lima to LAD was patent, SVG to circumflex was also noted be patent. Native LAD had a 100% mid occlusion. Patient noted to have a clot sitting in the sinus of the right coronary artery which has disrupted the flow to the RCA    2. AV heart block: patient experience intermittent AV block during right coronary artery injection and  after.    -   Temporary trans venous pacemaker placed via right groin venous sheath    -   heart rate 70 with MA 2.5 ms     3. Coronary artery disease with history of CABG: a history of CABG times two in 2009 (Lima to LAD and saphenous vein graft to marginal)    -   follows with Dr. Steven Walton at [facility] in [place],  [phone].    -   12/15/2021 cardiac CTA: noted both bypass grafts were patent with chronic total occlusion of his LAD and diffuse plaque in both left circumflex and RCA.    -   Continue Toprol-XL 25 mg once a day    -   continue Vytorin 10/40 mg daily    -   continue aspirin 81 mg once a day    -   continue nitroglycerin patch 0.1 mg per hour on in the a.m. off in the p.m.    -   care as per #1     4. TUAN on chronic kidney disease: review records from [facility], [place], appears his baseline creatinine is 1.4 - 1.7    -   follows with nephrologist at [place]    -   seen by Dr. Sheppard nephrology and consultation appreciated    -   creatinine on presentation was 2.65, slowly improving with creatinine of 1.78 today    -   IV fluids discontinued 3/8/2024 by nephrology    -   patient with nausea and vomiting since 5 AM 3/9/2024. Appears to be slightly volume overloaded. Discussed with nephrology, will give Lasix 20 mg IV times one and continue to monitor    -   3/10/2024 creatinine 1.98. Nephrology note reviewed and patient to start on isolyte in the a.m. at 100 mL per hour one hour prior to cardiac catheterization. Orders placed in chart and discussed with nurse.     5. Hypertension: blood pressure stable    -   continue Toprol and nitroglycerin with close monitoring     6. Paroxysmal atrial fibrillation flutter: patient has watchmen device    -   telemetry reviewed on initial admission, patient appears to be changing between Mobitz II type 1 and Mobitz II Type 2    -   3/8/2024 telemetry reviewed and patient appears to be sinus rhythm with first-degree AV heart block.    -    "Continue to monitor telemetry     7. Aortic stenosis status post TAVR: TAVR performed in 3/31/2021 at [facility]    -   3/6/2024 TTE: Bio prosthetic valve in the aortic position appears to be well seated with no evidence of para valvular regurgitation and gradient within expected range.     8. History of epitaxis with anticoagulation (Xarelto): patient has watchmen device       Subjective / Objective:       Vitals: Blood pressure 143/70, pulse 87, temperature 97.5 °F (36.4 °C), temperature source Temporal, resp. rate 17, height 6' 1.5\" (1.867 m), weight 98.2 kg (216 lb 7.9 oz), SpO2 99%.  Vitals:    03/11/24 0600 03/11/24 0945   Weight: 97.1 kg (214 lb) 98.2 kg (216 lb 7.9 oz)     Body mass index is 28.18 kg/m².  BP Readings from Last 3 Encounters:   03/11/24 143/70   07/12/22 118/72   07/05/22 114/72     Orthostatic Blood Pressures      Flowsheet Row Most Recent Value   Blood Pressure 143/70 filed at 03/11/2024 0945   Patient Position - Orthostatic VS Lying filed at 03/11/2024 0945          I/O         03/09 0701  03/10 0700 03/10 0701  03/11 0700 03/11 0701  03/12 0700    P.O. 600      Total Intake(mL/kg) 600 (6.3)      Urine (mL/kg/hr) 1150 (0.5) 700 (0.3) 260 (0.6)    Total Output 1150 700 260    Net -550 -700 -260                 Invasive Devices       Peripheral Intravenous Line  Duration             Peripheral IV 03/05/24 Left Forearm 5 days    Peripheral IV 03/11/24 Proximal;Right;Ventral (anterior) Forearm <1 day              Line  Duration             Arterial Sheath 5 Fr. Right Femoral <1 day    Venous Sheath 6 Fr. Right Femoral <1 day                      Intake/Output Summary (Last 24 hours) at 3/11/2024 1119  Last data filed at 3/11/2024 1016  Gross per 24 hour   Intake --   Output 960 ml   Net -960 ml         Physical Exam:   Physical Exam  Vitals and nursing note reviewed.   Constitutional:       General: He is not in acute distress.     Appearance: Normal appearance. He is normal weight.   HENT: "      Right Ear: External ear normal.      Left Ear: External ear normal.   Eyes:      General: No scleral icterus.        Right eye: No discharge.         Left eye: No discharge.   Cardiovascular:      Rate and Rhythm: Normal rate. Rhythm irregular.      Heart sounds: Murmur heard.   Pulmonary:      Effort: Pulmonary effort is normal.      Breath sounds: Normal breath sounds.   Abdominal:      General: Bowel sounds are normal. There is no distension.      Palpations: Abdomen is soft.   Musculoskeletal:      Right lower leg: No edema.      Left lower leg: No edema.   Skin:     General: Skin is warm and dry.      Capillary Refill: Capillary refill takes less than 2 seconds.   Neurological:      General: No focal deficit present.      Mental Status: He is alert and oriented to person, place, and time. Mental status is at baseline.   Psychiatric:         Mood and Affect: Mood normal.              Medications/ Allergies:     Current Facility-Administered Medications   Medication Dose Route Frequency Provider Last Rate    acetaminophen  650 mg Oral Q4H PRN JESSICA Maher      aluminum-magnesium hydroxide-simethicone  30 mL Oral Q4H PRN JESSICA Maher      aspirin  81 mg Oral Daily JESSICA Maher      chlorhexidine  15 mL Mouth/Throat Q12H [facility] JESSICA Maher      clopidogrel  75 mg Oral Daily JESSICA Maher      docusate sodium  100 mg Oral Daily JESSICA Maher      ezetimibe  10 mg Oral Daily With Dinner JESSICA Maher      And    pravastatin  40 mg Oral Daily With Dinner JESSICA Maher      finasteride  5 mg Oral Daily JESSICA Maher      fish oil  2,000 mg Oral Daily JESSICA Maher      heparin (porcine)  3-20 Units/kg/hr (Order-Specific) Intravenous Titrated JESSICA Maher 11.1 Units/kg/hr (03/11/24 1006)    heparin (porcine)  2,000 Units Intravenous Q6H PRN JESSICA Maher      heparin (porcine)  4,000 Units Intravenous Q6H PRN JESSICA Maher       metoclopramide  10 mg Intravenous Q6H PRN Jayshree Salvadortler, CRNP      nitroglycerin  0.1 mg Transdermal Daily Jayshree Salvadortler, CRNP      pantoprazole  40 mg Oral Early Morning Jayshree Earl, CRNP      polyethylene glycol  17 g Oral Daily PRN Jayshree Earl, CRNP      sodium chloride  75 mL/hr Intravenous Continuous Jayshree Earl, CRNP 75 mL/hr (03/11/24 1016)     acetaminophen, 650 mg, Q4H PRN  aluminum-magnesium hydroxide-simethicone, 30 mL, Q4H PRN  heparin (porcine), 2,000 Units, Q6H PRN  heparin (porcine), 4,000 Units, Q6H PRN  metoclopramide, 10 mg, Q6H PRN  polyethylene glycol, 17 g, Daily PRN      No Known Allergies    VTE Pharmacologic Prophylaxis:   Sequential compression device (Venodyne)     Labs:   Troponins:  Results from last 7 days   Lab Units 03/07/24  0604 03/06/24  1812 03/06/24  1231 03/06/24  0540 03/05/24  2239 03/05/24  2023   HS TNI RAND ng/L >22,973* >22,973* 11,193*   < >  --   --    HSTNI D2 ng/L  --   --   --   --   --  325*   HSTNI D4 ng/L  --   --   --   --  532*  --     < > = values in this interval not displayed.     CBC with diff:  Results from last 7 days   Lab Units 03/11/24  0448 03/10/24  0615 03/09/24  0457 03/08/24  0607 03/07/24  0604 03/06/24  0540 03/05/24  1830   WBC Thousand/uL 6.34 6.57 7.35 8.26 7.10 7.92 6.51   HEMOGLOBIN g/dL 10.6* 10.7* 10.5* 10.7* 10.7* 11.5* 11.5*   HEMATOCRIT % 31.6* 31.4* 31.1* 32.8* 31.4* 35.0* 34.6*   MCV fL 100* 100* 100* 100* 99* 100* 101*   PLATELETS Thousands/uL 145* 131* 137* 131* 127* 167 148*   RBC Million/uL 3.16* 3.13* 3.12* 3.28* 3.17* 3.50* 3.44*   MCH pg 33.5 34.2 33.7 32.6 33.8 32.9 33.4   MCHC g/dL 33.5 34.1 33.8 32.6 34.1 32.9 33.2   RDW % 14.4 14.6 14.6 14.7 14.9 14.7 14.6   MPV fL 10.7 10.4 10.7 10.9 11.0 11.1 11.0   NRBC AUTO /100 WBCs  --   --   --   --   --   --  0     CMP:  Results from last 7 days   Lab Units 03/11/24  0448 03/10/24  0615 03/09/24  0457 03/08/24  0607 03/07/24  0604 03/06/24  0540 03/05/24  2239 03/05/24  1833    SODIUM mmol/L 135 136 135 134* 133* 135 135 134*   POTASSIUM mmol/L 4.0 4.0 3.9 4.0 4.2 5.3 4.9 5.4*   CHLORIDE mmol/L 103 102 102 101 101 107 106 104   CO2 mmol/L 27 27 23 23 23 17* 20* 22   ANION GAP mmol/L 5 7 10 10 9 11 9 8   BUN mg/dL 42* 46* 50* 54* 58* 49* 45* 41*   CREATININE mg/dL 1.93* 1.98* 1.78* 2.07* 2.40* 2.65* 2.24* 2.01*   CALCIUM mg/dL 8.8 8.8 8.6 8.1* 8.0* 8.4 9.0 9.1   AST U/L  --   --   --   --   --   --  210* 200*   ALT U/L  --   --   --   --   --   --  198* 167*   ALK PHOS U/L  --   --   --   --   --   --  76 77   TOTAL PROTEIN g/dL  --   --   --   --   --   --  6.8 7.0   ALBUMIN g/dL  --   --  3.6 3.5 3.6  --  3.9 4.1   TOTAL BILIRUBIN mg/dL  --   --   --   --   --   --  0.45 0.48   EGFR ml/min/1.73sq m 29 28 32 27 22 20 24 28     Magnesium:  Results from last 7 days   Lab Units 03/09/24  0457 03/08/24  0607 03/07/24  0604 03/06/24  0540   MAGNESIUM mg/dL 2.2 2.2 2.1 2.2     Coags:  Results from last 7 days   Lab Units 03/11/24  1002 03/10/24  0615 03/09/24  0457 03/08/24  0607 03/07/24  0604 03/07/24  0003 03/06/24  1812   PTT seconds >210* 82* 73* 74* 60* 74* 78*     Imaging & Testing   I have personally reviewed pertinent reports.    NM Myocardial Perfusion Spect (Pharmacological Induced Stress and/or Rest)    Result Date: 3/9/2024  Narrative:   Stress ECG: Right bundle branch block was seen. The stress ECG is equivocal for ischemia after pharmacologic vasodilation.   Perfusion: There is a left ventricular perfusion defect present in the mid to basal inferior and inferoseptal location(s) that is partially reversible.   Stress Combined Conclusion: Left ventricular perfusion is abnormal.   Stress Function: Left ventricular function post-stress is normal. Stress ejection fraction is 65%. Abnormal study, Perfusion: There is a left ventricular perfusion defect present in the mid to basal inferior and inferoseptal location(s) that is partially reversible. SSS 7 SRS 5 SDS 2     Stress  strip    Result Date: 3/8/2024  Narrative: Confirmed by MAISHA FREEMAN ([id number]),  Marilu Rothman ([id number]) on 3/8/2024 10:37:07 AM    US kidney and bladder    Result Date: 3/6/2024  Narrative: RENAL ULTRASOUND INDICATION: Renal failure. COMPARISON: None TECHNIQUE: Ultrasound of the retroperitoneum was performed with a curvilinear transducer utilizing volumetric sweeps and still imaging techniques. FINDINGS: KIDNEYS: Symmetric and normal size. Right kidney: 11.0 x 5.0 x 5.2 cm. Volume 149.8 mL Left kidney: 10.8 x 5.4 x 4.5 cm. Volume 136.3 mL Right kidney Normal echogenicity and contour. No mass is identified. No hydronephrosis. No shadowing calculi. Trace perinephric simple free fluid. Left kidney Normal echogenicity and contour. No mass is identified. No hydronephrosis. No shadowing calculi. No perinephric fluid collections. URETERS: Nonvisualized. BLADDER: Normally distended. No focal thickening or mass lesions. The right ureteral jet is seen. The left ureteral jet is not visualized.     Impression: Normal. Workstation performed: [id number]     NM lung ventilation / perfusion    Result Date: 3/6/2024  Narrative: VENTILATION AND PERFUSION SCAN INDICATION: RV enlargement on Echo - new,  Question PE COMPARISON:  Chest radiograph 3/5/2023 TECHNIQUE:  Posterior ventilation imaging was performed after the inhalation of 30.1 mCi nebulized Tc-99m DTPA with deposition of less than 1 mCi of activity within the lungs. Multiplanar perfusion imaging was next performed following the intravenous administration of 4.35 mCi Tc-99m labeled MAA. FINDINGS: Ventilation imaging demonstrates partial deposition of radiopharmaceutical activity within the central bronchi. Perfusion imaging demonstrates a matching subsegmental defect in the posterior aspect of the right lower lobe. There are no suspicious ventilation/perfusion mismatches.     Impression: The probability for pulmonary embolus is low. Workstation performed: [id number]      Echo complete    Result Date: 3/6/2024  Narrative:   Left Ventricle: Left ventricular cavity size is lower normal Wall thickness is moderately increased. The left ventricular ejection fraction is 55%. Systolic function is normal. Dyskinetic septum otherwise normal wall motion Unable to assess diastolic function due to atrial flutter.   IVS: There is abnormal septal motion consistent with post-operative status. There is systolic flattening of the interventricular septum consistent with right ventricle pressure overload.   Right Ventricle: Right ventricular cavity size is moderately dilated. Systolic function is moderately to severely reduced.   Left Atrium: The atrium is mildly dilated.   Right Atrium: The atrium is severely dilated.   Aortic Valve: There is a TAVR bioprosthetic valve. The prosthetic valve appears well-seated. There is no evidence of paravalvular regurgitation. The gradient recorded across the prosthetic aortic valve is within the expected range. The aortic valve peak velocity is 1.57 m/s. The aortic valve mean gradient is 6 mmHg.   Mitral Valve: There is mild regurgitation.   Tricuspid Valve: There is mild regurgitation. Abnormal study, compared to prior echo report increased RV size and decreased systolic function. Elevated pulmonary pressures are suggested by LV systolic flattening.     XR chest 1 view portable    Result Date: 3/6/2024  Narrative: XR CHEST PORTABLE INDICATION: chest pain. COMPARISON: None FINDINGS: No airspace consolidation, pneumothorax, pulmonary edema, or pleural effusion. Trace left apical scarring.. Prior CABG. Mild cardiomegaly allowing for portable AP technique. Aortic calcification is present. Mild degenerative changes of bilateral shoulders. Normal upper abdomen.     Impression: No radiographic evidence of acute intrathoracic process. Workstation performed: [id number]     XR chest 1 view    Result Date: 3/3/2024  Narrative: CHEST X-RAY  SINGLE VIEW: HISTORY: chest  "pain;   PRIORS: Chest radiograph on October 8, 2010. FINDINGS: LUNGS: Clear.  No pleural abnormality seen. HEART: Mild cardiomegaly. Sternotomy. MEDIASTINUM: Normal. OTHER FINDINGS:  None.        Impression:  No acute pulmonary abnormality.       EKG / Monitor: Personally reviewed.    Intermittently paced      Roula LOPEZ  Cardiology      \"This note was completed in part utilizing [facility] direct voice recognition software.   Grammatical errors, random word insertion, spelling mistakes, and incomplete sentences may be an occasional consequence of the system secondary to software limitations, ambient noise and hardware issues.    Please read the chart carefully and recognize, using context, where substitutions have occurred.  If you have any questions or concerns about the context, text or information contained within the body of this dictation, please contact myself, the provider, for further clarification.\"  "

## 2024-03-11 NOTE — WOUND OSTOMY CARE
Progress Note - Wound   Vladimir Mathias 89 y.o. male MRN: [id number]  Unit/Bed#: ICU 01 Encounter: [id number]      Assessment:   This is a 89 year old male patient admitted on 3/5/24 with NSTEMI. Consult placed for stage 1 pressure injury to left buttock. Patient received in ICU bed AAO x 3 and agreeable to have wound visit done.    Assessment Findings:  1-Patient noted to have thickened intact area to left sacrobuttock with blanchable erythema and scar tissue. The patient stated that he had a skin condition for a number of years in the past and it was treated by a dermatologist with Acetonide (Triamcinolone). He cannot recall what the skin condition was or if he had a procedure done but it is healed. Orders in place for skin care and for prevention.  2-Bilateral heels intact with blanchable erythema - orders in place for skin care and for prevention.    Plan:   Skin care plans:  1-Hydraguard to bilateral sacrum, buttock and heels BID and PRN  2-Float heels on 2 pillows to offload pressure so heels are not in contact with mattress or pillows.  3-Ehob pressure redistribution cushion in chair when out of bed. Avoid prolonged sitting.  4-Moisturize skin daily with skin nourishing cream.  5-Turn/reposition q2h or when medically stable for pressure re-distribution on skin.     Wound 03/11/24 Other (comment) Buttocks Left (Active)   Wound Image   03/11/24 1519   Wound Description Intact, thickened area with scar tissue (this is not a wound) 03/11/24 1519   Drainage Amount None 03/11/24 1519   Treatments Cleansed 03/11/24 1519   Dressing Protective barrier 03/11/24 1519   Dressing Status Intact 03/11/24 1519     Discussed assessment findings, and plan of care/recommendations with Zahra WEINSTEIN.    Wound care signing off - please call or tiger text with questions and concerns.    Recommendations written as orders.  Christy Gonzalez MSN, RN, CWON

## 2024-03-11 NOTE — DISCHARGE INSTRUCTIONS
Cardiac Catheterization     WHAT YOU NEED TO KNOW:   A cardiac catheterization is a procedure to look at your heart and its blood vessels. Healthcare providers can measure oxygen levels and pressures in your heart. They can also fix problems with your valves, blood vessels, or the walls of your heart. You may need this procedure if you have chest pain, heart disease, or your heart is not working properly.         DISCHARGE INSTRUCTIONS:     No driving for 24 hours, because you were sedated.    Sedation: Avoid making any legal/major decisions today, as the sedation may inhibit your decision making. Also avoid alcohol today, as the sedation may heighten the effects of the alcohol.    Apply firm, steady pressure directly over the wound if bleeding occurs. A small amount of bleeding from your wound is possible. Apply pressure with a clean gauze or towel for 5 to 10 minutes. Call 911 if bleeding becomes heavy or does not stop.  Do not attempt to drive yourself to the hospital.    Bathing: You will be able to shower the day after your procedure. Remove your bandage before you shower. Carefully wash the wound with soap and water. Pat the area dry and apply a clean band-aid. Do not take baths or go in hot tubs or pools for about one week.     Care for your wound as directed: Keep your dressing clean and dry. Monitor your wound everyday for signs of infection such as redness, swelling, or pus. Mild bruising is normal and expected. Do not put powders, lotions, or creams on your wound for about one week.    Do not lift anything heavier than 5 pounds for about 5 days. Heavy lifting can put stress on your wound and cause bleeding. If an artery in your wrist was used, do not push or pull with the arm that was used for the procedure - pretend like your wrist is broken.      Do not do vigorous activity for at least 48 hours. Vigorous activity may cause bleeding from your wound. Rest and do quiet activities. Short walks to the  bathroom and around the house are okay. Ask your healthcare provider when you can return to your normal activities.        Drink liquids to flush the contrast liquid from your body and prevent blood clots. Ask how much liquid to drink each day and which liquids are best for you.          Call 911 for any of the following:   You have any of the following signs of a heart attack:    Squeezing, pressure, or pain in your chest that lasts longer than 5 minutes or returns   Discomfort or pain in your back, neck, jaw, stomach, or arm    Trouble breathing   Nausea or vomiting   Lightheadedness or a sudden cold sweat, especially with chest pain or trouble breathing     You have any of the following signs of a stroke:    Numbness or drooping on one side of your face    Weakness in an arm or leg   Confusion or difficulty speaking   Dizziness, a severe headache, or vision loss     You feel lightheaded, short of breath, and have chest pain.    You cough up blood.    You have trouble breathing.   You cannot stop the bleeding from your wound even after you hold firm pressure for 10 minutes.    Seek care immediately if:   Blood soaks through your bandage.    Your stitches come apart.    Your arm or leg feels numb, cool, or looks pale.    Your wound gets swollen quickly.    Contact your healthcare provider if:   You have a fever or chills.   Your wound is red, swollen, or draining pus.   Your wound looks more bruised or there is new bruising on the side of your leg or arm.    You have nausea or are vomiting.   Your skin is itchy, swollen, or you have a rash.   You have questions or concerns about your condition or care.

## 2024-03-11 NOTE — PLAN OF CARE
Problem: PAIN - ADULT  Goal: Verbalizes/displays adequate comfort level or baseline comfort level  Description: Interventions:  - Encourage patient to monitor pain and request assistance  - Assess pain using appropriate pain scale  - Administer analgesics based on type and severity of pain and evaluate response  - Implement non-pharmacological measures as appropriate and evaluate response  - Consider cultural and social influences on pain and pain management  - Notify physician/advanced practitioner if interventions unsuccessful or patient reports new pain  Outcome: Progressing     Problem: INFECTION - ADULT  Goal: Absence or prevention of progression during hospitalization  Description: INTERVENTIONS:  - Assess and monitor for signs and symptoms of infection  - Monitor lab/diagnostic results  - Monitor all insertion sites, i.e. indwelling lines, tubes, and drains  - Monitor endotracheal if appropriate and nasal secretions for changes in amount and color  - [place] appropriate cooling/warming therapies per order  - Administer medications as ordered  - Instruct and encourage patient and family to use good hand hygiene technique  - Identify and instruct in appropriate isolation precautions for identified infection/condition  Outcome: Progressing  Goal: Absence of fever/infection during neutropenic period  Description: INTERVENTIONS:  - Monitor WBC    Outcome: Progressing     Problem: SAFETY ADULT  Goal: Patient will remain free of falls  Description: INTERVENTIONS:  - Educate patient/family on patient safety including physical limitations  - Instruct patient to call for assistance with activity   - Consult OT/PT to assist with strengthening/mobility   - Keep Call bell within reach  - Keep bed low and locked with side rails adjusted as appropriate  - Keep care items and personal belongings within reach  - Initiate and maintain comfort rounds  - Make Fall Risk Sign visible to staff  - Offer Toileting every 2 Hours,  in advance of need  - Initiate/Maintain bed alarm  - Obtain necessary fall risk management equipment: bracelet/socks   - Apply yellow socks and bracelet for high fall risk patients  - Consider moving patient to room near nurses station  Outcome: Progressing  Goal: Maintain or return to baseline ADL function  Description: INTERVENTIONS:  -  Assess patient's ability to carry out ADLs; assess patient's baseline for ADL function and identify physical deficits which impact ability to perform ADLs (bathing, care of mouth/teeth, toileting, grooming, dressing, etc.)  - Assess/evaluate cause of self-care deficits   - Assess range of motion  - Assess patient's mobility; develop plan if impaired  - Assess patient's need for assistive devices and provide as appropriate  - Encourage maximum independence but intervene and supervise when necessary  - Involve family in performance of ADLs  - Assess for home care needs following discharge   - Consider OT consult to assist with ADL evaluation and planning for discharge  - Provide patient education as appropriate  Outcome: Progressing  Goal: Maintains/Returns to pre admission functional level  Description: INTERVENTIONS:  - Perform AM-PAC 6 Click Basic Mobility/ Daily Activity assessment daily.  - Set and communicate daily mobility goal to care team and patient/family/caregiver.   - Collaborate with rehabilitation services on mobility goals if consulted  - Perform Range of Motion 12 times a day.  - Reposition patient every 2 hours.  - Dangle patient 3 times a day  - Stand patient 3 times a day  - Ambulate patient 3 times a day  - Out of bed to chair 3 times a day   - Out of bed for meals 3 times a day  - Out of bed for toileting  - Record patient progress and toleration of activity level   Outcome: Progressing     Problem: DISCHARGE PLANNING  Goal: Discharge to home or other facility with appropriate resources  Description: INTERVENTIONS:  - Identify barriers to discharge w/patient  and caregiver  - Arrange for needed discharge resources and transportation as appropriate  - Identify discharge learning needs (meds, wound care, etc.)  - Arrange for interpretive services to assist at discharge as needed  - Refer to Case Management Department for coordinating discharge planning if the patient needs post-hospital services based on physician/advanced practitioner order or complex needs related to functional status, cognitive ability, or social support system  Outcome: Progressing     Problem: Knowledge Deficit  Goal: Patient/family/caregiver demonstrates understanding of disease process, treatment plan, medications, and discharge instructions  Description: Complete learning assessment and assess knowledge base.  Interventions:  - Provide teaching at level of understanding  - Provide teaching via preferred learning methods  Outcome: Progressing     Problem: Prexisting or High Potential for Compromised Skin Integrity  Goal: Skin integrity is maintained or improved  Description: INTERVENTIONS:  - Identify patients at risk for skin breakdown  - Assess and monitor skin integrity  - Assess and monitor nutrition and hydration status  - Monitor labs   - Assess for incontinence   - Turn and reposition patient  - Assist with mobility/ambulation  - Relieve pressure over bony prominences  - Avoid friction and shearing  - Provide appropriate hygiene as needed including keeping skin clean and dry  - Evaluate need for skin moisturizer/barrier cream  - Collaborate with interdisciplinary team   - Patient/family teaching  - Consider wound care consult   Outcome: Progressing

## 2024-03-11 NOTE — ASSESSMENT & PLAN NOTE
Home regimen: benicar, metoprolol     Plan:   Benicar held on admission in favor of Ntg patch   Metoprolol now held 2/2 HB3   Goal SBP <160

## 2024-03-11 NOTE — PLAN OF CARE
Plan of care cont.      Problem: PAIN - ADULT  Goal: Verbalizes/displays adequate comfort level or baseline comfort level  Description: Interventions:  - Encourage patient to monitor pain and request assistance  - Assess pain using appropriate pain scale  - Administer analgesics based on type and severity of pain and evaluate response  - Implement non-pharmacological measures as appropriate and evaluate response  - Consider cultural and social influences on pain and pain management  - Notify physician/advanced practitioner if interventions unsuccessful or patient reports new pain  Outcome: Progressing     Problem: INFECTION - ADULT  Goal: Absence or prevention of progression during hospitalization  Description: INTERVENTIONS:  - Assess and monitor for signs and symptoms of infection  - Monitor lab/diagnostic results  - Monitor all insertion sites, i.e. indwelling lines, tubes, and drains  - Monitor endotracheal if appropriate and nasal secretions for changes in amount and color  - [place] appropriate cooling/warming therapies per order  - Administer medications as ordered  - Instruct and encourage patient and family to use good hand hygiene technique  - Identify and instruct in appropriate isolation precautions for identified infection/condition  Outcome: Progressing  Goal: Absence of fever/infection during neutropenic period  Description: INTERVENTIONS:  - Monitor WBC    Outcome: Progressing     Problem: DISCHARGE PLANNING  Goal: Discharge to home or other facility with appropriate resources  Description: INTERVENTIONS:  - Identify barriers to discharge w/patient and caregiver  - Arrange for needed discharge resources and transportation as appropriate  - Identify discharge learning needs (meds, wound care, etc.)  - Arrange for interpretive services to assist at discharge as needed  - Refer to Case Management Department for coordinating discharge planning if the patient needs post-hospital services based on  physician/advanced practitioner order or complex needs related to functional status, cognitive ability, or social support system  Outcome: Progressing     Problem: Knowledge Deficit  Goal: Patient/family/caregiver demonstrates understanding of disease process, treatment plan, medications, and discharge instructions  Description: Complete learning assessment and assess knowledge base.  Interventions:  - Provide teaching at level of understanding  - Provide teaching via preferred learning methods  Outcome: Progressing   Patient received from cath lab approximately at 0945. Pt transfer to bed using  transfer board with assists x2. Pt. awake resting calmly in bed. VSS. No distress noted. TVP at a rate of 70, 2.5 ma, VVI noted. Arterial and venous sheaths in the Right fem. with transparent dressing CDI. As per Dr. Rehman, arterial pressure monitoring initiated & connected to the arterial sheath, flushes ok with good blood return.  NSS infusing without difficulty at 75 ml/hr in the venous access R fem.  No redness or swelling noted in the site.  Neurovascular assessment wnl. Dual skin assessment performed with Nurse Radha. Blanchable rednessand non-blanchable in some areas, bruising in the UEs  noted  Pt. denies any pain or needs. Patient and son Instructed to call for help if needed. Call bell within reach.

## 2024-03-11 NOTE — ASSESSMENT & PLAN NOTE
3/5 Substernal CP at approx 1530 lasting 30 min, admitted to [facility] and placed on Heparin gtt   Recent admission at [place] for same, was discharged with CP 2/2 AF   3/6 echo -- EF 55%, dyskinetic septum, severe RA dilation, severely reduces RV function, bioprosthetic AV, mild MVR and TVR  3/6 VQ scan --low probability for pulm embolus   3/8 Stress test -- left ventricular perfusion defect present in the mid to basal inferior and inferoseptal location(s) that is partially reversible  3/11 Cardiac cath -- patent LIMA to LAD patent vein graft to circumflex and LAD is mid 100% occluded. Patient also noted to have clot sitting in sinus of right coronary artery which disrupted the flow to right coronary artery which was not amendable to intervention     Plan:   Heparin ACS per protocol   Continuous telemetry   Cont plavix, nitro patch  Hold metoprolol 2/2 HB3  3/11 R femoral art line placed in cath lab -- plan to transduce overnight and likely d/c tomorrow   Cardiac diet

## 2024-03-11 NOTE — PROGRESS NOTES
"Formerly [facility], [facility]  Progress Note  Name: Vladimir Mathias I  MRN: [id number]  Unit/Bed#: 4 [place] 414-01 I Date of Admission: 3/5/2024   Date of Service: 3/11/2024 I Hospital Day: 6    Assessment/Plan   * NSTEMI (non-ST elevated myocardial infarction) (HCC)  Assessment & Plan  Patient with chest pain and dyspnea exertion x 4 days.  Recent admission at [facility] on March 3 and discharged same day.  At that time noticed that his chest pain was secondary to atrial flutter.  His troponins at that time were 196, 234, 346     Latest Reference Range & Units 03/05/24 18:30 03/05/24 20:23 03/05/24 22:39 03/06/24 05:40 03/06/24 08:12 03/06/24 12:31   hs TnI 0hr \"Refer to ACS Flowchart\"- see link ng/L 1,276 (H)        hs TnI 2hr \"Refer to ACS Flowchart\"- see link ng/L  1,601 (H)       Delta 2hr hsTnI <20 ng/L  325 (H)       hs TnI 4hr \"Refer to ACS Flowchart\"- see link ng/L   1,808 (H)      Delta 4hr hsTnI <20 ng/L   532 (H)      HS TnI random 8 - 18 ng/L    3,126 (H) 5,951 (H) 11,193 (H)   BNP 0 - 100 pg/mL 282 (H)        (H): Data is abnormally high    Patient received 324 mg of aspirin via EMS  Consult cardiology  TTE 3/6/24: EF 50% There is systolic flattening of the interventricular septum consistent with right ventricle pressure overload, There is systolic flattening of the interventricular septum consistent with right ventricle pressure overload. RA severely dilated, RV mildly dilated,     3/6/2024 VQ Scan: low probability of PE    -   Continue Toprol-XL 25 mg once a day    -   continue Vytorin 10/40 mg daily    -   continue aspirin 81 mg once a day    -   continue nitroglycerin patch 0.1 mg per hour on in the a.m. often the p.m.  3/8/2024 Lexiscan nuclear stress test: partially reversible inferior defect noted. Patient with known native RCA disease which was not bypass.    -   Patient loaded with Plavix 300 mg times one and will start Plavix 75 mg daily  Heparin gtt stopped evening of " 3/9  Continue fluids per nephro recommendations  Plan for cardiac cath today      TUAN (acute kidney injury) (HCC)-resolved as of 3/10/2024  Assessment & Plan  Admission creatinine 2.01.   Baseline Creatinine: 1.5-1.7    Trended up to 2.65 on hospital day 1  Nephrology consulted  Placed on  sodium bicarb gtt @75 ml/hr for 1 day then isolyte for 1 day with Cr improvement down to baseline. 1.7  S/P Lasix 20 mg IV x 1 on 3/9 with slight Cr bump to 1.9      Atrial flutter (HCC)  Assessment & Plan  Continue with Lopressor.    Status post watchman flex (24 mm) in 2023    On his hospitalization at [facility] March 3, Xeralto discontinued due to epistaxis  Heparin gtt finished on 3/9/24    Nonrheumatic aortic valve stenosis  Assessment & Plan  Status post [employer] evolute valve in 2022    S/P CABG (coronary artery bypass graft)  Assessment & Plan  CAD status post CABG 2009. Lima to LAD and saphenous vein graft to circumflex marginal, Cardiac CTA 12/15/2021 revealed patent LIMA to LAD, patent vein graft to circumflex obtuse marginal #1 an extensive plaque in the native RCA,     Continue aspirin.  Continue Vytorin    Essential hypertension  Assessment & Plan  Continue Lopressor, ACE inhibitor discontinued to TUAN     CKD (chronic kidney disease)  Assessment & Plan  Baseline creatinine:1.5-1.7 mg/dl based on labs from [facility] dating back to 2019   Admission Cr 2.01  Nephrology consulted due to worsening Cr up to 2.4, Improved to baseline with IVF.  Cr lashaun to 1.9 after lasix 20 mg iv on 3/9  Stable 1.9. Continue fluids per nephro        Lab Results   Component Value Date    EGFR 29 03/11/2024    EGFR 28 03/10/2024    EGFR 32 03/09/2024    CREATININE 1.93 (H) 03/11/2024    CREATININE 1.98 (H) 03/10/2024    CREATININE 1.78 (H) 03/09/2024       CAD (coronary artery disease)  Assessment & Plan  CAD status post CABG 2009. Lima to LAD and saphenous vein graft to circumflex marginal, Cardiac CTA 12/15/2021  revealed patent ROGERS to LAD, patent vein graft to circumflex obtuse marginal #1 an extensive plaque in the native RCA,     BPH (benign prostatic hyperplasia)  Assessment & Plan  Avodart substituted for finasteride               VTE Pharmacologic Prophylaxis: VTE Score: 5  On hold for cath    Mobility:   Basic Mobility Inpatient Raw Score: 18  JH-HLM Goal: 6: Walk 10 steps or more  JH-HLM Achieved: 6: Walk 10 steps or more  HLM Goal achieved. Continue to encourage appropriate mobility.    Patient Centered Rounds: I performed bedside rounds with nursing staff today.   Discussions with Specialists or Other Care Team Provider: cardiology, nephrology    Education and Discussions with Family / Patient: Patient declined call to .     Total Time Spent on Date of Encounter in care of patient: 35 mins. This time was spent on one or more of the following: performing physical exam; counseling and coordination of care; obtaining or reviewing history; documenting in the medical record; reviewing/ordering tests, medications or procedures; communicating with other healthcare professionals and discussing with patient's family/caregivers.    Current Length of Stay: 6 day(s)  Current Patient Status: Inpatient   Certification Statement: The patient will continue to require additional inpatient hospital stay due to planned cath today  Discharge Plan: Anticipate discharge in 24-48 hrs to home.    Code Status: Level 3 - DNAR and DNI    Subjective:   States he feels well. Denies any acute events overnight. States he's feeling well and ready for the cath.    Objective:     Vitals:   Temp (24hrs), Av °F (36.7 °C), Min:97.9 °F (36.6 °C), Max:98.1 °F (36.7 °C)    Temp:  [97.9 °F (36.6 °C)-98.1 °F (36.7 °C)] 97.9 °F (36.6 °C)  HR:  [82-98] 98  Resp:  [15-18] 15  BP: (106-139)/(52-71) 139/71  SpO2:  [95 %-99 %] 96 %  Body mass index is 28.23 kg/m².     Input and Output Summary (last 24 hours):     Intake/Output Summary (Last 24  hours) at 3/11/2024 0800  Last data filed at 3/11/2024 0338  Gross per 24 hour   Intake --   Output 700 ml   Net -700 ml         Physical Exam:   Physical Exam  Vitals and nursing note reviewed.   Constitutional:       General: He is not in acute distress.     Appearance: He is well-developed.   HENT:      Head: Normocephalic and atraumatic.   Eyes:      Conjunctiva/sclera: Conjunctivae normal.   Cardiovascular:      Rate and Rhythm: Normal rate and regular rhythm.      Heart sounds: No murmur heard.  Pulmonary:      Effort: Pulmonary effort is normal. No respiratory distress.      Breath sounds: Normal breath sounds.   Abdominal:      Palpations: Abdomen is soft.      Tenderness: There is no abdominal tenderness.   Musculoskeletal:         General: No swelling.      Cervical back: Neck supple.   Skin:     General: Skin is warm and dry.   Neurological:      Mental Status: He is alert. Mental status is at baseline.   Psychiatric:         Mood and Affect: Mood normal.          Additional Data:     Labs:  Results from last 7 days   Lab Units 03/11/24 0448 03/06/24  0540 03/05/24  1830   WBC Thousand/uL 6.34   < > 6.51   HEMOGLOBIN g/dL 10.6*   < > 11.5*   HEMATOCRIT % 31.6*   < > 34.6*   PLATELETS Thousands/uL 145*   < > 148*   NEUTROS PCT %  --   --  76*   LYMPHS PCT %  --   --  11*   MONOS PCT %  --   --  9   EOS PCT %  --   --  2    < > = values in this interval not displayed.     Results from last 7 days   Lab Units 03/11/24  0448 03/10/24  0615 03/09/24  0457 03/06/24  0540 03/05/24  2239   SODIUM mmol/L 135   < > 135   < > 135   POTASSIUM mmol/L 4.0   < > 3.9   < > 4.9   CHLORIDE mmol/L 103   < > 102   < > 106   CO2 mmol/L 27   < > 23   < > 20*   BUN mg/dL 42*   < > 50*   < > 45*   CREATININE mg/dL 1.93*   < > 1.78*   < > 2.24*   ANION GAP mmol/L 5   < > 10   < > 9   CALCIUM mg/dL 8.8   < > 8.6   < > 9.0   ALBUMIN g/dL  --   --  3.6   < > 3.9   TOTAL BILIRUBIN mg/dL  --   --   --   --  0.45   ALK PHOS U/L  --    --   --   --  76   ALT U/L  --   --   --   --  198*   AST U/L  --   --   --   --  210*   GLUCOSE RANDOM mg/dL 120   < > 125   < > 135    < > = values in this interval not displayed.                       Lines/Drains:  Invasive Devices       Peripheral Intravenous Line  Duration             Peripheral IV 03/05/24 Left Forearm 5 days    Peripheral IV 03/11/24 Proximal;Right;Ventral (anterior) Forearm <1 day                      Telemetry:  Telemetry Orders (From admission, onward)               24 Hour Telemetry Monitoring  Continuous x 24 Hours (Telem)        Question:  Reason for 24 Hour Telemetry  Answer:  PCI/EP study (including pacer and ICD implementation), Cardiac surgery, MI, abnormal cardiac cath, and chest pain- rule out MI                     Telemetry Reviewed:  telemetry disconnected due to being prepped to be taken to cath lab.  Indication for Continued Telemetry Use: Acute MI/Unstable Angina/Rule out ACS             Imaging: Reviewed radiology reports from this admission including: chest xray    Recent Cultures (last 7 days):         Last 24 Hours Medication List:   Current Facility-Administered Medications   Medication Dose Route Frequency Provider Last Rate    acetaminophen  650 mg Oral Q4H PRN Ahsan Washburn MD      aluminum-magnesium hydroxide-simethicone  30 mL Oral Q4H PRN Shae Camacho DO      aspirin  81 mg Oral Daily Ahsan Washburn MD      clopidogrel  75 mg Oral Daily JESSICA Charles      docusate sodium  100 mg Oral Daily Shae Camacho DO      ezetimibe  10 mg Oral Daily With Dinner Ahsan Washburn MD      And    pravastatin  40 mg Oral Daily With Dinner Ahsan Washburn MD      finasteride  5 mg Oral Daily Ahsan Washburn MD      fish oil  2,000 mg Oral Daily Ahsan Washburn MD      metoclopramide  10 mg Intravenous Q6H PRN Shae Camacho DO      metoprolol succinate  25 mg Oral Q24H Ahsan Washburn MD      morphine injection  2 mg Intravenous Q6H PRN  Ahsan Washburn MD      nitroglycerin  0.1 mg Transdermal Daily Ahsan Washburn MD      oxyCODONE  5 mg Oral Q8H PRN Ahsan Washburn MD      pantoprazole  40 mg Oral Early Morning Shae Camacho DO      polyethylene glycol  17 g Oral Daily PRN Shae Camacho DO      sodium chloride  75 mL/hr Intravenous Continuous Adán Sheppard MD 75 mL/hr (03/10/24 2141)        Today, Patient Was Seen By: Shae Camacho DO    **Please Note: This note may have been constructed using a voice recognition system.**

## 2024-03-11 NOTE — ASSESSMENT & PLAN NOTE
Lab Results   Component Value Date    EGFR 29 03/11/2024    EGFR 28 03/10/2024    EGFR 32 03/09/2024    CREATININE 1.93 (H) 03/11/2024    CREATININE 1.98 (H) 03/10/2024    CREATININE 1.78 (H) 03/09/2024     Baseline creat 1.5-1.7 in the setting of HTN and age-related nephron loss   Creat peak appears to be 2.65, likely in the setting of NSTEMI as above   Nephrology consulted who suggests gently IVF with NS @ 75    Plan:   Hopeful continued renal recovery with initiation of Heparin gtt post cardiac cath   Cont to hold home lasix   Will recheck BMP this afternoon, and if the same or better, would decrease vs d/c cont IVF   Strict I&O, avoid hypotension and nephrotoxic agents

## 2024-03-11 NOTE — CONSULTS
[facility]  Consult  Name: Vladimir Mathias 89 y.o. male I MRN: [id number]  Unit/Bed#: ICU 01 I Date of Admission: 3/5/2024   Date of Service: 3/11/2024 I Hospital Day: 6    Inpatient consult to Medical Critical Care  Consult performed by: JESSICA Maher  Consult ordered by: JESSICA Charles          Assessment/Plan   * NSTEMI (non-ST elevated myocardial infarction) (HCC)  Assessment & Plan  3/5 Substernal CP at approx 1530 lasting 30 min, admitted to [facility] and placed on Heparin gtt   Recent admission at [facility] for same, was discharged with CP 2/2 AF   3/6 echo -- EF 55%, dyskinetic septum, severe RA dilation, severely reduces RV function, bioprosthetic AV, mild MVR and TVR  3/6 VQ scan --low probability for pulm embolus   3/8 Stress test -- left ventricular perfusion defect present in the mid to basal inferior and inferoseptal location(s) that is partially reversible  3/11 Cardiac cath -- patent LIMA to LAD patent vein graft to circumflex and LAD is mid 100% occluded. Patient also noted to have clot sitting in sinus of right coronary artery which disrupted the flow to right coronary artery which was not amendable to intervention     Plan:   Heparin ACS per protocol   Continuous telemetry   Cont plavix, nitro patch  Hold metoprolol 2/2 HB3  3/11 R femoral art line placed in cath lab -- plan to transduce overnight and likely d/c tomorrow   Cardiac diet     Third degree heart block (HCC)  Assessment & Plan  Reports of intermittent HB2 since admission, with noted HB3 during Cardiac cath 3/11  3/11 TVP placed via R groin during Cardiac Cath   Settings: Rate 70, mA 2.5  Currently overbeating the TVP with HR 81, though intermittent infrequent pacer spikes noted    Plan:   Cont TVP with continuous telemetry   Discussion for PPM per Cardiology   Hold metoprolol for now       Atrial flutter (HCC)  Assessment & Plan  Hx of same with Watchman device not on OP AC   Rate control: metoprolol, which is  currently being held for HB3  Continuous telemetry     Nonrheumatic aortic valve stenosis  Assessment & Plan  3/6 echo -- bioprosthetic valve is well-seated and without regurg     S/P CABG (coronary artery bypass graft)  Assessment & Plan  Hx of CABG times two in 2009 (Lima to LAD and saphenous vein graft to marginal)  As above     Essential hypertension  Assessment & Plan  Home regimen: benicar, metoprolol     Plan:   Benicar held on admission in favor of Ntg patch   Metoprolol now held 2/2 HB3   Goal SBP <160    TUAN on CKD 3  Assessment & Plan  Lab Results   Component Value Date    EGFR 29 03/11/2024    EGFR 28 03/10/2024    EGFR 32 03/09/2024    CREATININE 1.93 (H) 03/11/2024    CREATININE 1.98 (H) 03/10/2024    CREATININE 1.78 (H) 03/09/2024     Baseline creat 1.5-1.7 in the setting of HTN and age-related nephron loss   Creat peak appears to be 2.65, likely in the setting of NSTEMI as above   Nephrology consulted who suggests gently IVF with NS @ 75    Plan:   Hopeful continued renal recovery with initiation of Heparin gtt post cardiac cath   Cont to hold home lasix   Will recheck BMP this afternoon, and if the same or better, would decrease vs d/c cont IVF   Strict I&O, avoid hypotension and nephrotoxic agents     CAD (coronary artery disease)  Assessment & Plan  Hx of CABG times two in 2009 (Lima to LAD and saphenous vein graft to marginal)  Follows with Dr. Steven Walton at [facility] in [place]    Plan:   Hole home metoprolol 2/2 HB3   Cont plavix  Home Vytorin not on formulary -- cont with pravastatin and Zetia while IP    BPH (benign prostatic hyperplasia)  Assessment & Plan  Cont home proscar   Urinary retention protocol            History of Present Illness     HPI: Vladimir Mathias is a 89 y.o. with PMH HLD, HTN, CAD s/p CABG, s/p TAVR, BPH, CKD3 who presents as an ICU transfer post cath lab for Cardiac cath 2/2 NSTEMI and now with TVP. He initially presented to [facility] ER on 3/5 with intermittent  substernal CP and was noted to have up-trending troponins. As he also had an TUAN on CKD, he was admitted to [facility] for medical optimization before proceeding to cath lab. Cardiac catheterization shows patient has patent LIMA to LAD patent vein graft to circumflex and LAD is mid 100% occluded. Patient also noted to have clot sitting in sinus of right coronary artery which disrupted the flow to right coronary artery. During procedure, was noted to have intermittent HB3 and TVP via R fem was placed. He comes to ICU with intermittent V pacing on telemetry. He reports intermittent substernal CP that radiates to his back without SOB. He remains HD stable. He is admitted to ICU for continuous telemetry with TVP, IV Heparin.     History obtained from chart review and the patient.  Review of Systems   HENT: Negative.     Respiratory:  Negative for shortness of breath.    Cardiovascular:  Positive for chest pain.   Genitourinary: Negative.    Musculoskeletal: Negative.    Neurological: Negative.      Disposition: Critical care   Historical Information   Past Medical History:  05/25/2022: Basal cell carcinoma      Comment:  Left nasal ala  05/25/2022: Basal cell carcinoma (BCC) of antihelix of right ear      Comment:  Right antihelix  No date: Coronary artery disease  No date: Hypertension  No date: Renal disorder Past Surgical History:  No date: APPENDECTOMY  No date: CARDIAC SURGERY  No date: EYE SURGERY  No date: HERNIA REPAIR  07/05/2022: MOHS SURGERY; Left      Comment:  Left nasal ala  07/05/2022: MOHS SURGERY; Right      Comment:  Right antihelix  07/12/2022: MOHS SURGERY; Right      Comment:  Right antihelix  No date: SKIN BIOPSY   Current Outpatient Medications   Medication Instructions    apixaban (ELIQUIS) 2.5 mg, Oral, 2 times daily    aspirin (Aspir-Low) 81 mg EC tablet Every 24 hours    aspirin (ECOTRIN LOW STRENGTH) 81 mg, Oral, Daily    clopidogrel (PLAVIX) 75 mg tablet No dose, route, or frequency recorded.     dicyclomine (BENTYL) 10 mg, Every 6 hours    dutasteride (AVODART) 0.5 mg capsule No dose, route, or frequency recorded.    ezetimibe-simvastatin (VYTORIN) 10-20 mg per tablet 1 tablet, Oral, Daily at bedtime    famotidine (Pepcid) 20 mg tablet Pepcid    FOLIC ACID-VITAMIN C PO Take by mouth    furosemide (LASIX) 20 mg tablet No dose, route, or frequency recorded.    metoprolol succinate (TOPROL-XL) 25 mg 24 hr tablet 1 capsule, Every 24 hours    metoprolol succinate (TOPROL-XL) 25 mg, Oral, Daily    olmesartan (BENICAR) 40 mg, Oral, Daily    Vascepa 1 g CAPS No dose, route, or frequency recorded.    No Known Allergies   Social History     Tobacco Use    Smoking status: Former     Types: Cigarettes    Smokeless tobacco: Never   Vaping Use    Vaping status: Never Used   Substance Use Topics    Alcohol use: Yes     Comment: twice a year    Family History   Problem Relation Age of Onset    Squamous cell carcinoma Daughter         Objective                            Vitals I/O      Most Recent Min/Max in 24hrs   Temp 97.5 °F (36.4 °C) Temp  Min: 97.5 °F (36.4 °C)  Max: 98.1 °F (36.7 °C)   Pulse 87 Pulse  Min: 82  Max: 98   Resp 17 Resp  Min: 15  Max: 18   /70 BP  Min: 106/53  Max: 143/70   O2 Sat 99 % SpO2  Min: 95 %  Max: 99 %      Intake/Output Summary (Last 24 hours) at 3/11/2024 1120  Last data filed at 3/11/2024 1016  Gross per 24 hour   Intake --   Output 960 ml   Net -960 ml       Diet NPO    Invasive Monitoring   Arterial Line  Jamee BP    No data recorded   MAP    No data recorded           Physical Exam   Physical Exam  Vitals and nursing note reviewed.   Skin:     General: Skin is warm.      Capillary Refill: Capillary refill takes less than 2 seconds.      Coloration: Skin is not pale.   HENT:      Head: Normocephalic and atraumatic.      Mouth/Throat:      Lips: Pink.      Mouth: Mucous membranes are moist.   Cardiovascular:      Rate and Rhythm: Normal rate.      Heart sounds: Normal heart sounds.       Arteriovenous access: Right arteriovenous access is present.     Comments: R groin TVP and art line with intact dressing  Abdominal: General: Abdomen is flat. Bowel sounds are decreased.      Palpations: Abdomen is soft.      Tenderness: There is no abdominal tenderness.   Constitutional:       General: He is awake. He is not in acute distress.     Appearance: He is well-developed.      Interventions: Nasal cannula in place.   Pulmonary:      Effort: Pulmonary effort is normal.      Breath sounds: Normal breath sounds.   Psychiatric:         Mood and Affect: Mood and affect normal.         Behavior: Behavior is cooperative.   Neurological:      General: No focal deficit present.      Mental Status: He is alert and oriented to person, place, and time.      GCS: GCS eye subscore is 4. GCS verbal subscore is 5. GCS motor subscore is 6.   Genitourinary/Anorectal:     Comments: Voiding independently            Diagnostic Studies      EKG: Intermittent paced on tele   Imaging:   US kidney and bladder   Final Result      Normal.            Workstation performed: [id number]         NM lung ventilation / perfusion   Final Result      The probability for pulmonary embolus is low.      Workstation performed: [id number]         XR chest 1 view portable   Final Result      No radiographic evidence of acute intrathoracic process.            Workstation performed: [id number]            I have personally reviewed pertinent reports.       Medications:  Scheduled PRN   aspirin, 81 mg, Daily  chlorhexidine, 15 mL, Q12H GUSTAVO  clopidogrel, 75 mg, Daily  docusate sodium, 100 mg, Daily  ezetimibe, 10 mg, Daily With Dinner   And  pravastatin, 40 mg, Daily With Dinner  finasteride, 5 mg, Daily  fish oil, 2,000 mg, Daily  nitroglycerin, 0.1 mg, Daily  pantoprazole, 40 mg, Early Morning      acetaminophen, 650 mg, Q4H PRN  aluminum-magnesium hydroxide-simethicone, 30 mL, Q4H PRN  heparin (porcine), 2,000 Units, Q6H PRN  heparin  (porcine), 4,000 Units, Q6H PRN  metoclopramide, 10 mg, Q6H PRN  polyethylene glycol, 17 g, Daily PRN       Continuous    heparin (porcine), 3-20 Units/kg/hr (Order-Specific), Last Rate: 11.1 Units/kg/hr (03/11/24 1006)  sodium chloride, 75 mL/hr, Last Rate: 75 mL/hr (03/11/24 1016)         Labs:    CBC    Recent Labs     03/10/24  0615 03/11/24  0448   WBC 6.57 6.34   HGB 10.7* 10.6*   HCT 31.4* 31.6*   * 145*     BMP    Recent Labs     03/10/24  0615 03/11/24  0448   SODIUM 136 135   K 4.0 4.0    103   CO2 27 27   AGAP 7 5   BUN 46* 42*   CREATININE 1.98* 1.93*   CALCIUM 8.8 8.8       Coags    Recent Labs     03/10/24  0615 03/11/24  1002   PTT 82* >210*        Additional Electrolytes  No recent results       Blood Gas    No recent results  No recent results LFTs  No recent results    Infectious  No recent results  Glucose  Recent Labs     03/10/24  0615 03/11/24  0448   GLUC 130 120               JESSICA Sharma

## 2024-03-11 NOTE — ASSESSMENT & PLAN NOTE
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 Steven Walton at [facility] in [place]    Plan:   Hole home metoprolol 2/2 HB3   Cont plavix  Home Vytorin not on formulary -- cont with pravastatin and Zetia while IP

## 2024-03-11 NOTE — DISCHARGE INSTR - OTHER ORDERS
Plan:   Skin care plans:    1-Cleanse traumatic wound cluster to mid-anterior chest with NSS & pat dry. Apply thin layer of Normlgel to wounds and cover with small bordered foam dressing. Change every other day & as needed for soilage/dislodgement.  2-Hydraguard barrier cream to bilateral sacrum, buttock and heels 2x/day and as needed.  3-Float heels on 2 pillows to offload pressure so heels are not in contact with mattress or pillows.  4-Use pressure redistribution cushion in chair when out of bed if able. Limit prolonged sitting.  5-Moisturize skin daily with skin nourishing cream.  6-Turn/reposition every 2 hrs for pressure re-distribution on skin.

## 2024-03-11 NOTE — PROGRESS NOTES
NEPHROLOGY HOSPITAL PROGRESS NOTE   Vladimir Mathias 89 y.o. male MRN: [id number]  Unit/Bed#: [address] [place] [address] Encounter: [id number]  Reason for Consult: TUAN on CKD    ASSESSMENT and PLAN:  89-year-old male with history of CAD, A-fib status post Watchman device and CKD presented with chest pain and dyspnea.  We are consulted for management of TUAN on CKD.    1.  Acute kidney injury.  Baseline creatinine 1.5-1.7.  Admission creatinine 2.01.  Peak creatinine 2.6503/06.  Yoandy creatinine 1.78 03/09.  Creatinine today 1.93.  Urinalysis with no RBC, urine sodium of 17.  Kidney ultrasound did not show hydronephrosis.  Etiology of TUAN due to hemodynamic changes +/- hypotension in setting of NSTEMI.  Kidney function is currently stabilized after administration of IV fluids.  Patient also received IV Lasix on 03/09 and diuretics are currently on hold.  Currently receiving IV hydration with normal saline at 75 cc/h to minimize incidence of contrast associated nephropathy.  Continue to hold losartan and diuretics today.  Follow LVEDP from cardiac catheterization today and dose diuretics as needed.  Trend BMP.    2.  CKD stage IIIb.  Outpatient nephrologist is Dr. Barrientos at [facility].  Baseline creatinine is 1.5-1.7.  Etiology most likely in setting of hypertension and age-related nephron loss.    3.  Hypertension.  Continue metoprolol.  Blood pressure is currently controlled.    4.  NSTEMI.  Plans noted for cardiac catheterization today.  Management per cardiology.    5.  Anemia in CKD.  Hemoglobin currently 10.6.  Transfuse to keep hemoglobin more than 7.    Discussed with internal medicine team.  After discussion, we agreed that kidney function is currently stable and to provide IV fluids in the pre and postcardiac catheterization phase to avoid incidence of contrast associated nephropathy.    SUBJECTIVE / 24H INTERVAL HISTORY:  Urine output recorded as 700 cc.  Denies dyspnea.  Denies leg swelling.    OBJECTIVE:  Current  Weight: Weight - Scale: 97.1 kg (214 lb)  Vitals:    03/10/24 1949 03/10/24 2159 03/11/24 0336 03/11/24 0600   BP: 106/53 109/53 114/55    Pulse: 82 82 90    Resp:  18     Temp:  98.1 °F (36.7 °C)     TempSrc:       SpO2: 99% 97% 95%    Weight:    97.1 kg (214 lb)   Height:           Intake/Output Summary (Last 24 hours) at 3/11/2024 0724  Last data filed at 3/11/2024 0338  Gross per 24 hour   Intake --   Output 700 ml   Net -700 ml     Review of Systems   Constitutional:  Negative for chills and fever.   HENT:  Negative for ear pain and sore throat.    Eyes:  Negative for pain and visual disturbance.   Respiratory:  Negative for cough and shortness of breath.    Cardiovascular:  Negative for chest pain and palpitations.   Gastrointestinal:  Negative for abdominal pain and vomiting.   Genitourinary:  Negative for dysuria and hematuria.   Musculoskeletal:  Negative for arthralgias and back pain.   Skin:  Negative for color change and rash.   Neurological:  Negative for seizures and syncope.   All other systems reviewed and are negative.    Physical Exam  Vitals and nursing note reviewed.   Constitutional:       General: He is not in acute distress.     Appearance: He is well-developed.   HENT:      Head: Normocephalic and atraumatic.   Eyes:      Conjunctiva/sclera: Conjunctivae normal.   Cardiovascular:      Rate and Rhythm: Normal rate and regular rhythm.      Pulses: Normal pulses.      Heart sounds: Normal heart sounds. No murmur heard.  Pulmonary:      Effort: Pulmonary effort is normal. No respiratory distress.      Breath sounds: Normal breath sounds.   Abdominal:      Palpations: Abdomen is soft.      Tenderness: There is no abdominal tenderness.   Musculoskeletal:         General: No swelling.      Cervical back: Neck supple.      Right lower leg: No edema.      Left lower leg: No edema.   Skin:     General: Skin is warm and dry.      Capillary Refill: Capillary refill takes less than 2 seconds.    Neurological:      Mental Status: He is alert.   Psychiatric:         Mood and Affect: Mood normal.         Medications:    Current Facility-Administered Medications:     acetaminophen (TYLENOL) tablet 650 mg, 650 mg, Oral, Q4H PRN, Ahsan Washburn MD, 650 mg at 03/09/24 2034    aluminum-magnesium hydroxide-simethicone (MAALOX) oral suspension 30 mL, 30 mL, Oral, Q4H PRN, Shae Camacho DO    aspirin chewable tablet 81 mg, 81 mg, Oral, Daily, Ahsan Washburn MD, 81 mg at 03/10/24 0849    clopidogrel (PLAVIX) tablet 75 mg, 75 mg, Oral, Daily, JESSICA Charles, 75 mg at 03/10/24 0849    docusate sodium (COLACE) capsule 100 mg, 100 mg, Oral, Daily, Shae Camacho DO, 100 mg at 03/10/24 1512    ezetimibe (ZETIA) tablet 10 mg, 10 mg, Oral, Daily With Dinner, 10 mg at 03/10/24 1531 **AND** pravastatin (PRAVACHOL) tablet 40 mg, 40 mg, Oral, Daily With Dinner, Ahsan Washburn MD, 40 mg at 03/10/24 1531    finasteride (PROSCAR) tablet 5 mg, 5 mg, Oral, Daily, Ahsan Washburn MD, 5 mg at 03/10/24 0849    fish oil capsule 2,000 mg, 2,000 mg, Oral, Daily, Ahsan Washburn MD, 2,000 mg at 03/10/24 0849    metoclopramide (REGLAN) injection 10 mg, 10 mg, Intravenous, Q6H PRN, Shae Camacho DO    metoprolol succinate (TOPROL-XL) 24 hr tablet 25 mg, 25 mg, Oral, Q24H, Ahsan Washburn MD, 25 mg at 03/09/24 2034    morphine injection 2 mg, 2 mg, Intravenous, Q6H PRN, Ahsan Washburn MD    nitroglycerin (NITRODUR) 0.1 mg/hr TD 24 hr patch, 0.1 mg, Transdermal, Daily, Ahsan Washburn MD, 0.1 mg at 03/10/24 0851    oxyCODONE (ROXICODONE) oral solution 5 mg, 5 mg, Oral, Q8H PRN, Ahsan Washburn MD    pantoprazole (PROTONIX) EC tablet 40 mg, 40 mg, Oral, Early Morning, Shae Camacho DO, 40 mg at 03/10/24 0608    polyethylene glycol (MIRALAX) packet 17 g, 17 g, Oral, Daily PRN, Shae Camacho DO, 17 g at 03/10/24 1512    sodium chloride 0.9 % infusion, 75 mL/hr, Intravenous, Continuous, Adán Sheppard,  "MD, Last Rate: 75 mL/hr at 03/10/24 2141, 75 mL/hr at 03/10/24 2141    Laboratory Results:  Results from last 7 days   Lab Units 03/11/24  0448 03/10/24  0615 03/09/24  0457 03/08/24  0607 03/07/24  0604 03/06/24  0540 03/05/24  2239 03/05/24  1830   WBC Thousand/uL 6.34 6.57 7.35 8.26 7.10 7.92  --  6.51   HEMOGLOBIN g/dL 10.6* 10.7* 10.5* 10.7* 10.7* 11.5*  --  11.5*   HEMATOCRIT % 31.6* 31.4* 31.1* 32.8* 31.4* 35.0*  --  34.6*   PLATELETS Thousands/uL 145* 131* 137* 131* 127* 167  --  148*   POTASSIUM mmol/L 4.0 4.0 3.9 4.0 4.2 5.3 4.9 5.4*   CHLORIDE mmol/L 103 102 102 101 101 107 106 104   CO2 mmol/L 27 27 23 23 23 17* 20* 22   BUN mg/dL 42* 46* 50* 54* 58* 49* 45* 41*   CREATININE mg/dL 1.93* 1.98* 1.78* 2.07* 2.40* 2.65* 2.24* 2.01*   CALCIUM mg/dL 8.8 8.8 8.6 8.1* 8.0* 8.4 9.0 9.1   MAGNESIUM mg/dL  --   --  2.2 2.2 2.1 2.2  --   --    PHOSPHORUS mg/dL  --   --  3.3 3.5 3.4  --   --   --        Portions of the record may have been created with voice recognition software. Occasional wrong word or \"sound a like\" substitutions may have occurred due to the inherent limitations of voice recognition software. Read the chart carefully and recognize, using context, where substitutions have occurred. If you have any questions, please contact the dictating provider.    "

## 2024-03-11 NOTE — ASSESSMENT & PLAN NOTE
Hx of same with Watchman device not on OP AC   Rate control: metoprolol, which is currently being held for HB3  Continuous telemetry

## 2024-03-11 NOTE — ASSESSMENT & PLAN NOTE
Reports of intermittent HB2 since admission, with noted HB3 during Cardiac cath 3/11  3/11 TVP placed via R groin during Cardiac Cath   Settings: Rate 70, mA 2.5  Currently overbeating the TVP with HR 81, though intermittent infrequent pacer spikes noted    Plan:   Cont TVP with continuous telemetry   Discussion for PPM per Cardiology   Hold metoprolol for now

## 2024-03-11 NOTE — QUICK NOTE
Interventional cardiology note.    Patient underwent cardiac catheterization by the right common femoral artery.  Patient is known to have previous history of CAD s/p CABG with a LIMA to LAD and vein graft to circumflex, RCA was not bypassed, history of CoreValve who presented initially to [facility] with chest pain and has positive troponin.  He was admitted here on March 5 again with a chest pain and EKG suggesting of persistent ST elevation in inferior lead consistent with previous history of inferior wall infarction.  His peak troponin here was more than 22,000.  Patient was cooldown with heparin and underwent nuclear stress test which shows inferior wall reversible ischemia and he has a history of CRI.  After patient was evaluated by nephrology medicine and cardiology team schedule patient for cardiac catheterization.  Cardiac catheterization shows patient has patent LIMA to LAD patent vein graft to circumflex and LAD is mid 100% occluded.  Patient also noted to have clot sitting in sinus of right coronary artery which disrupted the flow to right coronary artery when injection was taken.  It was a nonselective  first shot.  Patient has temporary AV block hence a temporary pacemaker was placed.  Images was reviewed with my colleagues at [facility] it was felt that due to presence of valve as well as previous and already have previously EKG is inferior leads and was asymptomatic as far as chest pain was concerned it was decided to manage him conservatively.  He was given IV heparin and started on heparin drip.  We also kept the A-line in.  Images reviewed with ICU team, discussed with patient's family.  Patient's vitals were stable when he left the Cath Lab.

## 2024-03-12 LAB
ANION GAP SERPL CALCULATED.3IONS-SCNC: 8 MMOL/L
APTT PPP: 47 SECONDS (ref 23–37)
BACTERIA UR QL AUTO: ABNORMAL /HPF
BILIRUB UR QL STRIP: ABNORMAL
BUN SERPL-MCNC: 39 MG/DL (ref 5–25)
CALCIUM SERPL-MCNC: 7.9 MG/DL (ref 8.4–10.2)
CHLORIDE SERPL-SCNC: 109 MMOL/L (ref 96–108)
CLARITY UR: CLEAR
CO2 SERPL-SCNC: 19 MMOL/L (ref 21–32)
COLOR UR: YELLOW
CREAT SERPL-MCNC: 1.68 MG/DL (ref 0.6–1.3)
ERYTHROCYTE [DISTWIDTH] IN BLOOD BY AUTOMATED COUNT: 14.6 % (ref 11.6–15.1)
GFR SERPL CREATININE-BSD FRML MDRD: 34 ML/MIN/1.73SQ M
GLUCOSE SERPL-MCNC: 117 MG/DL (ref 65–140)
GLUCOSE UR STRIP-MCNC: NEGATIVE MG/DL
HCT VFR BLD AUTO: 27.7 % (ref 36.5–49.3)
HGB BLD-MCNC: 9.5 G/DL (ref 12–17)
HGB UR QL STRIP.AUTO: NEGATIVE
KETONES UR STRIP-MCNC: NEGATIVE MG/DL
LEUKOCYTE ESTERASE UR QL STRIP: NEGATIVE
MCH RBC QN AUTO: 34.3 PG (ref 26.8–34.3)
MCHC RBC AUTO-ENTMCNC: 34.3 G/DL (ref 31.4–37.4)
MCV RBC AUTO: 100 FL (ref 82–98)
MRSA NOSE QL CULT: NORMAL
NITRITE UR QL STRIP: NEGATIVE
NON-SQ EPI CELLS URNS QL MICRO: ABNORMAL /HPF
PH UR STRIP.AUTO: 5.5 [PH]
PLATELET # BLD AUTO: 127 THOUSANDS/UL (ref 149–390)
PMV BLD AUTO: 10.6 FL (ref 8.9–12.7)
POTASSIUM SERPL-SCNC: 3.6 MMOL/L (ref 3.5–5.3)
PROT UR STRIP-MCNC: NEGATIVE MG/DL
RBC # BLD AUTO: 2.77 MILLION/UL (ref 3.88–5.62)
RBC #/AREA URNS AUTO: ABNORMAL /HPF
SODIUM SERPL-SCNC: 136 MMOL/L (ref 135–147)
SP GR UR STRIP.AUTO: 1.02 (ref 1–1.03)
UROBILINOGEN UR QL STRIP.AUTO: 0.2 E.U./DL
WBC # BLD AUTO: 6.61 THOUSAND/UL (ref 4.31–10.16)
WBC #/AREA URNS AUTO: ABNORMAL /HPF

## 2024-03-12 PROCEDURE — 81001 URINALYSIS AUTO W/SCOPE: CPT | Performed by: NURSE PRACTITIONER

## 2024-03-12 PROCEDURE — 99232 SBSQ HOSP IP/OBS MODERATE 35: CPT | Performed by: INTERNAL MEDICINE

## 2024-03-12 PROCEDURE — 85730 THROMBOPLASTIN TIME PARTIAL: CPT | Performed by: STUDENT IN AN ORGANIZED HEALTH CARE EDUCATION/TRAINING PROGRAM

## 2024-03-12 PROCEDURE — 99232 SBSQ HOSP IP/OBS MODERATE 35: CPT | Performed by: STUDENT IN AN ORGANIZED HEALTH CARE EDUCATION/TRAINING PROGRAM

## 2024-03-12 PROCEDURE — 93005 ELECTROCARDIOGRAM TRACING: CPT

## 2024-03-12 PROCEDURE — 80048 BASIC METABOLIC PNL TOTAL CA: CPT | Performed by: NURSE PRACTITIONER

## 2024-03-12 PROCEDURE — 85027 COMPLETE CBC AUTOMATED: CPT | Performed by: NURSE PRACTITIONER

## 2024-03-12 RX ORDER — SENNOSIDES 8.6 MG
1 TABLET ORAL
Status: DISCONTINUED | OUTPATIENT
Start: 2024-03-12 | End: 2024-03-20 | Stop reason: HOSPADM

## 2024-03-12 RX ORDER — POLYETHYLENE GLYCOL 3350 17 G/17G
17 POWDER, FOR SOLUTION ORAL DAILY
Status: DISCONTINUED | OUTPATIENT
Start: 2024-03-12 | End: 2024-03-20 | Stop reason: HOSPADM

## 2024-03-12 RX ORDER — POTASSIUM CHLORIDE 20 MEQ/1
40 TABLET, EXTENDED RELEASE ORAL ONCE
Status: COMPLETED | OUTPATIENT
Start: 2024-03-12 | End: 2024-03-12

## 2024-03-12 RX ORDER — BISACODYL 10 MG
10 SUPPOSITORY, RECTAL RECTAL DAILY PRN
Status: DISCONTINUED | OUTPATIENT
Start: 2024-03-12 | End: 2024-03-20 | Stop reason: HOSPADM

## 2024-03-12 RX ADMIN — SENNOSIDES 8.6 MG: 8.6 TABLET, FILM COATED ORAL at 21:27

## 2024-03-12 RX ADMIN — DOCUSATE SODIUM 100 MG: 100 CAPSULE, LIQUID FILLED ORAL at 10:01

## 2024-03-12 RX ADMIN — ASPIRIN 81 MG 81 MG: 81 TABLET ORAL at 10:01

## 2024-03-12 RX ADMIN — FINASTERIDE 5 MG: 5 TABLET, FILM COATED ORAL at 10:01

## 2024-03-12 RX ADMIN — EZETIMIBE 10 MG: 10 TABLET ORAL at 18:17

## 2024-03-12 RX ADMIN — HEPARIN SODIUM 2000 UNITS: 1000 INJECTION INTRAVENOUS; SUBCUTANEOUS at 06:38

## 2024-03-12 RX ADMIN — POTASSIUM CHLORIDE 40 MEQ: 1500 TABLET, EXTENDED RELEASE ORAL at 06:37

## 2024-03-12 RX ADMIN — PRAVASTATIN SODIUM 40 MG: 40 TABLET ORAL at 18:17

## 2024-03-12 RX ADMIN — HEPARIN SODIUM 10.1 UNITS/KG/HR: 10000 INJECTION, SOLUTION INTRAVENOUS at 18:17

## 2024-03-12 RX ADMIN — NITROGLYCERIN 0.1 MG: 0.1 PATCH TRANSDERMAL at 10:43

## 2024-03-12 RX ADMIN — PANTOPRAZOLE SODIUM 40 MG: 40 TABLET, DELAYED RELEASE ORAL at 06:37

## 2024-03-12 RX ADMIN — OMEGA-3 FATTY ACIDS CAP 1000 MG 2000 MG: 1000 CAP at 10:01

## 2024-03-12 RX ADMIN — CHLORHEXIDINE GLUCONATE 15 ML: 1.2 SOLUTION ORAL at 10:02

## 2024-03-12 RX ADMIN — CLOPIDOGREL 75 MG: 75 TABLET ORAL at 10:01

## 2024-03-12 RX ADMIN — POLYETHYLENE GLYCOL 3350 17 G: 17 POWDER, FOR SOLUTION ORAL at 10:01

## 2024-03-12 NOTE — PROGRESS NOTES
[facility]  Progress Note  Name: Vladimir Mathias I  MRN: [id number]  Unit/Bed#: ICU 01 I Date of Admission: 3/5/2024   Date of Service: 3/11/2024 I Hospital Day: 6    Assessment/Plan   * NSTEMI (non-ST elevated myocardial infarction) (HCC)  Assessment & Plan  3/5 Substernal CP at approx 1530 lasting 30 min, admitted to [facility] and placed on Heparin gtt   Recent admission at [facility] for same, was discharged with CP 2/2 AF   3/6 echo -- EF 55%, dyskinetic septum, severe RA dilation, severely reduces RV function, bioprosthetic AV, mild MVR and TVR  3/6 VQ scan --low probability for pulm embolus   3/8 Stress test -- left ventricular perfusion defect present in the mid to basal inferior and inferoseptal location(s) that is partially reversible  3/11 Cardiac cath -- patent LIMA to LAD patent vein graft to circumflex and LAD is mid 100% occluded. Patient also noted to have clot sitting in sinus of right coronary artery which disrupted the flow to right coronary artery which was not amendable to intervention     Plan:   Heparin ACS per protocol   Continuous telemetry   Cont plavix, nitro patch  Consider resuming metoprolol today   Potential d/c of TVP today   Cardiac diet     Third degree heart block (HCC)  Assessment & Plan  Reports of intermittent HB2 since admission, with noted HB3 during Cardiac cath 3/11  3/11 TVP placed via R groin during Cardiac Cath   Rate dropped to 30 overnight     Plan:   Resume metoprolol   Potential d/c of TVP pending vs PPM?  Cardiology following     Atrial flutter (HCC)  Assessment & Plan  Hx of same with Watchman device not on OP AC   Rate control: metoprolol    Plan:  Consider resuming metoprolol today   Continuous telemetry     Nonrheumatic aortic valve stenosis  Assessment & Plan  3/6 echo -- bioprosthetic valve is well-seated and without regurg     S/P CABG (coronary artery bypass graft)  Assessment & Plan  Hx of CABG times two in 2009 (Lima to LAD and saphenous vein  graft to marginal)  As above     Essential hypertension  Assessment & Plan  Home regimen: benicar, metoprolol   Benicar held on admission in favor of Ntg patch     Plan:  Continue nitro patch   Metoprolol as outlined   Goal SBP <160    TUAN on CKD 3  Assessment & Plan  Baseline creat 1.5-1.7 in the setting of HTN and age-related nephron loss   Creat peak appears to be 2.65, likely in the setting of NSTEMI as above   Nephrology consulted who suggests gentle IVF with NS @ 75    Plan:   Cont to hold home lasix   Strict I&O  Avoid nephrotoxic agents   Maintain MAP > 65  Discontinue IVF's today     CAD (coronary artery disease)  Assessment & Plan  Hx of CABG times two in 2009 (Lima to LAD and saphenous vein graft to marginal)  Follows with Dr. Steven Walton at [facility] [place]    Plan:   Consider resuming metoprolol today   Cont plavix  Home Vytorin not on formulary -- cont with pravastatin and Zetia while IP    BPH (benign prostatic hyperplasia)  Assessment & Plan  Cont home proscar   Urinary retention protocol          Disposition: Critical care    ICU Core Measures     A: Assess, Prevent, and Manage Pain Has pain been assessed? Yes  Need for changes to pain regimen? No   B: Both SAT/SAT  N/A   C: Choice of Sedation RASS Goal: 0 Alert and Calm or N/A patient not on sedation  Need for changes to sedation or analgesia regimen? NA   D: Delirium CAM-ICU: Negative   E: Early Mobility  Plan for early mobility? Yes   F: Family Engagement Plan for family engagement today? Yes         Prophylaxis:  VTE VTE covered by:  heparin (porcine), Intravenous, 8.1 Units/kg/hr at 03/11/24 1727  heparin (porcine), Intravenous  heparin (porcine), Intravenous       Stress Ulcer  covered byfamotidine (Pepcid) 20 mg tablet [[id number]] (Long-Term Med), pantoprazole (PROTONIX) EC tablet 40 mg [[id number]]         Significant 24hr Events     24hr events: Patient underwent cardiac catheterization which revealed patent grafts,  but clot sitting in the right coronary artery. Patient went into 3rd degree heart block during procedure requiring TVP to be placed. Overnight rate was decreased to 30. No pacing noted overnight on review of telemetry.      Subjective   Review of Systems   All other systems reviewed and are negative.     Objective                            Vitals I/O      Most Recent Min/Max in 24hrs   Temp 98.3 °F (36.8 °C) Temp  Min: 97.5 °F (36.4 °C)  Max: 98.3 °F (36.8 °C)   Pulse 86 Pulse  Min: 73  Max: 98   Resp (!) 23 Resp  Min: 15  Max: 29   /60 BP  Min: 114/55  Max: 148/85   O2 Sat 100 % SpO2  Min: 95 %  Max: 100 %      Intake/Output Summary (Last 24 hours) at 3/11/2024 2224  Last data filed at 3/11/2024 1149  Gross per 24 hour   Intake --   Output 950 ml   Net -950 ml       Diet Cardiovascular; Cardiac Cath    Invasive Monitoring           Physical Exam   Physical Exam  Eyes:      Pupils: Pupils are equal, round, and reactive to light.   Skin:     General: Skin is warm and dry.   HENT:      Head: Normocephalic.      Mouth/Throat:      Mouth: Mucous membranes are moist.   Cardiovascular:      Rate and Rhythm: Normal rate and regular rhythm.      Pulses: Normal pulses.      Comments: Right fem sheath with TVP  Musculoskeletal:         General: Normal range of motion.      Right lower leg: No edema.      Left lower leg: No edema.   Abdominal:      Palpations: Abdomen is soft.   Constitutional:       General: He is not in acute distress.  Pulmonary:      Effort: Pulmonary effort is normal. No respiratory distress.      Breath sounds: No wheezing or rales.   Neurological:      Mental Status: He is alert and oriented to person, place and time.            Diagnostic Studies      EKG: NSR rate of 76  Imaging: I have personally reviewed pertinent reports.       Medications:  Scheduled PRN   aspirin, 81 mg, Daily  chlorhexidine, 15 mL, Q12H GUSTAVO  clopidogrel, 75 mg, Daily  docusate sodium, 100 mg, Daily  ezetimibe, 10 mg,  Daily With Dinner   And  pravastatin, 40 mg, Daily With Dinner  finasteride, 5 mg, Daily  fish oil, 2,000 mg, Daily  nitroglycerin, 0.1 mg, Daily  pantoprazole, 40 mg, Early Morning      acetaminophen, 650 mg, Q4H PRN  aluminum-magnesium hydroxide-simethicone, 30 mL, Q4H PRN  heparin (porcine), 2,000 Units, Q6H PRN  heparin (porcine), 4,000 Units, Q6H PRN  metoclopramide, 10 mg, Q6H PRN  polyethylene glycol, 17 g, Daily PRN       Continuous    heparin (porcine), 3-20 Units/kg/hr (Order-Specific), Last Rate: 8.1 Units/kg/hr (03/11/24 1727)  sodium chloride, 75 mL/hr, Last Rate: 75 mL/hr (03/11/24 1016)         Labs:    CBC    Recent Labs     03/10/24  0615 03/11/24  0448   WBC 6.57 6.34   HGB 10.7* 10.6*   HCT 31.4* 31.6*   * 145*     BMP    Recent Labs     03/11/24  0448 03/11/24  1547   SODIUM 135 134*   K 4.0 4.1    104   CO2 27 20*   AGAP 5 10   BUN 42* 42*   CREATININE 1.93* 1.82*   CALCIUM 8.8 8.4       Coags    Recent Labs     03/11/24  1002 03/11/24  1547   PTT >210* 141*        Additional Electrolytes  No recent results       Blood Gas    No recent results  No recent results LFTs  No recent results    Infectious  No recent results  Glucose  Recent Labs     03/10/24  0615 03/11/24  0448 03/11/24  1547   GLUC 130 120 196*               JESSICA Barnes

## 2024-03-12 NOTE — ASSESSMENT & PLAN NOTE
Home regimen: benicar, metoprolol   Benicar held on admission in favor of Ntg patch     Plan:  Continue nitro patch   Metoprolol as outlined   Goal SBP <160

## 2024-03-12 NOTE — PROGRESS NOTES
NEPHROLOGY HOSPITAL PROGRESS NOTE   Vladimir Mathias 89 y.o. male MRN: [id number]  Unit/Bed#: ICU 01 Encounter: [id number]  Reason for Consult: TUAN on CKD    ASSESSMENT and PLAN:  89-year-old male with history of CAD, A-fib status post Watchman device and CKD presented with chest pain and dyspnea.  We are consulted for management of TUAN on CKD.    1.  Acute kidney injury.  Baseline creatinine 1.5-1.7.  Admission creatinine 2.01.  Peak creatinine 2.65 on 03/06. Creatinine today 1.68.  Urinalysis with no RBC, urine sodium of 17.  Kidney ultrasound did not show hydronephrosis.  Etiology of TUAN due to hemodynamic changes +/- hypotension in setting of NSTEMI.  Status post left heart catheterization with iodinated contrast administration on 03/11.  He did receive IV fluids pre and post iodinated contrast administration.  No evidence of contrast associated nephropathy at this stage and will continue to monitor renal function.  No indication to continue IV fluids today.  Monitor volume status.  Trend BMP.    2.  CKD stage IIIb.  Outpatient nephrologist is Dr. Barrientos at [facility].  Baseline creatinine is 1.5-1.7.  Etiology most likely in setting of hypertension and age-related nephron loss.    3.  Hypertension.  Blood pressure is currently controlled.  He was on metoprolol which is on hold due to heart block.  Continue to monitor blood pressure.    4.  NSTEMI.  Status postcardiac catheterization that showed patent ROGERS to LAD, patent vein graft to his marginal with a severe three-vessel disease.  Thrombus was noted in RCA which was causing intermittent AV block and a temporary pacemaker was placed.  Plans noted for conservative management.    5.  Anemia in CKD.  Hemoglobin currently 9.5.  Transfuse to keep hemoglobin more than 7.    6.  Hyperchloremic acidosis.  This is most likely due to administration of normal saline.  Trend serum bicarbonate level now that normal saline has been discontinued.    Discussed with ICU team.   After discussion, we agreed that kidney function is stable with no evidence of contrast associated nephropathy and to observe off IV fluids.    SUBJECTIVE / 24H INTERVAL HISTORY:  Urine output recorded as 550 cc.  He denies dyspnea.  He denies leg swelling.  He was transferred to ICU after cardiac catheterization because he needed a temporary pacemaker.    OBJECTIVE:  Current Weight: Weight - Scale: 98.2 kg (216 lb 7.9 oz)  Vitals:    03/12/24 0200 03/12/24 0300 03/12/24 0400 03/12/24 0722   BP: 136/62 128/71 135/64    Pulse: 79 77 75    Resp: (!) 42 (!) 29 (!) 24    Temp:    98.5 °F (36.9 °C)   TempSrc:    Temporal   SpO2: 100% 100% 100%    Weight:       Height:           Intake/Output Summary (Last 24 hours) at 3/12/2024 0740  Last data filed at 3/11/2024 1149  Gross per 24 hour   Intake --   Output 550 ml   Net -550 ml     Review of Systems   Constitutional:  Negative for chills and fever.   HENT:  Negative for ear pain and sore throat.    Eyes:  Negative for pain and visual disturbance.   Respiratory:  Negative for cough and shortness of breath.    Cardiovascular:  Negative for chest pain and palpitations.   Gastrointestinal:  Negative for abdominal pain and vomiting.   Genitourinary:  Negative for dysuria and hematuria.   Musculoskeletal:  Negative for arthralgias and back pain.   Skin:  Negative for color change and rash.   Neurological:  Negative for seizures and syncope.   All other systems reviewed and are negative.    Physical Exam  Vitals and nursing note reviewed.   Constitutional:       General: He is not in acute distress.     Appearance: He is well-developed.   HENT:      Head: Normocephalic and atraumatic.   Eyes:      Conjunctiva/sclera: Conjunctivae normal.   Cardiovascular:      Rate and Rhythm: Normal rate and regular rhythm.      Pulses: Normal pulses.      Heart sounds: Normal heart sounds. No murmur heard.  Pulmonary:      Effort: Pulmonary effort is normal. No respiratory distress.       Breath sounds: Normal breath sounds.   Abdominal:      Palpations: Abdomen is soft.      Tenderness: There is no abdominal tenderness.   Musculoskeletal:         General: No swelling.      Cervical back: Neck supple.      Right lower leg: No edema.      Left lower leg: No edema.   Skin:     General: Skin is warm and dry.      Capillary Refill: Capillary refill takes less than 2 seconds.   Neurological:      Mental Status: He is alert.   Psychiatric:         Mood and Affect: Mood normal.         Medications:    Current Facility-Administered Medications:     acetaminophen (TYLENOL) tablet 650 mg, 650 mg, Oral, Q4H PRN, JESSICA Maher, 650 mg at 03/11/24 1121    aluminum-magnesium hydroxide-simethicone (MAALOX) oral suspension 30 mL, 30 mL, Oral, Q4H PRN, JESSICA Maher    aspirin chewable tablet 81 mg, 81 mg, Oral, Daily, JESSICA Maher, 81 mg at 03/11/24 0800    bisacodyl (DULCOLAX) rectal suppository 10 mg, 10 mg, Rectal, Daily PRN, JESSICA Maher    chlorhexidine (PERIDEX) 0.12 % oral rinse 15 mL, 15 mL, Mouth/Throat, Q12H GUSTAVO, JESSICA Maher, 15 mL at 03/11/24 2157    clopidogrel (PLAVIX) tablet 75 mg, 75 mg, Oral, Daily, JESSICA Maher, 75 mg at 03/11/24 0800    docusate sodium (COLACE) capsule 100 mg, 100 mg, Oral, Daily, JESSICA Maher, 100 mg at 03/11/24 0800    ezetimibe (ZETIA) tablet 10 mg, 10 mg, Oral, Daily With Dinner, 10 mg at 03/11/24 1619 **AND** pravastatin (PRAVACHOL) tablet 40 mg, 40 mg, Oral, Daily With Dinner, JESSICA Maher, 40 mg at 03/11/24 1619    finasteride (PROSCAR) tablet 5 mg, 5 mg, Oral, Daily, JESSICA Maher, 5 mg at 03/11/24 0800    fish oil capsule 2,000 mg, 2,000 mg, Oral, Daily, JESSICA Maher, 2,000 mg at 03/11/24 1625    heparin (porcine) 25,000 units in 0.45% NaCl 250 mL infusion (premix), 3-20 Units/kg/hr (Order-Specific), Intravenous, Titrated, JESSICA Maher, Last Rate: 9.1 mL/hr at 03/12/24 0638, 10.1 Units/kg/hr at  "03/12/24 0638    heparin (porcine) injection 2,000 Units, 2,000 Units, Intravenous, Q6H PRN, JESSICA Maher, 2,000 Units at 03/12/24 0638    heparin (porcine) injection 4,000 Units, 4,000 Units, Intravenous, Q6H PRN, JESSICA Maher    metoclopramide (REGLAN) injection 10 mg, 10 mg, Intravenous, Q6H PRN, JESSICA Maher    nitroglycerin (NITRODUR) 0.1 mg/hr TD 24 hr patch, 0.1 mg, Transdermal, Daily, JESSICA Maher, 0.1 mg at 03/11/24 0800    pantoprazole (PROTONIX) EC tablet 40 mg, 40 mg, Oral, Early Morning, JESSICA Maher, 40 mg at 03/12/24 0637    polyethylene glycol (MIRALAX) packet 17 g, 17 g, Oral, Daily, JESSICA Maher    senna (SENOKOT) tablet 8.6 mg, 1 tablet, Oral, HS, JESSICA Maher    Laboratory Results:  Results from last 7 days   Lab Units 03/12/24  0530 03/11/24  1547 03/11/24  0448 03/10/24  0615 03/09/24  0457 03/08/24  0607 03/07/24  0604 03/06/24  0540   WBC Thousand/uL 6.61  --  6.34 6.57 7.35 8.26 7.10 7.92   HEMOGLOBIN g/dL 9.5*  --  10.6* 10.7* 10.5* 10.7* 10.7* 11.5*   HEMATOCRIT % 27.7*  --  31.6* 31.4* 31.1* 32.8* 31.4* 35.0*   PLATELETS Thousands/uL 127*  --  145* 131* 137* 131* 127* 167   POTASSIUM mmol/L 3.6 4.1 4.0 4.0 3.9 4.0 4.2 5.3   CHLORIDE mmol/L 109* 104 103 102 102 101 101 107   CO2 mmol/L 19* 20* 27 27 23 23 23 17*   BUN mg/dL 39* 42* 42* 46* 50* 54* 58* 49*   CREATININE mg/dL 1.68* 1.82* 1.93* 1.98* 1.78* 2.07* 2.40* 2.65*   CALCIUM mg/dL 7.9* 8.4 8.8 8.8 8.6 8.1* 8.0* 8.4   MAGNESIUM mg/dL  --   --   --   --  2.2 2.2 2.1 2.2   PHOSPHORUS mg/dL  --   --   --   --  3.3 3.5 3.4  --        Portions of the record may have been created with voice recognition software. Occasional wrong word or \"sound a like\" substitutions may have occurred due to the inherent limitations of voice recognition software. Read the chart carefully and recognize, using context, where substitutions have occurred. If you have any questions, please contact the dictating " provider.

## 2024-03-12 NOTE — PROGRESS NOTES
Critical Care Interval Transfer Note:    Please refer to progress note from earlier today for full details.     Barriers to discharge:   Femstop present until 1800   Heparin to restart at 1800     Consults: IP CONSULT TO CARDIOLOGY  IP CONSULT TO CASE MANAGEMENT  IP CONSULT TO NEPHROLOGY  IP CONSULT TO MEDICAL CRITICAL CARE  IP CONSULT TO CASE MANAGEMENT    Recommended to review admission imaging for incidental findings and document in discharge navigator: Chart reviewed, no known incidental findings noted at this time.      Discharge Plan: pending cardiology             Patient seen and evaluated by Critical Care today and deemed to be appropriate for transfer to [facility] with Telemetry. Spoke to Dr. Kent via TT from [facility] to accept transfer. Critical care can be contacted via [employer] with any questions or concerns.

## 2024-03-12 NOTE — ASSESSMENT & PLAN NOTE
3/5 Substernal CP at approx 1530 lasting 30 min, admitted to [facility] and placed on Heparin gtt   Recent admission at [facility] for same, was discharged with CP 2/2 AF   3/6 echo -- EF 55%, dyskinetic septum, severe RA dilation, severely reduces RV function, bioprosthetic AV, mild MVR and TVR  3/6 VQ scan --low probability for pulm embolus   3/8 Stress test -- left ventricular perfusion defect present in the mid to basal inferior and inferoseptal location(s) that is partially reversible  3/11 Cardiac cath -- patent LIMA to LAD patent vein graft to circumflex and LAD is mid 100% occluded. Patient also noted to have clot sitting in sinus of right coronary artery which disrupted the flow to right coronary artery which was not amendable to intervention     Plan:   Heparin ACS per protocol   Continuous telemetry   Cont plavix, nitro patch  Consider resuming metoprolol today   Potential d/c of TVP today   Cardiac diet

## 2024-03-12 NOTE — PROGRESS NOTES
Progress Note - Cardiology   [facility]     Vladimir Mathias 89 y.o. male MRN: [id number]  : 12/3/1932  Unit/Bed#: ICU 01 Encounter: [id number]    Assessment and Plan:   1. MI type I:  hs trop: 1276 (0hr), 1601 (2hr), 1808 (4hr)    -   hs trops random:  3126, 5951,11,193, >22973 x2    -   patient initially hesitant for IV anticoagulation secondary to history of epitaxsis, but was agreeable after long conversation and IV heparin was initiated on 3/6/2024 at approximately 1 PM. Completed heparin 3/9/2024    -   3/6/2024 TTE: EF visibly estimated at 55% with disconnect except him but otherwise abnormal wall motion. IVS is consistent with postoperative status, there is systolic flattening consistent with RV pressure overload. RV cavity is moderately dilated with moderate to severe reduction in systolic function. Left atrium is mildly dilated, right atrium severely dilated and mild mitral and tricuspid valve regurgitation. Bio prosthetic valve in the aortic position appears to be well seated with no evidence of para valvular regurgitation and gradient within expected range.    -   3/6/2024 VQ Scan: low probability of PE    -   Continue Toprol-XL 25 mg once a day    -   continue Vytorin 10/40 mg daily    -   continue aspirin 81 mg once a day    -   continue nitroglycerin patch 0.1 mg per hour on in the a.m. often the p.m.    -   3/8/2024 Lexiscan nuclear stress test: partially reversible inferior defect noted. Patient with known native RCA disease which was not bypass.    -   Patient loaded with Plavix 300 mg times one and will start Plavix 75 mg daily    -   3/11/2024 LHC: Lima to LAD was patent, SVG to circumflex was also noted be patent. Native LAD had a 100% mid occlusion. Patient noted to have a clot sitting in the sinus of the right coronary artery which has disrupted the flow to the RCA not amenable to intervention     2. AV heart block: patient experience intermittent AV block during right  coronary artery injection and after.    -   Temporary trans venous pacemaker placed via right groin venous sheath    -   heart rate 70 with MA 2.5 ms    -   patient stable overnight will remove trans venous pacemaker today, 3/12 2024     3. Coronary artery disease with history of CABG: a history of CABG times two in 2009 (Lima to LAD and saphenous vein graft to marginal)    -   follows with Dr. Steven Walton at [facility] in [place],  [phone].    -   12/15/2021 cardiac CTA: noted both bypass grafts were patent with chronic total occlusion of his LAD and diffuse plaque in both left circumflex and RCA.    -   Continue Toprol-XL 25 mg once a day    -   continue Vytorin 10/40 mg daily    -   continue aspirin 81 mg once a day    -   continue nitroglycerin patch 0.1 mg per hour on in the a.m. off in the p.m.    -   care as per #1     4. TUAN on chronic kidney disease: review records from [facility], appears his baseline creatinine is 1.4 - 1.7    -   follows with nephrologist at [place]    -   seen by Dr. Sheppard nephrology and consultation appreciated    -   creatinine on presentation was 2.65, slowly improving with creatinine of 1.78 today    -   IV fluids discontinued 3/8/2024 by nephrology    -   patient with nausea and vomiting since 5 AM 3/9/2024. Appears to be slightly volume overloaded. Discussed with nephrology, will give Lasix 20 mg IV times one and continue to monitor    -   3/10/2024 creatinine 1.98. Nephrology note reviewed and patient to start on isolyte in the a.m. at 100 mL per hour one hour prior to cardiac catheterization. Orders placed in chart and discussed with nurse.     5. Hypertension: blood pressure stable    -   continue Toprol and nitroglycerin with close monitoring     6. Paroxysmal atrial fibrillation flutter: patient has Lighting by LED device    -   telemetry reviewed on initial admission, patient appears to be changing between Mobitz II type 1 and Mobitz II Type 2     "-   3/8/2024 telemetry reviewed and patient appears to be sinus rhythm with first-degree AV heart block.    -   Continue to monitor telemetry     7. Aortic stenosis status post TAVR: TAVR performed in 3/31/2021 at [facility]    -   3/6/2024 TTE: Bio prosthetic valve in the aortic position appears to be well seated with no evidence of para valvular regurgitation and gradient within expected range.     8. History of epitaxis with anticoagulation (Xarelto): patient has watchmen device       Subjective / Objective:   Patient seen and examined. Patient without chest pain overnight. Heart rate stable. Heparin currently on hold for removal of arterial and venous sheets from right groin.    Vitals: Blood pressure 111/60, pulse 72, temperature 98.5 °F (36.9 °C), temperature source Temporal, resp. rate (!) 26, height 6' 1.5\" (1.867 m), weight 98.2 kg (216 lb 7.9 oz), SpO2 97%.  Vitals:    03/11/24 0600 03/11/24 0945   Weight: 97.1 kg (214 lb) 98.2 kg (216 lb 7.9 oz)     Body mass index is 28.18 kg/m².  BP Readings from Last 3 Encounters:   03/12/24 111/60   07/12/22 118/72   07/05/22 114/72     Orthostatic Blood Pressures      Flowsheet Row Most Recent Value   Blood Pressure 111/60 filed at 03/12/2024 0700   Patient Position - Orthostatic VS Lying filed at 03/11/2024 1200          I/O         03/10 0701  03/11 0700 03/11 0701  03/12 0700 03/12 0701  03/13 0700    P.O.       I.V. (mL/kg)  1570.5 (16)     Total Intake(mL/kg)  1570.5 (16)     Urine (mL/kg/hr) 700 (0.3) 1000 (0.4)     Total Output 700 1000     Net -700 +570.5                  Invasive Devices       Peripheral Intravenous Line  Duration             Peripheral IV 03/05/24 Left Forearm 6 days    Peripheral IV 03/11/24 Proximal;Right;Ventral (anterior) Forearm 1 day              Line  Duration             Arterial Sheath 5 Fr. Right Femoral 1 day    Venous Sheath 6 Fr. Right Femoral 1 day                      Intake/Output Summary (Last 24 hours) at 3/12/2024 " 1047  Last data filed at 3/12/2024 0601  Gross per 24 hour   Intake 1570.45 ml   Output 750 ml   Net 820.45 ml         Physical Exam:   Physical Exam  Vitals and nursing note reviewed.   Constitutional:       General: He is not in acute distress.     Appearance: Normal appearance. He is normal weight.   HENT:      Right Ear: External ear normal.      Left Ear: External ear normal.   Eyes:      General: No scleral icterus.        Right eye: No discharge.         Left eye: No discharge.   Cardiovascular:      Rate and Rhythm: Normal rate and regular rhythm.      Pulses: Normal pulses.      Heart sounds: Murmur heard.   Pulmonary:      Effort: Pulmonary effort is normal.      Breath sounds: Normal breath sounds.   Abdominal:      General: Bowel sounds are normal. There is no distension.      Palpations: Abdomen is soft.   Musculoskeletal:      Right lower leg: No edema.      Left lower leg: No edema.   Skin:     General: Skin is warm and dry.      Capillary Refill: Capillary refill takes less than 2 seconds.   Neurological:      General: No focal deficit present.      Mental Status: He is alert and oriented to person, place, and time. Mental status is at baseline.   Psychiatric:         Mood and Affect: Mood normal.                 Medications/ Allergies:     Current Facility-Administered Medications   Medication Dose Route Frequency Provider Last Rate    acetaminophen  650 mg Oral Q4H PRN JESSICA Maher      aluminum-magnesium hydroxide-simethicone  30 mL Oral Q4H PRN JESSICA Maher      aspirin  81 mg Oral Daily JESSICA Maher      bisacodyl  10 mg Rectal Daily PRN JESSICA Maher      chlorhexidine  15 mL Mouth/Throat Q12H [facility] JESSICA Maher      clopidogrel  75 mg Oral Daily JESSICA Maher      docusate sodium  100 mg Oral Daily JESSICA Maher      ezetimibe  10 mg Oral Daily With Dinner JESSICA Maher      And    pravastatin  40 mg Oral Daily With Dinner JESSICA Maher       finasteride  5 mg Oral Daily Jayshree Earl, CRNP      fish oil  2,000 mg Oral Daily Jayshree Earl, CRNP      heparin (porcine)  3-20 Units/kg/hr (Order-Specific) Intravenous Titrated Jayshree Earl, CRNP 10.1 Units/kg/hr (03/12/24 0638)    heparin (porcine)  2,000 Units Intravenous Q6H PRN Jayshree Earl, CRNP      heparin (porcine)  4,000 Units Intravenous Q6H PRN Jayshree Earl, CRNP      metoclopramide  10 mg Intravenous Q6H PRN Jayshree Earl, CRNP      nitroglycerin  0.1 mg Transdermal Daily Jayshree Earl, CRNP      pantoprazole  40 mg Oral Early Morning Jayshree Earl, CRNP      polyethylene glycol  17 g Oral Daily Jayshree Earl, CRNP      senna  1 tablet Oral HS Jayshreejozef Salvadortler, CRNP       acetaminophen, 650 mg, Q4H PRN  aluminum-magnesium hydroxide-simethicone, 30 mL, Q4H PRN  bisacodyl, 10 mg, Daily PRN  heparin (porcine), 2,000 Units, Q6H PRN  heparin (porcine), 4,000 Units, Q6H PRN  metoclopramide, 10 mg, Q6H PRN      No Known Allergies    VTE Pharmacologic Prophylaxis:   Sequential compression device (Venodyne)  and Heparin    Labs:   Troponins:  Results from last 7 days   Lab Units 03/07/24  0604 03/06/24  1812 03/06/24  1231 03/06/24  0540 03/05/24  2239 03/05/24  2023   HS TNI RAND ng/L >22,973* >22,973* 11,193*   < >  --   --    HSTNI D2 ng/L  --   --   --   --   --  325*   HSTNI D4 ng/L  --   --   --   --  532*  --     < > = values in this interval not displayed.         CBC with diff:  Results from last 7 days   Lab Units 03/12/24  0530 03/11/24  0448 03/10/24  0615 03/09/24  0457 03/08/24  0607 03/07/24  0604 03/06/24  0540 03/05/24  1830   WBC Thousand/uL 6.61 6.34 6.57 7.35 8.26 7.10 7.92 6.51   HEMOGLOBIN g/dL 9.5* 10.6* 10.7* 10.5* 10.7* 10.7* 11.5* 11.5*   HEMATOCRIT % 27.7* 31.6* 31.4* 31.1* 32.8* 31.4* 35.0* 34.6*   MCV fL 100* 100* 100* 100* 100* 99* 100* 101*   PLATELETS Thousands/uL 127* 145* 131* 137* 131* 127* 167 148*   RBC Million/uL 2.77* 3.16* 3.13* 3.12* 3.28* 3.17* 3.50* 3.44*   MCH  pg 34.3 33.5 34.2 33.7 32.6 33.8 32.9 33.4   MCHC g/dL 34.3 33.5 34.1 33.8 32.6 34.1 32.9 33.2   RDW % 14.6 14.4 14.6 14.6 14.7 14.9 14.7 14.6   MPV fL 10.6 10.7 10.4 10.7 10.9 11.0 11.1 11.0   NRBC AUTO /100 WBCs  --   --   --   --   --   --   --  0       CMP:  Results from last 7 days   Lab Units 03/12/24  0530 03/11/24  1547 03/11/24  0448 03/10/24  0615 03/09/24  0457 03/08/24  0607 03/07/24  0604 03/06/24  0540 03/05/24  2239 03/05/24  1830   SODIUM mmol/L 136 134* 135 136 135 134* 133*   < > 135 134*   POTASSIUM mmol/L 3.6 4.1 4.0 4.0 3.9 4.0 4.2   < > 4.9 5.4*   CHLORIDE mmol/L 109* 104 103 102 102 101 101   < > 106 104   CO2 mmol/L 19* 20* 27 27 23 23 23   < > 20* 22   ANION GAP mmol/L 8 10 5 7 10 10 9   < > 9 8   BUN mg/dL 39* 42* 42* 46* 50* 54* 58*   < > 45* 41*   CREATININE mg/dL 1.68* 1.82* 1.93* 1.98* 1.78* 2.07* 2.40*   < > 2.24* 2.01*   CALCIUM mg/dL 7.9* 8.4 8.8 8.8 8.6 8.1* 8.0*   < > 9.0 9.1   AST U/L  --   --   --   --   --   --   --   --  210* 200*   ALT U/L  --   --   --   --   --   --   --   --  198* 167*   ALK PHOS U/L  --   --   --   --   --   --   --   --  76 77   TOTAL PROTEIN g/dL  --   --   --   --   --   --   --   --  6.8 7.0   ALBUMIN g/dL  --   --   --   --  3.6 3.5 3.6  --  3.9 4.1   TOTAL BILIRUBIN mg/dL  --   --   --   --   --   --   --   --  0.45 0.48   EGFR ml/min/1.73sq m 34 31 29 28 32 27 22   < > 24 28    < > = values in this interval not displayed.       Magnesium:  Results from last 7 days   Lab Units 03/09/24  0457 03/08/24  0607 03/07/24  0604 03/06/24  0540   MAGNESIUM mg/dL 2.2 2.2 2.1 2.2     Coags:  Results from last 7 days   Lab Units 03/12/24  0530 03/11/24  2308 03/11/24  1547 03/11/24  1002 03/10/24  0615 03/09/24  0457 03/08/24  0607   PTT seconds 47* 62* 141* >210* 82* 73* 74*       Imaging & Testing   I have personally reviewed pertinent reports.    Cardiac catheterization    Result Date: 3/11/2024  Narrative:   Mid LAD lesion is 100% stenosed.   1st Mrg lesion  is 100% stenosed.   Ost RCA to Prox RCA lesion is 90% stenosed.  Which had thrombus sitting in the mouth which temporarily decrease the flow in the right coronary artery.   Since patient has intermittent AV block a temporary pacemaker was placed without any complications.     NM Myocardial Perfusion Spect (Pharmacological Induced Stress and/or Rest)    Result Date: 3/9/2024  Narrative:   Stress ECG: Right bundle branch block was seen. The stress ECG is equivocal for ischemia after pharmacologic vasodilation.   Perfusion: There is a left ventricular perfusion defect present in the mid to basal inferior and inferoseptal location(s) that is partially reversible.   Stress Combined Conclusion: Left ventricular perfusion is abnormal.   Stress Function: Left ventricular function post-stress is normal. Stress ejection fraction is 65%. Abnormal study, Perfusion: There is a left ventricular perfusion defect present in the mid to basal inferior and inferoseptal location(s) that is partially reversible. SSS 7 SRS 5 SDS 2     Stress strip    Result Date: 3/8/2024  Narrative: Confirmed by MAISHA FREEMAN ([id number]),  Marilu Rothman ([id number]) on 3/8/2024 10:37:07 AM    US kidney and bladder    Result Date: 3/6/2024  Narrative: RENAL ULTRASOUND INDICATION: Renal failure. COMPARISON: None TECHNIQUE: Ultrasound of the retroperitoneum was performed with a curvilinear transducer utilizing volumetric sweeps and still imaging techniques. FINDINGS: KIDNEYS: Symmetric and normal size. Right kidney: 11.0 x 5.0 x 5.2 cm. Volume 149.8 mL Left kidney: 10.8 x 5.4 x 4.5 cm. Volume 136.3 mL Right kidney Normal echogenicity and contour. No mass is identified. No hydronephrosis. No shadowing calculi. Trace perinephric simple free fluid. Left kidney Normal echogenicity and contour. No mass is identified. No hydronephrosis. No shadowing calculi. No perinephric fluid collections. URETERS: Nonvisualized. BLADDER: Normally distended. No focal thickening or  mass lesions. The right ureteral jet is seen. The left ureteral jet is not visualized.     Impression: Normal. Workstation performed: [id number]     NM lung ventilation / perfusion    Result Date: 3/6/2024  Narrative: VENTILATION AND PERFUSION SCAN INDICATION: RV enlargement on Echo - new,  Question PE COMPARISON:  Chest radiograph 3/5/2023 TECHNIQUE:  Posterior ventilation imaging was performed after the inhalation of 30.1 mCi nebulized Tc-99m DTPA with deposition of less than 1 mCi of activity within the lungs. Multiplanar perfusion imaging was next performed following the intravenous administration of 4.35 mCi Tc-99m labeled MAA. FINDINGS: Ventilation imaging demonstrates partial deposition of radiopharmaceutical activity within the central bronchi. Perfusion imaging demonstrates a matching subsegmental defect in the posterior aspect of the right lower lobe. There are no suspicious ventilation/perfusion mismatches.     Impression: The probability for pulmonary embolus is low. Workstation performed: [id number]     Echo complete    Result Date: 3/6/2024  Narrative:   Left Ventricle: Left ventricular cavity size is lower normal Wall thickness is moderately increased. The left ventricular ejection fraction is 55%. Systolic function is normal. Dyskinetic septum otherwise normal wall motion Unable to assess diastolic function due to atrial flutter.   IVS: There is abnormal septal motion consistent with post-operative status. There is systolic flattening of the interventricular septum consistent with right ventricle pressure overload.   Right Ventricle: Right ventricular cavity size is moderately dilated. Systolic function is moderately to severely reduced.   Left Atrium: The atrium is mildly dilated.   Right Atrium: The atrium is severely dilated.   Aortic Valve: There is a TAVR bioprosthetic valve. The prosthetic valve appears well-seated. There is no evidence of paravalvular regurgitation. The gradient recorded  "across the prosthetic aortic valve is within the expected range. The aortic valve peak velocity is 1.57 m/s. The aortic valve mean gradient is 6 mmHg.   Mitral Valve: There is mild regurgitation.   Tricuspid Valve: There is mild regurgitation. Abnormal study, compared to prior echo report increased RV size and decreased systolic function. Elevated pulmonary pressures are suggested by LV systolic flattening.     XR chest 1 view portable    Result Date: 3/6/2024  Narrative: XR CHEST PORTABLE INDICATION: chest pain. COMPARISON: None FINDINGS: No airspace consolidation, pneumothorax, pulmonary edema, or pleural effusion. Trace left apical scarring.. Prior CABG. Mild cardiomegaly allowing for portable AP technique. Aortic calcification is present. Mild degenerative changes of bilateral shoulders. Normal upper abdomen.     Impression: No radiographic evidence of acute intrathoracic process. Workstation performed: [id number]     XR chest 1 view    Result Date: 3/3/2024  Narrative: CHEST X-RAY  SINGLE VIEW: HISTORY: chest pain;   PRIORS: Chest radiograph on October 8, 2010. FINDINGS: LUNGS: Clear.  No pleural abnormality seen. HEART: Mild cardiomegaly. Sternotomy. MEDIASTINUM: Normal. OTHER FINDINGS:  None.        Impression:  No acute pulmonary abnormality.       EKG / Monitor: Personally reviewed.    Sinus rhythm with first-degree AV heart block, patient is not used venous pacemaker overnight      Roula LOPEZ  Cardiology      \"This note was completed in part utilizing [email or web address] fluency direct voice recognition software.   Grammatical errors, random word insertion, spelling mistakes, and incomplete sentences may be an occasional consequence of the system secondary to software limitations, ambient noise and hardware issues.    Please read the chart carefully and recognize, using context, where substitutions have occurred.  If you have any questions or concerns about the context, text or information contained within the " "body of this dictation, please contact myself, the provider, for further clarification.\"  "

## 2024-03-12 NOTE — ASSESSMENT & PLAN NOTE
Reports of intermittent HB2 since admission, with noted HB3 during Cardiac cath 3/11  3/11 TVP placed via R groin during Cardiac Cath   Rate dropped to 30 overnight     Plan:   Resume metoprolol   Potential d/c of TVP pending vs PPM?  Cardiology following

## 2024-03-12 NOTE — ASSESSMENT & PLAN NOTE
Baseline creat 1.5-1.7 in the setting of HTN and age-related nephron loss   Creat peak appears to be 2.65, likely in the setting of NSTEMI as above   Nephrology consulted who suggests gentle IVF with NS @ 75    Plan:   Cont to hold home lasix   Strict I&O  Avoid nephrotoxic agents   Maintain MAP > 65  Discontinue IVF's today

## 2024-03-12 NOTE — PLAN OF CARE
Problem: PAIN - ADULT  Goal: Verbalizes/displays adequate comfort level or baseline comfort level  Description: Interventions:  - Encourage patient to monitor pain and request assistance  - Assess pain using appropriate pain scale  - Administer analgesics based on type and severity of pain and evaluate response  - Implement non-pharmacological measures as appropriate and evaluate response  - Consider cultural and social influences on pain and pain management  - Notify physician/advanced practitioner if interventions unsuccessful or patient reports new pain  Outcome: Progressing     Problem: INFECTION - ADULT  Goal: Absence or prevention of progression during hospitalization  Description: INTERVENTIONS:  - Assess and monitor for signs and symptoms of infection  - Monitor lab/diagnostic results  - Monitor all insertion sites, i.e. indwelling lines, tubes, and drains  - Monitor endotracheal if appropriate and nasal secretions for changes in amount and color  - Central appropriate cooling/warming therapies per order  - Administer medications as ordered  - Instruct and encourage patient and family to use good hand hygiene technique  - Identify and instruct in appropriate isolation precautions for identified infection/condition  Outcome: Progressing  Goal: Absence of fever/infection during neutropenic period  Description: INTERVENTIONS:  - Monitor WBC    Outcome: Progressing     Problem: SAFETY ADULT  Goal: Patient will remain free of falls  Description: INTERVENTIONS:  - Educate patient/family on patient safety including physical limitations  - Instruct patient to call for assistance with activity   - Consult OT/PT to assist with strengthening/mobility   - Keep Call bell within reach  - Keep bed low and locked with side rails adjusted as appropriate  - Keep care items and personal belongings within reach  - Initiate and maintain comfort rounds  - Make Fall Risk Sign visible to staff  - Offer Toileting every 2 Hours,  in advance of need  - Initiate/Maintain bed alarm  - Apply yellow socks and bracelet for high fall risk patients  - Consider moving patient to room near nurses station  Outcome: Progressing  Goal: Maintain or return to baseline ADL function  Description: INTERVENTIONS:  -  Assess patient's ability to carry out ADLs; assess patient's baseline for ADL function and identify physical deficits which impact ability to perform ADLs (bathing, care of mouth/teeth, toileting, grooming, dressing, etc.)  - Assess/evaluate cause of self-care deficits   - Assess range of motion  - Assess patient's mobility; develop plan if impaired  - Assess patient's need for assistive devices and provide as appropriate  - Encourage maximum independence but intervene and supervise when necessary  - Involve family in performance of ADLs  - Assess for home care needs following discharge   - Consider OT consult to assist with ADL evaluation and planning for discharge  - Provide patient education as appropriate  Outcome: Progressing  Goal: Maintains/Returns to pre admission functional level  Description: INTERVENTIONS:  - Perform AM-PAC 6 Click Basic Mobility/ Daily Activity assessment daily.  - Set and communicate daily mobility goal to care team and patient/family/caregiver.   - Collaborate with rehabilitation services on mobility goals if consulted  - Perform Range of Motion 4 times a day.  - Reposition patient every 2 hours.  - Record patient progress and toleration of activity level   Outcome: Progressing     Problem: DISCHARGE PLANNING  Goal: Discharge to home or other facility with appropriate resources  Description: INTERVENTIONS:  - Identify barriers to discharge w/patient and caregiver  - Arrange for needed discharge resources and transportation as appropriate  - Identify discharge learning needs (meds, wound care, etc.)  - Arrange for interpretive services to assist at discharge as needed  - Refer to Case Management Department for  coordinating discharge planning if the patient needs post-hospital services based on physician/advanced practitioner order or complex needs related to functional status, cognitive ability, or social support system  Outcome: Progressing     Problem: Knowledge Deficit  Goal: Patient/family/caregiver demonstrates understanding of disease process, treatment plan, medications, and discharge instructions  Description: Complete learning assessment and assess knowledge base.  Interventions:  - Provide teaching at level of understanding  - Provide teaching via preferred learning methods  Outcome: Progressing     Problem: Prexisting or High Potential for Compromised Skin Integrity  Goal: Skin integrity is maintained or improved  Description: INTERVENTIONS:  - Identify patients at risk for skin breakdown  - Assess and monitor skin integrity  - Assess and monitor nutrition and hydration status  - Monitor labs   - Assess for incontinence   - Turn and reposition patient  - Assist with mobility/ambulation  - Relieve pressure over bony prominences  - Avoid friction and shearing  - Provide appropriate hygiene as needed including keeping skin clean and dry  - Evaluate need for skin moisturizer/barrier cream  - Collaborate with interdisciplinary team   - Patient/family teaching  - Consider wound care consult   Outcome: Progressing     Problem: Nutrition/Hydration-ADULT  Goal: Nutrient/Hydration intake appropriate for improving, restoring or maintaining nutritional needs  Description: Monitor and assess patient's nutrition/hydration status for malnutrition. Collaborate with interdisciplinary team and initiate plan and interventions as ordered.  Monitor patient's weight and dietary intake as ordered or per policy. Utilize nutrition screening tool and intervene as necessary. Determine patient's food preferences and provide high-protein, high-caloric foods as appropriate.     INTERVENTIONS:  - Monitor oral intake, urinary output, labs,  and treatment plans  - Assess nutrition and hydration status and recommend course of action  - Evaluate amount of meals eaten  - Assist patient with eating if necessary   - Allow adequate time for meals  - Recommend/ encourage appropriate diets, oral nutritional supplements, and vitamin/mineral supplements  - Order, calculate, and assess calorie counts as needed  -- Assess need for intravenous fluids  - Provide specific nutrition/hydration education as appropriate  - Include patient/family/caregiver in decisions related to nutrition  Outcome: Progressing     Problem: Nutrition/Hydration-ADULT  Goal: Nutrient/Hydration intake appropriate for improving, restoring or maintaining nutritional needs  Description: Monitor and assess patient's nutrition/hydration status for malnutrition. Collaborate with interdisciplinary team and initiate plan and interventions as ordered.  Monitor patient's weight and dietary intake as ordered or per policy. Utilize nutrition screening tool and intervene as necessary. Determine patient's food preferences and provide high-protein, high-caloric foods as appropriate.     INTERVENTIONS:  - Monitor oral intake, urinary output, labs, and treatment plans  - Assess nutrition and hydration status and recommend course of action  - Evaluate amount of meals eaten  - Assist patient with eating if necessary   - Allow adequate time for meals  - Recommend/ encourage appropriate diets, oral nutritional supplements, and vitamin/mineral supplements  - Order, calculate, and assess calorie counts as needed  -- Assess need for intravenous fluids  - Provide specific nutrition/hydration education as appropriate  - Include patient/family/caregiver in decisions related to nutrition  Outcome: Progressing

## 2024-03-12 NOTE — QUICK NOTE
Update provided to son, Vladimir, and patient at the bedside. Vladimir Decker Is aware of his downgrade and removal of TVP.

## 2024-03-12 NOTE — ASSESSMENT & PLAN NOTE
Hx of same with Watchman device not on OP AC   Rate control: metoprolol    Plan:  Consider resuming metoprolol today   Continuous telemetry

## 2024-03-12 NOTE — ASSESSMENT & PLAN NOTE
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 Steven Walton at [facility] in [place]    Plan:   Consider resuming metoprolol today   Cont plavix  Home Vytorin not on formulary -- cont with pravastatin and Zetia while IP

## 2024-03-13 LAB
ANION GAP SERPL CALCULATED.3IONS-SCNC: 10 MMOL/L (ref 4–13)
APTT PPP: 58 SECONDS (ref 23–37)
APTT PPP: 64 SECONDS (ref 23–37)
ATRIAL RATE: 70 BPM
BASOPHILS # BLD AUTO: 0.03 THOUSANDS/ÂΜL (ref 0–0.1)
BASOPHILS NFR BLD AUTO: 0 % (ref 0–1)
BUN SERPL-MCNC: 53 MG/DL (ref 5–25)
CALCIUM SERPL-MCNC: 8.8 MG/DL (ref 8.4–10.2)
CHLORIDE SERPL-SCNC: 103 MMOL/L (ref 96–108)
CO2 SERPL-SCNC: 19 MMOL/L (ref 21–32)
CREAT SERPL-MCNC: 2.11 MG/DL (ref 0.6–1.3)
EOSINOPHIL # BLD AUTO: 0.03 THOUSAND/ÂΜL (ref 0–0.61)
EOSINOPHIL NFR BLD AUTO: 0 % (ref 0–6)
ERYTHROCYTE [DISTWIDTH] IN BLOOD BY AUTOMATED COUNT: 14.7 % (ref 11.6–15.1)
GFR SERPL CREATININE-BSD FRML MDRD: 26 ML/MIN/1.73SQ M
GLUCOSE SERPL-MCNC: 132 MG/DL (ref 65–140)
HCT VFR BLD AUTO: 29.8 % (ref 36.5–49.3)
HGB BLD-MCNC: 9.9 G/DL (ref 12–17)
IMM GRANULOCYTES # BLD AUTO: 0.04 THOUSAND/UL (ref 0–0.2)
IMM GRANULOCYTES NFR BLD AUTO: 1 % (ref 0–2)
LYMPHOCYTES # BLD AUTO: 0.67 THOUSANDS/ÂΜL (ref 0.6–4.47)
LYMPHOCYTES NFR BLD AUTO: 10 % (ref 14–44)
MAGNESIUM SERPL-MCNC: 2 MG/DL (ref 1.9–2.7)
MCH RBC QN AUTO: 33.1 PG (ref 26.8–34.3)
MCHC RBC AUTO-ENTMCNC: 33.2 G/DL (ref 31.4–37.4)
MCV RBC AUTO: 100 FL (ref 82–98)
MONOCYTES # BLD AUTO: 0.76 THOUSAND/ÂΜL (ref 0.17–1.22)
MONOCYTES NFR BLD AUTO: 11 % (ref 4–12)
NEUTROPHILS # BLD AUTO: 5.52 THOUSANDS/ÂΜL (ref 1.85–7.62)
NEUTS SEG NFR BLD AUTO: 78 % (ref 43–75)
NRBC BLD AUTO-RTO: 0 /100 WBCS
P AXIS: 63 DEGREES
PHOSPHATE SERPL-MCNC: 3.6 MG/DL (ref 2.3–4.1)
PLATELET # BLD AUTO: 159 THOUSANDS/UL (ref 149–390)
PMV BLD AUTO: 10.9 FL (ref 8.9–12.7)
POTASSIUM SERPL-SCNC: 4.4 MMOL/L (ref 3.5–5.3)
PR INTERVAL: 408 MS
QRS AXIS: 36 DEGREES
QRSD INTERVAL: 144 MS
QT INTERVAL: 350 MS
QTC INTERVAL: 378 MS
RBC # BLD AUTO: 2.99 MILLION/UL (ref 3.88–5.62)
SODIUM SERPL-SCNC: 132 MMOL/L (ref 135–147)
T WAVE AXIS: 110 DEGREES
VENTRICULAR RATE: 70 BPM
WBC # BLD AUTO: 7.05 THOUSAND/UL (ref 4.31–10.16)

## 2024-03-13 PROCEDURE — 85730 THROMBOPLASTIN TIME PARTIAL: CPT | Performed by: INTERNAL MEDICINE

## 2024-03-13 PROCEDURE — 85025 COMPLETE CBC W/AUTO DIFF WBC: CPT | Performed by: NURSE PRACTITIONER

## 2024-03-13 PROCEDURE — 99233 SBSQ HOSP IP/OBS HIGH 50: CPT | Performed by: INTERNAL MEDICINE

## 2024-03-13 PROCEDURE — 97163 PT EVAL HIGH COMPLEX 45 MIN: CPT

## 2024-03-13 PROCEDURE — 99232 SBSQ HOSP IP/OBS MODERATE 35: CPT | Performed by: NURSE PRACTITIONER

## 2024-03-13 PROCEDURE — 80048 BASIC METABOLIC PNL TOTAL CA: CPT | Performed by: NURSE PRACTITIONER

## 2024-03-13 PROCEDURE — 99232 SBSQ HOSP IP/OBS MODERATE 35: CPT | Performed by: INTERNAL MEDICINE

## 2024-03-13 PROCEDURE — 97110 THERAPEUTIC EXERCISES: CPT

## 2024-03-13 PROCEDURE — 83735 ASSAY OF MAGNESIUM: CPT | Performed by: NURSE PRACTITIONER

## 2024-03-13 PROCEDURE — 93010 ELECTROCARDIOGRAM REPORT: CPT | Performed by: INTERNAL MEDICINE

## 2024-03-13 PROCEDURE — 84100 ASSAY OF PHOSPHORUS: CPT | Performed by: NURSE PRACTITIONER

## 2024-03-13 RX ORDER — SODIUM CHLORIDE, SODIUM GLUCONATE, SODIUM ACETATE, POTASSIUM CHLORIDE, MAGNESIUM CHLORIDE, SODIUM PHOSPHATE, DIBASIC, AND POTASSIUM PHOSPHATE .53; .5; .37; .037; .03; .012; .00082 G/100ML; G/100ML; G/100ML; G/100ML; G/100ML; G/100ML; G/100ML
75 INJECTION, SOLUTION INTRAVENOUS CONTINUOUS
Status: DISCONTINUED | OUTPATIENT
Start: 2024-03-13 | End: 2024-03-14

## 2024-03-13 RX ORDER — BISACODYL 10 MG
10 SUPPOSITORY, RECTAL RECTAL ONCE
Status: COMPLETED | OUTPATIENT
Start: 2024-03-13 | End: 2024-03-13

## 2024-03-13 RX ADMIN — EZETIMIBE 10 MG: 10 TABLET ORAL at 17:09

## 2024-03-13 RX ADMIN — FINASTERIDE 5 MG: 5 TABLET, FILM COATED ORAL at 08:30

## 2024-03-13 RX ADMIN — NITROGLYCERIN 0.1 MG: 0.1 PATCH TRANSDERMAL at 08:30

## 2024-03-13 RX ADMIN — PANTOPRAZOLE SODIUM 40 MG: 40 TABLET, DELAYED RELEASE ORAL at 06:16

## 2024-03-13 RX ADMIN — CLOPIDOGREL 75 MG: 75 TABLET ORAL at 08:30

## 2024-03-13 RX ADMIN — ASPIRIN 81 MG 81 MG: 81 TABLET ORAL at 08:29

## 2024-03-13 RX ADMIN — SODIUM CHLORIDE, SODIUM GLUCONATE, SODIUM ACETATE, POTASSIUM CHLORIDE, MAGNESIUM CHLORIDE, SODIUM PHOSPHATE, DIBASIC, AND POTASSIUM PHOSPHATE 75 ML/HR: .53; .5; .37; .037; .03; .012; .00082 INJECTION, SOLUTION INTRAVENOUS at 08:28

## 2024-03-13 RX ADMIN — SENNOSIDES 8.6 MG: 8.6 TABLET, FILM COATED ORAL at 21:52

## 2024-03-13 RX ADMIN — PRAVASTATIN SODIUM 40 MG: 40 TABLET ORAL at 17:09

## 2024-03-13 RX ADMIN — POLYETHYLENE GLYCOL 3350 17 G: 17 POWDER, FOR SOLUTION ORAL at 08:30

## 2024-03-13 RX ADMIN — TICAGRELOR 90 MG: 90 TABLET ORAL at 13:01

## 2024-03-13 RX ADMIN — SODIUM CHLORIDE, SODIUM GLUCONATE, SODIUM ACETATE, POTASSIUM CHLORIDE, MAGNESIUM CHLORIDE, SODIUM PHOSPHATE, DIBASIC, AND POTASSIUM PHOSPHATE 75 ML/HR: .53; .5; .37; .037; .03; .012; .00082 INJECTION, SOLUTION INTRAVENOUS at 21:52

## 2024-03-13 RX ADMIN — DOCUSATE SODIUM 100 MG: 100 CAPSULE, LIQUID FILLED ORAL at 08:29

## 2024-03-13 RX ADMIN — TICAGRELOR 90 MG: 90 TABLET ORAL at 21:52

## 2024-03-13 RX ADMIN — OMEGA-3 FATTY ACIDS CAP 1000 MG 2000 MG: 1000 CAP at 08:29

## 2024-03-13 RX ADMIN — BISACODYL 10 MG: 10 SUPPOSITORY RECTAL at 09:31

## 2024-03-13 NOTE — PHYSICAL THERAPY NOTE
PHYSICAL THERAPY EVALUATION/TREATMENT     03/13/24 4475   PT Last Visit   PT Visit Date 03/13/24   Note Type   Note type Evaluation   Pain Assessment   Pain Assessment Tool Sosa-Baker FACES   Sosa-Baker FACES Pain Rating 4  (Bilateral knee areas right greater than left)   Restrictions/Precautions   Other Precautions Chair Alarm;Bed Alarm;Multiple lines;Telemetry;Fall Risk;Pain  (Seen in ICU)   Home Living   Type of Home House   Home Layout One level  (No stairs to enter)   Home Equipment Cane   Additional Comments , Patient independent without assistive device prior to admission   Prior Function   Level of [place] Independent with ADLs;Independent with functional mobility;Independent with IADLS   Lives With Alone   Receives Help From Family   IADLs Independent with driving;Independent with meal prep;Independent with medication management   Comments Patient states being independent with all ADLs and IADLs as well as continues to golf using a cart   General   Additional Pertinent History Chart reviewed, patient admitted with NSTEMI.  Patient now seen in ICU and presents as generally weak and deconditioned with resulting gait dysfunction.  Patient also with shortness of breath with activity.  Patient independent prior to admission driving caring for self and home   Family/Caregiver Present No   Cognition   Overall Cognitive Status WFL   Arousal/Participation Cooperative   Orientation Level Oriented X4   Following Commands Follows multistep commands with increased time or repetition   Subjective   Subjective Patient states feeling weak, deconditioned and short of breath with limited gait activity   RLE Assessment   RLE Assessment   (Range of motion within functional limits, strength 3+/5)   LLE Assessment   LLE Assessment   (Range of motion within functional limits, strength 3+/5)   Coordination   Movements are Fluid and Coordinated 0   Coordination and Movement Description Decreased coordination with  transfer and gait activity   Bed Mobility   Additional Comments Patient sitting out of bed in bedside chair upon arrival by therapist   Transfers   Sit to Stand 3  Moderate assistance   Additional items Assist x 1;Verbal cues   Stand to Sit 3  Moderate assistance   Additional items Assist x 1;Verbal cues   Ambulation/Elevation   Gait Assistance 2  Maximal assist   Additional items Assist x 1;Verbal cues;Tactile cues   Assistive Device Rolling walker   Distance 3 to 5 feet with change in direction, slow gait patterning narrow base of support shortened stride length noted   Balance   Static Sitting Fair   Dynamic Sitting Fair -   Static Standing Poor +   Dynamic Standing Poor   Ambulatory Poor   Activity Tolerance   Activity Tolerance Patient limited by fatigue;Treatment limited secondary to medical complications (Comment)   Nurse Made Aware Yes   Assessment   Prognosis Good   Problem List Decreased strength;Decreased range of motion;Decreased endurance;Impaired balance;Decreased mobility;Decreased coordination;Pain   Goals   Patient Goals To get stronger and get back to independent   STG Expiration Date 03/20/24   Short Term Goal #1 Transfers and gait with roller walker with min assist   Short Term Goal #2 Gait endurance to 50 feet   LTG Expiration Date 03/27/24   Long Term Goal #1 Strength bilateral lower extremities 4/5   Long Term Goal #2 Transfers and gait with roller walker independently for functional household distances   Plan   Treatment/Interventions ADL retraining;Functional transfer training;LE strengthening/ROM;Therapeutic exercise;Endurance training;Patient/family training;Equipment eval/education;Bed mobility;Gait training;Compensatory technique education   PT Frequency Other (Comment)  (5 times per week)   Discharge Recommendation   Rehab Resource Intensity Level, PT II (Moderate Resource Intensity)   AM-PAC Basic Mobility Inpatient   Turning in Flat Bed Without Bedrails 3   Lying on Back to Sitting on  Edge of Flat Bed Without Bedrails 2   Moving Bed to Chair 2   Standing Up From Chair Using Arms 2   Walk in Room 2   Climb 3-5 Stairs With Railing 1   Basic Mobility Inpatient Raw Score 12   Basic Mobility Standardized Score 32.23   Highest Level Of Mobility   -[facility] Goal 4: Move to chair/commode   -HLM Achieved 6: Walk 10 steps or more   Barthel Index   Feeding 10   Bathing 0   Grooming Score 0   Dressing Score 5   Bladder Score 10   Bowels Score 10   Toilet Use Score 5   Transfers (Bed/Chair) Score 5   Mobility (Level Surface) Score 0   Stairs Score 0   Barthel Index Score 45   Additional Treatment Session   Start Time 1440   End Time 1455   Treatment Assessment s: Patient reports bilateral knee pain and general weakness O: Bilateral lower extremity exercise completed as listed below A: Patient seen in ICU presents as generally weak and deconditioned with resulting gait dysfunction.  Patient will benefit from continued physical therapy with progression as tolerated and increasing functional mobility with clinical staff as well   Exercises   Hamstring Stretch Sitting;5 reps;PROM   Heelslides Sitting;5 reps;Bilateral   Hip Flexion Sitting;5 reps;Bilateral  (Alternating)   Hip Abduction Sitting;5 reps;Bilateral  (Alternating)   Knee AROM Long Arc Quad Sitting;5 reps;Bilateral  (Alternating)   Ankle Pumps Sitting;10 reps   Licensure   [place] License Number  Denise Meng, PT 4 [phone] [id number]

## 2024-03-13 NOTE — PROGRESS NOTES
"[facility]  Progress Note  Name: Vladimir Mathias I  MRN: [id number]  Unit/Bed#: ICU 01 I Date of Admission: 3/5/2024   Date of Service: 3/13/2024 I Hospital Day: 8    Assessment/Plan   * NSTEMI (non-ST elevated myocardial infarction) (HCC)  Assessment & Plan  Patient with chest pain and dyspnea exertion x 4 days.  Recent admission at [facility] on March 3 and discharged same day.  At that time noticed that his chest pain was secondary to atrial flutter.  His troponins at that time were 196, 234, 346     Latest Reference Range & Units 03/05/24 18:30 03/05/24 20:23 03/05/24 22:39 03/06/24 05:40 03/06/24 08:12 03/06/24 12:31   hs TnI 0hr \"Refer to ACS Flowchart\"- see link ng/L 1,276 (H)        hs TnI 2hr \"Refer to ACS Flowchart\"- see link ng/L  1,601 (H)       Delta 2hr hsTnI <20 ng/L  325 (H)       hs TnI 4hr \"Refer to ACS Flowchart\"- see link ng/L   1,808 (H)      Delta 4hr hsTnI <20 ng/L   532 (H)      HS TnI random 8 - 18 ng/L    3,126 (H) 5,951 (H) 11,193 (H)   BNP 0 - 100 pg/mL 282 (H)        (H): Data is abnormally high    Patient received 324 mg of aspirin via EMS  Consulted cardiology  TTE 3/6/24: EF 50% There is systolic flattening of the interventricular septum consistent with right ventricle pressure overload, There is systolic flattening of the interventricular septum consistent with right ventricle pressure overload. RA severely dilated, RV mildly dilated,     3/6/2024 VQ Scan: low probability of PE    -   Continue Toprol-XL 25 mg once a day    -   continue Vytorin 10/40 mg daily    -   continue aspirin 81 mg once a day    -start Brillinta as per cardiology  Patient had been on heparin gtt post cath which showed a mid LAD lesion 100% stenosed, first marginal lesion 100% stenosed, OST RCA to proximal RCA lesion 90% stenosed which had thrombus with temporary decrease in flow to RCA.  Patient was also noted to have intermittent A-V block which a TVP was placed which was " removed on 3/12 as per cardiology.    -   continue nitroglycerin patch 0.1 mg per hour on in the a.m. often the p.m.  3/8/2024 Lexiscan nuclear stress test: partially reversible inferior defect noted. Patient with known native RCA disease which was not bypass.    -   Patient loaded with Plavix 300 mg times one and started Plavix 75 mg daily  Cath performed on 3/11 as noted above  Continue fluids per nephro recommendations  Patient did have a bump in his creatinine to 2.11 on 3/13, gentle IV hydration as per nephrology.        Third degree heart block (HCC)  Assessment & Plan  Patient was noted on monitor to have intermittent third-degree heart block  Cardiology did cardiac catheterization where it was discovered that patient had a thrombus with temporary decrease in flow to RCA and intermittent AV block for which a TVP was placed on 3/11.  Patient had been placed on heparin drip postcardiac cath  Temporary venous pacer was removed on 3/12  Heparin drip transition to aspirin and Brilinta on 3/13    Atrial flutter (HCC)  Assessment & Plan  Continue with Lopressor.    Status post watchman flex (24 mm) in 2023    On his hospitalization at [facility] March 3, Xeralto discontinued due to epistaxis  Heparin gtt had been initiated on admission, stopped on 3/9.  Placed back on heparin drip 3/11 with cardiac catheterization revealing no thrombus noted OST RCA to proximal RCA  Transition to Brilinta and aspirin on 3/13    Nonrheumatic aortic valve stenosis  Assessment & Plan  Status post [employer] evolute valve in 2022    S/P CABG (coronary artery bypass graft)  Assessment & Plan  CAD status post CABG 2009. [place] to LAD and saphenous vein graft to circumflex marginal, Cardiac CTA 12/15/2021 revealed patent LIMA to LAD, patent vein graft to circumflex obtuse marginal #1 an extensive plaque in the native RCA,     Cardiac cath performed on 3/11 as noted above    Continue aspirin.    Continue Vytorin  Started  Brilinta 3/13    CAD (coronary artery disease)  Assessment & Plan  CAD status post CABG 2009. [place] to LAD and saphenous vein graft to circumflex marginal, Cardiac CTA 12/15/2021 revealed patent LIMA to LAD, patent vein graft to circumflex obtuse marginal #1 an extensive plaque in the native RCA    Cardiac cath performed on 3/11 as noted above.  Continue aspirin, Vytorin and Brilinta    TUAN on CKD 3  Assessment & Plan  Baseline creatinine:1.5-1.7 mg/dl based on labs from [facility] dating back to 2019   Admission Cr 2.01  Nephrology consulted due to worsening Cr up to 2.4, Improved to baseline with IVF.  Cr lashaun to 1.9 after lasix 20 mg iv on 3/9  Patient underwent cardiac catheterization 3/11, nephrology following.  Creatinine noted to rise to 2.11, receiving gentle IV hydration on 3/13  Repeat BMP in a.m.        Lab Results   Component Value Date    EGFR 29 03/11/2024    EGFR 28 03/10/2024    EGFR 32 03/09/2024    CREATININE 1.93 (H) 03/11/2024    CREATININE 1.98 (H) 03/10/2024    CREATININE 1.78 (H) 03/09/2024       Essential hypertension  Assessment & Plan  Continue Lopressor, ACE inhibitor discontinued to TUAN     BPH (benign prostatic hyperplasia)  Assessment & Plan  Avodart substituted for finasteride    TUAN (acute kidney injury) (HCC)-resolved as of 3/10/2024  Assessment & Plan  Admission creatinine 2.01.   Baseline Creatinine: 1.5-1.7    Trended up to 2.65 on hospital day 1  Nephrology consulted  Placed on  sodium bicarb gtt @75 ml/hr for 1 day then isolyte for 1 day with Cr improvement down to baseline. 1.7  S/P Lasix 20 mg IV x 1 on 3/9 with slight Cr bump to 1.9  Remains at 1.9                 VTE Pharmacologic Prophylaxis: VTE Score: 4 Moderate Risk (Score 3-4) - Pharmacological DVT Prophylaxis Ordered: asa and brillinta .    Mobility:   Basic Mobility Inpatient Raw Score: 16  JH-HLM Goal: 5: Stand one or more mins  JH-HLM Achieved: 4: Move to chair/commode  HLM Goal NOT achieved. Continue with  multidisciplinary rounding and encourage appropriate mobility to improve upon HLM goals.    Patient Centered Rounds: I performed bedside rounds with nursing staff today.   Discussions with Specialists or Other Care Team Provider: multi    Education and Discussions with Family / Patient: Updated  (son) via phone.    Total Time Spent on Date of Encounter in care of patient: greater than 45 mins. This time was spent on one or more of the following: performing physical exam; counseling and coordination of care; obtaining or reviewing history; documenting in the medical record; reviewing/ordering tests, medications or procedures; communicating with other healthcare professionals and discussing with patient's family/caregivers.    Current Length of Stay: 8 day(s)  Current Patient Status: Inpatient   Certification Statement: The patient will continue to require additional inpatient hospital stay due to transition to asa and brillinta, PT and OT eval and treatment, follow up on recommendations   Discharge Plan: Anticipate discharge in 24-48 hrs to discharge location to be determined pending rehab evaluations.    Code Status: Level 3 - DNAR and DNI    Subjective:   Patient seen sitting up in bed resting comfortably had just had some breakfast.  Reports that he slept okay last night.  Denies any chest pain.  Does report that he feels like generalized weakness and attributes that to being in bed for the past 7 days.  Denies any shortness of breath.    Objective:     Vitals:   Temp (24hrs), Av.9 °F (36.6 °C), Min:97.5 °F (36.4 °C), Max:98.3 °F (36.8 °C)    Temp:  [97.5 °F (36.4 °C)-98.3 °F (36.8 °C)] 98.3 °F (36.8 °C)  HR:  [66-75] 73  Resp:  [25-33] 25  BP: ()/(53-66) 98/54  SpO2:  [97 %-99 %] 99 %  Body mass index is 28.32 kg/m².     Input and Output Summary (last 24 hours):     Intake/Output Summary (Last 24 hours) at 3/13/2024 1616  Last data filed at 3/13/2024 0130  Gross per 24 hour   Intake --    Output 450 ml   Net -450 ml       Physical Exam:   Physical Exam  Vitals and nursing note reviewed.   HENT:      Head: Normocephalic.      Nose: Nose normal.      Mouth/Throat:      Mouth: Mucous membranes are moist.   Eyes:      Extraocular Movements: Extraocular movements intact.      Conjunctiva/sclera: Conjunctivae normal.   Cardiovascular:      Rate and Rhythm: Normal rate. Rhythm irregular.      Heart sounds: Murmur heard.   Pulmonary:      Effort: Pulmonary effort is normal.      Breath sounds: Normal breath sounds.   Abdominal:      General: Bowel sounds are normal. There is no distension.      Palpations: Abdomen is soft.      Tenderness: There is no abdominal tenderness.   Genitourinary:     Comments: Voiding spontaneously   Musculoskeletal:      Cervical back: Normal range of motion.      Right lower leg: No edema.      Left lower leg: No edema.      Comments: Generalized deconditioning    Skin:     General: Skin is warm and dry.      Capillary Refill: Capillary refill takes less than 2 seconds.   Neurological:      General: No focal deficit present.      Mental Status: He is alert and oriented to person, place, and time.   Psychiatric:         Mood and Affect: Mood normal.         Behavior: Behavior normal.         Thought Content: Thought content normal.         Judgment: Judgment normal.          Additional Data:     Labs:  Results from last 7 days   Lab Units 03/13/24  0624   WBC Thousand/uL 7.05   HEMOGLOBIN g/dL 9.9*   HEMATOCRIT % 29.8*   PLATELETS Thousands/uL 159   NEUTROS PCT % 78*   LYMPHS PCT % 10*   MONOS PCT % 11   EOS PCT % 0     Results from last 7 days   Lab Units 03/13/24  0624 03/10/24  0615 03/09/24  0457   SODIUM mmol/L 132*   < > 135   POTASSIUM mmol/L 4.4   < > 3.9   CHLORIDE mmol/L 103   < > 102   CO2 mmol/L 19*   < > 23   BUN mg/dL 53*   < > 50*   CREATININE mg/dL 2.11*   < > 1.78*   ANION GAP mmol/L 10   < > 10   CALCIUM mg/dL 8.8   < > 8.6   ALBUMIN g/dL  --   --  3.6    GLUCOSE RANDOM mg/dL 132   < > 125    < > = values in this interval not displayed.                       Lines/Drains:  Invasive Devices       Peripheral Intravenous Line  Duration             Peripheral IV 03/11/24 Proximal;Right;Ventral (anterior) Forearm 2 days    Peripheral IV 03/13/24 Left;Ventral (anterior) Forearm <1 day                      Telemetry:  Telemetry Orders (From admission, onward)               24 Hour Telemetry Monitoring  Continuous x 24 Hours (Telem)        Question:  Reason for 24 Hour Telemetry  Answer:  PCI/EP study (including pacer and ICD implementation), Cardiac surgery, MI, abnormal cardiac cath, and chest pain- rule out MI                     Telemetry Reviewed: Normal Sinus Rhythm  Indication for Continued Telemetry Use: Arrthymias requiring medical therapy             Imaging: No pertinent imaging reviewed.    Recent Cultures (last 7 days):         Last 24 Hours Medication List:   Current Facility-Administered Medications   Medication Dose Route Frequency Provider Last Rate    acetaminophen  650 mg Oral Q4H PRN JESSICA Maher      aluminum-magnesium hydroxide-simethicone  30 mL Oral Q4H PRN JESSICA Maher      aspirin  81 mg Oral Daily JESSICA Maher      bisacodyl  10 mg Rectal Daily PRN JESSICA Maher      docusate sodium  100 mg Oral Daily JESSICA Maher      ezetimibe  10 mg Oral Daily With Dinner JESSICA Maher      And    pravastatin  40 mg Oral Daily With Dinner JESSICA Maher      finasteride  5 mg Oral Daily JESSICA Maher      fish oil  2,000 mg Oral Daily JESSICA Maher      metoclopramide  10 mg Intravenous Q6H PRN JESSICA Maher      multi-electrolyte  75 mL/hr Intravenous Continuous Zaki Grimm MD 75 mL/hr (03/13/24 0828)    nitroglycerin  0.1 mg Transdermal Daily JESSICA Maher      pantoprazole  40 mg Oral Early Morning JESSICA Maher      polyethylene glycol  17 g Oral Daily JESSICA Maher       senna  1 tablet Oral HS JESSICA Maher      ticagrelor  90 mg Oral Q12H [facility] JESSICA Charles          Today, Patient Was Seen By: JESSICA Alfred    **Please Note: This note may have been constructed using a voice recognition system.**

## 2024-03-13 NOTE — PLAN OF CARE
Problem: PAIN - ADULT  Goal: Verbalizes/displays adequate comfort level or baseline comfort level  Description: Interventions:  - Encourage patient to monitor pain and request assistance  - Assess pain using appropriate pain scale  - Administer analgesics based on type and severity of pain and evaluate response  - Implement non-pharmacological measures as appropriate and evaluate response  - Consider cultural and social influences on pain and pain management  - Notify physician/advanced practitioner if interventions unsuccessful or patient reports new pain  Outcome: Progressing     Problem: INFECTION - ADULT  Goal: Absence or prevention of progression during hospitalization  Description: INTERVENTIONS:  - Assess and monitor for signs and symptoms of infection  - Monitor lab/diagnostic results  - Monitor all insertion sites, i.e. indwelling lines, tubes, and drains  - Monitor endotracheal if appropriate and nasal secretions for changes in amount and color  - [place] appropriate cooling/warming therapies per order  - Administer medications as ordered  - Instruct and encourage patient and family to use good hand hygiene technique  - Identify and instruct in appropriate isolation precautions for identified infection/condition  Outcome: Progressing     Problem: SAFETY ADULT  Goal: Patient will remain free of falls  Description: INTERVENTIONS:  - Educate patient/family on patient safety including physical limitations  - Instruct patient to call for assistance with activity   - Consult OT/PT to assist with strengthening/mobility   - Keep Call bell within reach  - Keep bed low and locked with side rails adjusted as appropriate  - Keep care items and personal belongings within reach  - Initiate and maintain comfort rounds  - Make Fall Risk Sign visible to staff  - Offer Toileting every 2 Hours, in advance of need  - Initiate/Maintain bed alarm  - Apply yellow socks and bracelet for high fall risk patients  - Consider  moving patient to room near nurses station  Outcome: Progressing     Problem: Knowledge Deficit  Goal: Patient/family/caregiver demonstrates understanding of disease process, treatment plan, medications, and discharge instructions  Description: Complete learning assessment and assess knowledge base.  Interventions:  - Provide teaching at level of understanding  - Provide teaching via preferred learning methods  Outcome: Progressing     Problem: Prexisting or High Potential for Compromised Skin Integrity  Goal: Skin integrity is maintained or improved  Description: INTERVENTIONS:  - Identify patients at risk for skin breakdown  - Assess and monitor skin integrity  - Assess and monitor nutrition and hydration status  - Monitor labs   - Assess for incontinence   - Turn and reposition patient  - Assist with mobility/ambulation  - Relieve pressure over bony prominences  - Avoid friction and shearing  - Provide appropriate hygiene as needed including keeping skin clean and dry  - Evaluate need for skin moisturizer/barrier cream  - Collaborate with interdisciplinary team   - Patient/family teaching  - Consider wound care consult   Outcome: Progressing     Problem: Nutrition/Hydration-ADULT  Goal: Nutrient/Hydration intake appropriate for improving, restoring or maintaining nutritional needs  Description: Monitor and assess patient's nutrition/hydration status for malnutrition. Collaborate with interdisciplinary team and initiate plan and interventions as ordered.  Monitor patient's weight and dietary intake as ordered or per policy. Utilize nutrition screening tool and intervene as necessary. Determine patient's food preferences and provide high-protein, high-caloric foods as appropriate.     INTERVENTIONS:  - Monitor oral intake, urinary output, labs, and treatment plans  - Assess nutrition and hydration status and recommend course of action  - Evaluate amount of meals eaten  - Assist patient with eating if necessary   -  Allow adequate time for meals  - Recommend/ encourage appropriate diets, oral nutritional supplements, and vitamin/mineral supplements  - Order, calculate, and assess calorie counts as needed  -- Assess need for intravenous fluids  - Provide specific nutrition/hydration education as appropriate  - Include patient/family/caregiver in decisions related to nutrition  Outcome: Progressing

## 2024-03-13 NOTE — CASE MANAGEMENT
Case Management Discharge Planning Note    Patient name Vladimir Mathias  Location ICU 01/ICU 01 MRN [id number]  : 12/3/1932 Date 3/13/2024       Current Admission Date: 3/5/2024  Current Admission Diagnosis:NSTEMI (non-ST elevated myocardial infarction) (HCC)   Patient Active Problem List    Diagnosis Date Noted    Third degree heart block (HCC) 2024    BPH (benign prostatic hyperplasia) 2024    CAD (coronary artery disease) 2024    TUAN on CKD 3 2024    Essential hypertension 2024    Atrial flutter (HCC) 2024    Hyperkalemia 2024    Elevated LFTs 2024    NSTEMI (non-ST elevated myocardial infarction) (HCC) 2024    Nonrheumatic aortic valve stenosis 2017    S/P CABG (coronary artery bypass graft) 2016    Hyperlipidemia 2016      LOS (days): 8  Geometric Mean LOS (GMLOS) (days): 2.4  Days to GMLOS:-5.4     OBJECTIVE:  Risk of Unplanned Readmission Score: 18.6     Current admission status: Inpatient   Preferred Pharmacy:   PATIENT/FAMILY REPORTS NO PREFERRED PHARMACY  No address on file      [id number] [place], [place] - [address] [place] [id number]  Phone: [phone] Fax: [phone]    Primary Care Provider: No primary care provider on file.    Primary Insurance: A[employer] REP  Secondary Insurance:     DISCHARGE DETAILS:    Discharge planning discussed with:: Patient  Freedom of Choice: Yes  Comments - Freedom of Choice: SW following to monitor needs and assist with planning.  OSMANY contacted CRNP to request PT/OT evaluations for pt.  SW met with pt to discuss discharge options.  Pt expressed that he feels much weaker than he was prior to admission and is concerned about his ability to return home alone.  SW offered that PT/OT evaluations have been ordered so recommendations will be made following sessions.  SW reviewed all options including acute and skilled rehab and home and outpatient therapy.  Pt said  he would be open to rehab if needed and preferred acute level if possible.  After reviewing different facility options pt said he would prefer [facility] (acute) and possibly [facility] (skilled).  Pt also shared that there is an outpatient therapy clinic close to his home that he could possibly go to if he can find transportation until he can drive himself again.  SW offered to explore all options for pt once therapy evaluations are completed.  Pt was agreeable with rehab and home care referrals being made.  SW offered ongoing support and assistance.  SW will follow to monitor progress and assist with planning as needed.  SW placed call to pt's son to review plans and options as well.  CM contacted family/caregiver?: Yes  Were Treatment Team discharge recommendations reviewed with patient/caregiver?: Yes  Did patient/caregiver verbalize understanding of patient care needs?: Yes  Were patient/caregiver advised of the risks associated with not following Treatment Team discharge recommendations?: Yes    Contacts  Patient Contacts: Vladimir Mathias Jr. (son)  Relationship to Patient:: Family  Contact Method: Phone  Phone Number: [phone]  Reason/Outcome: Discharge Planning    Requested Home Health Care         Is the patient interested in HHC at discharge?:  (pending evaluations)    Other Referral/Resources/Interventions Provided:  Interventions: Acute Rehab, Short Term Rehab, HHC  Referral Comments: SW following to monitor needs and assist with planning pending recommendations    Treatment Team Recommendation:  (pending therapy evaluations)    IMM Given (Date):: 03/13/24  IMM Given to:: Patient (IMM #2 reviewed with pt.  Pt verbalized understanding.  Pt signed IMM and copy given. Copy also placed in scan bin for chart.)

## 2024-03-13 NOTE — PROGRESS NOTES
NEPHROLOGY HOSPITAL PROGRESS NOTE   Vladimir Mathias 89 y.o. male MRN: [id number]  Unit/Bed#: ICU 01 Encounter: [id number]  Reason for Consult: TUAN on CKD    ASSESSMENT and PLAN:  89-year-old male with history of CAD, A-fib status post Watchman device and CKD presented with chest pain and dyspnea.  We are consulted for management of TUAN on CKD.    1.  Acute kidney injury.  Baseline creatinine 1.5-1.7.  Admission creatinine 2.01.  Peak creatinine 2.65 on 03/06.  Yoandy creatinine 1.68 on 03/12.  Creatinine has risen today to 2.1.  Urinalysis with no RBC, urine sodium of 17.  Kidney ultrasound did not show hydronephrosis.  Etiology of TUAN due to hemodynamic changes +/- hypotension in setting of NSTEMI.  Status post left heart catheterization with iodinated contrast administration on 03/11.  He now appears to have a component of contrast associated nephropathy.  No evidence of volume overload currently.  We will give Isolyte at 75 cc/h today.  Trend BMP.    2.  CKD stage IIIb.  Outpatient nephrologist is Dr. Barrientos at [facility].  Baseline creatinine is 1.5-1.7.  Etiology most likely in setting of hypertension and age-related nephron loss.    3.  Hypertension.  Blood pressure is currently borderline low.  He was on metoprolol which has been discontinued due to heart block during heart catheterization.    4.  NSTEMI.  Status postcardiac catheterization that showed patent ROGERS to LAD, patent vein graft to his marginal with a severe three-vessel disease.  Thrombus was noted in RCA which was causing intermittent AV block and a temporary pacemaker was placed.  Temporary pacemaker has now been discontinued and patient remains in sinus rhythm.    5.  Anemia in CKD.  Hemoglobin currently 9.9.  Transfuse to keep hemoglobin more than 7.    6.  Hyperchloremic acidosis.  This is most likely due to administration of normal saline and mild TUAN.  We will use Isolyte and trend serum bicarbonate level.      7.  Hyponatremia.  Serum  sodium down to 132 this morning.  This is in setting of TUAN.  We will restrict free fluid intake to 1500 cc/h and will trend serum sodium levels with administration of Isolyte.    Discussed with internal medicine team.  After discussion, we agreed that there is a component of contrast associated nephropathy and to give gentle hydration today.    SUBJECTIVE / 24H INTERVAL HISTORY:  Urine output recorded as 750 cc.  He denies dyspnea.  He denies leg swelling.  He denies any trouble with urination.    OBJECTIVE:  Current Weight: Weight - Scale: 98.7 kg (217 lb 9.5 oz)  Vitals:    03/13/24 0400 03/13/24 0600 03/13/24 0633 03/13/24 0724   BP: 108/56  107/66 107/56   Pulse: 66  68    Resp: (!) 25  (!) 30    Temp: 98.1 °F (36.7 °C)  97.5 °F (36.4 °C) 97.5 °F (36.4 °C)   TempSrc: Temporal   Temporal   SpO2: 97%  99%    Weight:  98.7 kg (217 lb 9.5 oz)     Height:           Intake/Output Summary (Last 24 hours) at 3/13/2024 0803  Last data filed at 3/13/2024 0130  Gross per 24 hour   Intake 280 ml   Output 750 ml   Net -470 ml     Review of Systems   Constitutional:  Negative for chills and fever.   HENT:  Negative for ear pain and sore throat.    Eyes:  Negative for pain and visual disturbance.   Respiratory:  Negative for cough and shortness of breath.    Cardiovascular:  Negative for chest pain and palpitations.   Gastrointestinal:  Negative for abdominal pain and vomiting.   Genitourinary:  Negative for dysuria and hematuria.   Musculoskeletal:  Negative for arthralgias and back pain.   Skin:  Negative for color change and rash.   Neurological:  Negative for seizures and syncope.   All other systems reviewed and are negative.    Physical Exam  Vitals and nursing note reviewed.   Constitutional:       General: He is not in acute distress.     Appearance: He is well-developed.   HENT:      Head: Normocephalic and atraumatic.   Eyes:      Conjunctiva/sclera: Conjunctivae normal.   Cardiovascular:      Rate and Rhythm:  Normal rate and regular rhythm.      Pulses: Normal pulses.      Heart sounds: Normal heart sounds. No murmur heard.  Pulmonary:      Effort: Pulmonary effort is normal. No respiratory distress.      Breath sounds: Normal breath sounds.   Abdominal:      Palpations: Abdomen is soft.      Tenderness: There is no abdominal tenderness.   Musculoskeletal:         General: No swelling.      Cervical back: Neck supple.      Right lower leg: No edema.      Left lower leg: No edema.   Skin:     General: Skin is warm and dry.      Capillary Refill: Capillary refill takes less than 2 seconds.   Neurological:      Mental Status: He is alert.   Psychiatric:         Mood and Affect: Mood normal.       Medications:    Current Facility-Administered Medications:     acetaminophen (TYLENOL) tablet 650 mg, 650 mg, Oral, Q4H PRN, JESSICA Maher, 650 mg at 03/11/24 1121    aluminum-magnesium hydroxide-simethicone (MAALOX) oral suspension 30 mL, 30 mL, Oral, Q4H PRN, JESSICA Maher    aspirin chewable tablet 81 mg, 81 mg, Oral, Daily, JESSICA Maher, 81 mg at 03/12/24 1001    bisacodyl (DULCOLAX) rectal suppository 10 mg, 10 mg, Rectal, Daily PRN, JESSICA Maher    clopidogrel (PLAVIX) tablet 75 mg, 75 mg, Oral, Daily, JESSICA Maher, 75 mg at 03/12/24 1001    docusate sodium (COLACE) capsule 100 mg, 100 mg, Oral, Daily, JESSICA Maher, 100 mg at 03/12/24 1001    ezetimibe (ZETIA) tablet 10 mg, 10 mg, Oral, Daily With Dinner, 10 mg at 03/12/24 1817 **AND** pravastatin (PRAVACHOL) tablet 40 mg, 40 mg, Oral, Daily With Dinner, JESSICA Maher, 40 mg at 03/12/24 1817    finasteride (PROSCAR) tablet 5 mg, 5 mg, Oral, Daily, JESSICA Maher, 5 mg at 03/12/24 1001    fish oil capsule 2,000 mg, 2,000 mg, Oral, Daily, JESSICA Maher, 2,000 mg at 03/12/24 1001    heparin (porcine) 25,000 units in 0.45% NaCl 250 mL infusion (premix), 3-20 Units/kg/hr (Order-Specific), Intravenous, Titrated, Jayshree  "JESSICA Elliott, Last Rate: 9.1 mL/hr at 03/12/24 1930, 10.1 Units/kg/hr at 03/12/24 1930    heparin (porcine) injection 2,000 Units, 2,000 Units, Intravenous, Q6H PRN, SHAY MaherNP, 2,000 Units at 03/12/24 0638    heparin (porcine) injection 4,000 Units, 4,000 Units, Intravenous, Q6H PRN, SHAY MaherNP    metoclopramide (REGLAN) injection 10 mg, 10 mg, Intravenous, Q6H PRN, SHAY MaherNP    multi-electrolyte (PLASMALYTE-A/ISOLYTE-S PH 7.4) IV solution, 75 mL/hr, Intravenous, Continuous, Zaki Grimm MD    nitroglycerin (NITRODUR) 0.1 mg/hr TD 24 hr patch, 0.1 mg, Transdermal, Daily, JESSICA Maher, 0.1 mg at 03/12/24 1043    pantoprazole (PROTONIX) EC tablet 40 mg, 40 mg, Oral, Early Morning, JESSICA Maher, 40 mg at 03/13/24 0616    polyethylene glycol (MIRALAX) packet 17 g, 17 g, Oral, Daily, JESSICA Maher, 17 g at 03/12/24 1001    senna (SENOKOT) tablet 8.6 mg, 1 tablet, Oral, HS, JESSICA Maher, 8.6 mg at 03/12/24 2127    Laboratory Results:  Results from last 7 days   Lab Units 03/13/24  0624 03/12/24  0530 03/11/24  1547 03/11/24  0448 03/10/24  0615 03/09/24  0457 03/08/24  0607 03/07/24  0604   WBC Thousand/uL 7.05 6.61  --  6.34 6.57 7.35 8.26 7.10   HEMOGLOBIN g/dL 9.9* 9.5*  --  10.6* 10.7* 10.5* 10.7* 10.7*   HEMATOCRIT % 29.8* 27.7*  --  31.6* 31.4* 31.1* 32.8* 31.4*   PLATELETS Thousands/uL 159 127*  --  145* 131* 137* 131* 127*   POTASSIUM mmol/L 4.4 3.6 4.1 4.0 4.0 3.9 4.0 4.2   CHLORIDE mmol/L 103 109* 104 103 102 102 101 101   CO2 mmol/L 19* 19* 20* 27 27 23 23 23   BUN mg/dL 53* 39* 42* 42* 46* 50* 54* 58*   CREATININE mg/dL 2.11* 1.68* 1.82* 1.93* 1.98* 1.78* 2.07* 2.40*   CALCIUM mg/dL 8.8 7.9* 8.4 8.8 8.8 8.6 8.1* 8.0*   MAGNESIUM mg/dL 2.0  --   --   --   --  2.2 2.2 2.1   PHOSPHORUS mg/dL 3.6  --   --   --   --  3.3 3.5 3.4       Portions of the record may have been created with voice recognition software. Occasional wrong word or \"sound a like\" " substitutions may have occurred due to the inherent limitations of voice recognition software. Read the chart carefully and recognize, using context, where substitutions have occurred. If you have any questions, please contact the dictating provider.

## 2024-03-13 NOTE — ASSESSMENT & PLAN NOTE
Patient was noted on monitor to have intermittent third-degree heart block  Cardiology did cardiac catheterization where it was discovered that patient had a thrombus with temporary decrease in flow to RCA and intermittent AV block for which a TVP was placed on 3/11.  Patient had been placed on heparin drip postcardiac cath  Temporary venous pacer was removed on 3/12  Heparin drip transition to aspirin and Brilinta on 3/13

## 2024-03-13 NOTE — PROGRESS NOTES
Progress Note - Cardiology   [facility]     Vladimir Mathias 89 y.o. male MRN: [id number]  : 12/3/1932  Unit/Bed#: ICU 01 Encounter: [id number]    Assessment and Plan:   1. MI type I:  hs trop: 1276 (0hr), 1601 (2hr), 1808 (4hr)    -   hs trops random:  3126, 5951,11,193, >22973 x2    -   patient initially hesitant for IV anticoagulation secondary to history of epitaxsis, but was agreeable after long conversation and IV heparin was initiated on 3/6/2024 at approximately 1 PM. Completed heparin 3/9/2024    -   3/6/2024 TTE: EF visibly estimated at 55% with disconnect except him but otherwise abnormal wall motion. IVS is consistent with postoperative status, there is systolic flattening consistent with RV pressure overload. RV cavity is moderately dilated with moderate to severe reduction in systolic function. Left atrium is mildly dilated, right atrium severely dilated and mild mitral and tricuspid valve regurgitation. Bio prosthetic valve in the aortic position appears to be well seated with no evidence of para valvular regurgitation and gradient within expected range.    -   3/6/2024 VQ Scan: low probability of PE    -   Continue Toprol-XL 25 mg once a day    -   continue Vytorin 10/40 mg daily    -   continue aspirin 81 mg once a day    -   continue nitroglycerin patch 0.1 mg per hour on in the a.m. often the p.m.    -   3/8/2024 Lexiscan nuclear stress test: partially reversible inferior defect noted. Patient with known native RCA disease which was not bypass.    -   3/11/2024 LHC: Lima to LAD was patent, SVG to circumflex was also noted be patent. Native LAD had a 100% mid occlusion. Patient noted to have a clot sitting in the sinus of the right coronary artery which has disrupted the flow to the RCA not amenable to intervention    -   discontinue IV heparin and transition patient to aspirin 81 mg once a day and Brilinta 90 mg BID.     2. AV heart block: patient experience intermittent AV  block during right coronary artery injection and after.    -   Temporary trans venous pacemaker placed via right groin venous sheath    -   heart rate 70 with MA 2.5 ms    -   patient stable overnight will remove trans venous pacemaker today, 3/12/ 2024     3. Coronary artery disease with history of CABG: a history of CABG times two in 2009 (Lima to LAD and saphenous vein graft to marginal)    -   follows with Dr. Steven Walton at [facility] in [place],  [phone].    -   12/15/2021 cardiac CTA: noted both bypass grafts were patent with chronic total occlusion of his LAD and diffuse plaque in both left circumflex and RCA.    -   Continue Toprol-XL 25 mg once a day    -   continue Vytorin 10/40 mg daily    -   continue aspirin 81 mg once a day    -   continue nitroglycerin patch 0.1 mg per hour on in the a.m. off in the p.m.    -   care as per #1     4. TUAN on chronic kidney disease: review records from [facility], appears his baseline creatinine is 1.4 - 1.7    -   follows with nephrologist at [place]    -   seen by Dr. Sheppard nephrology and consultation appreciated    -   creatinine on presentation was 2.65, slowly improving with creatinine of 1.78 today    -   IV fluids discontinued 3/8/2024 by nephrology    -   patient with nausea and vomiting since 5 AM 3/9/2024. Appears to be slightly volume overloaded. Discussed with nephrology, will give Lasix 20 mg IV times one and continue to monitor    -   3/13/2024 creatinine bump to 2.11 today, patient started on gentle IV hydration per nephrology.     5. Hypertension: blood pressure stable    -   continue Toprol and nitroglycerin with close monitoring     6. Paroxysmal atrial fibrillation flutter: patient has watchmen device    -   telemetry reviewed on initial admission, patient appears to be changing between Mobitz II type 1 and Mobitz II Type 2    -   3/8/2024 telemetry reviewed and patient appears to be sinus rhythm with first-degree AV  "heart block.    -   Continue to monitor telemetry     7. Aortic stenosis status post TAVR: TAVR performed in 3/31/2021 at [facility]    -   3/6/2024 TTE: Bio prosthetic valve in the aortic position appears to be well seated with no evidence of para valvular regurgitation and gradient within expected range.     8. History of epitaxis with anticoagulation (Xarelto): patient has watchmen device       Subjective / Objective:       Vitals: Blood pressure 115/60, pulse 73, temperature 97.5 °F (36.4 °C), temperature source Temporal, resp. rate (!) 25, height 6' 1.5\" (1.867 m), weight 98.7 kg (217 lb 9.5 oz), SpO2 99%.  Vitals:    03/11/24 0945 03/13/24 0600   Weight: 98.2 kg (216 lb 7.9 oz) 98.7 kg (217 lb 9.5 oz)     Body mass index is 28.32 kg/m².  BP Readings from Last 3 Encounters:   03/13/24 115/60   07/12/22 118/72   07/05/22 114/72     Orthostatic Blood Pressures      Flowsheet Row Most Recent Value   Blood Pressure 115/60 filed at 03/13/2024 0800   Patient Position - Orthostatic VS Lying filed at 03/13/2024 0800          I/O         03/11 0701  03/12 0700 03/12 0701  03/13 0700 03/13 0701  03/14 0700    P.O.  420     I.V. (mL/kg) 1570.5 (16)      Total Intake(mL/kg) 1570.5 (16) 420 (4.3)     Urine (mL/kg/hr) 1000 (0.4) 750 (0.3)     Total Output 1000 750     Net +570.5 -330                  Invasive Devices       Peripheral Intravenous Line  Duration             Peripheral IV 03/11/24 Proximal;Right;Ventral (anterior) Forearm 2 days    Peripheral IV 03/13/24 Left;Ventral (anterior) Forearm <1 day                      Intake/Output Summary (Last 24 hours) at 3/13/2024 1121  Last data filed at 3/13/2024 0130  Gross per 24 hour   Intake 280 ml   Output 750 ml   Net -470 ml         Physical Exam:   Physical Exam  Vitals and nursing note reviewed.   Constitutional:       General: He is not in acute distress.     Appearance: Normal appearance. He is obese.   HENT:      Right Ear: External ear normal.      Left Ear: " External ear normal.   Eyes:      General: No scleral icterus.        Right eye: No discharge.         Left eye: No discharge.   Cardiovascular:      Rate and Rhythm: Normal rate and regular rhythm.      Pulses: Normal pulses.      Heart sounds: Murmur heard.   Pulmonary:      Effort: Pulmonary effort is normal. No respiratory distress.      Breath sounds: Normal breath sounds.   Abdominal:      General: Bowel sounds are normal. There is no distension.      Palpations: Abdomen is soft.   Musculoskeletal:      Right lower leg: No edema.      Left lower leg: No edema.   Skin:     General: Skin is warm and dry.      Capillary Refill: Capillary refill takes less than 2 seconds.   Neurological:      General: No focal deficit present.      Mental Status: He is alert and oriented to person, place, and time. Mental status is at baseline.   Psychiatric:         Mood and Affect: Mood normal.              Medications/ Allergies:     Current Facility-Administered Medications   Medication Dose Route Frequency Provider Last Rate    acetaminophen  650 mg Oral Q4H PRN JESSICA Maher      aluminum-magnesium hydroxide-simethicone  30 mL Oral Q4H PRN JESSICA Maher      aspirin  81 mg Oral Daily JESSICA Maher      bisacodyl  10 mg Rectal Daily PRN JESSICA Maher      docusate sodium  100 mg Oral Daily JESSICA Maher      ezetimibe  10 mg Oral Daily With Dinner JESSICA Maher      And    pravastatin  40 mg Oral Daily With Dinner JESSICA Maher      finasteride  5 mg Oral Daily JESSICA Maher      fish oil  2,000 mg Oral Daily JESSICA Maher      metoclopramide  10 mg Intravenous Q6H PRN JESSICA Maher      multi-electrolyte  75 mL/hr Intravenous Continuous Zaki Grimm MD 75 mL/hr (03/13/24 0838)    nitroglycerin  0.1 mg Transdermal Daily JESSICA Maher      pantoprazole  40 mg Oral Early Morning JESSICA Maher      polyethylene glycol  17 g Oral Daily Jayshree Elliott  SHAYNP      senna  1 tablet Oral HS Jayshree Elliott, CRNP      ticagrelor  90 mg Oral Q12H [facility] Roula Randhawa, SHAYNP       acetaminophen, 650 mg, Q4H PRN  aluminum-magnesium hydroxide-simethicone, 30 mL, Q4H PRN  bisacodyl, 10 mg, Daily PRN  metoclopramide, 10 mg, Q6H PRN      No Known Allergies    VTE Pharmacologic Prophylaxis:   Sequential compression device (Venodyne)     Labs:   Troponins:  Results from last 7 days   Lab Units 03/07/24  0604 03/06/24  1812 03/06/24  1231   HS TNI RAND ng/L >22,973* >22,973* 11,193*     CBC with diff:  Results from last 7 days   Lab Units 03/13/24  0624 03/12/24  0530 03/11/24  0448 03/10/24  0615 03/09/24  0457 03/08/24  0607 03/07/24  0604   WBC Thousand/uL 7.05 6.61 6.34 6.57 7.35 8.26 7.10   HEMOGLOBIN g/dL 9.9* 9.5* 10.6* 10.7* 10.5* 10.7* 10.7*   HEMATOCRIT % 29.8* 27.7* 31.6* 31.4* 31.1* 32.8* 31.4*   MCV fL 100* 100* 100* 100* 100* 100* 99*   PLATELETS Thousands/uL 159 127* 145* 131* 137* 131* 127*   RBC Million/uL 2.99* 2.77* 3.16* 3.13* 3.12* 3.28* 3.17*   MCH pg 33.1 34.3 33.5 34.2 33.7 32.6 33.8   MCHC g/dL 33.2 34.3 33.5 34.1 33.8 32.6 34.1   RDW % 14.7 14.6 14.4 14.6 14.6 14.7 14.9   MPV fL 10.9 10.6 10.7 10.4 10.7 10.9 11.0   NRBC AUTO /100 WBCs 0  --   --   --   --   --   --      CMP:  Results from last 7 days   Lab Units 03/13/24  0624 03/12/24  0530 03/11/24  1547 03/11/24  0448 03/10/24  0615 03/09/24  0457 03/08/24  0607 03/07/24  0604   SODIUM mmol/L 132* 136 134* 135 136 135 134* 133*   POTASSIUM mmol/L 4.4 3.6 4.1 4.0 4.0 3.9 4.0 4.2   CHLORIDE mmol/L 103 109* 104 103 102 102 101 101   CO2 mmol/L 19* 19* 20* 27 27 23 23 23   ANION GAP mmol/L 10 8 10 5 7 10 10 9   BUN mg/dL 53* 39* 42* 42* 46* 50* 54* 58*   CREATININE mg/dL 2.11* 1.68* 1.82* 1.93* 1.98* 1.78* 2.07* 2.40*   CALCIUM mg/dL 8.8 7.9* 8.4 8.8 8.8 8.6 8.1* 8.0*   ALBUMIN g/dL  --   --   --   --   --  3.6 3.5 3.6   EGFR ml/min/1.73sq m 26 34 31 29 28 32 27 22     Magnesium:  Results from last 7 days   Lab  Units 03/13/24  0624 03/09/24  0457 03/08/24  0607 03/07/24  0604   MAGNESIUM mg/dL 2.0 2.2 2.2 2.1     Coags:  Results from last 7 days   Lab Units 03/13/24  0624 03/13/24  0020 03/12/24  0530 03/11/24  2308 03/11/24  1547 03/11/24  1002 03/10/24  0615   PTT seconds 58* 64* 47* 62* 141* >210* 82*       Imaging & Testing   I have personally reviewed pertinent reports.    Cardiac catheterization    Result Date: 3/11/2024  Narrative:   Mid LAD lesion is 100% stenosed.   1st Mrg lesion is 100% stenosed.   Ost RCA to Prox RCA lesion is 90% stenosed.  Which had thrombus sitting in the mouth which temporarily decrease the flow in the right coronary artery.   Since patient has intermittent AV block a temporary pacemaker was placed without any complications.     NM Myocardial Perfusion Spect (Pharmacological Induced Stress and/or Rest)    Result Date: 3/9/2024  Narrative:   Stress ECG: Right bundle branch block was seen. The stress ECG is equivocal for ischemia after pharmacologic vasodilation.   Perfusion: There is a left ventricular perfusion defect present in the mid to basal inferior and inferoseptal location(s) that is partially reversible.   Stress Combined Conclusion: Left ventricular perfusion is abnormal.   Stress Function: Left ventricular function post-stress is normal. Stress ejection fraction is 65%. Abnormal study, Perfusion: There is a left ventricular perfusion defect present in the mid to basal inferior and inferoseptal location(s) that is partially reversible. SSS 7 SRS 5 SDS 2     Stress strip    Result Date: 3/8/2024  Narrative: Confirmed by MAISHA FREEMAN ([id number]),  Marilu Rothman ([id number]) on 3/8/2024 10:37:07 AM    US kidney and bladder    Result Date: 3/6/2024  Narrative: RENAL ULTRASOUND INDICATION: Renal failure. COMPARISON: None TECHNIQUE: Ultrasound of the retroperitoneum was performed with a curvilinear transducer utilizing volumetric sweeps and still imaging techniques. FINDINGS: KIDNEYS:  Symmetric and normal size. Right kidney: 11.0 x 5.0 x 5.2 cm. Volume 149.8 mL Left kidney: 10.8 x 5.4 x 4.5 cm. Volume 136.3 mL Right kidney Normal echogenicity and contour. No mass is identified. No hydronephrosis. No shadowing calculi. Trace perinephric simple free fluid. Left kidney Normal echogenicity and contour. No mass is identified. No hydronephrosis. No shadowing calculi. No perinephric fluid collections. URETERS: Nonvisualized. BLADDER: Normally distended. No focal thickening or mass lesions. The right ureteral jet is seen. The left ureteral jet is not visualized.     Impression: Normal. Workstation performed: [id number]     NM lung ventilation / perfusion    Result Date: 3/6/2024  Narrative: VENTILATION AND PERFUSION SCAN INDICATION: RV enlargement on Echo - new,  Question PE COMPARISON:  Chest radiograph 3/5/2023 TECHNIQUE:  Posterior ventilation imaging was performed after the inhalation of 30.1 mCi nebulized Tc-99m DTPA with deposition of less than 1 mCi of activity within the lungs. Multiplanar perfusion imaging was next performed following the intravenous administration of 4.35 mCi Tc-99m labeled MAA. FINDINGS: Ventilation imaging demonstrates partial deposition of radiopharmaceutical activity within the central bronchi. Perfusion imaging demonstrates a matching subsegmental defect in the posterior aspect of the right lower lobe. There are no suspicious ventilation/perfusion mismatches.     Impression: The probability for pulmonary embolus is low. Workstation performed: [id number]     Echo complete    Result Date: 3/6/2024  Narrative:   Left Ventricle: Left ventricular cavity size is lower normal Wall thickness is moderately increased. The left ventricular ejection fraction is 55%. Systolic function is normal. Dyskinetic septum otherwise normal wall motion Unable to assess diastolic function due to atrial flutter.   IVS: There is abnormal septal motion consistent with post-operative status. There  "is systolic flattening of the interventricular septum consistent with right ventricle pressure overload.   Right Ventricle: Right ventricular cavity size is moderately dilated. Systolic function is moderately to severely reduced.   Left Atrium: The atrium is mildly dilated.   Right Atrium: The atrium is severely dilated.   Aortic Valve: There is a TAVR bioprosthetic valve. The prosthetic valve appears well-seated. There is no evidence of paravalvular regurgitation. The gradient recorded across the prosthetic aortic valve is within the expected range. The aortic valve peak velocity is 1.57 m/s. The aortic valve mean gradient is 6 mmHg.   Mitral Valve: There is mild regurgitation.   Tricuspid Valve: There is mild regurgitation. Abnormal study, compared to prior echo report increased RV size and decreased systolic function. Elevated pulmonary pressures are suggested by LV systolic flattening.     XR chest 1 view portable    Result Date: 3/6/2024  Narrative: XR CHEST PORTABLE INDICATION: chest pain. COMPARISON: None FINDINGS: No airspace consolidation, pneumothorax, pulmonary edema, or pleural effusion. Trace left apical scarring.. Prior CABG. Mild cardiomegaly allowing for portable AP technique. Aortic calcification is present. Mild degenerative changes of bilateral shoulders. Normal upper abdomen.     Impression: No radiographic evidence of acute intrathoracic process. Workstation performed: [id number]     XR chest 1 view    Result Date: 3/3/2024  Narrative: CHEST X-RAY  SINGLE VIEW: HISTORY: chest pain;   PRIORS: Chest radiograph on October 8, 2010. FINDINGS: LUNGS: Clear.  No pleural abnormality seen. HEART: Mild cardiomegaly. Sternotomy. MEDIASTINUM: Normal. OTHER FINDINGS:  None.        Impression:  No acute pulmonary abnormality.       EKG / Monitor: Personally reviewed.    Sinus rhythm with first-degree AV heart block        Roula LOPEZ  Cardiology      \"This note was completed in part utilizing m-modal " "fluency direct voice recognition software.   Grammatical errors, random word insertion, spelling mistakes, and incomplete sentences may be an occasional consequence of the system secondary to software limitations, ambient noise and hardware issues.    Please read the chart carefully and recognize, using context, where substitutions have occurred.  If you have any questions or concerns about the context, text or information contained within the body of this dictation, please contact myself, the provider, for further clarification.\"  "

## 2024-03-14 ENCOUNTER — APPOINTMENT (INPATIENT)
Dept: RADIOLOGY | Facility: HOSPITAL | Age: 89
DRG: 243 | End: 2024-03-14
Payer: COMMERCIAL

## 2024-03-14 ENCOUNTER — APPOINTMENT (INPATIENT)
Dept: NON INVASIVE DIAGNOSTICS | Facility: HOSPITAL | Age: 89
DRG: 243 | End: 2024-03-14
Payer: COMMERCIAL

## 2024-03-14 ENCOUNTER — PREP FOR PROCEDURE (OUTPATIENT)
Dept: NON INVASIVE DIAGNOSTICS | Facility: HOSPITAL | Age: 89
End: 2024-03-14

## 2024-03-14 DIAGNOSIS — I44.2 THIRD DEGREE HEART BLOCK (HCC): Primary | ICD-10-CM

## 2024-03-14 PROBLEM — E87.20 METABOLIC ACIDOSIS: Status: ACTIVE | Noted: 2024-03-14

## 2024-03-14 PROBLEM — E87.1 HYPONATREMIA: Status: ACTIVE | Noted: 2024-03-14

## 2024-03-14 LAB
AMORPH URATE CRY URNS QL MICRO: ABNORMAL /HPF
ANION GAP SERPL CALCULATED.3IONS-SCNC: 11 MMOL/L (ref 4–13)
ANION GAP SERPL CALCULATED.3IONS-SCNC: 12 MMOL/L (ref 4–13)
AORTIC ROOT: 2.7 CM
APICAL FOUR CHAMBER EJECTION FRACTION: 68 %
APTT PPP: 34 SECONDS (ref 23–37)
APTT PPP: 64 SECONDS (ref 23–37)
ATRIAL RATE: 66 BPM
BACTERIA UR QL AUTO: ABNORMAL /HPF
BILIRUB UR QL STRIP: NEGATIVE
BSA FOR ECHO PROCEDURE: 2.24 M2
BUN SERPL-MCNC: 65 MG/DL (ref 5–25)
BUN SERPL-MCNC: 71 MG/DL (ref 5–25)
CALCIUM SERPL-MCNC: 8.7 MG/DL (ref 8.4–10.2)
CALCIUM SERPL-MCNC: 9.3 MG/DL (ref 8.4–10.2)
CARDIAC TROPONIN I PNL SERPL HS: ABNORMAL NG/L (ref 8–18)
CHLORIDE SERPL-SCNC: 102 MMOL/L (ref 96–108)
CHLORIDE SERPL-SCNC: 98 MMOL/L (ref 96–108)
CLARITY UR: CLEAR
CO2 SERPL-SCNC: 17 MMOL/L (ref 21–32)
CO2 SERPL-SCNC: 19 MMOL/L (ref 21–32)
COLOR UR: YELLOW
CREAT SERPL-MCNC: 2.32 MG/DL (ref 0.6–1.3)
CREAT SERPL-MCNC: 2.53 MG/DL (ref 0.6–1.3)
E WAVE DECELERATION TIME: 149 MS
E/A RATIO: 1.31
ERYTHROCYTE [DISTWIDTH] IN BLOOD BY AUTOMATED COUNT: 14.9 % (ref 11.6–15.1)
FRACTIONAL SHORTENING: 28 (ref 28–44)
GFR SERPL CREATININE-BSD FRML MDRD: 21 ML/MIN/1.73SQ M
GFR SERPL CREATININE-BSD FRML MDRD: 23 ML/MIN/1.73SQ M
GLUCOSE SERPL-MCNC: 126 MG/DL (ref 65–140)
GLUCOSE SERPL-MCNC: 146 MG/DL (ref 65–140)
GLUCOSE UR STRIP-MCNC: NEGATIVE MG/DL
HCT VFR BLD AUTO: 32.4 % (ref 36.5–49.3)
HGB BLD-MCNC: 10.4 G/DL (ref 12–17)
HGB UR QL STRIP.AUTO: NEGATIVE
HYALINE CASTS #/AREA URNS LPF: ABNORMAL /LPF
INR PPP: 1.31 (ref 0.84–1.19)
INTERVENTRICULAR SEPTUM IN DIASTOLE (PARASTERNAL SHORT AXIS VIEW): 1.4 CM
INTERVENTRICULAR SEPTUM: 1.4 CM (ref 0.6–1.1)
KETONES UR STRIP-MCNC: NEGATIVE MG/DL
LAAS-AP2: 23.8 CM2
LAAS-AP4: 23.7 CM2
LEFT ATRIUM SIZE: 4 CM
LEFT ATRIUM VOLUME (MOD BIPLANE): 67 ML
LEFT ATRIUM VOLUME INDEX (MOD BIPLANE): 29.6 ML/M2
LEFT INTERNAL DIMENSION IN SYSTOLE: 2.6 CM (ref 2.1–4)
LEFT VENTRICULAR INTERNAL DIMENSION IN DIASTOLE: 3.6 CM (ref 3.5–6)
LEFT VENTRICULAR POSTERIOR WALL IN END DIASTOLE: 1.4 CM
LEFT VENTRICULAR STROKE VOLUME: 31 ML
LEUKOCYTE ESTERASE UR QL STRIP: NEGATIVE
LVSV (TEICH): 31 ML
MAGNESIUM SERPL-MCNC: 2.3 MG/DL (ref 1.9–2.7)
MAGNESIUM SERPL-MCNC: 2.6 MG/DL (ref 1.9–2.7)
MCH RBC QN AUTO: 33.3 PG (ref 26.8–34.3)
MCHC RBC AUTO-ENTMCNC: 32.1 G/DL (ref 31.4–37.4)
MCV RBC AUTO: 104 FL (ref 82–98)
MV E'TISSUE VEL-SEP: 4 CM/S
MV PEAK A VEL: 0.71 M/S
MV PEAK E VEL: 93 CM/S
MV STENOSIS PRESSURE HALF TIME: 43 MS
MV VALVE AREA P 1/2 METHOD: 5.12
NITRITE UR QL STRIP: NEGATIVE
NON-SQ EPI CELLS URNS QL MICRO: ABNORMAL /HPF
P AXIS: 72 DEGREES
PH UR STRIP.AUTO: 5.5 [PH]
PLATELET # BLD AUTO: 170 THOUSANDS/UL (ref 149–390)
PMV BLD AUTO: 11.3 FL (ref 8.9–12.7)
POTASSIUM SERPL-SCNC: 4.2 MMOL/L (ref 3.5–5.3)
POTASSIUM SERPL-SCNC: 4.8 MMOL/L (ref 3.5–5.3)
PROT UR STRIP-MCNC: NEGATIVE MG/DL
PROTHROMBIN TIME: 16.5 SECONDS (ref 11.6–14.5)
QRS AXIS: 50 DEGREES
QRSD INTERVAL: 134 MS
QT INTERVAL: 424 MS
QTC INTERVAL: 375 MS
RA PRESSURE ESTIMATED: 8 MMHG
RBC # BLD AUTO: 3.12 MILLION/UL (ref 3.88–5.62)
RBC #/AREA URNS AUTO: ABNORMAL /HPF
RIGHT ATRIUM AREA SYSTOLE A4C: 35.2 CM2
RIGHT VENTRICLE ID DIMENSION: 5.3 CM
RV PSP: 17 MMHG
SL CV LEFT ATRIUM LENGTH A2C: 7 CM
SL CV LV EF: 60
SL CV PED ECHO LEFT VENTRICLE DIASTOLIC VOLUME (MOD BIPLANE) 2D: 55 ML
SL CV PED ECHO LEFT VENTRICLE SYSTOLIC VOLUME (MOD BIPLANE) 2D: 24 ML
SODIUM 24H UR-SCNC: 15 MOL/L
SODIUM SERPL-SCNC: 128 MMOL/L (ref 135–147)
SODIUM SERPL-SCNC: 131 MMOL/L (ref 135–147)
SP GR UR STRIP.AUTO: 1.02 (ref 1–1.03)
T WAVE AXIS: 112 DEGREES
TR MAX PG: 9 MMHG
TR PEAK VELOCITY: 1.5 M/S
TRICUSPID ANNULAR PLANE SYSTOLIC EXCURSION: 0.6 CM
TRICUSPID VALVE PEAK REGURGITATION VELOCITY: 1.49 M/S
UROBILINOGEN UR QL STRIP.AUTO: 0.2 E.U./DL
VENTRICULAR RATE: 47 BPM
WBC # BLD AUTO: 8.6 THOUSAND/UL (ref 4.31–10.16)
WBC #/AREA URNS AUTO: ABNORMAL /HPF

## 2024-03-14 PROCEDURE — 83735 ASSAY OF MAGNESIUM: CPT | Performed by: NURSE PRACTITIONER

## 2024-03-14 PROCEDURE — 83935 ASSAY OF URINE OSMOLALITY: CPT | Performed by: NURSE PRACTITIONER

## 2024-03-14 PROCEDURE — 80048 BASIC METABOLIC PNL TOTAL CA: CPT | Performed by: NURSE PRACTITIONER

## 2024-03-14 PROCEDURE — 93321 DOPPLER ECHO F-UP/LMTD STD: CPT

## 2024-03-14 PROCEDURE — 85027 COMPLETE CBC AUTOMATED: CPT | Performed by: NURSE PRACTITIONER

## 2024-03-14 PROCEDURE — 85610 PROTHROMBIN TIME: CPT | Performed by: NURSE PRACTITIONER

## 2024-03-14 PROCEDURE — 97167 OT EVAL HIGH COMPLEX 60 MIN: CPT

## 2024-03-14 PROCEDURE — 93325 DOPPLER ECHO COLOR FLOW MAPG: CPT

## 2024-03-14 PROCEDURE — 93308 TTE F-UP OR LMTD: CPT | Performed by: INTERNAL MEDICINE

## 2024-03-14 PROCEDURE — 85730 THROMBOPLASTIN TIME PARTIAL: CPT | Performed by: STUDENT IN AN ORGANIZED HEALTH CARE EDUCATION/TRAINING PROGRAM

## 2024-03-14 PROCEDURE — 99233 SBSQ HOSP IP/OBS HIGH 50: CPT | Performed by: INTERNAL MEDICINE

## 2024-03-14 PROCEDURE — 84300 ASSAY OF URINE SODIUM: CPT | Performed by: NURSE PRACTITIONER

## 2024-03-14 PROCEDURE — 93005 ELECTROCARDIOGRAM TRACING: CPT

## 2024-03-14 PROCEDURE — 99291 CRITICAL CARE FIRST HOUR: CPT | Performed by: STUDENT IN AN ORGANIZED HEALTH CARE EDUCATION/TRAINING PROGRAM

## 2024-03-14 PROCEDURE — 84484 ASSAY OF TROPONIN QUANT: CPT | Performed by: NURSE PRACTITIONER

## 2024-03-14 PROCEDURE — 93325 DOPPLER ECHO COLOR FLOW MAPG: CPT | Performed by: INTERNAL MEDICINE

## 2024-03-14 PROCEDURE — 83930 ASSAY OF BLOOD OSMOLALITY: CPT | Performed by: NURSE PRACTITIONER

## 2024-03-14 PROCEDURE — 93321 DOPPLER ECHO F-UP/LMTD STD: CPT | Performed by: INTERNAL MEDICINE

## 2024-03-14 PROCEDURE — 93308 TTE F-UP OR LMTD: CPT

## 2024-03-14 PROCEDURE — 85730 THROMBOPLASTIN TIME PARTIAL: CPT | Performed by: NURSE PRACTITIONER

## 2024-03-14 PROCEDURE — 99232 SBSQ HOSP IP/OBS MODERATE 35: CPT | Performed by: NURSE PRACTITIONER

## 2024-03-14 PROCEDURE — 76775 US EXAM ABDO BACK WALL LIM: CPT

## 2024-03-14 PROCEDURE — 99232 SBSQ HOSP IP/OBS MODERATE 35: CPT | Performed by: INTERNAL MEDICINE

## 2024-03-14 PROCEDURE — 81001 URINALYSIS AUTO W/SCOPE: CPT | Performed by: NURSE PRACTITIONER

## 2024-03-14 RX ORDER — HEPARIN SODIUM 5000 [USP'U]/ML
5000 INJECTION, SOLUTION INTRAVENOUS; SUBCUTANEOUS EVERY 8 HOURS SCHEDULED
Status: DISCONTINUED | OUTPATIENT
Start: 2024-03-14 | End: 2024-03-14

## 2024-03-14 RX ORDER — BUMETANIDE 0.25 MG/ML
1 INJECTION INTRAMUSCULAR; INTRAVENOUS ONCE
Status: COMPLETED | OUTPATIENT
Start: 2024-03-14 | End: 2024-03-14

## 2024-03-14 RX ORDER — CHLORHEXIDINE GLUCONATE ORAL RINSE 1.2 MG/ML
15 SOLUTION DENTAL ONCE
Status: COMPLETED | OUTPATIENT
Start: 2024-03-14 | End: 2024-03-14

## 2024-03-14 RX ORDER — CHLORHEXIDINE GLUCONATE ORAL RINSE 1.2 MG/ML
15 SOLUTION DENTAL ONCE
Status: CANCELLED | OUTPATIENT
Start: 2024-03-14 | End: 2024-03-14

## 2024-03-14 RX ORDER — SODIUM BICARBONATE 650 MG/1
650 TABLET ORAL
Status: DISCONTINUED | OUTPATIENT
Start: 2024-03-14 | End: 2024-03-18

## 2024-03-14 RX ORDER — HEPARIN SODIUM 10000 [USP'U]/100ML
3-20 INJECTION, SOLUTION INTRAVENOUS
Status: DISCONTINUED | OUTPATIENT
Start: 2024-03-14 | End: 2024-03-15

## 2024-03-14 RX ORDER — ACETAMINOPHEN 325 MG/1
650 TABLET ORAL EVERY 6 HOURS PRN
Status: DISCONTINUED | OUTPATIENT
Start: 2024-03-14 | End: 2024-03-20 | Stop reason: HOSPADM

## 2024-03-14 RX ADMIN — CHLORHEXIDINE GLUCONATE 15 ML: 1.2 SOLUTION ORAL at 16:47

## 2024-03-14 RX ADMIN — ASPIRIN 81 MG 81 MG: 81 TABLET ORAL at 09:05

## 2024-03-14 RX ADMIN — BUMETANIDE 1 MG: 0.25 INJECTION INTRAMUSCULAR; INTRAVENOUS at 12:13

## 2024-03-14 RX ADMIN — SODIUM BICARBONATE 650 MG TABLET 650 MG: at 12:13

## 2024-03-14 RX ADMIN — HEPARIN SODIUM 11.1 UNITS/KG/HR: 10000 INJECTION, SOLUTION INTRAVENOUS at 15:54

## 2024-03-14 RX ADMIN — NITROGLYCERIN 0.1 MG: 0.1 PATCH TRANSDERMAL at 09:06

## 2024-03-14 RX ADMIN — EZETIMIBE 10 MG: 10 TABLET ORAL at 16:48

## 2024-03-14 RX ADMIN — PRAVASTATIN SODIUM 40 MG: 40 TABLET ORAL at 16:47

## 2024-03-14 RX ADMIN — SODIUM BICARBONATE 650 MG TABLET 650 MG: at 09:05

## 2024-03-14 RX ADMIN — SODIUM BICARBONATE 650 MG TABLET 650 MG: at 16:47

## 2024-03-14 RX ADMIN — PANTOPRAZOLE SODIUM 40 MG: 40 TABLET, DELAYED RELEASE ORAL at 05:55

## 2024-03-14 RX ADMIN — TICAGRELOR 90 MG: 90 TABLET ORAL at 09:05

## 2024-03-14 RX ADMIN — FINASTERIDE 5 MG: 5 TABLET, FILM COATED ORAL at 09:05

## 2024-03-14 RX ADMIN — HEPARIN SODIUM 5000 UNITS: 5000 INJECTION INTRAVENOUS; SUBCUTANEOUS at 14:45

## 2024-03-14 RX ADMIN — OMEGA-3 FATTY ACIDS CAP 1000 MG 2000 MG: 1000 CAP at 09:05

## 2024-03-14 NOTE — ASSESSMENT & PLAN NOTE
Continue with Lopressor.    Status post watchman flex (24 mm) in 2023    On his hospitalization at [facility] March 3, Xeralto discontinued due to epistaxis  Heparin gtt had been initiated on admission, stopped on 3/9.  Placed back on heparin drip 3/11 with cardiac catheterization revealing thrombus noted OST RCA to proximal RCA  Transition to Brilinta and aspirin on 3/13  Hemoglobin stable

## 2024-03-14 NOTE — ASSESSMENT & PLAN NOTE
Baseline creatinine:1.5-1.7 mg/dl based on labs from [facility] dating back to 2019   Admission Cr 2.01  Nephrology consulted due to worsening Cr up to 2.4, Improved to baseline with IVF.  Cr lashaun to 1.9 after lasix 20 mg iv on 3/9  Patient underwent cardiac catheterization 3/11, nephrology following.  Creatinine noted to rise to 2.11, receiving gentle IV hydration on 3/13  Creatinine continues to rise; nephrology giving bumex 1 mg IV today as appears volume overloaded  Check bladder scan post void residual   Repeat BMP in a.m.        Lab Results   Component Value Date    EGFR 23 03/14/2024    EGFR 26 03/13/2024    EGFR 34 03/12/2024    CREATININE 2.32 (H) 03/14/2024    CREATININE 2.11 (H) 03/13/2024    CREATININE 1.68 (H) 03/12/2024

## 2024-03-14 NOTE — QUICK NOTE
Patient in [facility] as per discussion with KIMBERLY Childs in critical care; patient hemodynamically stable and asymptomatic. Will upgrade to critical care level; critical care input appreciated. Son updated via phone call.

## 2024-03-14 NOTE — PROGRESS NOTES
NEPHROLOGY HOSPITAL PROGRESS NOTE   Vladimir Mathias 89 y.o. male MRN: [id number]  Unit/Bed#: ICU 01 Encounter: [id number]  Reason for Consult: TUAN on CKD    ASSESSMENT and PLAN:  89-year-old male with history of CAD, A-fib status post Watchman device and CKD presented with chest pain and dyspnea.  We are consulted for management of TUAN on CKD.    1.  Acute kidney injury.  Baseline creatinine 1.5-1.7.  Admission creatinine 2.01.  Peak creatinine 2.65 on 03/06.  Yoandy creatinine 1.68 on 03/12.  Creatinine has risen today to 2.3.  Urinalysis with no RBC, urine sodium 17.  Kidney ultrasound did not show hydronephrosis.  Etiology of TUAN due to hemodynamic changes +/- hypotension in setting of NSTEMI.  Status post left heart catheterization with iodinated contrast administration on 03/11.  He now appears to have contrast associated nephropathy.  He was given IV fluids yesterday but today he appears to have signs of volume overload.  Discontinue IV fluids.  Give IV Bumex 1 mg x 1.  Bladder scan to rule out urinary retention.  Trend BMP.      2.  CKD stage IIIb.  Outpatient nephrologist is Dr. Barrientos at [facility].  Baseline creatinine is 1.5-1.7.  Etiology most likely in setting of hypertension and age-related nephron loss.    3.  Hypertension.  Blood pressure is currently borderline low.  He was on metoprolol which has been discontinued due to heart block during heart catheterization.  Continue to monitor off antihypertensives.    4.  NSTEMI.  Status postcardiac catheterization that showed patent ROGERS to LAD, patent vein graft to his marginal with a severe three-vessel disease.  Thrombus was noted in RCA which was causing intermittent AV block and a temporary pacemaker was placed.  Temporary pacemaker has now been discontinued and patient remains in sinus rhythm.    5.  Anemia in CKD.  Hemoglobin today 10.4.  Transfuse to keep hemoglobin more than 7.    6.  Hyperchloremic acidosis.  This is most likely due to TUAN on CKD.   Start sodium bicarbonate 650 mg 3 times daily.    7.  Hyponatremia.  Serum sodium down to 131 today.  This is in setting of TUAN and volume overload.  Continue fluid restriction at 1200 cc per 24 hours and give IV Bumex 1 mg x 1 today.    Discussed with internal medicine team.  After discussion, we agreed patient has worsening of his kidney function with evidence of volume overload on examination and to provide a small dose of IV Bumex today with ongoing monitoring of volume status and kidney function.    SUBJECTIVE / 24H INTERVAL HISTORY:  Urine output recorded as 500 cc.  He complains of dyspnea.  He has leg swelling.  He denies any trouble with urination.    OBJECTIVE:  Current Weight: Weight - Scale: 100 kg (221 lb 1.9 oz)  Vitals:    03/13/24 2000 03/14/24 0000 03/14/24 0400 03/14/24 0600   BP: 111/52  (!) 91/46    BP Location: Right arm      Pulse: 58  (!) 52    Resp: (!) 30  12    Temp: (!) 97 °F (36.1 °C) 97.6 °F (36.4 °C) 97.9 °F (36.6 °C)    TempSrc: Tympanic Temporal Temporal    SpO2: 100%  100%    Weight:    100 kg (221 lb 1.9 oz)   Height:           Intake/Output Summary (Last 24 hours) at 3/14/2024 0827  Last data filed at 3/14/2024 0600  Gross per 24 hour   Intake 2125.31 ml   Output 500 ml   Net 1625.31 ml     Review of Systems   Constitutional:  Negative for chills and fever.   HENT:  Negative for ear pain and sore throat.    Eyes:  Negative for pain and visual disturbance.   Respiratory:  Positive for shortness of breath. Negative for cough.    Cardiovascular:  Negative for chest pain and palpitations.   Gastrointestinal:  Negative for abdominal pain and vomiting.   Genitourinary:  Negative for dysuria and hematuria.   Musculoskeletal:  Negative for arthralgias and back pain.   Skin:  Negative for color change and rash.   Neurological:  Negative for seizures and syncope.   All other systems reviewed and are negative.    Physical Exam  Vitals and nursing note reviewed.   Constitutional:        General: He is not in acute distress.     Appearance: He is well-developed.   HENT:      Head: Normocephalic and atraumatic.   Eyes:      Conjunctiva/sclera: Conjunctivae normal.   Cardiovascular:      Rate and Rhythm: Normal rate and regular rhythm.      Heart sounds: No murmur heard.  Pulmonary:      Effort: Pulmonary effort is normal. No respiratory distress.      Comments: Diminished breath sounds bilaterally at bases  Abdominal:      Palpations: Abdomen is soft.      Tenderness: There is no abdominal tenderness.   Musculoskeletal:         General: No swelling.      Cervical back: Neck supple.      Right lower leg: Edema present.      Left lower leg: Edema present.   Skin:     General: Skin is warm and dry.      Capillary Refill: Capillary refill takes less than 2 seconds.   Neurological:      Mental Status: He is alert.   Psychiatric:         Mood and Affect: Mood normal.       Medications:    Current Facility-Administered Medications:     acetaminophen (TYLENOL) tablet 650 mg, 650 mg, Oral, Q4H PRN, JESSICA Maher, 650 mg at 03/11/24 1121    aluminum-magnesium hydroxide-simethicone (MAALOX) oral suspension 30 mL, 30 mL, Oral, Q4H PRN, JESSICA Maher    aspirin chewable tablet 81 mg, 81 mg, Oral, Daily, JESSICA Maher, 81 mg at 03/13/24 0829    bisacodyl (DULCOLAX) rectal suppository 10 mg, 10 mg, Rectal, Daily PRN, JESSICA Maher    docusate sodium (COLACE) capsule 100 mg, 100 mg, Oral, Daily, JESSICA Maher, 100 mg at 03/13/24 0829    ezetimibe (ZETIA) tablet 10 mg, 10 mg, Oral, Daily With Dinner, 10 mg at 03/13/24 1709 **AND** pravastatin (PRAVACHOL) tablet 40 mg, 40 mg, Oral, Daily With Dinner, JESSICA Maher, 40 mg at 03/13/24 1709    finasteride (PROSCAR) tablet 5 mg, 5 mg, Oral, Daily, JESSICA Maher, 5 mg at 03/13/24 0830    fish oil capsule 2,000 mg, 2,000 mg, Oral, Daily, JESSICA Maher, 2,000 mg at 03/13/24 0829    metoclopramide (REGLAN) injection 10 mg, 10  "mg, Intravenous, Q6H PRN, JESSICA Maher    nitroglycerin (NITRODUR) 0.1 mg/hr TD 24 hr patch, 0.1 mg, Transdermal, Daily, JESSICA Maher, 0.1 mg at 03/13/24 0830    pantoprazole (PROTONIX) EC tablet 40 mg, 40 mg, Oral, Early Morning, JESSICA Maher, 40 mg at 03/14/24 0555    polyethylene glycol (MIRALAX) packet 17 g, 17 g, Oral, Daily, JESSICA Maher, 17 g at 03/13/24 0830    senna (SENOKOT) tablet 8.6 mg, 1 tablet, Oral, HS, JESSICA Maher, 8.6 mg at 03/13/24 2152    sodium bicarbonate tablet 650 mg, 650 mg, Oral, TID after meals, Zaki Grimm MD    ticagrelor (BRILINTA) tablet 90 mg, 90 mg, Oral, Q12H GUSTAVO, JESSICA Charles, 90 mg at 03/13/24 2152    Laboratory Results:  Results from last 7 days   Lab Units 03/14/24  0625 03/13/24  0624 03/12/24  0530 03/11/24  1547 03/11/24  0448 03/10/24  0615 03/09/24  0457 03/08/24  0607   WBC Thousand/uL 8.60 7.05 6.61  --  6.34 6.57 7.35 8.26   HEMOGLOBIN g/dL 10.4* 9.9* 9.5*  --  10.6* 10.7* 10.5* 10.7*   HEMATOCRIT % 32.4* 29.8* 27.7*  --  31.6* 31.4* 31.1* 32.8*   PLATELETS Thousands/uL 170 159 127*  --  145* 131* 137* 131*   POTASSIUM mmol/L 4.2 4.4 3.6 4.1 4.0 4.0 3.9 4.0   CHLORIDE mmol/L 102 103 109* 104 103 102 102 101   CO2 mmol/L 17* 19* 19* 20* 27 27 23 23   BUN mg/dL 65* 53* 39* 42* 42* 46* 50* 54*   CREATININE mg/dL 2.32* 2.11* 1.68* 1.82* 1.93* 1.98* 1.78* 2.07*   CALCIUM mg/dL 8.7 8.8 7.9* 8.4 8.8 8.8 8.6 8.1*   MAGNESIUM mg/dL 2.3 2.0  --   --   --   --  2.2 2.2   PHOSPHORUS mg/dL  --  3.6  --   --   --   --  3.3 3.5       Portions of the record may have been created with voice recognition software. Occasional wrong word or \"sound a like\" substitutions may have occurred due to the inherent limitations of voice recognition software. Read the chart carefully and recognize, using context, where substitutions have occurred. If you have any questions, please contact the dictating provider.    "

## 2024-03-14 NOTE — QUICK NOTE
Asked to evaluate patient by nursing d/t concerning telemetry rhythms with bradycardia and ectopy. 3 EKGs obtained. One EKG with CHB rate 47, RBBB, and ST elevations in II, III, aVF and V1 and ST depressions in I and AVL. Other EKGs with ventricular ectopy. Patient asymptomatic and normotensive. EKG changes are concerning for RCA territory thrombus rupture. Discussed with cardiology, Dr. Scanlon. Advised to put patient back on ACS Heparin with no boluses which has been ordered. BMP/Mg/PTT/INR and troponin ordered. LEO Fu-primary team, updated via tigertext.

## 2024-03-14 NOTE — PROGRESS NOTES
Progress Note - Cardiology   [facility]     Vladimir Mathias 89 y.o. male MRN: [id number]  : 12/3/1932  Unit/Bed#: ICU 01 Encounter: [id number]    Assessment and Plan:   1. MI type I:  hs trop: 1276 (0hr), 1601 (2hr), 1808 (4hr)    -   hs trops random:  3126, 5951,11,193, >22973 x2    -   patient initially hesitant for IV anticoagulation secondary to history of epitaxsis, but was agreeable after long conversation and IV heparin was initiated on 3/6/2024 at approximately 1 PM. Completed heparin 3/9/2024    -   3/6/2024 TTE: EF visibly estimated at 55% with disconnect except him but otherwise abnormal wall motion. IVS is consistent with postoperative status, there is systolic flattening consistent with RV pressure overload. RV cavity is moderately dilated with moderate to severe reduction in systolic function. Left atrium is mildly dilated, right atrium severely dilated and mild mitral and tricuspid valve regurgitation. Bio prosthetic valve in the aortic position appears to be well seated with no evidence of para valvular regurgitation and gradient within expected range.    -   3/6/2024 VQ Scan: low probability of PE    -   Continue Toprol-XL 25 mg once a day    -   continue Vytorin 10/40 mg daily    -   continue aspirin 81 mg once a day    -   continue nitroglycerin patch 0.1 mg per hour on in the a.m. often the p.m.    -   3/8/2024 Lexiscan nuclear stress test: partially reversible inferior defect noted. Patient with known native RCA disease which was not bypass.    -   3/11/2024 LHC: Lima to LAD was patent, SVG to circumflex was also noted be patent. Native LAD had a 100% mid occlusion. Patient noted to have a clot sitting in the sinus of the right coronary artery which has disrupted the flow to the RCA not amenable to intervention    -   patient transition from IV heparin to aspirin 81 mg once a day and Brilinta 90 mg BID on 3/13/2024    -   3/14/2024 limited TTE: pending     2. Coronary artery  disease with history of CABG: a history of CABG times two in 2009 (Lima to LAD and saphenous vein graft to marginal)    -   follows with Dr. Steven Walton at [facility] in [place],  [phone].    -   12/15/2021 cardiac CTA: noted both bypass grafts were patent with chronic total occlusion of his LAD and diffuse plaque in both left circumflex and RCA.    -   Continue Toprol-XL 25 mg once a day    -   continue Vytorin 10/40 mg daily    -   continue aspirin 81 mg once a day    -   continue nitroglycerin patch 0.1 mg per hour on in the a.m. off in the p.m.    -   care as per #1     3. TUAN on chronic kidney disease: review records from [facility], appears his baseline creatinine is 1.4 - 1.7    -   follows with nephrologist at [place]    -   seen by Dr. Sheppard nephrology and consultation appreciated    -   3/13/2024 creatinine bump to 2.11 today, patient started on gentle IV hydration per nephrology. 3/14/2024 creatinine today is 2.32 despite having IV fluids. Patient appears to be volume overloaded.    -   Patient given Bumex 1 mg IV per nephrology     4. Hypertension: blood pressure stable    -   continue Toprol and nitroglycerin with close monitoring     5. Paroxysmal atrial fibrillation flutter: patient has watchmen device    -   telemetry reviewed on initial admission, patient appears to be changing between Mobitz II type 1 and Mobitz II Type 2    -   3/8/2024 telemetry reviewed and patient appears to be sinus rhythm with first-degree AV heart block.    -   Continue to monitor telemetry     6. Aortic stenosis status post TAVR: TAVR performed in 3/31/2021 at [facility]    -   3/6/2024 TTE: Bio prosthetic valve in the aortic position appears to be well seated with no evidence of para valvular regurgitation and gradient within expected range.     7. History of epitaxis with anticoagulation (Xarelto): patient has watchmen device    8. AV heart block: (Resolved) patient experience  "intermittent AV block during right coronary artery injection and after.    -   Temporary trans venous pacemaker placed via right groin venous sheath    -   heart rate 70 with MA 2.5 ms    -   patient stable overnight will remove trans venous pacemaker today, 3/12/ 2024       Subjective / Objective:       Vitals: Blood pressure 118/59, pulse 63, temperature 97.5 °F (36.4 °C), temperature source Temporal, resp. rate (!) 27, height 6' 1.5\" (1.867 m), weight 100 kg (221 lb 1.9 oz), SpO2 100%.  Vitals:    03/13/24 0600 03/14/24 0600   Weight: 98.7 kg (217 lb 9.5 oz) 100 kg (221 lb 1.9 oz)     Body mass index is 28.78 kg/m².  BP Readings from Last 3 Encounters:   03/14/24 118/59   07/12/22 118/72   07/05/22 114/72     Orthostatic Blood Pressures      Flowsheet Row Most Recent Value   Blood Pressure 118/59 filed at 03/14/2024 0930   Patient Position - Orthostatic VS Sitting filed at 03/14/2024 0930          I/O         03/12 0701  03/13 0700 03/13 0701  03/14 0700 03/14 0701  03/15 0700    P.O. 420 300 240    I.V. (mL/kg)  1825.3 (18.3) 207.5 (2.1)    Total Intake(mL/kg) 420 (4.3) 2125.3 (21.3) 447.5 (4.5)    Urine (mL/kg/hr) 750 (0.3) 500 (0.2)     Stool  0     Total Output 750 500     Net -330 +1625.3 +447.5           Unmeasured Urine Occurrence   1 x    Unmeasured Stool Occurrence  1 x 1 x          Invasive Devices       Peripheral Intravenous Line  Duration             Peripheral IV 03/11/24 Proximal;Right;Ventral (anterior) Forearm 3 days    Peripheral IV 03/13/24 Left;Ventral (anterior) Forearm 1 day                      Intake/Output Summary (Last 24 hours) at 3/14/2024 1109  Last data filed at 3/14/2024 0930  Gross per 24 hour   Intake 2572.81 ml   Output 400 ml   Net 2172.81 ml         Physical Exam:   Physical Exam  Vitals and nursing note reviewed.   Constitutional:       General: He is not in acute distress.     Appearance: Normal appearance. He is normal weight.   HENT:      Right Ear: External ear normal.     "  Left Ear: External ear normal.   Eyes:      General: No scleral icterus.        Right eye: No discharge.         Left eye: No discharge.   Cardiovascular:      Rate and Rhythm: Regular rhythm. Bradycardia present.      Pulses: Normal pulses.      Heart sounds: Murmur heard.   Pulmonary:      Effort: Pulmonary effort is normal.      Breath sounds: Normal breath sounds.   Abdominal:      General: Bowel sounds are normal. There is no distension.      Palpations: Abdomen is soft.   Musculoskeletal:      Right lower leg: No edema.      Left lower leg: No edema.   Skin:     General: Skin is warm and dry.      Capillary Refill: Capillary refill takes less than 2 seconds.   Neurological:      General: No focal deficit present.      Mental Status: He is alert and oriented to person, place, and time. Mental status is at baseline.   Psychiatric:         Mood and Affect: Mood normal.                 Medications/ Allergies:     Current Facility-Administered Medications   Medication Dose Route Frequency Provider Last Rate    acetaminophen  650 mg Oral Q4H PRN JESSICA Maher      aluminum-magnesium hydroxide-simethicone  30 mL Oral Q4H PRN JESSICA Maher      Artificial Tears  1 drop Both Eyes Q4H PRN JESSICA Alfred      aspirin  81 mg Oral Daily JESSICA Maher      bisacodyl  10 mg Rectal Daily PRN JESSICA Maher      bumetanide  1 mg Intravenous Once Zaki Grimm MD      docusate sodium  100 mg Oral Daily JESSICA Maher      ezetimibe  10 mg Oral Daily With Dinner JESSICA Maher      And    pravastatin  40 mg Oral Daily With Dinner JESSICA Maher      finasteride  5 mg Oral Daily JESSICA Maher      fish oil  2,000 mg Oral Daily JESSICA Maher      heparin (porcine)  5,000 Units Subcutaneous Q8H [facility] JESSICA Alfred      metoclopramide  10 mg Intravenous Q6H PRN JESSICA Maher      nitroglycerin  0.1 mg Transdermal Daily JESSICA Maher       pantoprazole  40 mg Oral Early Morning JESSICA Maher      polyethylene glycol  17 g Oral Daily JESSICA Maher      senna  1 tablet Oral HS JESSICA Maher      sodium bicarbonate  650 mg Oral TID after meals Zaki Grimm MD      ticagrelor  90 mg Oral Q12H [facility] JESSICA Charles       acetaminophen, 650 mg, Q4H PRN  aluminum-magnesium hydroxide-simethicone, 30 mL, Q4H PRN  Artificial Tears, 1 drop, Q4H PRN  bisacodyl, 10 mg, Daily PRN  metoclopramide, 10 mg, Q6H PRN      No Known Allergies    VTE Pharmacologic Prophylaxis:   Sequential compression device (Venodyne)     Labs:     CBC with diff:  Results from last 7 days   Lab Units 03/14/24  0625 03/13/24  0624 03/12/24  0530 03/11/24  0448 03/10/24  0615 03/09/24  0457 03/08/24  0607   WBC Thousand/uL 8.60 7.05 6.61 6.34 6.57 7.35 8.26   HEMOGLOBIN g/dL 10.4* 9.9* 9.5* 10.6* 10.7* 10.5* 10.7*   HEMATOCRIT % 32.4* 29.8* 27.7* 31.6* 31.4* 31.1* 32.8*   MCV fL 104* 100* 100* 100* 100* 100* 100*   PLATELETS Thousands/uL 170 159 127* 145* 131* 137* 131*   RBC Million/uL 3.12* 2.99* 2.77* 3.16* 3.13* 3.12* 3.28*   MCH pg 33.3 33.1 34.3 33.5 34.2 33.7 32.6   MCHC g/dL 32.1 33.2 34.3 33.5 34.1 33.8 32.6   RDW % 14.9 14.7 14.6 14.4 14.6 14.6 14.7   MPV fL 11.3 10.9 10.6 10.7 10.4 10.7 10.9   NRBC AUTO /100 WBCs  --  0  --   --   --   --   --      CMP:  Results from last 7 days   Lab Units 03/14/24  0625 03/13/24  0624 03/12/24  0530 03/11/24  1547 03/11/24  0448 03/10/24  0615 03/09/24  0457 03/08/24  0607   SODIUM mmol/L 131* 132* 136 134* 135 136 135 134*   POTASSIUM mmol/L 4.2 4.4 3.6 4.1 4.0 4.0 3.9 4.0   CHLORIDE mmol/L 102 103 109* 104 103 102 102 101   CO2 mmol/L 17* 19* 19* 20* 27 27 23 23   ANION GAP mmol/L 12 10 8 10 5 7 10 10   BUN mg/dL 65* 53* 39* 42* 42* 46* 50* 54*   CREATININE mg/dL 2.32* 2.11* 1.68* 1.82* 1.93* 1.98* 1.78* 2.07*   CALCIUM mg/dL 8.7 8.8 7.9* 8.4 8.8 8.8 8.6 8.1*   ALBUMIN g/dL  --   --   --   --   --   --  3.6 3.5   EGFR  ml/min/1.73sq m 23 26 34 31 29 28 32 27     Magnesium:  Results from last 7 days   Lab Units 03/14/24  0625 03/13/24  0624 03/09/24  0457 03/08/24  0607   MAGNESIUM mg/dL 2.3 2.0 2.2 2.2     Coags:  Results from last 7 days   Lab Units 03/13/24  0624 03/13/24  0020 03/12/24  0530 03/11/24  2308 03/11/24  1547 03/11/24  1002 03/10/24  0615   PTT seconds 58* 64* 47* 62* 141* >210* 82*       Imaging & Testing   I have personally reviewed pertinent reports.    Cardiac catheterization    Result Date: 3/11/2024  Narrative:   Mid LAD lesion is 100% stenosed.   1st Mrg lesion is 100% stenosed.   Ost RCA to Prox RCA lesion is 90% stenosed.  Which had thrombus sitting in the mouth which temporarily decrease the flow in the right coronary artery.   Since patient has intermittent AV block a temporary pacemaker was placed without any complications.     NM Myocardial Perfusion Spect (Pharmacological Induced Stress and/or Rest)    Result Date: 3/9/2024  Narrative:   Stress ECG: Right bundle branch block was seen. The stress ECG is equivocal for ischemia after pharmacologic vasodilation.   Perfusion: There is a left ventricular perfusion defect present in the mid to basal inferior and inferoseptal location(s) that is partially reversible.   Stress Combined Conclusion: Left ventricular perfusion is abnormal.   Stress Function: Left ventricular function post-stress is normal. Stress ejection fraction is 65%. Abnormal study, Perfusion: There is a left ventricular perfusion defect present in the mid to basal inferior and inferoseptal location(s) that is partially reversible. SSS 7 SRS 5 SDS 2     Stress strip    Result Date: 3/8/2024  Narrative: Confirmed by MAISHA FREEMAN ([id number]),  Marilu Rothman ([id number]) on 3/8/2024 10:37:07 AM    US kidney and bladder    Result Date: 3/6/2024  Narrative: RENAL ULTRASOUND INDICATION: Renal failure. COMPARISON: None TECHNIQUE: Ultrasound of the retroperitoneum was performed with a curvilinear  transducer utilizing volumetric sweeps and still imaging techniques. FINDINGS: KIDNEYS: Symmetric and normal size. Right kidney: 11.0 x 5.0 x 5.2 cm. Volume 149.8 mL Left kidney: 10.8 x 5.4 x 4.5 cm. Volume 136.3 mL Right kidney Normal echogenicity and contour. No mass is identified. No hydronephrosis. No shadowing calculi. Trace perinephric simple free fluid. Left kidney Normal echogenicity and contour. No mass is identified. No hydronephrosis. No shadowing calculi. No perinephric fluid collections. URETERS: Nonvisualized. BLADDER: Normally distended. No focal thickening or mass lesions. The right ureteral jet is seen. The left ureteral jet is not visualized.     Impression: Normal. Workstation performed: [id number]     NM lung ventilation / perfusion    Result Date: 3/6/2024  Narrative: VENTILATION AND PERFUSION SCAN INDICATION: RV enlargement on Echo - new,  Question PE COMPARISON:  Chest radiograph 3/5/2023 TECHNIQUE:  Posterior ventilation imaging was performed after the inhalation of 30.1 mCi nebulized Tc-99m DTPA with deposition of less than 1 mCi of activity within the lungs. Multiplanar perfusion imaging was next performed following the intravenous administration of 4.35 mCi Tc-99m labeled MAA. FINDINGS: Ventilation imaging demonstrates partial deposition of radiopharmaceutical activity within the central bronchi. Perfusion imaging demonstrates a matching subsegmental defect in the posterior aspect of the right lower lobe. There are no suspicious ventilation/perfusion mismatches.     Impression: The probability for pulmonary embolus is low. Workstation performed: [id number]     Echo complete    Result Date: 3/6/2024  Narrative:   Left Ventricle: Left ventricular cavity size is lower normal Wall thickness is moderately increased. The left ventricular ejection fraction is 55%. Systolic function is normal. Dyskinetic septum otherwise normal wall motion Unable to assess diastolic function due to atrial  flutter.   IVS: There is abnormal septal motion consistent with post-operative status. There is systolic flattening of the interventricular septum consistent with right ventricle pressure overload.   Right Ventricle: Right ventricular cavity size is moderately dilated. Systolic function is moderately to severely reduced.   Left Atrium: The atrium is mildly dilated.   Right Atrium: The atrium is severely dilated.   Aortic Valve: There is a TAVR bioprosthetic valve. The prosthetic valve appears well-seated. There is no evidence of paravalvular regurgitation. The gradient recorded across the prosthetic aortic valve is within the expected range. The aortic valve peak velocity is 1.57 m/s. The aortic valve mean gradient is 6 mmHg.   Mitral Valve: There is mild regurgitation.   Tricuspid Valve: There is mild regurgitation. Abnormal study, compared to prior echo report increased RV size and decreased systolic function. Elevated pulmonary pressures are suggested by LV systolic flattening.     XR chest 1 view portable    Result Date: 3/6/2024  Narrative: XR CHEST PORTABLE INDICATION: chest pain. COMPARISON: None FINDINGS: No airspace consolidation, pneumothorax, pulmonary edema, or pleural effusion. Trace left apical scarring.. Prior CABG. Mild cardiomegaly allowing for portable AP technique. Aortic calcification is present. Mild degenerative changes of bilateral shoulders. Normal upper abdomen.     Impression: No radiographic evidence of acute intrathoracic process. Workstation performed: [id number]     XR chest 1 view    Result Date: 3/3/2024  Narrative: CHEST X-RAY  SINGLE VIEW: HISTORY: chest pain;   PRIORS: Chest radiograph on October 8, 2010. FINDINGS: LUNGS: Clear.  No pleural abnormality seen. HEART: Mild cardiomegaly. Sternotomy. MEDIASTINUM: Normal. OTHER FINDINGS:  None.        Impression:  No acute pulmonary abnormality.       EKG / Monitor: Personally reviewed.    Sinus bradycardia with a first-degree heart  "aron LOPEZ  Cardiology      \"This note was completed in part utilizing [employer] direct voice recognition software.   Grammatical errors, random word insertion, spelling mistakes, and incomplete sentences may be an occasional consequence of the system secondary to software limitations, ambient noise and hardware issues.    Please read the chart carefully and recognize, using context, where substitutions have occurred.  If you have any questions or concerns about the context, text or information contained within the body of this dictation, please contact myself, the provider, for further clarification.\"  "

## 2024-03-14 NOTE — CASE MANAGEMENT
Case Management Discharge Planning Note    Patient name Vladimir Mathias  Location ICU 01/ICU 01 MRN [id number]  : 12/3/1932 Date 3/14/2024       Current Admission Date: 3/5/2024  Current Admission Diagnosis:NSTEMI (non-ST elevated myocardial infarction) (HCC)   Patient Active Problem List    Diagnosis Date Noted    Third degree heart block (HCC) 2024    BPH (benign prostatic hyperplasia) 2024    CAD (coronary artery disease) 2024    TUAN on CKD 3 2024    Essential hypertension 2024    Atrial flutter (HCC) 2024    Hyperkalemia 2024    Elevated LFTs 2024    NSTEMI (non-ST elevated myocardial infarction) (HCC) 2024    Nonrheumatic aortic valve stenosis 2017    S/P CABG (coronary artery bypass graft) 2016    Hyperlipidemia 2016      LOS (days): 9  Geometric Mean LOS (GMLOS) (days): 2.4  Days to GMLOS:-6.5     OBJECTIVE:  Risk of Unplanned Readmission Score: 19.49     Current admission status: Inpatient   Preferred Pharmacy:   PATIENT/FAMILY REPORTS NO PREFERRED PHARMACY  No address on file      [id number] [place], [place] - [address] [place] [id number]  Phone: [phone] Fax: [phone]    Primary Care Provider: No primary care provider on file.    Primary Insurance: [employer] REP  Secondary Insurance:     DISCHARGE DETAILS:    Discharge planning discussed with:: Patient and son, Vladimir Mathias Jr  Freedom of Choice: Yes  Comments - Freedom of Choice: SW following to assist with DCP.  PT evaluated pt at Level II (Moderate Resource Intensity) and STR placement is being recommended.  SW met with pt and son earlier today to review recommendations and plans.  Yesterday SW had talked with pt about the possible need for rehab and had initiated rehab referral process this morning based on pt's preferences.  SW talked with pt and son about rehab levels, pt's preferences and facility options.  Pt expressed that he still  prefers acute rehab at [facility] if possible.  Son is in agreement with that plan.  SW discussed that skilled rehab may be necessary if either not accepted or insurance denial.  Yesterday pt had indicated that [facility] would be his preference for that level.  Today possible skilled placement near family was discussed and additional referrals were made.  SW offered ongoing support and assistance to pt and son.  SW will continue to follow to monitor progress and assist with planning as needed.  CM contacted family/caregiver?: Yes  Were Treatment Team discharge recommendations reviewed with patient/caregiver?: Yes  Did patient/caregiver verbalize understanding of patient care needs?: Yes  Were patient/caregiver advised of the risks associated with not following Treatment Team discharge recommendations?: Yes    Contacts  Patient Contacts: Vladimir Mathias Jr. (son)  Relationship to Patient:: Family  Contact Method: In Person  Reason/Outcome: Discharge Planning    Requested Home Health Care         Is the patient interested in HHC at discharge?: No    DME Referral Provided  Referral made for DME?: No    Other Referral/Resources/Interventions Provided:  Interventions: Acute Rehab, Short Term Rehab  Referral Comments: Acute and skilled rehab referrals have been made.  Authorization request will need to be submitted once facility choice is made.    Treatment Team Recommendation: Short Term Rehab  Discharge Destination Plan:: Acute Rehab, Short Term Rehab  Transport at Discharge :  (pending progress)

## 2024-03-14 NOTE — ASSESSMENT & PLAN NOTE
Baseline creat 1.5-1.7 in the setting of HTN and age-related nephron loss   Creat peak appears to be 2.65, cardiorenal vs KANDY  Initially tx with IVF now with concerns for volume overload and worsening creat, currently 2.5  Given 1mg IV Bumex  Strict I&O  Avoid nephrotoxic agents   Maintain MAP > 65  Nephro following, appreciate recs

## 2024-03-14 NOTE — OCCUPATIONAL THERAPY NOTE
OT EVALUATION       03/14/24 1330   OT Last Visit   OT Visit Date 03/14/24   Note Type   Note type Evaluation   Pain Assessment   Pain Assessment Tool 0-10   Pain Score No Pain   Restrictions/Precautions   Other Precautions Chair Alarm;Bed Alarm;Fall Risk;Multiple lines   Home Living   Type of Home House   Home Layout One level  (no steps to enter)   Bathroom Shower/Tub Walk-in shower   Bathroom Toilet Raised   Home Equipment Cane   Prior Function   Level of [place] Independent with ADLs;Independent with functional mobility;Independent with IADLS   Lives With Alone   Receives Help From Family   IADLs Independent with driving;Independent with meal prep;Independent with medication management  (has a cleaning lady)   Comments Patient states being independent with all ADLs and IADLs as well as continues to golf using a cart   Lifestyle   Intrinsic Gratification golfing, playing cards   General   Family/Caregiver Present Yes  (son)   ADL   Eating Assistance 5  Supervision/Setup   Grooming Assistance 5  Supervision/Setup   UB Bathing Assistance 4  Minimal Assistance   LB Bathing Assistance 3  Moderate Assistance   UB Dressing Assistance 4  Minimal Assistance   LB Dressing Assistance 3  Moderate Assistance   Toileting Assistance  3  Moderate Assistance   Transfers   Sit to Stand 4  Minimal assistance   Additional items Assist x 1;Verbal cues   Stand to Sit 4  Minimal assistance   Additional items Assist x 1;Verbal cues   Functional Mobility   Functional Mobility 4  Minimal assistance   Additional Comments 10 feet with RW, SOB with activity, improved with education of pursed lip breathing   Balance   Static Sitting Fair   Dynamic Sitting Fair -   Static Standing Fair -   Dynamic Standing Poor +   Activity Tolerance   Activity Tolerance Patient limited by fatigue  (SOB with activity)   Nurse Made Aware yes, Griselda CELIS Assessment   RUE Assessment WFL   LUE Assessment   LUE Assessment WFL   Cognition   Overall Cognitive  Status WFL   Arousal/Participation Cooperative   Attention Within functional limits   Orientation Level Oriented X4   Following Commands Follows all commands and directions without difficulty   Assessment   Limitation Decreased ADL status;Decreased UE strength;Decreased Safe judgement during ADL;Decreased endurance;Decreased self-care trans;Decreased high-level ADLs  (decreased balance and mobility)   Prognosis Good   Assessment Patient evaluated by Occupational Therapy.  Patient admitted with NSTEMI (non-ST elevated myocardial infarction) (HCC).  The patients occupational profile, medical and therapy history includes a extensive additional review of physical, cognitive, or psychosocial history related to current functional performance.  Comorbidities affecting functional mobility and ADLS include: CAD, cancer, and hypertension.  Prior to admission, patient was independent with functional mobility without assistive device, independent with ADLS, and independent with IADLS.  The evaluation identifies the following performance deficits: weakness, impaired balance, decreased endurance, increased fall risk, new onset of impairment of functional mobility, decreased ADLS, decreased IADLS, decreased activity tolerance, decreased safety awareness, impaired judgement, SOB upon exertion, and decreased strength, that result in activity limitations and/or participation restrictions. This evaluation requires clinical decision making of high complexity, because the patient presents with comorbidites that affect occupational performance and required significant modification of tasks or assistance with consideration of multiple treatment options.  The Barthel Index was used as a functional outcome tool presenting with a score of Barthel Index Score: 45, indicating marked limitations of functional mobility and ADLS.  The patient's raw score on the AM-PAC Daily Activity Inpatient Short Form is 16. A raw score of less than 19 suggests  the patient may benefit from discharge to post-acute rehabilitation services. Please refer to the recommendation of the Occupational Therapist for safe discharge planning.  Patient will benefit from skilled Occupational Therapy services to address above deficits and facilitate a safe return to prior level of function.   Goals   Patient Goals to get stronger and go home independently   STG Time Frame   (1-7 days)   Short Term Goal  Goals established to promote Patient Goals: to get stronger and go home independently:  Grooming: min assist standing at sink; Bathing: min assist; Upper Body Dressing supervision; Lower Body Dressing: min assist; Toileting: min assist; Patient will increase ambulatory standard toilet transfer to supervision with rolling walker to increase performance and safety with ADLS and functional mobility; Patient will increase standing tolerance to 5 minutes during ADL task to decrease assistance level and decrease fall risk; Patient will increase bed mobility to supervision in preparation for ADLS and transfers; Patient will increase functional mobility to and from bathroom with rolling walker with min assist to increase performance with ADLS and to use a toilet; Patient will tolerate 5 minutes of UE ROM/strengthening to increase general activity tolerance and performance in ADLS/IADLS; Patient will improve functional activity tolerance to 10 minutes of sustained functional tasks to increase participation in basic self-care and decrease assistance level;  Patient will be able to to verbalize understanding and perform energy conservation/proper body mechanics during ADLS and functional mobility at least 75% of the time with minimal cueing to decrease signs of fatigue and increase stamina to return to prior level of function; Patient will increase dynamic sitting balance to fair to improve the ability to sit at edge of bed or on a chair for ADLS;  Patient will increase dynamic standing balance to  fair- to improve postural stability and decrease fall risk during standing ADLS and transfers.   LTG Time Frame   (8-14 days)   Long Term Goal Grooming: supervision standing at sink; Bathing: supervision; Upper Body Dressing independent; Lower Body Dressing: supervision; Toileting: supervision; Patient will increase ambulatory standard toilet transfer to independent with rolling walker to increase performance and safety with ADLS and functional mobility; Patient will increase standing tolerance to 10 minutes during ADL task to decrease assistance level and decrease fall risk; Patient will increase bed mobility to independent in preparation for ADLS and transfers; Patient will increase functional mobility to and from bathroom with rolling walker with supervision to increase performance with ADLS and to use a toilet; Patient will tolerate 10 minutes of UE ROM/strengthening to increase general activity tolerance and performance in ADLS/IADLS; Patient will improve functional activity tolerance to 20 minutes of sustained functional tasks to increase participation in basic self-care and decrease assistance level;  Patient will be able to to verbalize understanding and perform energy conservation/proper body mechanics during ADLS and functional mobility at least 90% of the time to decrease signs of fatigue and increase stamina to return to prior level of function; Patient will increase dynamic sitting balance to fair+ to improve the ability to sit at edge of bed or on a chair for ADLS;  Patient will increase dynamic standing balance to fair to improve postural stability and decrease fall risk during standing ADLS and transfers. Pt will score >/= 20/24 on AM-PAC Daily Activity Inpatient scale to promote safe independence with ADLs and functional mobility; Pt will score >/= 75/100 on Barthel Index in order to decrease caregiver assistance needed and increase ability to perform ADLs and functional mobility.   Plan   Treatment  Interventions ADL retraining;Functional transfer training;UE strengthening/ROM;Endurance training;Patient/family training;Equipment evaluation/education;Activityengagement;Energy conservation;Compensatory technique education   Goal Expiration Date 03/28/24   OT Frequency 3-5x/wk   Discharge Recommendation   Rehab Resource Intensity Level, OT II (Moderate Resource Intensity)   AM-PAC Daily Activity Inpatient   Lower Body Dressing 2   Bathing 2   Toileting 2   Upper Body Dressing 3   Grooming 3   Eating 4   Daily Activity Raw Score 16   Daily Activity Standardized Score (Calc for Raw Score >=11) 35.96   AM-PAC Applied Cognition Inpatient   Following a Speech/Presentation 4   Understanding Ordinary Conversation 4   Taking Medications 4   Remembering Where Things Are Placed or Put Away 4   Remembering List of 4-5 Errands 4   Taking Care of Complicated Tasks 4   Applied Cognition Raw Score 24   Applied Cognition Standardized Score 62.21   Barthel Index   Feeding 10   Bathing 0   Grooming Score 0   Dressing Score 5   Bladder Score 10   Bowels Score 10   Toilet Use Score 5   Transfers (Bed/Chair) Score 5   Mobility (Level Surface) Score 0   Stairs Score 0   Barthel Index Score 45   Licensure   [place] License Number  Marilu Tristan MS OTR/L [id number]

## 2024-03-14 NOTE — CASE MANAGEMENT
Case Management Progress Note    Patient name Vladimir Mathias  Location ICU 01/ICU 01 MRN [id number]  : 12/3/1932 Date 3/14/2024       LOS (days): 9  Geometric Mean LOS (GMLOS) (days): 2.4  Days to GMLOS:-6.3        OBJECTIVE:        Current admission status: Inpatient  Preferred Pharmacy:   PATIENT/FAMILY REPORTS NO PREFERRED PHARMACY  No address on file      [id number] [place], [place] - [address]  [address] [address]  Phone: [phone] Fax: [phone]    Primary Care Provider: No primary care provider on file.    Primary Insurance: [employer] REP  Secondary Insurance:     PROGRESS NOTE:    Weekly Care Management Length of Stay Review     Current LOS: 9 Days    Most Recent Labs:     Lab Results   Component Value Date/Time    WBC 8.60 2024 06:25 AM    HGB 10.4 (L) 2024 06:25 AM    HCT 32.4 (L) 2024 06:25 AM     2024 06:25 AM    SODIUM 131 (L) 2024 06:25 AM    K 4.2 2024 06:25 AM     2024 06:25 AM    CO2 17 (L) 2024 06:25 AM    BUN 65 (H) 2024 06:25 AM    CREATININE 2.32 (H) 2024 06:25 AM    GLUC 126 2024 06:25 AM       Most Recent Vitals:   Vitals:    24 1114   BP: 105/69   Pulse: (!) 47   Resp: (!) 28   Temp: 97.6 °F (36.4 °C)   SpO2: 96%        Identified Barriers to Discharge/Discharge Goals/Care Management Interventions: on-going TUAN, IV bumex, nephrology following, repeat labs      Intended Discharge Disposition: IRF vs STR - pending bed availability and authorization    Expected Discharge Date: 48-72 hours.    Charles Irving, LCSW, ACSW, ACM, CCM, CCTP  [place] LCSW: [id number]  [place] LSW: [id number]  24 1:22 PM

## 2024-03-14 NOTE — ASSESSMENT & PLAN NOTE
3/5 Substernal CP at approx 1530 lasting 30 min, admitted to [facility] and placed on Heparin gtt   Recent admission at [facility] for chest pain with mild trop elevation. Symptoms were attributed to AF and he was discharged home   3/6 echo -- EF 55%, dyskinetic septum, severe RA dilation, severely reduces RV function, bioprosthetic AV, mild MVR and TVR  3/6 VQ scan --low probability for pulm embolus   3/8 Stress test -- left ventricular perfusion defect present in the mid to basal inferior and inferoseptal location(s) that is partially reversible  3/11 Cardiac cath -- patent LIMA to LAD patent vein graft to circumflex and LAD is mid 100% occluded. Patient also noted to have clot sitting in sinus of right coronary artery which disrupted the flow to right coronary artery which was not amendable to intervention   3/14: Echo-EF 55%. G1DD. RV mod dilated, RV systolic function severely reduced. TAVR bioprosthetic valve-well seated. No PVL. Mild to mod TR.   Peak troponin >22,973, current trop 11,569  EKG with CHB rate 47, RBBB, and ST elevations in II, III, aVF and V1 and ST depressions in I and AVL. Other EKGs with ventricular ectopy    Plan:   Heparin ACS   Aspirin/Brilinta (PM dose of Brilinta held for PPM tomorrow)   Statin  Nitro paste BID  Cardiology following, appreciate recs

## 2024-03-14 NOTE — ASSESSMENT & PLAN NOTE
"Patient with chest pain and dyspnea exertion x 4 days.  Recent admission at [facility] on March 3 and discharged same day.  At that time noticed that his chest pain was secondary to atrial flutter.  His troponins at that time were 196, 234, 346     Latest Reference Range & Units 03/05/24 18:30 03/05/24 20:23 03/05/24 22:39 03/06/24 05:40 03/06/24 08:12 03/06/24 12:31   hs TnI 0hr \"Refer to ACS Flowchart\"- see link ng/L 1,276 (H)        hs TnI 2hr \"Refer to ACS Flowchart\"- see link ng/L  1,601 (H)       Delta 2hr hsTnI <20 ng/L  325 (H)       hs TnI 4hr \"Refer to ACS Flowchart\"- see link ng/L   1,808 (H)      Delta 4hr hsTnI <20 ng/L   532 (H)      HS TnI random 8 - 18 ng/L    3,126 (H) 5,951 (H) 11,193 (H)   BNP 0 - 100 pg/mL 282 (H)        (H): Data is abnormally high    Patient received 324 mg of aspirin via EMS  Consulted cardiology  TTE 3/6/24: EF 50% There is systolic flattening of the interventricular septum consistent with right ventricle pressure overload, There is systolic flattening of the interventricular septum consistent with right ventricle pressure overload. RA severely dilated, RV mildly dilated,     3/6/2024 VQ Scan: low probability of PE    -   Continue Toprol-XL 25 mg once a day    -   continue Vytorin 10/40 mg daily    -   continue aspirin 81 mg once a day    -start Brillinta as per cardiology  Patient had been on heparin gtt post cath which showed a mid LAD lesion 100% stenosed, first marginal lesion 100% stenosed, OST RCA to proximal RCA lesion 90% stenosed which had thrombus with temporary decrease in flow to RCA.  Patient was also noted to have intermittent A-V block which a TVP was placed which was removed on 3/12 as per cardiology.    -   continue nitroglycerin patch 0.1 mg per hour on in the a.m. often the p.m.  3/8/2024 Lexiscan nuclear stress test: partially reversible inferior defect noted. Patient with known native RCA disease which was not bypass.    -   Patient " loaded with Plavix 300 mg times one and started Plavix 75 mg daily  Cath performed on 3/11 as noted above  Continue fluids per nephro recommendations  Patient did have a bump in his creatinine to 2.11 on 3/13, gentle IV hydration as per nephrology.  Continue to have rise in creatinine 3/14 to 2.32; discussed with nephrology; will get one dose bumex IV and will check post void residual

## 2024-03-14 NOTE — ASSESSMENT & PLAN NOTE
Non anion gap metabolic acidosis  Serum bicarb 19  Normal chloride   Unclear etiology, possibly RTA   Continue bicarb tablets  Nephro following, appreciate recs

## 2024-03-14 NOTE — ASSESSMENT & PLAN NOTE
S/p Watchman device not on OP AC d/t hx of epistaxis   No AV jose blocking agents in setting of tachybrady syndrome, now with CHB   Heparin gtt for AC  Optimize electrolytes

## 2024-03-14 NOTE — PROGRESS NOTES
"[facility]  Progress Note  Name: Vladimir Mathias I  MRN: [id number]  Unit/Bed#: ICU 01 I Date of Admission: 3/5/2024   Date of Service: 3/14/2024 I Hospital Day: 9    Assessment/Plan   * NSTEMI (non-ST elevated myocardial infarction) (HCC)  Assessment & Plan  Patient with chest pain and dyspnea exertion x 4 days.  Recent admission at [facility] on March 3 and discharged same day.  At that time noticed that his chest pain was secondary to atrial flutter.  His troponins at that time were 196, 234, 346     Latest Reference Range & Units 03/05/24 18:30 03/05/24 20:23 03/05/24 22:39 03/06/24 05:40 03/06/24 08:12 03/06/24 12:31   hs TnI 0hr \"Refer to ACS Flowchart\"- see link ng/L 1,276 (H)        hs TnI 2hr \"Refer to ACS Flowchart\"- see link ng/L  1,601 (H)       Delta 2hr hsTnI <20 ng/L  325 (H)       hs TnI 4hr \"Refer to ACS Flowchart\"- see link ng/L   1,808 (H)      Delta 4hr hsTnI <20 ng/L   532 (H)      HS TnI random 8 - 18 ng/L    3,126 (H) 5,951 (H) 11,193 (H)   BNP 0 - 100 pg/mL 282 (H)        (H): Data is abnormally high    Patient received 324 mg of aspirin via EMS  Consulted cardiology  TTE 3/6/24: EF 50% There is systolic flattening of the interventricular septum consistent with right ventricle pressure overload, There is systolic flattening of the interventricular septum consistent with right ventricle pressure overload. RA severely dilated, RV mildly dilated,     3/6/2024 VQ Scan: low probability of PE    -   Continue Toprol-XL 25 mg once a day    -   continue Vytorin 10/40 mg daily    -   continue aspirin 81 mg once a day    -start Brillinta as per cardiology  Patient had been on heparin gtt post cath which showed a mid LAD lesion 100% stenosed, first marginal lesion 100% stenosed, OST RCA to proximal RCA lesion 90% stenosed which had thrombus with temporary decrease in flow to RCA.  Patient was also noted to have intermittent A-V block which a TVP was placed which was " removed on 3/12 as per cardiology.    -   continue nitroglycerin patch 0.1 mg per hour on in the a.m. often the p.m.  3/8/2024 Lexiscan nuclear stress test: partially reversible inferior defect noted. Patient with known native RCA disease which was not bypass.    -   Patient loaded with Plavix 300 mg times one and started Plavix 75 mg daily  Cath performed on 3/11 as noted above  Continue fluids per nephro recommendations  Patient did have a bump in his creatinine to 2.11 on 3/13, gentle IV hydration as per nephrology.  Continue to have rise in creatinine 3/14 to 2.32; discussed with nephrology; will get one dose bumex IV and will check post void residual         Third degree heart block (HCC)  Assessment & Plan  Patient was noted on monitor to have intermittent third-degree heart block  Cardiology did cardiac catheterization where it was discovered that patient had a thrombus with temporary decrease in flow to RCA and intermittent AV block for which a TVP was placed on 3/11.  Patient had been placed on heparin drip postcardiac cath  Temporary venous pacer was removed on 3/12  Heparin drip transition to aspirin and Brilinta on 3/13    Atrial flutter (HCC)  Assessment & Plan  Continue with Lopressor.    Status post watchman flex (24 mm) in 2023    On his hospitalization at [facility] March 3, Xeralto discontinued due to epistaxis  Heparin gtt had been initiated on admission, stopped on 3/9.  Placed back on heparin drip 3/11 with cardiac catheterization revealing thrombus noted OST RCA to proximal RCA  Transition to Brilinta and aspirin on 3/13  Hemoglobin stable     Nonrheumatic aortic valve stenosis  Assessment & Plan  Status post [employer] evolute valve in 2022    S/P CABG (coronary artery bypass graft)  Assessment & Plan  CAD status post CABG 2009. Lima to LAD and saphenous vein graft to circumflex marginal, Cardiac CTA 12/15/2021 revealed patent LIMA to LAD, patent vein graft to circumflex obtuse  marginal #1 an extensive plaque in the native RCA,     Cardiac cath performed on 3/11 as noted above    Continue aspirin.    Continue Vytorin  Started Brilinta 3/13    CAD (coronary artery disease)  Assessment & Plan  CAD status post CABG 2009. Lima to LAD and saphenous vein graft to circumflex marginal, Cardiac CTA 12/15/2021 revealed patent LIMA to LAD, patent vein graft to circumflex obtuse marginal #1 an extensive plaque in the native RCA    Cardiac cath performed on 3/11 as noted above.  Continue aspirin, Vytorin and Brilinta    TUAN on CKD 3  Assessment & Plan  Baseline creatinine:1.5-1.7 mg/dl based on labs from [facility] dating back to 2019   Admission Cr 2.01  Nephrology consulted due to worsening Cr up to 2.4, Improved to baseline with IVF.  Cr lashaun to 1.9 after lasix 20 mg iv on 3/9  Patient underwent cardiac catheterization 3/11, nephrology following.  Creatinine noted to rise to 2.11, receiving gentle IV hydration on 3/13  Creatinine continues to rise; nephrology giving bumex 1 mg IV today as appears volume overloaded  Check bladder scan post void residual   Repeat BMP in a.m.        Lab Results   Component Value Date    EGFR 23 03/14/2024    EGFR 26 03/13/2024    EGFR 34 03/12/2024    CREATININE 2.32 (H) 03/14/2024    CREATININE 2.11 (H) 03/13/2024    CREATININE 1.68 (H) 03/12/2024       Essential hypertension  Assessment & Plan  Continue Lopressor, ACE inhibitor discontinued to TUAN     BPH (benign prostatic hyperplasia)  Assessment & Plan  Avodart substituted for finasteride               VTE Pharmacologic Prophylaxis: VTE Score: 4 Moderate Risk (Score 3-4) - Pharmacological DVT Prophylaxis Ordered: other medication SQ heparin .    Mobility:   Basic Mobility Inpatient Raw Score: 12  JH-HLM Goal: 4: Move to chair/commode  JH-HLM Achieved: 5: Stand (1 or more minutes)  HLM Goal achieved. Continue to encourage appropriate mobility.    Patient Centered Rounds: I performed bedside rounds with nursing  staff today.   Discussions with Specialists or Other Care Team Provider: multidisciplinary team     Education and Discussions with Family / Patient: Updated  (son) via phone.    Total Time Spent on Date of Encounter in care of patient: greater than 45 mins. This time was spent on one or more of the following: performing physical exam; counseling and coordination of care; obtaining or reviewing history; documenting in the medical record; reviewing/ordering tests, medications or procedures; communicating with other healthcare professionals and discussing with patient's family/caregivers.    Current Length of Stay: 9 day(s)  Current Patient Status: Inpatient   Certification Statement: The patient will continue to require additional inpatient hospital stay due to TUAN, IV bumex, nephrology following, repeat labs   Discharge Plan: Anticipate discharge in 24-48 hrs to discharge location to be determined pending rehab evaluations.    Code Status: Level 3 - DNAR and DNI    Subjective:   Patient seen sitting up in bed, reports he did not sleep very well at all last night.  Reports that he had approximately 3 small bowel movements, was not able to get comfortable in the bed last night.  Denies any chest pain or shortness of breath.    Objective:     Vitals:   Temp (24hrs), Av.8 °F (36.6 °C), Min:97 °F (36.1 °C), Max:98.3 °F (36.8 °C)    Temp:  [97 °F (36.1 °C)-98.3 °F (36.8 °C)] 97.5 °F (36.4 °C)  HR:  [47-72] 63  Resp:  [12-32] 27  BP: ()/(44-81) 118/59  SpO2:  [98 %-100 %] 100 %  Body mass index is 28.78 kg/m².     Input and Output Summary (last 24 hours):     Intake/Output Summary (Last 24 hours) at 3/14/2024 1026  Last data filed at 3/14/2024 0846  Gross per 24 hour   Intake 2332.81 ml   Output 400 ml   Net 1932.81 ml       Physical Exam:   Physical Exam  Vitals and nursing note reviewed.   Constitutional:       General: He is not in acute distress.  HENT:      Head: Normocephalic.      Nose: Nose  normal.      Mouth/Throat:      Mouth: Mucous membranes are moist.   Eyes:      Extraocular Movements: Extraocular movements intact.      Conjunctiva/sclera: Conjunctivae normal.   Cardiovascular:      Rate and Rhythm: Normal rate.      Heart sounds: Murmur heard.   Pulmonary:      Effort: Pulmonary effort is normal.      Breath sounds: Normal breath sounds.   Abdominal:      General: Bowel sounds are normal. There is no distension.      Palpations: Abdomen is soft.      Tenderness: There is no abdominal tenderness.   Genitourinary:     Comments: Voiding spontaneously   Musculoskeletal:      Cervical back: Normal range of motion.      Comments: Generalized deconditioning    Skin:     General: Skin is warm.      Capillary Refill: Capillary refill takes less than 2 seconds.      Coloration: Skin is pale.   Neurological:      General: No focal deficit present.      Mental Status: He is alert and oriented to person, place, and time.   Psychiatric:         Mood and Affect: Mood normal.         Behavior: Behavior normal.         Thought Content: Thought content normal.         Judgment: Judgment normal.          Additional Data:     Labs:  Results from last 7 days   Lab Units 03/14/24  0625 03/13/24  0624   WBC Thousand/uL 8.60 7.05   HEMOGLOBIN g/dL 10.4* 9.9*   HEMATOCRIT % 32.4* 29.8*   PLATELETS Thousands/uL 170 159   NEUTROS PCT %  --  78*   LYMPHS PCT %  --  10*   MONOS PCT %  --  11   EOS PCT %  --  0     Results from last 7 days   Lab Units 03/14/24  0625 03/10/24  0615 03/09/24  0457   SODIUM mmol/L 131*   < > 135   POTASSIUM mmol/L 4.2   < > 3.9   CHLORIDE mmol/L 102   < > 102   CO2 mmol/L 17*   < > 23   BUN mg/dL 65*   < > 50*   CREATININE mg/dL 2.32*   < > 1.78*   ANION GAP mmol/L 12   < > 10   CALCIUM mg/dL 8.7   < > 8.6   ALBUMIN g/dL  --   --  3.6   GLUCOSE RANDOM mg/dL 126   < > 125    < > = values in this interval not displayed.                       Lines/Drains:  Invasive Devices       Peripheral  Intravenous Line  Duration             Peripheral IV 03/11/24 Proximal;Right;Ventral (anterior) Forearm 3 days    Peripheral IV 03/13/24 Left;Ventral (anterior) Forearm 1 day                      Telemetry:  Telemetry Orders (From admission, onward)               24 Hour Telemetry Monitoring  Continuous x 24 Hours (Telem)        Expiring   Question:  Reason for 24 Hour Telemetry  Answer:  PCI/EP study (including pacer and ICD implementation), Cardiac surgery, MI, abnormal cardiac cath, and chest pain- rule out MI                     Telemetry Reviewed: Sinus Bradycardia  Indication for Continued Telemetry Use: Arrthymias requiring medical therapy             Imaging: No pertinent imaging reviewed.    Recent Cultures (last 7 days):         Last 24 Hours Medication List:   Current Facility-Administered Medications   Medication Dose Route Frequency Provider Last Rate    acetaminophen  650 mg Oral Q4H PRN JESSICA Maher      aluminum-magnesium hydroxide-simethicone  30 mL Oral Q4H PRN JESSICA Maher      Artificial Tears  1 drop Both Eyes Q4H PRN JESSICA Alfred      aspirin  81 mg Oral Daily JESSICA Maher      bisacodyl  10 mg Rectal Daily PRN JESSICA Maher      bumetanide  1 mg Intravenous Once Zaki Grimm MD      docusate sodium  100 mg Oral Daily JESSICA Maher      ezetimibe  10 mg Oral Daily With Dinner JESSICA Maher      And    pravastatin  40 mg Oral Daily With Dinner JESSICA Maher      finasteride  5 mg Oral Daily JESSICA Maher      fish oil  2,000 mg Oral Daily JESSICA Maher      metoclopramide  10 mg Intravenous Q6H PRN JESSICA Maher      nitroglycerin  0.1 mg Transdermal Daily JESSICA Maher      pantoprazole  40 mg Oral Early Morning JESSICA Maher      polyethylene glycol  17 g Oral Daily JESSICA Maher      senna  1 tablet Oral HS JESSICA Maher      sodium bicarbonate  650 mg Oral TID after meals Zaki Haney  MD Sirisha      ticagrelor  90 mg Oral Q12H GUSTAVO JESSICA Charles          Today, Patient Was Seen By: JESSICA Alfred    **Please Note: This note may have been constructed using a voice recognition system.**

## 2024-03-14 NOTE — PLAN OF CARE
Problem: PAIN - ADULT  Goal: Verbalizes/displays adequate comfort level or baseline comfort level  Description: Interventions:  - Encourage patient to monitor pain and request assistance  - Assess pain using appropriate pain scale  - Administer analgesics based on type and severity of pain and evaluate response  - Implement non-pharmacological measures as appropriate and evaluate response  - Consider cultural and social influences on pain and pain management  - Notify physician/advanced practitioner if interventions unsuccessful or patient reports new pain  Outcome: Progressing     Problem: INFECTION - ADULT  Goal: Absence or prevention of progression during hospitalization  Description: INTERVENTIONS:  - Assess and monitor for signs and symptoms of infection  - Monitor lab/diagnostic results  - Monitor all insertion sites, i.e. indwelling lines, tubes, and drains  - Monitor endotracheal if appropriate and nasal secretions for changes in amount and color  - [place] appropriate cooling/warming therapies per order  - Administer medications as ordered  - Instruct and encourage patient and family to use good hand hygiene technique  - Identify and instruct in appropriate isolation precautions for identified infection/condition  Outcome: Progressing  Goal: Absence of fever/infection during neutropenic period  Description: INTERVENTIONS:  - Monitor WBC    Outcome: Progressing     Problem: SAFETY ADULT  Goal: Patient will remain free of falls  Description: INTERVENTIONS:  - Educate patient/family on patient safety including physical limitations  - Instruct patient to call for assistance with activity   - Consult OT/PT to assist with strengthening/mobility   - Keep Call bell within reach  - Keep bed low and locked with side rails adjusted as appropriate  - Keep care items and personal belongings within reach  - Initiate and maintain comfort rounds  - Make Fall Risk Sign visible to staff  - Offer Toileting every 2 Hours,  in advance of need  - Initiate/Maintain bed alarm  - Apply yellow socks and bracelet for high fall risk patients  - Consider moving patient to room near nurses station  Outcome: Progressing  Goal: Maintain or return to baseline ADL function  Description: INTERVENTIONS:  -  Assess patient's ability to carry out ADLs; assess patient's baseline for ADL function and identify physical deficits which impact ability to perform ADLs (bathing, care of mouth/teeth, toileting, grooming, dressing, etc.)  - Assess/evaluate cause of self-care deficits   - Assess range of motion  - Assess patient's mobility; develop plan if impaired  - Assess patient's need for assistive devices and provide as appropriate  - Encourage maximum independence but intervene and supervise when necessary  - Involve family in performance of ADLs  - Assess for home care needs following discharge   - Consider OT consult to assist with ADL evaluation and planning for discharge  - Provide patient education as appropriate  Outcome: Progressing  Goal: Maintains/Returns to pre admission functional level  Description: INTERVENTIONS:  - Perform AM-PAC 6 Click Basic Mobility/ Daily Activity assessment daily.  - Set and communicate daily mobility goal to care team and patient/family/caregiver.   - Collaborate with rehabilitation services on mobility goals if consulted  - Perform Range of Motion 4 times a day.  - Reposition patient every 2 hours.  - Record patient progress and toleration of activity level   Outcome: Progressing     Problem: DISCHARGE PLANNING  Goal: Discharge to home or other facility with appropriate resources  Description: INTERVENTIONS:  - Identify barriers to discharge w/patient and caregiver  - Arrange for needed discharge resources and transportation as appropriate  - Identify discharge learning needs (meds, wound care, etc.)  - Arrange for interpretive services to assist at discharge as needed  - Refer to Case Management Department for  coordinating discharge planning if the patient needs post-hospital services based on physician/advanced practitioner order or complex needs related to functional status, cognitive ability, or social support system  Outcome: Progressing     Problem: Knowledge Deficit  Goal: Patient/family/caregiver demonstrates understanding of disease process, treatment plan, medications, and discharge instructions  Description: Complete learning assessment and assess knowledge base.  Interventions:  - Provide teaching at level of understanding  - Provide teaching via preferred learning methods  Outcome: Progressing     Problem: Prexisting or High Potential for Compromised Skin Integrity  Goal: Skin integrity is maintained or improved  Description: INTERVENTIONS:  - Identify patients at risk for skin breakdown  - Assess and monitor skin integrity  - Assess and monitor nutrition and hydration status  - Monitor labs   - Assess for incontinence   - Turn and reposition patient  - Assist with mobility/ambulation  - Relieve pressure over bony prominences  - Avoid friction and shearing  - Provide appropriate hygiene as needed including keeping skin clean and dry  - Evaluate need for skin moisturizer/barrier cream  - Collaborate with interdisciplinary team   - Patient/family teaching  - Consider wound care consult   Outcome: Progressing     Problem: Nutrition/Hydration-ADULT  Goal: Nutrient/Hydration intake appropriate for improving, restoring or maintaining nutritional needs  Description: Monitor and assess patient's nutrition/hydration status for malnutrition. Collaborate with interdisciplinary team and initiate plan and interventions as ordered.  Monitor patient's weight and dietary intake as ordered or per policy. Utilize nutrition screening tool and intervene as necessary. Determine patient's food preferences and provide high-protein, high-caloric foods as appropriate.     INTERVENTIONS:  - Monitor oral intake, urinary output, labs,  and treatment plans  - Assess nutrition and hydration status and recommend course of action  - Evaluate amount of meals eaten  - Assist patient with eating if necessary   - Allow adequate time for meals  - Recommend/ encourage appropriate diets, oral nutritional supplements, and vitamin/mineral supplements  - Order, calculate, and assess calorie counts as needed  -- Assess need for intravenous fluids  - Provide specific nutrition/hydration education as appropriate  - Include patient/family/caregiver in decisions related to nutrition  Outcome: Progressing

## 2024-03-14 NOTE — ASSESSMENT & PLAN NOTE
Hx of CABG x 2 2009 (Lima to LAD and saphenous vein graft to marginal)  Follows with Dr. Steven Walton at [facility] in [place]  Rest of plan as above

## 2024-03-14 NOTE — ASSESSMENT & PLAN NOTE
Na 128  Likely in setting of volume overload  Continue diuresis and fluid restriction 1.2L  Check serum osmo, urine osmo, and urine Na

## 2024-03-14 NOTE — ASSESSMENT & PLAN NOTE
Close telemetry monitoring overnight, if hemodynamic instability d/t CHB will need urgent TVP  Plan for PPM in AM

## 2024-03-14 NOTE — CONSULTS
[facility]  Consult  Name: Vladimir Mathias 89 y.o. male I MRN: [id number]  Unit/Bed#: ICU 01 I Date of Admission: 3/5/2024   Date of Service: 3/14/2024 I Hospital Day: 9    Consults    Assessment/Plan   * NSTEMI (non-ST elevated myocardial infarction) (HCC)  Assessment & Plan  3/5 Substernal CP at approx 1530 lasting 30 min, admitted to [facility] and placed on Heparin gtt   Recent admission at [facility] for chest pain with mild trop elevation. Symptoms were attributed to AF and he was discharged home   3/6 echo -- EF 55%, dyskinetic septum, severe RA dilation, severely reduces RV function, bioprosthetic AV, mild MVR and TVR  3/6 VQ scan --low probability for pulm embolus   3/8 Stress test -- left ventricular perfusion defect present in the mid to basal inferior and inferoseptal location(s) that is partially reversible  3/11 Cardiac cath -- patent LIMA to LAD patent vein graft to circumflex and LAD is mid 100% occluded. Patient also noted to have clot sitting in sinus of right coronary artery which disrupted the flow to right coronary artery which was not amendable to intervention   3/14: Echo-EF 55%. G1DD. RV mod dilated, RV systolic function severely reduced. TAVR bioprosthetic valve-well seated. No PVL. Mild to mod TR.   Peak troponin >22,973, current trop 11,569  EKG with CHB rate 47, RBBB, and ST elevations in II, III, aVF and V1 and ST depressions in I and AVL. Other EKGs with ventricular ectopy    Plan:   Heparin ACS   Aspirin/Brilinta (PM dose of Brilinta held for PPM tomorrow)   Statin  Nitro paste BID  Cardiology following, appreciate recs       Third degree heart block (HCC)  Assessment & Plan  Close telemetry monitoring overnight, if hemodynamic instability d/t CHB will need urgent TVP  Plan for PPM in AM     Atrial flutter (HCC)  Assessment & Plan  S/p Watchman device not on OP AC d/t hx of epistaxis   No AV jose blocking agents in setting of tachybrady syndrome, now with CHB   Heparin  gtt for AC  Optimize electrolytes     TUAN on CKD 3  Assessment & Plan  Baseline creat 1.5-1.7 in the setting of HTN and age-related nephron loss   Creat peak appears to be 2.65, cardiorenal vs KANDY  Initially tx with IVF now with concerns for volume overload and worsening creat, currently 2.5  Given 1mg IV Bumex  Strict I&O  Avoid nephrotoxic agents   Maintain MAP > 65  Nephro following, appreciate recs    Hyponatremia  Assessment & Plan  Na 128  Likely in setting of volume overload  Continue diuresis and fluid restriction 1.2L  Check serum osmo, urine osmo, and urine Na    Metabolic acidosis  Assessment & Plan  Non anion gap metabolic acidosis  Serum bicarb 19  Normal chloride   Unclear etiology, possibly RTA   Continue bicarb tablets  Nephro following, appreciate recs    Nonrheumatic aortic valve stenosis  Assessment & Plan  Hx of TAVR  3/6 echo -- bioprosthetic valve is well-seated and without regurg     Essential hypertension  Assessment & Plan  Continue nitro paste BID  Hold beta blocker in setting of CHB    CAD (coronary artery disease)  Assessment & Plan  Hx of CABG x 2 2009 (Lima to LAD and saphenous vein graft to marginal)  Follows with Dr. Steven Walton at [facility] in [place]  Rest of plan as above     BPH (benign prostatic hyperplasia)  Assessment & Plan  Cont home proscar   Urinary retention protocol            History of Present Illness     HPI: Vladimir Mathias is a 89 y.o. with PMH HLD, HTN, CAD s/p CABG, AS s/p TAVR, PAF s/p Watchman, BPH, CKD3 who presented to [facility] ER on 3/5 with intermittent substernal CP and was noted to have up-trending troponins. As he also had an TUAN on CKD, he was admitted to [facility] for medical optimization before proceeding to cath lab. Cardiac cath done 3/11 showed patent ROGERS to LAD patent vein graft to circumflex and LAD is mid 100% occluded. Patient also noted to have clot sitting in sinus of RCA which disrupted the flow. During procedure, was noted to have  intermittent CHB and TVP via R fem was placed. He was admitted to the ICU post cath with intermittent V pacing and started on heparin gtt. Heart block/bradycardia resolved and TVP was removed on 3/12 and patient was transferred to SLIM service. Asked to evaluate patient by nursing d/t concerning telemetry rhythms with bradycardia and ectopy. 3 EKGs obtained. One EKG with CHB rate 47, RBBB, and ST elevations in II, III, aVF and V1 and ST depressions in I and AVL. Other EKGs with ventricular ectopy. Patient asymptomatic and normotensive. EKG changes are concerning for RCA territory thrombus rupture. Discussed with cardiology and heparin gtt restarted (was discontinued yesterday) and patient has been maintained on Aspirin and Brilinta. Patient transferred back to  service for close monitoring overnight with plans for PPM in AM.     History obtained from chart review and the patient.  Review of Systems   All other systems reviewed and are negative.    Disposition: Critical care   Historical Information   Past Medical History:  05/25/2022: Basal cell carcinoma      Comment:  Left nasal ala  05/25/2022: Basal cell carcinoma (BCC) of antihelix of right ear      Comment:  Right antihelix  No date: Coronary artery disease  No date: Hypertension  No date: Renal disorder Past Surgical History:  No date: APPENDECTOMY  3/11/2024: CARDIAC CATHETERIZATION; Left      Comment:  Procedure: Cardiac catheterization;  Surgeon: Anisha Rehman MD;  Location: [facility];  Service:                Cardiology  3/11/2024: CARDIAC PACEMAKER PLACEMENT; N/A      Comment:  Procedure: INSERTION PACEMAKER TRANSVENOUS;  Surgeon:                Anisha Rehman MD;  Location: [facility];                 Service: Cardiology  No date: CARDIAC SURGERY  No date: EYE SURGERY  No date: HERNIA REPAIR  07/05/2022: MOHS SURGERY; Left      Comment:  Left nasal ala  07/05/2022: MOHS SURGERY; Right      Comment:  Right  antihelix  07/12/2022: MOHS SURGERY; Right      Comment:  Right antihelix  No date: SKIN BIOPSY   Current Outpatient Medications   Medication Instructions    apixaban (ELIQUIS) 2.5 mg, Oral, 2 times daily    aspirin (Aspir-Low) 81 mg EC tablet Every 24 hours    aspirin (ECOTRIN LOW STRENGTH) 81 mg, Oral, Daily    clopidogrel (PLAVIX) 75 mg tablet No dose, route, or frequency recorded.    dicyclomine (BENTYL) 10 mg, Every 6 hours    dutasteride (AVODART) 0.5 mg capsule No dose, route, or frequency recorded.    ezetimibe-simvastatin (VYTORIN) 10-20 mg per tablet 1 tablet, Oral, Daily at bedtime    famotidine (Pepcid) 20 mg tablet Pepcid    FOLIC ACID-VITAMIN C PO Take by mouth    furosemide (LASIX) 20 mg tablet No dose, route, or frequency recorded.    metoprolol succinate (TOPROL-XL) 25 mg 24 hr tablet 1 capsule, Every 24 hours    metoprolol succinate (TOPROL-XL) 25 mg, Oral, Daily    olmesartan (BENICAR) 40 mg, Oral, Daily    Vascepa 1 g CAPS No dose, route, or frequency recorded.    No Known Allergies   Social History     Tobacco Use    Smoking status: Former     Types: Cigarettes    Smokeless tobacco: Never   Vaping Use    Vaping status: Never Used   Substance Use Topics    Alcohol use: Yes     Comment: twice a year    Family History   Problem Relation Age of Onset    Squamous cell carcinoma Daughter         Objective                            Vitals I/O      Most Recent Min/Max in 24hrs   Temp 97.6 °F (36.4 °C) Temp  Min: 97 °F (36.1 °C)  Max: 97.9 °F (36.6 °C)   Pulse (!) 48 Pulse  Min: 46  Max: 81   Resp (!) 30 Resp  Min: 12  Max: 33   /56 BP  Min: 91/46  Max: 123/57   O2 Sat 97 % SpO2  Min: 94 %  Max: 100 %      Intake/Output Summary (Last 24 hours) at 3/14/2024 1839  Last data filed at 3/14/2024 0930  Gross per 24 hour   Intake 1646.25 ml   Output 250 ml   Net 1396.25 ml       Diet Cardiovascular; Sodium 2 GM; Fluid Restriction 1200 ML  Diet NPO; Sips of clear liquids    Invasive Monitoring   N/A         Physical Exam   Physical Exam  Eyes:      General: Lids are normal.      Extraocular Movements: Extraocular movements intact.      Conjunctiva/sclera: Conjunctivae normal.      Pupils: Pupils are equal, round, and reactive to light.   Skin:     General: Skin is warm and dry.      Capillary Refill: Capillary refill takes less than 2 seconds.      Coloration: Skin is pale.      Comments: LE cool    HENT:      Head: Normocephalic and atraumatic.   Neck:      Trachea: Trachea normal.   Cardiovascular:      Rate and Rhythm: Bradycardia present. Rhythm irregularly irregular.      Pulses: Normal pulses.           Radial pulses are 2+ on the right side and 2+ on the left side.        Dorsalis pedis pulses are 2+ on the right side and 2+ on the left side.      Heart sounds: Normal heart sounds, S1 normal and S2 normal.   Musculoskeletal:      Cervical back: Full passive range of motion without pain, normal range of motion and neck supple.      Comments: Normal ROM, normal strength   Abdominal: General: Bowel sounds are normal.      Palpations: Abdomen is soft.   Constitutional:       General: He is awake.      Appearance: Normal appearance. He is well-developed.   Pulmonary:      Effort: Pulmonary effort is normal.      Breath sounds: Normal breath sounds.   Psychiatric:         Attention and Perception: Attention and perception normal.         Mood and Affect: Mood and affect normal.         Speech: Speech normal.         Behavior: Behavior normal. Behavior is cooperative.         Thought Content: Thought content normal.         Cognition and Memory: Cognition and memory normal.         Judgment: Judgment normal.   Neurological:      General: No focal deficit present.      Mental Status: He is alert and oriented to person, place, and time.      GCS: GCS eye subscore is 4. GCS verbal subscore is 5. GCS motor subscore is 6.            Diagnostic Studies      EKG: EKG with CHB rate 47, RBBB, and ST elevations in II, III,  aVF and V1 and ST depressions in I and AVL. Other EKGs with ventricular ectopy     Medications:  Scheduled PRN   aspirin, 81 mg, Daily  docusate sodium, 100 mg, Daily  ezetimibe, 10 mg, Daily With Dinner   And  pravastatin, 40 mg, Daily With Dinner  finasteride, 5 mg, Daily  fish oil, 2,000 mg, Daily  nitroglycerin, 0.1 mg, Daily  pantoprazole, 40 mg, Early Morning  polyethylene glycol, 17 g, Daily  senna, 1 tablet, HS  sodium bicarbonate, 650 mg, TID after meals      acetaminophen, 650 mg, Q6H PRN  aluminum-magnesium hydroxide-simethicone, 30 mL, Q4H PRN  Artificial Tears, 1 drop, Q4H PRN  bisacodyl, 10 mg, Daily PRN       Continuous    heparin (porcine), 3-20 Units/kg/hr (Order-Specific), Last Rate: 11.1 Units/kg/hr (03/14/24 1554)         Labs:    CBC    Recent Labs     03/13/24 0624 03/14/24  0625   WBC 7.05 8.60   HGB 9.9* 10.4*   HCT 29.8* 32.4*    170     BMP    Recent Labs     03/14/24  0625 03/14/24  1507   SODIUM 131* 128*   K 4.2 4.8    98   CO2 17* 19*   AGAP 12 11   BUN 65* 71*   CREATININE 2.32* 2.53*   CALCIUM 8.7 9.3       Coags    Recent Labs     03/13/24  0624 03/14/24  1549   INR  --  1.31*   PTT 58* 34        Additional Electrolytes  Recent Labs     03/13/24 0624 03/14/24  0625 03/14/24  1507   MG 2.0 2.3 2.6   PHOS 3.6  --   --           Blood Gas    No recent results  No recent results LFTs  No recent results    Infectious  No recent results  Glucose  Recent Labs     03/13/24 0624 03/14/24  0625 03/14/24  1507   GLUC 132 126 146*               JESSICA Salmeron

## 2024-03-15 ENCOUNTER — APPOINTMENT (INPATIENT)
Dept: RADIOLOGY | Facility: HOSPITAL | Age: 89
DRG: 243 | End: 2024-03-15
Payer: COMMERCIAL

## 2024-03-15 PROBLEM — R79.89 ELEVATED LFTS: Status: RESOLVED | Noted: 2024-03-05 | Resolved: 2024-03-15

## 2024-03-15 PROBLEM — E87.5 HYPERKALEMIA: Status: RESOLVED | Noted: 2024-03-05 | Resolved: 2024-03-15

## 2024-03-15 LAB
ALBUMIN SERPL BCP-MCNC: 3.5 G/DL (ref 3.5–5)
ALP SERPL-CCNC: 58 U/L (ref 34–104)
ALT SERPL W P-5'-P-CCNC: 48 U/L (ref 7–52)
ANION GAP SERPL CALCULATED.3IONS-SCNC: 10 MMOL/L (ref 4–13)
APTT PPP: 102 SECONDS (ref 23–37)
AST SERPL W P-5'-P-CCNC: 37 U/L (ref 13–39)
ATRIAL RATE: 41 BPM
ATRIAL RATE: 49 BPM
ATRIAL RATE: 75 BPM
BASOPHILS # BLD AUTO: 0.02 THOUSANDS/ÂΜL (ref 0–0.1)
BASOPHILS NFR BLD AUTO: 0 % (ref 0–1)
BILIRUB SERPL-MCNC: 0.81 MG/DL (ref 0.2–1)
BNP SERPL-MCNC: 1742 PG/ML (ref 0–100)
BUN SERPL-MCNC: 75 MG/DL (ref 5–25)
CALCIUM SERPL-MCNC: 9.4 MG/DL (ref 8.4–10.2)
CHLORIDE SERPL-SCNC: 100 MMOL/L (ref 96–108)
CO2 SERPL-SCNC: 22 MMOL/L (ref 21–32)
CREAT SERPL-MCNC: 2.64 MG/DL (ref 0.6–1.3)
EOSINOPHIL # BLD AUTO: 0.04 THOUSAND/ÂΜL (ref 0–0.61)
EOSINOPHIL NFR BLD AUTO: 1 % (ref 0–6)
ERYTHROCYTE [DISTWIDTH] IN BLOOD BY AUTOMATED COUNT: 14.7 % (ref 11.6–15.1)
GFR SERPL CREATININE-BSD FRML MDRD: 20 ML/MIN/1.73SQ M
GLUCOSE SERPL-MCNC: 142 MG/DL (ref 65–140)
HCT VFR BLD AUTO: 29.8 % (ref 36.5–49.3)
HGB BLD-MCNC: 10 G/DL (ref 12–17)
IMM GRANULOCYTES # BLD AUTO: 0.07 THOUSAND/UL (ref 0–0.2)
IMM GRANULOCYTES NFR BLD AUTO: 1 % (ref 0–2)
LYMPHOCYTES # BLD AUTO: 0.6 THOUSANDS/ÂΜL (ref 0.6–4.47)
LYMPHOCYTES NFR BLD AUTO: 8 % (ref 14–44)
MAGNESIUM SERPL-MCNC: 2.5 MG/DL (ref 1.9–2.7)
MCH RBC QN AUTO: 33.2 PG (ref 26.8–34.3)
MCHC RBC AUTO-ENTMCNC: 33.6 G/DL (ref 31.4–37.4)
MCV RBC AUTO: 99 FL (ref 82–98)
MONOCYTES # BLD AUTO: 0.72 THOUSAND/ÂΜL (ref 0.17–1.22)
MONOCYTES NFR BLD AUTO: 9 % (ref 4–12)
NEUTROPHILS # BLD AUTO: 6.58 THOUSANDS/ÂΜL (ref 1.85–7.62)
NEUTS SEG NFR BLD AUTO: 81 % (ref 43–75)
NRBC BLD AUTO-RTO: 0 /100 WBCS
OSMOLALITY UR/SERPL-RTO: 306 MMOL/KG (ref 282–298)
OSMOLALITY UR: 392 MMOL/KG
PHOSPHATE SERPL-MCNC: 5.1 MG/DL (ref 2.3–4.1)
PLATELET # BLD AUTO: 195 THOUSANDS/UL (ref 149–390)
PMV BLD AUTO: 11.1 FL (ref 8.9–12.7)
POTASSIUM SERPL-SCNC: 4.2 MMOL/L (ref 3.5–5.3)
PROT SERPL-MCNC: 6.5 G/DL (ref 6.4–8.4)
QRS AXIS: 30 DEGREES
QRS AXIS: 33 DEGREES
QRS AXIS: 52 DEGREES
QRSD INTERVAL: 136 MS
QRSD INTERVAL: 144 MS
QRSD INTERVAL: 98 MS
QT INTERVAL: 436 MS
QT INTERVAL: 464 MS
QT INTERVAL: 704 MS
QTC INTERVAL: 392 MS
QTC INTERVAL: 438 MS
QTC INTERVAL: 635 MS
RBC # BLD AUTO: 3.01 MILLION/UL (ref 3.88–5.62)
SODIUM SERPL-SCNC: 132 MMOL/L (ref 135–147)
T WAVE AXIS: 100 DEGREES
T WAVE AXIS: 107 DEGREES
T WAVE AXIS: 111 DEGREES
VENTRICULAR RATE: 43 BPM
VENTRICULAR RATE: 49 BPM
VENTRICULAR RATE: 61 BPM
WBC # BLD AUTO: 8.03 THOUSAND/UL (ref 4.31–10.16)

## 2024-03-15 PROCEDURE — 93010 ELECTROCARDIOGRAM REPORT: CPT | Performed by: INTERNAL MEDICINE

## 2024-03-15 PROCEDURE — 33208 INSRT HEART PM ATRIAL & VENT: CPT | Performed by: INTERNAL MEDICINE

## 2024-03-15 PROCEDURE — C1769 GUIDE WIRE: HCPCS | Performed by: INTERNAL MEDICINE

## 2024-03-15 PROCEDURE — 71045 X-RAY EXAM CHEST 1 VIEW: CPT

## 2024-03-15 PROCEDURE — 99152 MOD SED SAME PHYS/QHP 5/>YRS: CPT | Performed by: INTERNAL MEDICINE

## 2024-03-15 PROCEDURE — 85730 THROMBOPLASTIN TIME PARTIAL: CPT | Performed by: STUDENT IN AN ORGANIZED HEALTH CARE EDUCATION/TRAINING PROGRAM

## 2024-03-15 PROCEDURE — 85025 COMPLETE CBC W/AUTO DIFF WBC: CPT | Performed by: NURSE PRACTITIONER

## 2024-03-15 PROCEDURE — C1898 LEAD, PMKR, OTHER THAN TRANS: HCPCS | Performed by: INTERNAL MEDICINE

## 2024-03-15 PROCEDURE — 84100 ASSAY OF PHOSPHORUS: CPT | Performed by: NURSE PRACTITIONER

## 2024-03-15 PROCEDURE — 0JH606Z INSERTION OF PACEMAKER, DUAL CHAMBER INTO CHEST SUBCUTANEOUS TISSUE AND FASCIA, OPEN APPROACH: ICD-10-PCS | Performed by: INTERNAL MEDICINE

## 2024-03-15 PROCEDURE — 99291 CRITICAL CARE FIRST HOUR: CPT | Performed by: STUDENT IN AN ORGANIZED HEALTH CARE EDUCATION/TRAINING PROGRAM

## 2024-03-15 PROCEDURE — 83880 ASSAY OF NATRIURETIC PEPTIDE: CPT | Performed by: NURSE PRACTITIONER

## 2024-03-15 PROCEDURE — C1785 PMKR, DUAL, RATE-RESP: HCPCS | Performed by: INTERNAL MEDICINE

## 2024-03-15 PROCEDURE — 83735 ASSAY OF MAGNESIUM: CPT | Performed by: NURSE PRACTITIONER

## 2024-03-15 PROCEDURE — 93005 ELECTROCARDIOGRAM TRACING: CPT

## 2024-03-15 PROCEDURE — 80053 COMPREHEN METABOLIC PANEL: CPT | Performed by: NURSE PRACTITIONER

## 2024-03-15 PROCEDURE — 99232 SBSQ HOSP IP/OBS MODERATE 35: CPT | Performed by: INTERNAL MEDICINE

## 2024-03-15 PROCEDURE — 02HK3JZ INSERTION OF PACEMAKER LEAD INTO RIGHT VENTRICLE, PERCUTANEOUS APPROACH: ICD-10-PCS | Performed by: INTERNAL MEDICINE

## 2024-03-15 PROCEDURE — C1887 CATHETER, GUIDING: HCPCS | Performed by: INTERNAL MEDICINE

## 2024-03-15 PROCEDURE — 02H63JZ INSERTION OF PACEMAKER LEAD INTO RIGHT ATRIUM, PERCUTANEOUS APPROACH: ICD-10-PCS | Performed by: INTERNAL MEDICINE

## 2024-03-15 PROCEDURE — C1894 INTRO/SHEATH, NON-LASER: HCPCS | Performed by: INTERNAL MEDICINE

## 2024-03-15 PROCEDURE — 3E0102A INTRODUCTION OF ANTI-INFECTIVE ENVELOPE INTO SUBCUTANEOUS TISSUE, OPEN APPROACH: ICD-10-PCS | Performed by: INTERNAL MEDICINE

## 2024-03-15 PROCEDURE — 99153 MOD SED SAME PHYS/QHP EA: CPT | Performed by: INTERNAL MEDICINE

## 2024-03-15 DEVICE — LEAD 5076-52 MRI US RCMCRD
Type: IMPLANTABLE DEVICE | Site: HEART | Status: FUNCTIONAL
Brand: CAPSUREFIX NOVUS MRI™ SURESCAN®

## 2024-03-15 DEVICE — LEAD 3830 US MKT/ 69CM MRI LBBAP
Type: IMPLANTABLE DEVICE | Site: HEART | Status: FUNCTIONAL
Brand: SELECTSECURE™ MRI SURESCAN™

## 2024-03-15 DEVICE — ENVELOPE CMRM6122 ABSORB MED MR
Type: IMPLANTABLE DEVICE | Site: CHEST  WALL | Status: FUNCTIONAL
Brand: TYRX™

## 2024-03-15 DEVICE — IPG W1DR01 AZURE XT DR MRI USA
Type: IMPLANTABLE DEVICE | Site: CHEST  WALL | Status: FUNCTIONAL
Brand: AZURE™ XT DR MRI SURESCAN™

## 2024-03-15 RX ORDER — HEPARIN SODIUM 5000 [USP'U]/ML
5000 INJECTION, SOLUTION INTRAVENOUS; SUBCUTANEOUS EVERY 8 HOURS SCHEDULED
Status: DISCONTINUED | OUTPATIENT
Start: 2024-03-16 | End: 2024-03-20 | Stop reason: HOSPADM

## 2024-03-15 RX ORDER — FENTANYL CITRATE 50 UG/ML
INJECTION, SOLUTION INTRAMUSCULAR; INTRAVENOUS CODE/TRAUMA/SEDATION MEDICATION
Status: DISCONTINUED | OUTPATIENT
Start: 2024-03-15 | End: 2024-03-15 | Stop reason: HOSPADM

## 2024-03-15 RX ORDER — GENTAMICIN 40 MG/ML
INJECTION, SOLUTION INTRAMUSCULAR; INTRAVENOUS CODE/TRAUMA/SEDATION MEDICATION
Status: DISCONTINUED | OUTPATIENT
Start: 2024-03-15 | End: 2024-03-15 | Stop reason: HOSPADM

## 2024-03-15 RX ORDER — ACETAMINOPHEN 325 MG/1
650 TABLET ORAL EVERY 4 HOURS PRN
Status: DISCONTINUED | OUTPATIENT
Start: 2024-03-15 | End: 2024-03-15

## 2024-03-15 RX ORDER — METOLAZONE 5 MG/1
5 TABLET ORAL ONCE
Status: COMPLETED | OUTPATIENT
Start: 2024-03-15 | End: 2024-03-15

## 2024-03-15 RX ORDER — MIDAZOLAM HYDROCHLORIDE 2 MG/2ML
INJECTION, SOLUTION INTRAMUSCULAR; INTRAVENOUS CODE/TRAUMA/SEDATION MEDICATION
Status: DISCONTINUED | OUTPATIENT
Start: 2024-03-15 | End: 2024-03-15 | Stop reason: HOSPADM

## 2024-03-15 RX ORDER — CEFAZOLIN SODIUM 2 G/50ML
SOLUTION INTRAVENOUS
Status: COMPLETED | OUTPATIENT
Start: 2024-03-15 | End: 2024-03-15

## 2024-03-15 RX ORDER — CEFAZOLIN SODIUM 2 G/50ML
2000 SOLUTION INTRAVENOUS EVERY 8 HOURS
Status: COMPLETED | OUTPATIENT
Start: 2024-03-15 | End: 2024-03-16

## 2024-03-15 RX ORDER — BUMETANIDE 0.25 MG/ML
2 INJECTION INTRAMUSCULAR; INTRAVENOUS ONCE
Status: COMPLETED | OUTPATIENT
Start: 2024-03-15 | End: 2024-03-15

## 2024-03-15 RX ADMIN — ASPIRIN 81 MG 81 MG: 81 TABLET ORAL at 08:08

## 2024-03-15 RX ADMIN — SODIUM BICARBONATE 650 MG TABLET 650 MG: at 11:42

## 2024-03-15 RX ADMIN — PANTOPRAZOLE SODIUM 40 MG: 40 TABLET, DELAYED RELEASE ORAL at 05:04

## 2024-03-15 RX ADMIN — PRAVASTATIN SODIUM 40 MG: 40 TABLET ORAL at 17:06

## 2024-03-15 RX ADMIN — NITROGLYCERIN 0.1 MG: 0.1 PATCH TRANSDERMAL at 08:02

## 2024-03-15 RX ADMIN — SODIUM BICARBONATE 650 MG TABLET 650 MG: at 17:06

## 2024-03-15 RX ADMIN — EZETIMIBE 10 MG: 10 TABLET ORAL at 17:06

## 2024-03-15 RX ADMIN — ACETAMINOPHEN 650 MG: 325 TABLET ORAL at 21:00

## 2024-03-15 RX ADMIN — FINASTERIDE 5 MG: 5 TABLET, FILM COATED ORAL at 08:08

## 2024-03-15 RX ADMIN — SENNOSIDES 8.6 MG: 8.6 TABLET, FILM COATED ORAL at 21:00

## 2024-03-15 RX ADMIN — CEFAZOLIN SODIUM 2000 MG: 2 SOLUTION INTRAVENOUS at 21:00

## 2024-03-15 RX ADMIN — SODIUM BICARBONATE 650 MG TABLET 650 MG: at 08:08

## 2024-03-15 RX ADMIN — BUMETANIDE 2 MG: 0.25 INJECTION INTRAMUSCULAR; INTRAVENOUS at 08:02

## 2024-03-15 RX ADMIN — METOLAZONE 5 MG: 5 TABLET ORAL at 09:09

## 2024-03-15 NOTE — PROGRESS NOTES
NEPHROLOGY HOSPITAL PROGRESS NOTE   Vladimir Mathias 89 y.o. male MRN: [id number]  Unit/Bed#: ICU 01 Encounter: [id number]  Reason for Consult: TUAN on CKD    ASSESSMENT and PLAN:  89-year-old male with history of CAD, A-fib status post Watchman device and CKD presented with chest pain and dyspnea.  We are consulted for management of TUAN on CKD.    1.  Acute kidney injury.  Baseline creatinine 1.5-1.7.  Admission creatinine 2.01.  Peak creatinine 2.65 on 03/06.  Yoandy creatinine 1.68 on 03/12.  Creatinine on the rise and later part of this week, creatinine today 2.6.  Urinalysis with no RBC, urine sodium 17.  Kidney ultrasound did not show hydronephrosis.  Etiology of initial TUAN due to hemodynamic changes +/- hypotension in setting of NSTEMI.  Status post left heart catheterization with iodinated contrast administration on 03/11.  Rising creatinine most likely in setting of contrast associated nephropathy and ongoing bradycardia causing decreased renal perfusion.  He currently appears volume expanded (rising cardiac BNP, ascites noted on kidney ultrasound, elevated JVD) and would therefore provide IV Bumex 2 mg x 1 today.  No indication for renal replacement therapy.  Trend BMP.    2.  CKD stage IIIb.  Outpatient nephrologist is Dr. Barrientos at [facility].  Baseline creatinine is 1.5-1.7.  Etiology most likely in setting of hypertension and age-related nephron loss.  He will end up with a new baseline during this admission due to repeated episodes of TUAN.    3.  Hypertension.  Blood pressure is currently borderline low.  He was on metoprolol which has been discontinued due to heart block.  Continue to monitor off antihypertensives.    4.  NSTEMI.  Status postcardiac catheterization that showed patent ROGERS to LAD, patent vein graft to his marginal with a severe three-vessel disease.  Thrombus was noted in RCA which was causing intermittent AV block and a temporary pacemaker was placed.  Temporary pacemaker was  discontinued and he is now back in heart block.  Plans noted for pacemaker placement today.    5.  Anemia in CKD.  Hemoglobin today 10.0.  Transfuse to keep hemoglobin more than 7.    6.  Hyperchloremic acidosis.  This is most likely due to TUAN on CKD.  Currently resolved with sodium bicarbonate 650 mg 3 times daily, continue same dose.    7.  Hyponatremia.  Serum sodium improved to 132.  Serum osmolality is elevated at 306 in setting of elevated BUN.  Still consider hypotonic hyponatremia.  Urine osmolality 392 consistent with ADH dependent hyponatremia.  Urine sodium of 15 consistent with active RAAS system in setting of volume overload.  Serum sodium is improving with ongoing diuresis.    Discussed with ICU team.  After discussion, we agreed that patient has recurrence of TUAN in setting of contrast associated nephropathy and currently appears volume expanded and to give a dose of IV Bumex 2 mg x 1 today.    SUBJECTIVE / 24H INTERVAL HISTORY:  Urine output recorded as 790 cc.  He is on ICU service now due to ongoing bradycardia.  He complains of dyspnea.  He is going for pacemaker placement today.    OBJECTIVE:  Current Weight: Weight - Scale: 102 kg (223 lb 15.8 oz)  Vitals:    03/15/24 0400 03/15/24 0500 03/15/24 0600 03/15/24 0700   BP: 113/56  113/58 115/59   BP Location: Left arm   Left arm   Pulse: (!) 44  (!) 47    Resp: 16  (!) 31    Temp: 97.9 °F (36.6 °C)   (!) 97.3 °F (36.3 °C)   TempSrc: Temporal   Temporal   SpO2: 98%  99%    Weight:  102 kg (223 lb 15.8 oz)     Height:           Intake/Output Summary (Last 24 hours) at 3/15/2024 0832  Last data filed at 3/15/2024 0600  Gross per 24 hour   Intake 1378.5 ml   Output 715 ml   Net 663.5 ml     Review of Systems   Constitutional:  Negative for chills and fever.   HENT:  Negative for ear pain and sore throat.    Eyes:  Negative for pain and visual disturbance.   Respiratory:  Positive for shortness of breath. Negative for cough.    Cardiovascular:   Negative for chest pain and palpitations.   Gastrointestinal:  Negative for abdominal pain and vomiting.   Genitourinary:  Negative for dysuria and hematuria.   Musculoskeletal:  Negative for arthralgias and back pain.   Skin:  Negative for color change and rash.   Neurological:  Negative for seizures and syncope.   All other systems reviewed and are negative.    Physical Exam  Vitals and nursing note reviewed.   Constitutional:       General: He is not in acute distress.     Appearance: He is well-developed.   HENT:      Head: Normocephalic and atraumatic.   Eyes:      Conjunctiva/sclera: Conjunctivae normal.   Cardiovascular:      Rate and Rhythm: Normal rate and regular rhythm.      Pulses: Normal pulses.      Heart sounds: Normal heart sounds. No murmur heard.     Comments: JVD appreciated  Pulmonary:      Effort: Pulmonary effort is normal. No respiratory distress.      Breath sounds: Normal breath sounds.   Abdominal:      Palpations: Abdomen is soft.      Tenderness: There is no abdominal tenderness.   Musculoskeletal:         General: No swelling.      Cervical back: Neck supple.      Right lower leg: Edema present.      Left lower leg: Edema present.   Skin:     General: Skin is warm and dry.      Capillary Refill: Capillary refill takes less than 2 seconds.   Neurological:      Mental Status: He is alert.   Psychiatric:         Mood and Affect: Mood normal.       Medications:    Current Facility-Administered Medications:     acetaminophen (TYLENOL) tablet 650 mg, 650 mg, Oral, Q6H PRN, Judith Spironello V, CRNP    aluminum-magnesium hydroxide-simethicone (MAALOX) oral suspension 30 mL, 30 mL, Oral, Q4H PRN, Judith Spironello V, CRNP    Artificial Tears ophthalmic solution 1 drop, 1 drop, Both Eyes, Q4H PRN, Judith Spironello V, CRNP    aspirin chewable tablet 81 mg, 81 mg, Oral, Daily, Judith Spironello V, CRNP, 81 mg at 03/15/24 0808    bisacodyl (DULCOLAX) rectal suppository 10 mg, 10 mg, Rectal,  Daily PRN, Judith Spironello V, CRNP    docusate sodium (COLACE) capsule 100 mg, 100 mg, Oral, Daily, Judith Spironello V, CRNP, 100 mg at 03/13/24 0829    ezetimibe (ZETIA) tablet 10 mg, 10 mg, Oral, Daily With Dinner, 10 mg at 03/14/24 1648 **AND** pravastatin (PRAVACHOL) tablet 40 mg, 40 mg, Oral, Daily With Dinner, Judith Spironello V, CRNP, 40 mg at 03/14/24 1647    finasteride (PROSCAR) tablet 5 mg, 5 mg, Oral, Daily, Judith Spironello V, CRNP, 5 mg at 03/15/24 0808    fish oil capsule 2,000 mg, 2,000 mg, Oral, Daily, Judith Spironello V, CRNP, 2,000 mg at 03/14/24 0905    nitroglycerin (NITRODUR) 0.1 mg/hr TD 24 hr patch, 0.1 mg, Transdermal, Daily, Judith Spironello V, CRNP, 0.1 mg at 03/15/24 0802    pantoprazole (PROTONIX) EC tablet 40 mg, 40 mg, Oral, Early Morning, Judith Spironello V, CRNP, 40 mg at 03/15/24 0504    polyethylene glycol (MIRALAX) packet 17 g, 17 g, Oral, Daily, Judith Spironello V, CRNP, 17 g at 03/13/24 0830    senna (SENOKOT) tablet 8.6 mg, 1 tablet, Oral, HS, Judith Spironello V, CRNP, 8.6 mg at 03/13/24 2152    sodium bicarbonate tablet 650 mg, 650 mg, Oral, TID after meals, Judith Spironello V, CRNP, 650 mg at 03/15/24 0808    Laboratory Results:  Results from last 7 days   Lab Units 03/15/24  0508 03/14/24  1507 03/14/24  0625 03/13/24  0624 03/12/24  0530 03/11/24  1547 03/11/24  0448 03/10/24  0615 03/09/24  0457   WBC Thousand/uL 8.03  --  8.60 7.05 6.61  --  6.34 6.57 7.35   HEMOGLOBIN g/dL 10.0*  --  10.4* 9.9* 9.5*  --  10.6* 10.7* 10.5*   HEMATOCRIT % 29.8*  --  32.4* 29.8* 27.7*  --  31.6* 31.4* 31.1*   PLATELETS Thousands/uL 195  --  170 159 127*  --  145* 131* 137*   POTASSIUM mmol/L 4.2 4.8 4.2 4.4 3.6 4.1 4.0 4.0 3.9   CHLORIDE mmol/L 100 98 102 103 109* 104 103 102 102   CO2 mmol/L 22 19* 17* 19* 19* 20* 27 27 23   BUN mg/dL 75* 71* 65* 53* 39* 42* 42* 46* 50*   CREATININE mg/dL 2.64* 2.53* 2.32* 2.11* 1.68* 1.82* 1.93* 1.98* 1.78*   CALCIUM mg/dL  "9.4 9.3 8.7 8.8 7.9* 8.4 8.8 8.8 8.6   MAGNESIUM mg/dL 2.5 2.6 2.3 2.0  --   --   --   --  2.2   PHOSPHORUS mg/dL 5.1*  --   --  3.6  --   --   --   --  3.3       Portions of the record may have been created with voice recognition software. Occasional wrong word or \"sound a like\" substitutions may have occurred due to the inherent limitations of voice recognition software. Read the chart carefully and recognize, using context, where substitutions have occurred. If you have any questions, please contact the dictating provider.    "

## 2024-03-15 NOTE — ASSESSMENT & PLAN NOTE
Close cardiac monitoring overnight- has had persistent CHB but remained HD stable  Plan for PPM today

## 2024-03-15 NOTE — ASSESSMENT & PLAN NOTE
Na 128  Likely in setting of volume overload  Continue diuresis and fluid restriction 1.2L  Serum osmo 306, urine osmo 392, urine Na 15- drawn after administration of diuretic  Improved to 132 this AM  Trend BMP daily

## 2024-03-15 NOTE — PROGRESS NOTES
[facility]  Progress Note  Name: Vladimir Mathias I  MRN: [id number]  Unit/Bed#: ICU 01 I Date of Admission: 3/5/2024   Date of Service: 3/15/2024 I Hospital Day: 10    Assessment/Plan   * NSTEMI (non-ST elevated myocardial infarction) (HCC)  Assessment & Plan  3/5 Substernal CP at approx 1530 lasting 30 min, admitted to [facility] and placed on Heparin gtt   Recent admission at [facility] for chest pain with mild trop elevation. Symptoms were attributed to AF and he was discharged home   3/6 echo -- EF 55%, dyskinetic septum, severe RA dilation, severely reduces RV function, bioprosthetic AV, mild MVR and TVR  3/6 VQ scan --low probability for pulm embolus   3/8 Stress test -- left ventricular perfusion defect present in the mid to basal inferior and inferoseptal location(s) that is partially reversible  3/11 Cardiac cath -- patent LIMA to LAD patent vein graft to circumflex and LAD is mid 100% occluded. Patient also noted to have clot sitting in sinus of right coronary artery which disrupted the flow to right coronary artery which was not amendable to intervention   3/14: Echo-EF 55%. G1DD. RV mod dilated, RV systolic function severely reduced. TAVR bioprosthetic valve-well seated. No PVL. Mild to mod TR.   Peak troponin >22,973, current trop 11,569  EKG with CHB rate 47, RBBB, and ST elevations in II, III, aVF and V1 and ST depressions in I and AVL. Other EKGs with ventricular ectopy    Plan:   Heparin ACS   Aspirin/Brilinta (3/14 PM dose of Brilinta held for PPM today)   Statin  Nitro paste BID  Cardiology following, appreciate recs       Third degree heart block (HCC)  Assessment & Plan  Close cardiac monitoring overnight- has had persistent CHB but remained HD stable  Plan for PPM today     Atrial flutter (HCC)  Assessment & Plan  S/p Watchman device not on OP AC d/t hx of epistaxis   No AV jose blocking agents in setting of tachybrady syndrome, now with CHB   Heparin gtt for AC  Optimize  electrolytes     TUAN on CKD 3  Assessment & Plan  Baseline creat 1.5-1.7 in the setting of HTN and age-related nephron loss   Creat peak appears to be 2.65, cardiorenal vs KANDY  Initially tx with IVF now with concerns for volume overload and worsening creat, currently 2.5  Given 1mg IV Bumex 3/14 with poor response- 690ml output over 24h  Strict I&O  Avoid nephrotoxic agents   Maintain MAP > 65  Nephro following, appreciate recs    Hyponatremia  Assessment & Plan  Na 128  Likely in setting of volume overload  Continue diuresis and fluid restriction 1.2L  Serum osmo 306, urine osmo 392, urine Na 15- drawn after administration of diuretic    Metabolic acidosis  Assessment & Plan  Non anion gap metabolic acidosis  Serum bicarb 19  Normal chloride   Unclear etiology, possibly RTA   Continue bicarb tablets  Nephro following, appreciate recs    Nonrheumatic aortic valve stenosis  Assessment & Plan  Hx of TAVR  3/6 echo -- bioprosthetic valve is well-seated and without regurg     Essential hypertension  Assessment & Plan  Continue nitro paste BID  Hold beta blocker in setting of CHB    CAD (coronary artery disease)  Assessment & Plan  Hx of CABG x 2 2009 (Lima to LAD and saphenous vein graft to marginal)  Follows with Dr. Steven Walton at [facility] in [place]  Rest of plan as above     BPH (benign prostatic hyperplasia)  Assessment & Plan  Cont home proscar   Urinary retention protocol              Disposition: Stepdown Level 1    ICU Core Measures     A: Assess, Prevent, and Manage Pain Has pain been assessed? Yes  Need for changes to pain regimen? No   B: Both SAT/SAT  N/A   C: Choice of Sedation RASS Goal: N/A patient not on sedation  Need for changes to sedation or analgesia regimen? Yes   D: Delirium CAM-ICU: Negative   E: Early Mobility  Plan for early mobility? Yes   F: Family Engagement Plan for family engagement today? Yes         Prophylaxis:  VTE VTE covered by:  heparin (porcine), Intravenous,  "11.1 Units/kg/hr at 03/14/24 1554       Stress Ulcer  covered byfamotidine (Pepcid) 20 mg tablet [[id number]] (Long-Term Med), pantoprazole (PROTONIX) EC tablet 40 mg [[id number]]         Significant 24hr Events     24hr events: Went into complete heart block yesterday. EKG showed some elevations in the inferior leads. Heparin drip was resumed and was taken back to critical care service for closer monitoring. Has remained HD stable overnight and is planned for PPM today. No acute events overnight.      Subjective   \"I'm feeling alright, no complaints.\"    Review of Systems   Respiratory:  Negative for shortness of breath and wheezing.    Cardiovascular:  Negative for chest pain and palpitations.   Gastrointestinal:  Negative for constipation.   Neurological:  Negative for dizziness, weakness and light-headedness.   All other systems reviewed and are negative.     Objective                            Vitals I/O      Most Recent Min/Max in 24hrs   Temp 97.9 °F (36.6 °C) Temp  Min: 97.5 °F (36.4 °C)  Max: 97.9 °F (36.6 °C)   Pulse (!) 47 Pulse  Min: 44  Max: 81   Resp (!) 31 Resp  Min: 16  Max: 34   /58 BP  Min: 94/44  Max: 123/57   O2 Sat 99 % SpO2  Min: 94 %  Max: 100 %      Intake/Output Summary (Last 24 hours) at 3/15/2024 0621  Last data filed at 3/15/2024 0600  Gross per 24 hour   Intake 1378.5 ml   Output 790 ml   Net 588.5 ml       Diet NPO; Sips of clear liquids    Invasive Monitoring   N/A        Physical Exam   Physical Exam  Vitals and nursing note reviewed.   Skin:     General: Skin is warm and dry.      Coloration: Skin is pale.   HENT:      Head: Normocephalic.      Mouth/Throat:      Mouth: Mucous membranes are moist.   Cardiovascular:      Rate and Rhythm: Regular rhythm. Bradycardia present.   Musculoskeletal:      Right lower leg: No edema.      Left lower leg: No edema.   Abdominal: General: Bowel sounds are normal.      Palpations: Abdomen is soft.   Constitutional:       General: He is not in " acute distress.     Appearance: He is well-developed and well-nourished.   Pulmonary:      Effort: Pulmonary effort is normal. No accessory muscle usage, respiratory distress or accessory muscle usage.      Breath sounds: Normal breath sounds.   Neurological:      General: No focal deficit present.      Mental Status: He is alert and oriented to person, place and time.      Motor: Strength full and intact in all extremities.            Diagnostic Studies      EKG: Complete HB, rate 40's on monitor  Imaging: US kidney and bladder  Narrative: RENAL ULTRASOUND    INDICATION: TUAN.    COMPARISON: Renal ultrasound March 6, 2024    TECHNIQUE: Ultrasound of the retroperitoneum was performed with a curvilinear transducer utilizing volumetric sweeps and still imaging techniques.    FINDINGS:    KIDNEYS:  Symmetric and normal size.  Right kidney: 9.4 x 5.1 x 4.9 cm. Volume 122.3 mL  Left kidney: 10.2 x 4.5 x 4.0 cm. Volume 96.1 mL    Right kidney  Normal echogenicity and contour.  No mass is identified.  No hydronephrosis.  No shadowing calculi.  No perinephric fluid collections.    Left kidney  Normal echogenicity and contour.  No mass is identified.  No hydronephrosis.  No shadowing calculi.  No perinephric fluid collections.    URETERS:  Nonvisualized.    BLADDER:  Normally distended.  No focal thickening or mass lesions.  Bilateral ureteral jets detected.    OTHER: Small volume ascites in the right upper quadrant.  Impression: No hydronephrosis.  Small volume ascites in the right upper quadrant.    Workstation performed: [id number]     I have personally reviewed pertinent reports.       Medications:  Scheduled PRN   aspirin, 81 mg, Daily  docusate sodium, 100 mg, Daily  ezetimibe, 10 mg, Daily With Dinner   And  pravastatin, 40 mg, Daily With Dinner  finasteride, 5 mg, Daily  fish oil, 2,000 mg, Daily  nitroglycerin, 0.1 mg, Daily  pantoprazole, 40 mg, Early Morning  polyethylene glycol, 17 g, Daily  senna, 1 tablet,  HS  sodium bicarbonate, 650 mg, TID after meals      acetaminophen, 650 mg, Q6H PRN  aluminum-magnesium hydroxide-simethicone, 30 mL, Q4H PRN  Artificial Tears, 1 drop, Q4H PRN  bisacodyl, 10 mg, Daily PRN       Continuous    heparin (porcine), 3-20 Units/kg/hr (Order-Specific), Last Rate: 11.1 Units/kg/hr (03/14/24 1554)         Labs:    CBC    Recent Labs     03/14/24  0625 03/15/24  0508   WBC 8.60 8.03   HGB 10.4* 10.0*   HCT 32.4* 29.8*    195     BMP    Recent Labs     03/14/24  1507 03/15/24  0508   SODIUM 128* 132*   K 4.8 4.2   CL 98 100   CO2 19* 22   AGAP 11 10   BUN 71* 75*   CREATININE 2.53* 2.64*   CALCIUM 9.3 9.4       Coags    Recent Labs     03/14/24  1549 03/14/24  2130   INR 1.31*  --    PTT 34 64*        Additional Electrolytes  Recent Labs     03/13/24  0624 03/14/24  0625 03/14/24  1507 03/15/24  0508   MG 2.0   < > 2.6 2.5   PHOS 3.6  --   --  5.1*    < > = values in this interval not displayed.          Blood Gas    No recent results  No recent results LFTs  Recent Labs     03/15/24  0508   ALT 48   AST 37   ALKPHOS 58   ALB 3.5   TBILI 0.81       Infectious  No recent results  Glucose  Recent Labs     03/13/24  0624 03/14/24  0625 03/14/24  1507 03/15/24  0508   GLUC 132 126 146* 142*               JESSICA Tran

## 2024-03-15 NOTE — ASSESSMENT & PLAN NOTE
3/5 Substernal CP at approx 1530 lasting 30 min, admitted to [facility] and placed on Heparin gtt   Recent admission at [place] for chest pain with mild trop elevation. Symptoms were attributed to AF and he was discharged home   3/6 echo -- EF 55%, dyskinetic septum, severe RA dilation, severely reduces RV function, bioprosthetic AV, mild MVR and TVR  3/6 VQ scan --low probability for pulm embolus   3/8 Stress test -- left ventricular perfusion defect present in the mid to basal inferior and inferoseptal location(s) that is partially reversible  3/11 Cardiac cath -- patent LIMA to LAD patent vein graft to circumflex and LAD is mid 100% occluded. Patient also noted to have clot sitting in sinus of right coronary artery which disrupted the flow to right coronary artery which was not amendable to intervention   3/14: Echo-EF 55%. G1DD. RV mod dilated, RV systolic function severely reduced. TAVR bioprosthetic valve-well seated. No PVL. Mild to mod TR.   Peak troponin >22,973, current trop 11,569  EKG with CHB rate 47, RBBB, and ST elevations in II, III, aVF and V1 and ST depressions in I and AVL. Other EKGs with ventricular ectopy    Plan:   Heparin ACS   Aspirin/Brilinta (3/14 PM dose of Brilinta held for PPM today)   Statin  Nitro paste BID  Cardiology following, appreciate recs

## 2024-03-15 NOTE — ASSESSMENT & PLAN NOTE
Hx of CABG x 2 2009 (Lima to LAD and saphenous vein graft to marginal)  Follows with Dr. Steven Walton at [facility] in [place]  Rest of plan as above

## 2024-03-15 NOTE — ASSESSMENT & PLAN NOTE
Baseline creat 1.5-1.7 in the setting of HTN and age-related nephron loss   Creat peak appears to be 2.65, cardiorenal vs KANDY  Initially tx with IVF now with concerns for volume overload and worsening creat, currently 2.5  Given 1mg IV Bumex 3/14 with poor response- 690ml output over 24h  Strict I&O  Avoid nephrotoxic agents   Maintain MAP > 65  Nephro following, appreciate recs

## 2024-03-15 NOTE — QUICK NOTE
Critical Care Interval Transfer Note:    Please refer to progress note from earlier today for full details.     S/p PPM. Discussed with Dr. Scanlon post procedure, start Brilinta in AM. No further IV heparin, will start SQH tomorrow.     Barriers to discharge:   Telemetry monitoring post PPM      Consults: IP CONSULT TO CARDIOLOGY  IP CONSULT TO CASE MANAGEMENT  IP CONSULT TO NEPHROLOGY  IP CONSULT TO MEDICAL CRITICAL CARE  IP CONSULT TO CASE MANAGEMENT    Recommended to review admission imaging for incidental findings and document in discharge navigator: Chart reviewed, no known incidental findings noted at this time.      Discharge Plan: Anticipate discharge in 24-48 hrs to home with home services.    Patient seen and evaluated by Critical Care today and deemed to be appropriate for transfer to Med Surg with Telemetry. Spoke to Dr. Fernandes from [facility] to accept transfer. Critical care can be contacted via [employer] with any questions or concerns.

## 2024-03-15 NOTE — PROGRESS NOTES
Progress Note - Cardiology   [facility]     Vladimir Mathias 89 y.o. male MRN: [id number]  : 12/3/1932  Unit/Bed#: ICU 01 Encounter: [id number]    Assessment and Plan:   1. AV heart block: patient experience intermittent AV block during right coronary artery injection and after.    -   Temporary trans venous pacemaker placed via right groin venous sheath    -   heart rate 70 with MA 2.5 ms    -   patient stable overnight will remove trans venous pacemaker today, 3/12/2024    -   3/14/2024: patient had reoccurrence of bradycardia with complete heart block and frequent PVCs. Fortunately he is hemodynamically stable    -   3/15/2024: patient scheduled for placement of a [employer] dual chambered pacemaker.    2. TUAN on chronic kidney disease: review records from [facility], appears his baseline creatinine is 1.4 - 1.7    -   follows with nephrologist at [place]    -   seen by Dr. Sheppard nephrology and consultation appreciated    -   3/13/2024 creatinine bump to 2.11 today, patient started on gentle IV hydration per nephrology. 3/14/2024 creatinine today is 2.32 despite having IV fluids. Patient appears to be volume overloaded.    -   Patient given Bumex 1 mg IV per nephrology on 3/14/2024    -   3/15/24: creatinine 2.64 and BNP 1742, patient being diuresis by nephrology    3. MI type I:  hs trop: 1276 (0hr), 1601 (2hr), 1808 (4hr)    -   hs trops random:  3126, 5951,11,193, >22973 x2    -   patient initially hesitant for IV anticoagulation secondary to history of epitaxsis, but was agreeable after long conversation and IV heparin was initiated on 3/6/2024 at approximately 1 PM. Completed heparin 3/9/2024    -   3/6/2024 TTE: EF visibly estimated at 55% with disconnect except him but otherwise abnormal wall motion. IVS is consistent with postoperative status, there is systolic flattening consistent with RV pressure overload. RV cavity is moderately dilated with moderate to severe reduction  in systolic function. Left atrium is mildly dilated, right atrium severely dilated and mild mitral and tricuspid valve regurgitation. Bio prosthetic valve in the aortic position appears to be well seated with no evidence of para valvular regurgitation and gradient within expected range.    -   3/6/2024 VQ Scan: low probability of PE    -   Continue Toprol-XL 25 mg once a day    -   continue Vytorin 10/40 mg daily    -   continue aspirin 81 mg once a day    -   continue nitroglycerin patch 0.1 mg per hour on in the a.m. often the p.m.    -   3/8/2024 Lexiscan nuclear stress test: partially reversible inferior defect noted. Patient with known native RCA disease which was not bypass.    -   3/11/2024 LHC: Lima to LAD was patent, SVG to circumflex was also noted be patent. Native LAD had a 100% mid occlusion. Patient noted to have a clot sitting in the sinus of the right coronary artery which has disrupted the flow to the RCA not amenable to intervention    -   patient transition from IV heparin to aspirin 81 mg once a day and Brilinta 90 mg BID on 3/13/2024    -   3/14/2024 limited TTE: LV ejection fraction of 60% with normal systolic function and grade 2 diastolic dysfunction. RV cavity remains moderately dilated with severely reduced systolic function.     4. Coronary artery disease with history of CABG: a history of CABG times two in 2009 (Lima to LAD and saphenous vein graft to marginal)    -   follows with Dr. Steven Walton at [facility] in [place],  [phone].    -   12/15/2021 cardiac CTA: noted both bypass grafts were patent with chronic total occlusion of his LAD and diffuse plaque in both left circumflex and RCA.    -   Continue Toprol-XL 25 mg once a day    -   continue Vytorin 10/40 mg daily    -   continue aspirin 81 mg once a day    -   continue nitroglycerin patch 0.1 mg per hour on in the a.m. off in the p.m.    -   care as per #1     5. Hypertension: blood pressure stable    -    "continue Toprol and nitroglycerin with close monitoring     6. Paroxysmal atrial fibrillation flutter: patient has watchmen device    -   telemetry reviewed on initial admission, patient appears to be changing between Mobitz II type 1 and Mobitz II Type 2    -   3/8/2024 telemetry reviewed and patient appears to be sinus rhythm with first-degree AV heart block.    -   Continue to monitor telemetry     7. Aortic stenosis status post TAVR: TAVR performed in 3/31/2021 at [facility]    -   3/6/2024 TTE: Bio prosthetic valve in the aortic position appears to be well seated with no evidence of para valvular regurgitation and gradient within expected range.     8. History of epitaxis with anticoagulation (Xarelto): patient has watchmen device     Subjective / Objective:   3/14/2024 at approximately 2 PM patient began to experience frequent PVCs, bradycardia and return to complete heart block. He is scheduled for placement of a permanent pacemaker today. Fortunately he has been hemodynamically stable.    Vitals: Blood pressure 115/59, pulse (!) 47, temperature (!) 97.3 °F (36.3 °C), temperature source Temporal, resp. rate (!) 31, height 6' 1.5\" (1.867 m), weight 102 kg (223 lb 15.8 oz), SpO2 99%.  Vitals:    03/14/24 0600 03/15/24 0500   Weight: 100 kg (221 lb 1.9 oz) 102 kg (223 lb 15.8 oz)     Body mass index is 29.15 kg/m².  BP Readings from Last 3 Encounters:   03/15/24 115/59   07/12/22 118/72   07/05/22 114/72     Orthostatic Blood Pressures      Flowsheet Row Most Recent Value   Blood Pressure 115/59 filed at 03/15/2024 0700   Patient Position - Orthostatic VS Lying filed at 03/15/2024 0700          I/O         03/13 0701  03/14 0700 03/14 0701  03/15 0700 03/15 0701 03/16 0700    P.O. 300 960     I.V. (mL/kg) 1825.3 (18.3) 418.5 (4.1)     Total Intake(mL/kg) 2125.3 (21.3) 1378.5 (13.5)     Urine (mL/kg/hr) 500 (0.2) 790 (0.3)     Stool 0 0     Total Output 500 790     Net +1625.3 +588.5            Unmeasured " Urine Occurrence  2 x     Unmeasured Stool Occurrence 1 x 2 x           Invasive Devices       Peripheral Intravenous Line  Duration             Peripheral IV 03/13/24 Left;Ventral (anterior) Forearm 2 days    Long-Dwell Peripheral IV (Midline) 03/14/24 Right Brachial <1 day                      Intake/Output Summary (Last 24 hours) at 3/15/2024 0806  Last data filed at 3/15/2024 0600  Gross per 24 hour   Intake 1378.5 ml   Output 715 ml   Net 663.5 ml         Physical Exam:   Physical Exam  Vitals and nursing note reviewed.   Constitutional:       Appearance: Normal appearance.   HENT:      Right Ear: External ear normal.      Left Ear: External ear normal.   Eyes:      General: No scleral icterus.        Right eye: No discharge.         Left eye: No discharge.   Cardiovascular:      Rate and Rhythm: Regular rhythm. Bradycardia present.   Pulmonary:      Effort: Pulmonary effort is normal. No respiratory distress.      Breath sounds: Normal breath sounds.   Abdominal:      General: Bowel sounds are normal. There is no distension.      Palpations: Abdomen is soft.   Musculoskeletal:      Right lower leg: No edema.      Left lower leg: No edema.   Skin:     General: Skin is warm and dry.      Capillary Refill: Capillary refill takes less than 2 seconds.   Neurological:      General: No focal deficit present.      Mental Status: He is alert and oriented to person, place, and time. Mental status is at baseline.   Psychiatric:         Mood and Affect: Mood normal.           Medications/ Allergies:     Current Facility-Administered Medications   Medication Dose Route Frequency Provider Last Rate    acetaminophen  650 mg Oral Q6H PRN Judith Spironello V, CRNP      aluminum-magnesium hydroxide-simethicone  30 mL Oral Q4H PRN Judith Spironello V, CRNP      Artificial Tears  1 drop Both Eyes Q4H PRN Judith Spironello V, CRNP      aspirin  81 mg Oral Daily Judith Spironello V, CRNP      bisacodyl  10 mg Rectal Daily  PRN Judith Spironello V, CRNP      bumetanide  2 mg Intravenous Once Judith Spironello V, CRNP      docusate sodium  100 mg Oral Daily Judith Spironello V, CRNP      ezetimibe  10 mg Oral Daily With Dinner Judith Spironello V, CRNP      And    pravastatin  40 mg Oral Daily With Dinner Judith Spironello V, CRNP      finasteride  5 mg Oral Daily Judith Spironello V, CRNP      fish oil  2,000 mg Oral Daily Judith Spironello V, CRNP      nitroglycerin  0.1 mg Transdermal Daily Judith Spironello V, CRNP      pantoprazole  40 mg Oral Early Morning Judith Spironello V, CRNP      polyethylene glycol  17 g Oral Daily Judith Spironello V, CRNP      senna  1 tablet Oral HS Judith Spironello V, CRNP      sodium bicarbonate  650 mg Oral TID after meals Judith Spironello V, CRNP       acetaminophen, 650 mg, Q6H PRN  aluminum-magnesium hydroxide-simethicone, 30 mL, Q4H PRN  Artificial Tears, 1 drop, Q4H PRN  bisacodyl, 10 mg, Daily PRN      No Known Allergies    VTE Pharmacologic Prophylaxis:   Sequential compression device (Venodyne)     Labs:   Troponins:  Results from last 7 days   Lab Units 03/14/24  1507   HS TNI RAND ng/L 11,569*     CBC with diff:  Results from last 7 days   Lab Units 03/15/24  0508 03/14/24  0625 03/13/24  0624 03/12/24  0530 03/11/24  0448 03/10/24  0615 03/09/24  0457   WBC Thousand/uL 8.03 8.60 7.05 6.61 6.34 6.57 7.35   HEMOGLOBIN g/dL 10.0* 10.4* 9.9* 9.5* 10.6* 10.7* 10.5*   HEMATOCRIT % 29.8* 32.4* 29.8* 27.7* 31.6* 31.4* 31.1*   MCV fL 99* 104* 100* 100* 100* 100* 100*   PLATELETS Thousands/uL 195 170 159 127* 145* 131* 137*   RBC Million/uL 3.01* 3.12* 2.99* 2.77* 3.16* 3.13* 3.12*   MCH pg 33.2 33.3 33.1 34.3 33.5 34.2 33.7   MCHC g/dL 33.6 32.1 33.2 34.3 33.5 34.1 33.8   RDW % 14.7 14.9 14.7 14.6 14.4 14.6 14.6   MPV fL 11.1 11.3 10.9 10.6 10.7 10.4 10.7   NRBC AUTO /100 WBCs 0  --  0  --   --   --   --      CMP:  Results from last 7 days   Lab Units 03/15/24  5712  03/14/24  1507 03/14/24  0625 03/13/24  0624 03/12/24  0530 03/11/24  1547 03/11/24  0448 03/10/24  0615 03/09/24  0457   SODIUM mmol/L 132* 128* 131* 132* 136 134* 135   < > 135   POTASSIUM mmol/L 4.2 4.8 4.2 4.4 3.6 4.1 4.0   < > 3.9   CHLORIDE mmol/L 100 98 102 103 109* 104 103   < > 102   CO2 mmol/L 22 19* 17* 19* 19* 20* 27   < > 23   ANION GAP mmol/L 10 11 12 10 8 10 5   < > 10   BUN mg/dL 75* 71* 65* 53* 39* 42* 42*   < > 50*   CREATININE mg/dL 2.64* 2.53* 2.32* 2.11* 1.68* 1.82* 1.93*   < > 1.78*   CALCIUM mg/dL 9.4 9.3 8.7 8.8 7.9* 8.4 8.8   < > 8.6   AST U/L 37  --   --   --   --   --   --   --   --    ALT U/L 48  --   --   --   --   --   --   --   --    ALK PHOS U/L 58  --   --   --   --   --   --   --   --    TOTAL PROTEIN g/dL 6.5  --   --   --   --   --   --   --   --    ALBUMIN g/dL 3.5  --   --   --   --   --   --   --  3.6   TOTAL BILIRUBIN mg/dL 0.81  --   --   --   --   --   --   --   --    EGFR ml/min/1.73sq m 20 21 23 26 34 31 29   < > 32    < > = values in this interval not displayed.     Magnesium:  Results from last 7 days   Lab Units 03/15/24  0508 03/14/24  1507 03/14/24 0625 03/13/24 0624 03/09/24  0457   MAGNESIUM mg/dL 2.5 2.6 2.3 2.0 2.2     Coags:  Results from last 7 days   Lab Units 03/15/24  0604 03/14/24 2130 03/14/24  1549 03/13/24  0624 03/13/24  0020 03/12/24  0530 03/11/24  2308   PTT seconds 102* 64* 34 58* 64* 47* 62*   INR   --   --  1.31*  --   --   --   --        Imaging & Testing   I have personally reviewed pertinent reports.    US kidney and bladder    Result Date: 3/15/2024  Narrative: RENAL ULTRASOUND INDICATION: TUAN. COMPARISON: Renal ultrasound March 6, 2024 TECHNIQUE: Ultrasound of the retroperitoneum was performed with a curvilinear transducer utilizing volumetric sweeps and still imaging techniques. FINDINGS: KIDNEYS: Symmetric and normal size. Right kidney: 9.4 x 5.1 x 4.9 cm. Volume 122.3 mL Left kidney: 10.2 x 4.5 x 4.0 cm. Volume 96.1 mL Right kidney  Normal echogenicity and contour. No mass is identified. No hydronephrosis. No shadowing calculi. No perinephric fluid collections. Left kidney Normal echogenicity and contour. No mass is identified. No hydronephrosis. No shadowing calculi. No perinephric fluid collections. URETERS: Nonvisualized. BLADDER: Normally distended. No focal thickening or mass lesions. Bilateral ureteral jets detected. OTHER: Small volume ascites in the right upper quadrant.     Impression: No hydronephrosis. Small volume ascites in the right upper quadrant. Workstation performed: [id number]     Echo follow up/limited w/ contrast if indicated    Result Date: 3/14/2024  Narrative:   Left Ventricle: Left ventricular cavity size is lower normal Wall thickness is moderately increased. The left ventricular ejection fraction is 60% by visual estimation. Systolic function is normal. Dyskinetic septum otherwise normal wall motion Diastolic function is moderately abnormal, consistent with grade II (pseudonormal) relaxation.   IVS: There is abnormal septal motion consistent with post-operative status.   Right Ventricle: Right ventricular cavity size is moderately dilated. Systolic function is severely reduced. Wall motion is abnormal.   Left Atrium: The atrium is mildly dilated.   Right Atrium: The atrium is severely dilated.   Aortic Valve: There is a TAVR bioprosthetic valve. The prosthetic valve appears well-seated. There is no evidence of paravalvular regurgitation. The gradient recorded across the prosthetic aortic valve is within the expected range.   Mitral Valve: There is mild regurgitation.   Tricuspid Valve: There is mild to moderate regurgitation. The right ventricular systolic pressure is normal. The estimated right ventricular systolic pressure is 17.00 mmHg.   Pulmonic Valve: There is mild regurgitation.     Cardiac catheterization    Result Date: 3/11/2024  Narrative:   Mid LAD lesion is 100% stenosed.   1st Mrg lesion is 100%  stenosed.   Ost RCA to Prox RCA lesion is 90% stenosed.  Which had thrombus sitting in the mouth which temporarily decrease the flow in the right coronary artery.   Since patient has intermittent AV block a temporary pacemaker was placed without any complications.     NM Myocardial Perfusion Spect (Pharmacological Induced Stress and/or Rest)    Result Date: 3/9/2024  Narrative:   Stress ECG: Right bundle branch block was seen. The stress ECG is equivocal for ischemia after pharmacologic vasodilation.   Perfusion: There is a left ventricular perfusion defect present in the mid to basal inferior and inferoseptal location(s) that is partially reversible.   Stress Combined Conclusion: Left ventricular perfusion is abnormal.   Stress Function: Left ventricular function post-stress is normal. Stress ejection fraction is 65%. Abnormal study, Perfusion: There is a left ventricular perfusion defect present in the mid to basal inferior and inferoseptal location(s) that is partially reversible. SSS 7 SRS 5 SDS 2     Stress strip    Result Date: 3/8/2024  Narrative: Confirmed by MAISHA FREEMAN ([id number]),  Marilu Rothman ([id number]) on 3/8/2024 10:37:07 AM    US kidney and bladder    Result Date: 3/6/2024  Narrative: RENAL ULTRASOUND INDICATION: Renal failure. COMPARISON: None TECHNIQUE: Ultrasound of the retroperitoneum was performed with a curvilinear transducer utilizing volumetric sweeps and still imaging techniques. FINDINGS: KIDNEYS: Symmetric and normal size. Right kidney: 11.0 x 5.0 x 5.2 cm. Volume 149.8 mL Left kidney: 10.8 x 5.4 x 4.5 cm. Volume 136.3 mL Right kidney Normal echogenicity and contour. No mass is identified. No hydronephrosis. No shadowing calculi. Trace perinephric simple free fluid. Left kidney Normal echogenicity and contour. No mass is identified. No hydronephrosis. No shadowing calculi. No perinephric fluid collections. URETERS: Nonvisualized. BLADDER: Normally distended. No focal thickening or mass  lesions. The right ureteral jet is seen. The left ureteral jet is not visualized.     Impression: Normal. Workstation performed: [id number]     NM lung ventilation / perfusion    Result Date: 3/6/2024  Narrative: VENTILATION AND PERFUSION SCAN INDICATION: RV enlargement on Echo - new,  Question PE COMPARISON:  Chest radiograph 3/5/2023 TECHNIQUE:  Posterior ventilation imaging was performed after the inhalation of 30.1 mCi nebulized Tc-99m DTPA with deposition of less than 1 mCi of activity within the lungs. Multiplanar perfusion imaging was next performed following the intravenous administration of 4.35 mCi Tc-99m labeled MAA. FINDINGS: Ventilation imaging demonstrates partial deposition of radiopharmaceutical activity within the central bronchi. Perfusion imaging demonstrates a matching subsegmental defect in the posterior aspect of the right lower lobe. There are no suspicious ventilation/perfusion mismatches.     Impression: The probability for pulmonary embolus is low. Workstation performed: [id number]     Echo complete    Result Date: 3/6/2024  Narrative:   Left Ventricle: Left ventricular cavity size is lower normal Wall thickness is moderately increased. The left ventricular ejection fraction is 55%. Systolic function is normal. Dyskinetic septum otherwise normal wall motion Unable to assess diastolic function due to atrial flutter.   IVS: There is abnormal septal motion consistent with post-operative status. There is systolic flattening of the interventricular septum consistent with right ventricle pressure overload.   Right Ventricle: Right ventricular cavity size is moderately dilated. Systolic function is moderately to severely reduced.   Left Atrium: The atrium is mildly dilated.   Right Atrium: The atrium is severely dilated.   Aortic Valve: There is a TAVR bioprosthetic valve. The prosthetic valve appears well-seated. There is no evidence of paravalvular regurgitation. The gradient recorded across  "the prosthetic aortic valve is within the expected range. The aortic valve peak velocity is 1.57 m/s. The aortic valve mean gradient is 6 mmHg.   Mitral Valve: There is mild regurgitation.   Tricuspid Valve: There is mild regurgitation. Abnormal study, compared to prior echo report increased RV size and decreased systolic function. Elevated pulmonary pressures are suggested by LV systolic flattening.     XR chest 1 view portable    Result Date: 3/6/2024  Narrative: XR CHEST PORTABLE INDICATION: chest pain. COMPARISON: None FINDINGS: No airspace consolidation, pneumothorax, pulmonary edema, or pleural effusion. Trace left apical scarring.. Prior CABG. Mild cardiomegaly allowing for portable AP technique. Aortic calcification is present. Mild degenerative changes of bilateral shoulders. Normal upper abdomen.     Impression: No radiographic evidence of acute intrathoracic process. Workstation performed: [id number]     XR chest 1 view    Result Date: 3/3/2024  Narrative: CHEST X-RAY  SINGLE VIEW: HISTORY: chest pain;   PRIORS: Chest radiograph on October 8, 2010. FINDINGS: LUNGS: Clear.  No pleural abnormality seen. HEART: Mild cardiomegaly. Sternotomy. MEDIASTINUM: Normal. OTHER FINDINGS:  None.        Impression:  No acute pulmonary abnormality.       EKG / Monitor: Personally reviewed.    3rd° heart block, patient is hemodynamically stable            Roula LOPEZ  Cardiology      \"This note was completed in part utilizing [employer] direct voice recognition software.   Grammatical errors, random word insertion, spelling mistakes, and incomplete sentences may be an occasional consequence of the system secondary to software limitations, ambient noise and hardware issues.    Please read the chart carefully and recognize, using context, where substitutions have occurred.  If you have any questions or concerns about the context, text or information contained within the body of this dictation, please contact " "myself, the provider, for further clarification.\"  "

## 2024-03-15 NOTE — DISCHARGE INSTR - AVS FIRST PAGE
Permanent Pacemaker Discharge Instructions    -  DO NOT raise your affected arm over your head for 4 to 6 weeks      -  TAKE Aquacel dressing off of incision site in 5-7 days, beneath dressing will be Steri-Strips over the incision, these will dry up in fall off on their own.      -  DO NOT drive until seen by the Cardiologist for site visit      -  DO NOT lift, pull or push anything over 10-15 pounds for at least 3-4 weeks      -  DO NOT manipulate or put pressure over the pacemaker site      -  Avoid wearing clothing that must be pulled over your head for 4-6 weeks    Incision/Site Care    -  Check the incision daily.  Expect to see a scab forming over the incision and the skin color returning to normal.  Notify your doctor immediately if your incision suddenly becomes red, swollen, hot to touch or develops any kind of drainage or bleeding.      -  monitor for fever greater than 101 and for any of the above call the MD      -  checked incision daily.  Expect to see some bruising and swelling.  These will go away within several weeks.  Call cardiologist if you note this worsening.      -  keep the incision clean and dry.  You may place a sterile gauze pad over the incision and change it daily.        -  do not put on any medication over the incision site unless you have been given specific instructions by the physician.      -  over your incision are Steri (paper) strips, leave these in place and they will curl up and fall off on their own as you heal.        -  try to expose the incision to air at least for several hours per day.      -  gently wash around pacemaker and incision area with plain water and a clean washcloth daily.  Gently towel dry the incision area.      Follow-up with your cardiologist, nephrologist after discharge

## 2024-03-16 ENCOUNTER — APPOINTMENT (INPATIENT)
Dept: RADIOLOGY | Facility: HOSPITAL | Age: 89
DRG: 243 | End: 2024-03-16
Payer: COMMERCIAL

## 2024-03-16 PROBLEM — N17.9 ACUTE RENAL FAILURE SUPERIMPOSED ON STAGE 3B CHRONIC KIDNEY DISEASE (HCC): Status: ACTIVE | Noted: 2024-03-05

## 2024-03-16 PROBLEM — N18.32 ACUTE RENAL FAILURE SUPERIMPOSED ON STAGE 3B CHRONIC KIDNEY DISEASE (HCC): Status: ACTIVE | Noted: 2024-03-05

## 2024-03-16 LAB
ANION GAP SERPL CALCULATED.3IONS-SCNC: 10 MMOL/L (ref 4–13)
BUN SERPL-MCNC: 78 MG/DL (ref 5–25)
CALCIUM SERPL-MCNC: 8.7 MG/DL (ref 8.4–10.2)
CHLORIDE SERPL-SCNC: 99 MMOL/L (ref 96–108)
CO2 SERPL-SCNC: 24 MMOL/L (ref 21–32)
CREAT SERPL-MCNC: 2.73 MG/DL (ref 0.6–1.3)
ERYTHROCYTE [DISTWIDTH] IN BLOOD BY AUTOMATED COUNT: 14.7 % (ref 11.6–15.1)
GFR SERPL CREATININE-BSD FRML MDRD: 19 ML/MIN/1.73SQ M
GLUCOSE SERPL-MCNC: 110 MG/DL (ref 65–140)
HCT VFR BLD AUTO: 28.5 % (ref 36.5–49.3)
HGB BLD-MCNC: 9.6 G/DL (ref 12–17)
MAGNESIUM SERPL-MCNC: 2.2 MG/DL (ref 1.9–2.7)
MCH RBC QN AUTO: 33.7 PG (ref 26.8–34.3)
MCHC RBC AUTO-ENTMCNC: 33.7 G/DL (ref 31.4–37.4)
MCV RBC AUTO: 100 FL (ref 82–98)
PLATELET # BLD AUTO: 205 THOUSANDS/UL (ref 149–390)
PMV BLD AUTO: 10.8 FL (ref 8.9–12.7)
POTASSIUM SERPL-SCNC: 3.6 MMOL/L (ref 3.5–5.3)
RBC # BLD AUTO: 2.85 MILLION/UL (ref 3.88–5.62)
SODIUM SERPL-SCNC: 133 MMOL/L (ref 135–147)
WBC # BLD AUTO: 6.89 THOUSAND/UL (ref 4.31–10.16)

## 2024-03-16 PROCEDURE — 99024 POSTOP FOLLOW-UP VISIT: CPT | Performed by: INTERNAL MEDICINE

## 2024-03-16 PROCEDURE — 83735 ASSAY OF MAGNESIUM: CPT | Performed by: NURSE PRACTITIONER

## 2024-03-16 PROCEDURE — 71046 X-RAY EXAM CHEST 2 VIEWS: CPT

## 2024-03-16 PROCEDURE — 99232 SBSQ HOSP IP/OBS MODERATE 35: CPT | Performed by: INTERNAL MEDICINE

## 2024-03-16 PROCEDURE — 85027 COMPLETE CBC AUTOMATED: CPT | Performed by: NURSE PRACTITIONER

## 2024-03-16 PROCEDURE — 80048 BASIC METABOLIC PNL TOTAL CA: CPT | Performed by: NURSE PRACTITIONER

## 2024-03-16 RX ORDER — METOPROLOL SUCCINATE 25 MG/1
25 TABLET, EXTENDED RELEASE ORAL DAILY
Status: DISCONTINUED | OUTPATIENT
Start: 2024-03-17 | End: 2024-03-20 | Stop reason: HOSPADM

## 2024-03-16 RX ADMIN — HEPARIN SODIUM 5000 UNITS: 5000 INJECTION INTRAVENOUS; SUBCUTANEOUS at 14:41

## 2024-03-16 RX ADMIN — PANTOPRAZOLE SODIUM 40 MG: 40 TABLET, DELAYED RELEASE ORAL at 05:00

## 2024-03-16 RX ADMIN — EZETIMIBE 10 MG: 10 TABLET ORAL at 17:28

## 2024-03-16 RX ADMIN — HEPARIN SODIUM 5000 UNITS: 5000 INJECTION INTRAVENOUS; SUBCUTANEOUS at 21:41

## 2024-03-16 RX ADMIN — FINASTERIDE 5 MG: 5 TABLET, FILM COATED ORAL at 09:55

## 2024-03-16 RX ADMIN — ALUMINUM HYDROXIDE, MAGNESIUM HYDROXIDE, AND DIMETHICONE 30 ML: 200; 20; 200 SUSPENSION ORAL at 21:43

## 2024-03-16 RX ADMIN — HEPARIN SODIUM 5000 UNITS: 5000 INJECTION INTRAVENOUS; SUBCUTANEOUS at 05:00

## 2024-03-16 RX ADMIN — SODIUM BICARBONATE 650 MG TABLET 650 MG: at 17:28

## 2024-03-16 RX ADMIN — CEFAZOLIN SODIUM 2000 MG: 2 SOLUTION INTRAVENOUS at 05:00

## 2024-03-16 RX ADMIN — SODIUM BICARBONATE 650 MG TABLET 650 MG: at 08:54

## 2024-03-16 RX ADMIN — GLYCERIN 1 DROP: .002; .002; .01 SOLUTION/ DROPS OPHTHALMIC at 20:20

## 2024-03-16 RX ADMIN — OMEGA-3 FATTY ACIDS CAP 1000 MG 2000 MG: 1000 CAP at 09:54

## 2024-03-16 RX ADMIN — SODIUM BICARBONATE 650 MG TABLET 650 MG: at 12:58

## 2024-03-16 RX ADMIN — NITROGLYCERIN 0.1 MG: 0.1 PATCH TRANSDERMAL at 12:56

## 2024-03-16 RX ADMIN — CEFAZOLIN SODIUM 2000 MG: 2 SOLUTION INTRAVENOUS at 14:41

## 2024-03-16 RX ADMIN — ASPIRIN 81 MG 81 MG: 81 TABLET ORAL at 09:54

## 2024-03-16 RX ADMIN — TICAGRELOR 90 MG: 90 TABLET ORAL at 21:41

## 2024-03-16 RX ADMIN — DOCUSATE SODIUM 100 MG: 100 CAPSULE, LIQUID FILLED ORAL at 09:54

## 2024-03-16 RX ADMIN — POLYETHYLENE GLYCOL 3350 17 G: 17 POWDER, FOR SOLUTION ORAL at 09:55

## 2024-03-16 RX ADMIN — TICAGRELOR 90 MG: 90 TABLET ORAL at 09:54

## 2024-03-16 RX ADMIN — PRAVASTATIN SODIUM 40 MG: 40 TABLET ORAL at 17:28

## 2024-03-16 NOTE — PROGRESS NOTES
[facility]  Progress Note  Name: Vladimir Mathias I  MRN: [id number]  Unit/Bed#: 4 North 415-01 I Date of Admission: 3/5/2024   Date of Service: 3/16/2024 I Hospital Day: 11    Assessment/Plan   * NSTEMI (non-ST elevated myocardial infarction) (HCC)  Assessment & Plan  History of atrial flutter CKD 3 CAD hypertension presented to the hospital with chest pain found to have NSTEMI.  Cardiac catheterization 3/11/2024 with RCA thrombus.  Continue aspirin and brilinta.  Being followed by cardiology.  No further chest pain.    Lab Results   Component Value Date    HSTNI 11,569 (H) 03/14/2024    HSTNI >22,973 (H) 03/07/2024    HSTNI >22,973 (H) 03/06/2024       Acute renal failure superimposed on stage 3b chronic kidney disease (HCC)  Assessment & Plan  TUAN on CKD 3b being followed by nephrology due to NSTEMI/hypotension and contrast  Baseline creatinine 1.5-1.7.  Being followed by nephrology.  Holding further diuretics.    Results from last 7 days   Lab Units 03/16/24  0457 03/15/24  0508 03/14/24  1507 03/14/24  0625 03/13/24  0624 03/12/24  0530 03/11/24  1547 03/11/24  0448 03/10/24  0615   BUN mg/dL 78* 75* 71* 65* 53* 39* 42* 42* 46*   CREATININE mg/dL 2.73* 2.64* 2.53* 2.32* 2.11* 1.68* 1.82* 1.93* 1.98*   EGFR ml/min/1.73sq m 19 20 21 23 26 34 31 29 28         Third degree heart block (HCC)  Assessment & Plan  Complete heart block underwent pacemaker placement this admission.    Atrial flutter (HCC)  Assessment & Plan  Paroxysmal atrial fibrillation/flutter status post Watchman device due to recurrent GI bleeding  Continue metoprolol    Essential hypertension  Assessment & Plan  Stable continue metoprolol.  ARB held due to kidney injury    CAD (coronary artery disease)  Assessment & Plan  CAD with history CABG 2009.  Cardiac catheterization this admission for NSTEMI with RCA thrombus.    BPH (benign prostatic hyperplasia)  Assessment & Plan  Continue finasteride    VTE Pharmacologic Prophylaxis:  "VTE Score: 4 Moderate Risk (Score 3-4) - Pharmacological DVT Prophylaxis Ordered: heparin.    Mobility:  Basic Mobility Inpatient Raw Score: 13  JH-HLM Goal: 4: Move to chair/commode  JH-HLM Achieved: 5: Stand (1 or more minutes)  JH-HLM Goal achieved. Continue to encourage appropriate mobility.    Patient Centered Rounds: I have performed bedside rounds with nursing staff today.  Discussions with Specialists or Other Care Team Provider: Case management nephrology    Education and Discussions with Family / Patient: Updated  (son) via phone.    Time Spent for Care:   This time was spent on one or more of the following: performing physical exam; counseling and coordination of care; obtaining or reviewing history; documenting in the medical record; reviewing/ordering tests, medications or procedures; communicating with other healthcare professionals and discussing with patient's family/caregivers.    Current Length of Stay: 11 day(s)  Current Patient Status: Inpatient   Certification Statement: The patient will continue to require additional inpatient hospital stay due to awaiting renal recovery  Discharge Plan: Anticipate discharge in 48-72 hrs to rehab facility.    Code Status: Level 3 - DNAR and DNI      Subjective:   Patient seen and examined.  No complaints.  Transferred out of ICU.  No chest pain.    Objective:   Vitals: Blood pressure 115/58, pulse 74, temperature 98 °F (36.7 °C), resp. rate 18, height 6' 1.5\" (1.867 m), weight 102 kg (223 lb 15.8 oz), SpO2 97%.    Intake/Output Summary (Last 24 hours) at 3/16/2024 1649  Last data filed at 3/16/2024 1500  Gross per 24 hour   Intake 725 ml   Output 2075 ml   Net -1350 ml       Physical Exam  Vitals reviewed.   Constitutional:       General: He is not in acute distress.  HENT:      Head: Atraumatic.   Eyes:      General: No scleral icterus.     Conjunctiva/sclera: Conjunctivae normal.   Cardiovascular:      Rate and Rhythm: Regular rhythm. "   Pulmonary:      Effort: Pulmonary effort is normal.      Breath sounds: Decreased breath sounds present. No wheezing.   Abdominal:      General: Bowel sounds are normal.      Palpations: Abdomen is soft.      Tenderness: There is no abdominal tenderness. There is no rebound.   Musculoskeletal:         General: No swelling or tenderness.   Skin:     General: Skin is warm and dry.   Neurological:      General: No focal deficit present.      Mental Status: He is alert.   Psychiatric:         Mood and Affect: Mood normal.       Additional Data:   Labs:  Results from last 7 days   Lab Units 03/16/24  0457 03/15/24  0508 03/14/24  1549 03/14/24  0625   WBC Thousand/uL 6.89 8.03  --  8.60   HEMOGLOBIN g/dL 9.6* 10.0*  --  10.4*   PLATELETS Thousands/uL 205 195  --  170   MCV fL 100* 99*  --  104*   INR   --   --  1.31*  --      Results from last 7 days   Lab Units 03/16/24  0457 03/15/24  0508 03/14/24  1507   SODIUM mmol/L 133* 132* 128*   POTASSIUM mmol/L 3.6 4.2 4.8   CHLORIDE mmol/L 99 100 98   CO2 mmol/L 24 22 19*   ANION GAP mmol/L 10 10 11   BUN mg/dL 78* 75* 71*   CREATININE mg/dL 2.73* 2.64* 2.53*   CALCIUM mg/dL 8.7 9.4 9.3   ALBUMIN g/dL  --  3.5  --    TOTAL BILIRUBIN mg/dL  --  0.81  --    ALK PHOS U/L  --  58  --    ALT U/L  --  48  --    AST U/L  --  37  --    EGFR ml/min/1.73sq m 19 20 21   GLUCOSE RANDOM mg/dL 110 142* 146*     Results from last 7 days   Lab Units 03/16/24  0457 03/15/24  0508 03/14/24  1507 03/14/24  0625 03/13/24  0624   MAGNESIUM mg/dL 2.2 2.5 2.6 2.3 2.0   PHOSPHORUS mg/dL  --  5.1*  --   --  3.6                                  * I Have Reviewed All Lab Data Listed Above.    Recent cultures:                   Lines/Drains:  Invasive Devices       Peripheral Intravenous Line  Duration             Peripheral IV 03/13/24 Left;Ventral (anterior) Forearm 3 days    Long-Dwell Peripheral IV (Midline) 03/14/24 Right Brachial 1 day                          Telemetry:      Imaging:  Imaging  Reports Reviewed Today Include:   US kidney and bladder    Result Date: 3/6/2024  Impression: Normal. Workstation performed: [id number]     NM lung ventilation / perfusion    Result Date: 3/6/2024  Impression: The probability for pulmonary embolus is low. Workstation performed: [id number]     XR chest 1 view portable    Result Date: 3/6/2024  Impression: No radiographic evidence of acute intrathoracic process. Workstation performed: [id number]       Scheduled Meds:  Current Facility-Administered Medications   Medication Dose Route Frequency Provider Last Rate    acetaminophen  650 mg Oral Q6H PRN Judith Spironello V, CRNP      aluminum-magnesium hydroxide-simethicone  30 mL Oral Q4H PRN Judith Spironello V, CRNP      Artificial Tears  1 drop Both Eyes Q4H PRN Judith Spironello V, CRNP      aspirin  81 mg Oral Daily Judith Spironello V, CRNP      bisacodyl  10 mg Rectal Daily PRN Judith Spironello V, CRNP      docusate sodium  100 mg Oral Daily Judith Spironello V, CRNP      ezetimibe  10 mg Oral Daily With Dinner Judith Spironello V, CRNP      And    pravastatin  40 mg Oral Daily With Dinner Judith Spironello V, CRNP      finasteride  5 mg Oral Daily Judith Spironello V, CRNP      fish oil  2,000 mg Oral Daily Judith Spironello V, CRNP      heparin (porcine)  5,000 Units Subcutaneous Q8H JESSICA Mccarthy      [START ON 3/17/2024] metoprolol succinate  25 mg Oral Daily Ivanna Scanlon DO      nitroglycerin  0.1 mg Transdermal Daily Judith Spironello V, CRNP      pantoprazole  40 mg Oral Early Morning Judith Spironello V, CRNP      polyethylene glycol  17 g Oral Daily Judith Spironello V, SHAYNP      senna  1 tablet Oral HS Judith Spironello V, CRNP      sodium bicarbonate  650 mg Oral TID after meals Juidth Spironello V, CRNP      ticagrelor  90 mg Oral Q12H JESSICA Mccarthy         Today, Patient Was Seen By: Bhupendra Fernandes DO    ** Please Note: Dictation voice to  text software may have been used in the creation of this document. **

## 2024-03-16 NOTE — ASSESSMENT & PLAN NOTE
TUAN on CKD 3b being followed by nephrology due to NSTEMI/hypotension and contrast  Baseline creatinine 1.5-1.7.  Being followed by nephrology.  Holding further diuretics.    Results from last 7 days   Lab Units 03/16/24  0457 03/15/24  0508 03/14/24  1507 03/14/24  0625 03/13/24  0624 03/12/24  0530 03/11/24  1547 03/11/24  0448 03/10/24  0615   BUN mg/dL 78* 75* 71* 65* 53* 39* 42* 42* 46*   CREATININE mg/dL 2.73* 2.64* 2.53* 2.32* 2.11* 1.68* 1.82* 1.93* 1.98*   EGFR ml/min/1.73sq m 19 20 21 23 26 34 31 29 28

## 2024-03-16 NOTE — ASSESSMENT & PLAN NOTE
Paroxysmal atrial fibrillation/flutter status post Watchman device due to recurrent GI bleeding  Continue metoprolol

## 2024-03-16 NOTE — PROGRESS NOTES
Daily Cardiology Progress Note              LOS: 11 days     Assessment/Plan     Principal Problem:    NSTEMI (non-ST elevated myocardial infarction) ([facility])  Active Problems:    BPH (benign prostatic hyperplasia)    CAD (coronary artery disease)    TUAN on CKD 3    Essential hypertension    Hyperlipidemia    Nonrheumatic aortic valve stenosis    Atrial flutter (HCC)    Third degree heart block (HCC)    Hyponatremia    Metabolic acidosis    NSTEMI type 1 with troponin >22k.  Cardiac catheterization showed thrombus in RCA.  He is free of chest pain.   Continue ASA and Brilinta.    CAD with previous CABG - patent grafts  TUAN - creatinine further increased today.  Nephrology is following patient as well.  Paroxysmal atrial fibrillation -patient with Watchman device after developing bleeding from Xarelto and Eliquis  Aortic stenosis with previous TAVR  Third degree AV heart block - patient underwent permanent pacemaker insertion -pressure dressing was removed today.  Chest x-ray reviewed.  No pneumothorax present.     Plan:  - Continue ASA and Brilinta    - Resume metoprolol           Subjective     Interval History:  Patient underwent permanent pacemaker patient underwent permanent pacemaker insertion yesterday without complications.  He has no complaints today.      Objective     Vital signs in last 24 hours:  Temp:  [97.4 °F (36.3 °C)-98.5 °F (36.9 °C)] 97.5 °F (36.4 °C)  HR:  [61-79] 66  Resp:  [18-34] 18  BP: (105-144)/(51-78) 115/60  Weight (last 2 days)       Date/Time Weight    03/16/24 0600 102 (223.99)    03/15/24 0500 102 (223.99)    03/14/24 0600 100 (221.12)             Intake/Output last 3 shifts:  I/O last 3 completed shifts:  In: 381 [P.O.:120; I.V.:211; IV Piggyback:50]  Out: 2295 [Urine:2295]  Intake/Output this shift:  I/O this shift:  In: 255 [P.O.:225; I.V.:30]  Out: 250 [Urine:250]      Physical Exam:  Physical Exam   Constitutional: He appears healthy. No distress.   Eyes: Pupils are equal, round,  and reactive to light. Conjunctivae are normal.   Neck: No JVD present.   Cardiovascular: Normal rate, regular rhythm and normal heart sounds. Exam reveals no gallop and no friction rub.   No murmur heard.  Pulmonary/Chest: Effort normal and breath sounds normal. He has no wheezes. He has no rales.   Left-sided pressure dressing removed no hematoma present   Musculoskeletal:         General: No tenderness, deformity or edema.      Cervical back: Normal range of motion and neck supple.   Neurological: He is alert and oriented to person, place, and time.   Skin: Skin is warm and dry.          Lab Results: I have personally reviewed pertinent lab results.    Imaging: I have personally reviewed pertinent films in PACS  EKG/Tele: Reviewed.  Sinus rhythm with ventricular pacing

## 2024-03-16 NOTE — PLAN OF CARE
Problem: PAIN - ADULT  Goal: Verbalizes/displays adequate comfort level or baseline comfort level  Description: Interventions:  - Encourage patient to monitor pain and request assistance  - Assess pain using appropriate pain scale  - Administer analgesics based on type and severity of pain and evaluate response  - Implement non-pharmacological measures as appropriate and evaluate response  - Consider cultural and social influences on pain and pain management  - Notify physician/advanced practitioner if interventions unsuccessful or patient reports new pain  Outcome: Progressing     Problem: INFECTION - ADULT  Goal: Absence or prevention of progression during hospitalization  Description: INTERVENTIONS:  - Assess and monitor for signs and symptoms of infection  - Monitor lab/diagnostic results  - Monitor all insertion sites, i.e. indwelling lines, tubes, and drains  - Monitor endotracheal if appropriate and nasal secretions for changes in amount and color  - [place] appropriate cooling/warming therapies per order  - Administer medications as ordered  - Instruct and encourage patient and family to use good hand hygiene technique  - Identify and instruct in appropriate isolation precautions for identified infection/condition  Outcome: Progressing  Goal: Absence of fever/infection during neutropenic period  Description: INTERVENTIONS:  - Monitor WBC    Outcome: Progressing     Problem: SAFETY ADULT  Goal: Patient will remain free of falls  Description: INTERVENTIONS:  - Educate patient/family on patient safety including physical limitations  - Instruct patient to call for assistance with activity   - Consult OT/PT to assist with strengthening/mobility   - Keep Call bell within reach  - Keep bed low and locked with side rails adjusted as appropriate  - Keep care items and personal belongings within reach  - Initiate and maintain comfort rounds  - Make Fall Risk Sign visible to staff  - Offer Toileting every 2 Hours,  in advance of need  - Initiate/Maintain bed alarm  - Apply yellow socks and bracelet for high fall risk patients  - Consider moving patient to room near nurses station  Outcome: Progressing  Goal: Maintain or return to baseline ADL function  Description: INTERVENTIONS:  -  Assess patient's ability to carry out ADLs; assess patient's baseline for ADL function and identify physical deficits which impact ability to perform ADLs (bathing, care of mouth/teeth, toileting, grooming, dressing, etc.)  - Assess/evaluate cause of self-care deficits   - Assess range of motion  - Assess patient's mobility; develop plan if impaired  - Assess patient's need for assistive devices and provide as appropriate  - Encourage maximum independence but intervene and supervise when necessary  - Involve family in performance of ADLs  - Assess for home care needs following discharge   - Consider OT consult to assist with ADL evaluation and planning for discharge  - Provide patient education as appropriate  Outcome: Progressing  Goal: Maintains/Returns to pre admission functional level  Description: INTERVENTIONS:  - Perform AM-PAC 6 Click Basic Mobility/ Daily Activity assessment daily.  - Set and communicate daily mobility goal to care team and patient/family/caregiver.   - Collaborate with rehabilitation services on mobility goals if consulted  - Perform Range of Motion 4 times a day.  - Reposition patient every 2 hours.  - Record patient progress and toleration of activity level   Outcome: Progressing     Problem: DISCHARGE PLANNING  Goal: Discharge to home or other facility with appropriate resources  Description: INTERVENTIONS:  - Identify barriers to discharge w/patient and caregiver  - Arrange for needed discharge resources and transportation as appropriate  - Identify discharge learning needs (meds, wound care, etc.)  - Arrange for interpretive services to assist at discharge as needed  - Refer to Case Management Department for  coordinating discharge planning if the patient needs post-hospital services based on physician/advanced practitioner order or complex needs related to functional status, cognitive ability, or social support system  Outcome: Progressing     Problem: Knowledge Deficit  Goal: Patient/family/caregiver demonstrates understanding of disease process, treatment plan, medications, and discharge instructions  Description: Complete learning assessment and assess knowledge base.  Interventions:  - Provide teaching at level of understanding  - Provide teaching via preferred learning methods  Outcome: Progressing     Problem: Prexisting or High Potential for Compromised Skin Integrity  Goal: Skin integrity is maintained or improved  Description: INTERVENTIONS:  - Identify patients at risk for skin breakdown  - Assess and monitor skin integrity  - Assess and monitor nutrition and hydration status  - Monitor labs   - Assess for incontinence   - Turn and reposition patient  - Assist with mobility/ambulation  - Relieve pressure over bony prominences  - Avoid friction and shearing  - Provide appropriate hygiene as needed including keeping skin clean and dry  - Evaluate need for skin moisturizer/barrier cream  - Collaborate with interdisciplinary team   - Patient/family teaching  - Consider wound care consult   Outcome: Progressing     Problem: Nutrition/Hydration-ADULT  Goal: Nutrient/Hydration intake appropriate for improving, restoring or maintaining nutritional needs  Description: Monitor and assess patient's nutrition/hydration status for malnutrition. Collaborate with interdisciplinary team and initiate plan and interventions as ordered.  Monitor patient's weight and dietary intake as ordered or per policy. Utilize nutrition screening tool and intervene as necessary. Determine patient's food preferences and provide high-protein, high-caloric foods as appropriate.     INTERVENTIONS:  - Monitor oral intake, urinary output, labs,  and treatment plans  - Assess nutrition and hydration status and recommend course of action  - Evaluate amount of meals eaten  - Assist patient with eating if necessary   - Allow adequate time for meals  - Recommend/ encourage appropriate diets, oral nutritional supplements, and vitamin/mineral supplements  - Order, calculate, and assess calorie counts as needed  -- Assess need for intravenous fluids  - Provide specific nutrition/hydration education as appropriate  - Include patient/family/caregiver in decisions related to nutrition  Outcome: Progressing

## 2024-03-16 NOTE — ASSESSMENT & PLAN NOTE
History of atrial flutter CKD 3 CAD hypertension presented to the hospital with chest pain found to have NSTEMI.  Cardiac catheterization 3/11/2024 with RCA thrombus.  Continue aspirin and brilinta.  Being followed by cardiology.  No further chest pain.    Lab Results   Component Value Date    HSTNI 11,569 (H) 03/14/2024    HSTNI >22,973 (H) 03/07/2024    HSTNI >22,973 (H) 03/06/2024

## 2024-03-16 NOTE — PROGRESS NOTES
NEPHROLOGY HOSPITAL PROGRESS NOTE   Vladimir Mathias 89 y.o. male MRN: [id number]  Unit/Bed#: [address] [place] [address] Encounter: [id number]  Reason for Consult: TUAN on CKD    ASSESSMENT and PLAN:  89-year-old male with history of CAD, A-fib status post Watchman device and CKD presented with chest pain and dyspnea.  We are consulted for management of TUAN on CKD.    1.  Acute kidney injury.  Baseline creatinine 1.5-1.7.  Admission creatinine 2.01.  Peak creatinine 2.65 on 03/06.  Yoandy creatinine 1.68 on 03/12.  Creatinine on the rise and later part of this week, creatinine today 2.7.  Urinalysis with no RBC, urine sodium 17.  Kidney ultrasound did not show hydronephrosis.  Etiology of initial TUAN due to hemodynamic changes +/- hypotension in setting of NSTEMI.  Status post left heart catheterization with iodinated contrast administration on 03/11.  Rising creatinine most likely due to contrast-induced nephropathy and bradycardia that was causing decreased renal perfusion.  He was given diuretics on 03/14 and 03/15 but currently appears euvolemic on examination.  Provide diuretic holiday today.  Trend BMP.    2.  CKD stage IIIb.  Outpatient nephrologist is Dr. Barrientos at [facility].  Baseline creatinine is 1.5-1.7.  Etiology most likely in setting of hypertension and age-related nephron loss.  He will end up with a new baseline during this admission due to repeated episodes of TUAN.    3.  Hypertension.  Blood pressure is currently within normal limits.  He was on metoprolol which has been discontinued due to heart block.    4.  NSTEMI.  Status postcardiac catheterization that showed patent ROGERS to LAD, patent vein graft to his marginal with a severe three-vessel disease.  Thrombus was noted in RCA which was causing intermittent AV block and a temporary pacemaker was placed.  Temporary pacemaker was discontinued and he was back in heart block and he is now status post PPM on 03/15.    5.  Anemia in CKD.  Globin today 9.6.   Transfuse to keep hemoglobin more than 7.    6.  Hyperchloremic acidosis.  Most likely due to TUAN on CKD.  This is currently resolved on sodium bicarbonate 650 mg 3 times daily, continue the same dose.    7.  Hyponatremia.  Serum sodium level currently improved to 133.  Serum osmolality is elevated at 306 in setting of elevated BUN.  Still consider hypotonic hyponatremia.  Urine osmolality 392 consistent with ADH dependent hyponatremia.  Urine sodium of 15 consistent with active RAAS system in setting of volume overload.  Serum sodium is currently improved status post diuresis.  Holding further diuretics today as he appears euvolemic today.    Discussed with internal medicine team.  After discussion, we agreed that kidney function continues to worsen but may be plateauing and given the fact that patient is currently euvolemic to hold off on diuretics today.    SUBJECTIVE / 24H INTERVAL HISTORY:  Urine output recorded as 1855 cc.  Another 250 cc recorded from this morning.  He feels much better today.  Denies dyspnea.  Denies leg swelling.    OBJECTIVE:  Current Weight: Weight - Scale: 102 kg (223 lb 15.8 oz)  Vitals:    03/15/24 2343 03/16/24 0307 03/16/24 0600 03/16/24 0743   BP: 125/55 129/71  115/60   BP Location:       Pulse:  67  66   Resp: 18 18     Temp: (!) 97.4 °F (36.3 °C) 98.5 °F (36.9 °C)  97.5 °F (36.4 °C)   TempSrc:  Oral     SpO2:  97%  95%   Weight:   102 kg (223 lb 15.8 oz)    Height:           Intake/Output Summary (Last 24 hours) at 3/16/2024 1125  Last data filed at 3/16/2024 0900  Gross per 24 hour   Intake 305 ml   Output 1825 ml   Net -1520 ml     Review of Systems   Constitutional:  Negative for chills and fever.   HENT:  Negative for ear pain and sore throat.    Eyes:  Negative for pain and visual disturbance.   Respiratory:  Negative for cough and shortness of breath.    Cardiovascular:  Negative for chest pain and palpitations.   Gastrointestinal:  Negative for abdominal pain and vomiting.    Genitourinary:  Negative for dysuria and hematuria.   Musculoskeletal:  Negative for arthralgias and back pain.   Skin:  Negative for color change and rash.   Neurological:  Negative for seizures and syncope.   All other systems reviewed and are negative.    Physical Exam  Vitals and nursing note reviewed.   Constitutional:       General: He is not in acute distress.     Appearance: He is well-developed.   HENT:      Head: Normocephalic and atraumatic.   Eyes:      Conjunctiva/sclera: Conjunctivae normal.   Cardiovascular:      Rate and Rhythm: Normal rate and regular rhythm.      Pulses: Normal pulses.      Heart sounds: Normal heart sounds. No murmur heard.  Pulmonary:      Effort: Pulmonary effort is normal. No respiratory distress.      Breath sounds: Normal breath sounds.   Abdominal:      Palpations: Abdomen is soft.      Tenderness: There is no abdominal tenderness.   Musculoskeletal:         General: No swelling.      Cervical back: Neck supple.      Right lower leg: No edema.      Left lower leg: No edema.   Skin:     General: Skin is warm and dry.      Capillary Refill: Capillary refill takes less than 2 seconds.   Neurological:      Mental Status: He is alert.   Psychiatric:         Mood and Affect: Mood normal.       Medications:    Current Facility-Administered Medications:     acetaminophen (TYLENOL) tablet 650 mg, 650 mg, Oral, Q6H PRN, Judith Spironello V, CRNP, 650 mg at 03/15/24 2100    aluminum-magnesium hydroxide-simethicone (MAALOX) oral suspension 30 mL, 30 mL, Oral, Q4H PRN, Judith Spironello V, CRNP    Artificial Tears ophthalmic solution 1 drop, 1 drop, Both Eyes, Q4H PRN, Judith Spironello V, CRNP    aspirin chewable tablet 81 mg, 81 mg, Oral, Daily, Judith Spironello V, CRNP, 81 mg at 03/16/24 0954    bisacodyl (DULCOLAX) rectal suppository 10 mg, 10 mg, Rectal, Daily PRN, Judith Spironello V, CRNP    ceFAZolin (ANCEF) IVPB (premix in dextrose) 2,000 mg 50 mL, 2,000 mg,  Intravenous, Q8H, Judith Spironello V, CRNP, Last Rate: 100 mL/hr at 03/16/24 0500, 2,000 mg at 03/16/24 0500    docusate sodium (COLACE) capsule 100 mg, 100 mg, Oral, Daily, Judith Spironello V, CRNP, 100 mg at 03/16/24 0954    ezetimibe (ZETIA) tablet 10 mg, 10 mg, Oral, Daily With Dinner, 10 mg at 03/15/24 1706 **AND** pravastatin (PRAVACHOL) tablet 40 mg, 40 mg, Oral, Daily With Dinner, Judith Spironello V, CRNP, 40 mg at 03/15/24 1706    finasteride (PROSCAR) tablet 5 mg, 5 mg, Oral, Daily, Judith Spironello V, CRNP, 5 mg at 03/16/24 0955    fish oil capsule 2,000 mg, 2,000 mg, Oral, Daily, Judith Spironello V, CRNP, 2,000 mg at 03/16/24 0954    heparin (porcine) subcutaneous injection 5,000 Units, 5,000 Units, Subcutaneous, Q8H GUSTAVO, Seda Santos, CRNP, 5,000 Units at 03/16/24 0500    nitroglycerin (NITRODUR) 0.1 mg/hr TD 24 hr patch, 0.1 mg, Transdermal, Daily, Judith Spironello V, CRNP, 0.1 mg at 03/15/24 0802    pantoprazole (PROTONIX) EC tablet 40 mg, 40 mg, Oral, Early Morning, Judith Spironello V, CRNP, 40 mg at 03/16/24 0500    polyethylene glycol (MIRALAX) packet 17 g, 17 g, Oral, Daily, Judith Spironello V, CRNP, 17 g at 03/16/24 0955    senna (SENOKOT) tablet 8.6 mg, 1 tablet, Oral, HS, Judith Spironello V, CRNP, 8.6 mg at 03/15/24 2100    sodium bicarbonate tablet 650 mg, 650 mg, Oral, TID after meals, Judith Spironello V, CRNP, 650 mg at 03/16/24 0854    ticagrelor (BRILINTA) tablet 90 mg, 90 mg, Oral, Q12H GUSTAVOEitanSedaJESSICA Moreno, 90 mg at 03/16/24 0954    Laboratory Results:  Results from last 7 days   Lab Units 03/16/24  0457 03/15/24  0508 03/14/24  1507 03/14/24  0625 03/13/24  0624 03/12/24  0530 03/11/24  1547 03/11/24  0448 03/10/24  0615   WBC Thousand/uL 6.89 8.03  --  8.60 7.05 6.61  --  6.34 6.57   HEMOGLOBIN g/dL 9.6* 10.0*  --  10.4* 9.9* 9.5*  --  10.6* 10.7*   HEMATOCRIT % 28.5* 29.8*  --  32.4* 29.8* 27.7*  --  31.6* 31.4*   PLATELETS Thousands/uL 205  "195  --  170 159 127*  --  145* 131*   POTASSIUM mmol/L 3.6 4.2 4.8 4.2 4.4 3.6 4.1 4.0 4.0   CHLORIDE mmol/L 99 100 98 102 103 109* 104 103 102   CO2 mmol/L 24 22 19* 17* 19* 19* 20* 27 27   BUN mg/dL 78* 75* 71* 65* 53* 39* 42* 42* 46*   CREATININE mg/dL 2.73* 2.64* 2.53* 2.32* 2.11* 1.68* 1.82* 1.93* 1.98*   CALCIUM mg/dL 8.7 9.4 9.3 8.7 8.8 7.9* 8.4 8.8 8.8   MAGNESIUM mg/dL 2.2 2.5 2.6 2.3 2.0  --   --   --   --    PHOSPHORUS mg/dL  --  5.1*  --   --  3.6  --   --   --   --        Portions of the record may have been created with voice recognition software. Occasional wrong word or \"sound a like\" substitutions may have occurred due to the inherent limitations of voice recognition software. Read the chart carefully and recognize, using context, where substitutions have occurred. If you have any questions, please contact the dictating provider.    "

## 2024-03-17 LAB
ANION GAP SERPL CALCULATED.3IONS-SCNC: 14 MMOL/L (ref 4–13)
BUN SERPL-MCNC: 73 MG/DL (ref 5–25)
CALCIUM SERPL-MCNC: 8.3 MG/DL (ref 8.4–10.2)
CHLORIDE SERPL-SCNC: 96 MMOL/L (ref 96–108)
CO2 SERPL-SCNC: 25 MMOL/L (ref 21–32)
CREAT SERPL-MCNC: 2.45 MG/DL (ref 0.6–1.3)
ERYTHROCYTE [DISTWIDTH] IN BLOOD BY AUTOMATED COUNT: 14.7 % (ref 11.6–15.1)
GFR SERPL CREATININE-BSD FRML MDRD: 22 ML/MIN/1.73SQ M
GLUCOSE SERPL-MCNC: 125 MG/DL (ref 65–140)
HCT VFR BLD AUTO: 28.1 % (ref 36.5–49.3)
HGB BLD-MCNC: 9.5 G/DL (ref 12–17)
MCH RBC QN AUTO: 33.7 PG (ref 26.8–34.3)
MCHC RBC AUTO-ENTMCNC: 33.8 G/DL (ref 31.4–37.4)
MCV RBC AUTO: 100 FL (ref 82–98)
PLATELET # BLD AUTO: 205 THOUSANDS/UL (ref 149–390)
PMV BLD AUTO: 10.7 FL (ref 8.9–12.7)
POTASSIUM SERPL-SCNC: 3.3 MMOL/L (ref 3.5–5.3)
RBC # BLD AUTO: 2.82 MILLION/UL (ref 3.88–5.62)
SODIUM SERPL-SCNC: 135 MMOL/L (ref 135–147)
WBC # BLD AUTO: 6.7 THOUSAND/UL (ref 4.31–10.16)

## 2024-03-17 PROCEDURE — 99232 SBSQ HOSP IP/OBS MODERATE 35: CPT | Performed by: INTERNAL MEDICINE

## 2024-03-17 PROCEDURE — 99024 POSTOP FOLLOW-UP VISIT: CPT | Performed by: INTERNAL MEDICINE

## 2024-03-17 PROCEDURE — 85027 COMPLETE CBC AUTOMATED: CPT | Performed by: INTERNAL MEDICINE

## 2024-03-17 PROCEDURE — 80048 BASIC METABOLIC PNL TOTAL CA: CPT | Performed by: INTERNAL MEDICINE

## 2024-03-17 RX ORDER — POTASSIUM CHLORIDE 20 MEQ/1
40 TABLET, EXTENDED RELEASE ORAL ONCE
Status: COMPLETED | OUTPATIENT
Start: 2024-03-17 | End: 2024-03-17

## 2024-03-17 RX ADMIN — METOPROLOL SUCCINATE 25 MG: 25 TABLET, EXTENDED RELEASE ORAL at 08:45

## 2024-03-17 RX ADMIN — PRAVASTATIN SODIUM 40 MG: 40 TABLET ORAL at 17:06

## 2024-03-17 RX ADMIN — PANTOPRAZOLE SODIUM 40 MG: 40 TABLET, DELAYED RELEASE ORAL at 05:44

## 2024-03-17 RX ADMIN — TICAGRELOR 90 MG: 90 TABLET ORAL at 08:45

## 2024-03-17 RX ADMIN — SODIUM BICARBONATE 650 MG TABLET 650 MG: at 17:06

## 2024-03-17 RX ADMIN — ASPIRIN 81 MG 81 MG: 81 TABLET ORAL at 08:46

## 2024-03-17 RX ADMIN — EZETIMIBE 10 MG: 10 TABLET ORAL at 17:06

## 2024-03-17 RX ADMIN — POTASSIUM CHLORIDE 40 MEQ: 1500 TABLET, EXTENDED RELEASE ORAL at 12:26

## 2024-03-17 RX ADMIN — HEPARIN SODIUM 5000 UNITS: 5000 INJECTION INTRAVENOUS; SUBCUTANEOUS at 05:44

## 2024-03-17 RX ADMIN — DOCUSATE SODIUM 100 MG: 100 CAPSULE, LIQUID FILLED ORAL at 08:45

## 2024-03-17 RX ADMIN — SODIUM BICARBONATE 650 MG TABLET 650 MG: at 08:45

## 2024-03-17 RX ADMIN — GLYCERIN 1 DROP: .002; .002; .01 SOLUTION/ DROPS OPHTHALMIC at 18:23

## 2024-03-17 RX ADMIN — HEPARIN SODIUM 5000 UNITS: 5000 INJECTION INTRAVENOUS; SUBCUTANEOUS at 14:53

## 2024-03-17 RX ADMIN — TICAGRELOR 90 MG: 90 TABLET ORAL at 22:23

## 2024-03-17 RX ADMIN — FINASTERIDE 5 MG: 5 TABLET, FILM COATED ORAL at 08:46

## 2024-03-17 RX ADMIN — NITROGLYCERIN 0.1 MG: 0.1 PATCH TRANSDERMAL at 08:48

## 2024-03-17 RX ADMIN — SODIUM BICARBONATE 650 MG TABLET 650 MG: at 12:26

## 2024-03-17 RX ADMIN — HEPARIN SODIUM 5000 UNITS: 5000 INJECTION INTRAVENOUS; SUBCUTANEOUS at 22:23

## 2024-03-17 NOTE — PLAN OF CARE
Problem: PAIN - ADULT  Goal: Verbalizes/displays adequate comfort level or baseline comfort level  Description: Interventions:  - Encourage patient to monitor pain and request assistance  - Assess pain using appropriate pain scale  - Administer analgesics based on type and severity of pain and evaluate response  - Implement non-pharmacological measures as appropriate and evaluate response  - Consider cultural and social influences on pain and pain management  - Notify physician/advanced practitioner if interventions unsuccessful or patient reports new pain  Outcome: Progressing     Problem: INFECTION - ADULT  Goal: Absence or prevention of progression during hospitalization  Description: INTERVENTIONS:  - Assess and monitor for signs and symptoms of infection  - Monitor lab/diagnostic results  - Monitor all insertion sites, i.e. indwelling lines, tubes, and drains  - Monitor endotracheal if appropriate and nasal secretions for changes in amount and color  - [place] appropriate cooling/warming therapies per order  - Administer medications as ordered  - Instruct and encourage patient and family to use good hand hygiene technique  - Identify and instruct in appropriate isolation precautions for identified infection/condition  Outcome: Progressing  Goal: Absence of fever/infection during neutropenic period  Description: INTERVENTIONS:  - Monitor WBC    Outcome: Progressing     Problem: SAFETY ADULT  Goal: Patient will remain free of falls  Description: INTERVENTIONS:  - Educate patient/family on patient safety including physical limitations  - Instruct patient to call for assistance with activity   - Consult OT/PT to assist with strengthening/mobility   - Keep Call bell within reach  - Keep bed low and locked with side rails adjusted as appropriate  - Keep care items and personal belongings within reach  - Initiate and maintain comfort rounds  - Make Fall Risk Sign visible to staff  - Offer Toileting every 2 Hours,  in advance of need  - Initiate/Maintain bed alarm  - Apply yellow socks and bracelet for high fall risk patients  - Consider moving patient to room near nurses station  Outcome: Progressing  Goal: Maintain or return to baseline ADL function  Description: INTERVENTIONS:  -  Assess patient's ability to carry out ADLs; assess patient's baseline for ADL function and identify physical deficits which impact ability to perform ADLs (bathing, care of mouth/teeth, toileting, grooming, dressing, etc.)  - Assess/evaluate cause of self-care deficits   - Assess range of motion  - Assess patient's mobility; develop plan if impaired  - Assess patient's need for assistive devices and provide as appropriate  - Encourage maximum independence but intervene and supervise when necessary  - Involve family in performance of ADLs  - Assess for home care needs following discharge   - Consider OT consult to assist with ADL evaluation and planning for discharge  - Provide patient education as appropriate  Outcome: Progressing  Goal: Maintains/Returns to pre admission functional level  Description: INTERVENTIONS:  - Perform AM-PAC 6 Click Basic Mobility/ Daily Activity assessment daily.  - Set and communicate daily mobility goal to care team and patient/family/caregiver.   - Collaborate with rehabilitation services on mobility goals if consulted  - Perform Range of Motion 4 times a day.  - Reposition patient every 2 hours.  - Record patient progress and toleration of activity level   Outcome: Progressing     Problem: DISCHARGE PLANNING  Goal: Discharge to home or other facility with appropriate resources  Description: INTERVENTIONS:  - Identify barriers to discharge w/patient and caregiver  - Arrange for needed discharge resources and transportation as appropriate  - Identify discharge learning needs (meds, wound care, etc.)  - Arrange for interpretive services to assist at discharge as needed  - Refer to Case Management Department for  coordinating discharge planning if the patient needs post-hospital services based on physician/advanced practitioner order or complex needs related to functional status, cognitive ability, or social support system  Outcome: Progressing     Problem: Knowledge Deficit  Goal: Patient/family/caregiver demonstrates understanding of disease process, treatment plan, medications, and discharge instructions  Description: Complete learning assessment and assess knowledge base.  Interventions:  - Provide teaching at level of understanding  - Provide teaching via preferred learning methods  Outcome: Progressing     Problem: Prexisting or High Potential for Compromised Skin Integrity  Goal: Skin integrity is maintained or improved  Description: INTERVENTIONS:  - Identify patients at risk for skin breakdown  - Assess and monitor skin integrity  - Assess and monitor nutrition and hydration status  - Monitor labs   - Assess for incontinence   - Turn and reposition patient  - Assist with mobility/ambulation  - Relieve pressure over bony prominences  - Avoid friction and shearing  - Provide appropriate hygiene as needed including keeping skin clean and dry  - Evaluate need for skin moisturizer/barrier cream  - Collaborate with interdisciplinary team   - Patient/family teaching  - Consider wound care consult   Outcome: Progressing     Problem: Nutrition/Hydration-ADULT  Goal: Nutrient/Hydration intake appropriate for improving, restoring or maintaining nutritional needs  Description: Monitor and assess patient's nutrition/hydration status for malnutrition. Collaborate with interdisciplinary team and initiate plan and interventions as ordered.  Monitor patient's weight and dietary intake as ordered or per policy. Utilize nutrition screening tool and intervene as necessary. Determine patient's food preferences and provide high-protein, high-caloric foods as appropriate.     INTERVENTIONS:  - Monitor oral intake, urinary output, labs,  and treatment plans  - Assess nutrition and hydration status and recommend course of action  - Evaluate amount of meals eaten  - Assist patient with eating if necessary   - Allow adequate time for meals  - Recommend/ encourage appropriate diets, oral nutritional supplements, and vitamin/mineral supplements  - Order, calculate, and assess calorie counts as needed  -- Assess need for intravenous fluids  - Provide specific nutrition/hydration education as appropriate  - Include patient/family/caregiver in decisions related to nutrition  Outcome: Progressing

## 2024-03-17 NOTE — PROGRESS NOTES
[facility]  Progress Note  Name: Vladimir Mathias I  MRN: [id number]  Unit/Bed#: 4 [address] 415-01 I Date of Admission: 3/5/2024   Date of Service: 3/17/2024 I Hospital Day: 12    Assessment/Plan   * NSTEMI (non-ST elevated myocardial infarction) (HCC)  Assessment & Plan  History of atrial flutter CKD 3 CAD hypertension presented to the hospital with chest pain found to have NSTEMI.  Cardiac catheterization 3/11/2024 with RCA thrombus.  Continue aspirin and brilinta.  Being followed by cardiology.  No further chest pain.    Lab Results   Component Value Date    HSTNI 11,569 (H) 03/14/2024    HSTNI >22,973 (H) 03/07/2024    HSTNI >22,973 (H) 03/06/2024       Acute renal failure superimposed on stage 3b chronic kidney disease (HCC)  Assessment & Plan  TUAN on CKD 3b being followed by nephrology due to NSTEMI/hypotension and contrast  Baseline creatinine 1.5-1.7.  Slowly improving.  Being followed by nephrology.  Holding further diuretics.    Results from last 7 days   Lab Units 03/17/24  0551 03/16/24  0457 03/15/24  0508 03/14/24  1507 03/14/24  0625 03/13/24  0624 03/12/24  0530 03/11/24  1547 03/11/24  0448   BUN mg/dL 73* 78* 75* 71* 65* 53* 39* 42* 42*   CREATININE mg/dL 2.45* 2.73* 2.64* 2.53* 2.32* 2.11* 1.68* 1.82* 1.93*   EGFR ml/min/1.73sq m 22 19 20 21 23 26 34 31 29         Third degree heart block (HCC)  Assessment & Plan  Complete heart block underwent pacemaker placement this admission.    Atrial flutter (HCC)  Assessment & Plan  Paroxysmal atrial fibrillation/flutter status post Watchman device due to recurrent GI bleeding  Continue metoprolol    Hyperlipidemia  Assessment & Plan  Continue ezetimibe and pravastatin    Essential hypertension  Assessment & Plan  Stable continue metoprolol.  ARB held due to kidney injury    CAD (coronary artery disease)  Assessment & Plan  CAD with history CABG 2009.  Cardiac catheterization this admission for NSTEMI with RCA thrombus.    BPH (benign  "prostatic hyperplasia)  Assessment & Plan  Continue finasteride    VTE Pharmacologic Prophylaxis: VTE Score: 4 Moderate Risk (Score 3-4) - Pharmacological DVT Prophylaxis Ordered: heparin.    Mobility:  Basic Mobility Inpatient Raw Score: 13  JH-HLM Goal: 4: Move to chair/commode  JH-HLM Achieved: 4: Move to chair/commode  JH-HLM Goal achieved. Continue to encourage appropriate mobility.    Patient Centered Rounds: I have performed bedside rounds with nursing staff today.  Discussions with Specialists or Other Care Team Provider: Nephrology    Education and Discussions with Family / Patient: Updated  (son) at bedside.    Time Spent for Care:   This time was spent on one or more of the following: performing physical exam; counseling and coordination of care; obtaining or reviewing history; documenting in the medical record; reviewing/ordering tests, medications or procedures; communicating with other healthcare professionals and discussing with patient's family/caregivers.    Current Length of Stay: 12 day(s)  Current Patient Status: Inpatient   Certification Statement: The patient will continue to require additional inpatient hospital stay due to renal recovery and placement hopefully next 24-48 hours  Discharge Plan: Anticipate discharge in 24-48 hrs to rehab facility.    Code Status: Level 3 - DNAR and DNI      Subjective:   Patient seen and examined.  No complaints.  Had bowel movement this morning.  No chest pain    Objective:   Vitals: Blood pressure 115/58, pulse 82, temperature 97.7 °F (36.5 °C), resp. rate 18, height 6' 1.5\" (1.867 m), weight 94.1 kg (207 lb 6.4 oz), SpO2 97%.    Intake/Output Summary (Last 24 hours) at 3/17/2024 1351  Last data filed at 3/17/2024 0544  Gross per 24 hour   Intake 420 ml   Output 1650 ml   Net -1230 ml       Physical Exam  Vitals reviewed.   Constitutional:       General: He is not in acute distress.  HENT:      Head: Atraumatic.   Eyes:      Extraocular " Movements: Extraocular movements intact.      Conjunctiva/sclera: Conjunctivae normal.   Cardiovascular:      Rate and Rhythm: Regular rhythm.      Heart sounds: Normal heart sounds.   Pulmonary:      Effort: Pulmonary effort is normal.      Breath sounds: Decreased breath sounds present. No wheezing.   Abdominal:      General: Bowel sounds are normal. There is no distension.      Palpations: Abdomen is soft.      Tenderness: There is no rebound.   Musculoskeletal:         General: No swelling or tenderness.   Skin:     General: Skin is warm and dry.      Coloration: Skin is not jaundiced.   Neurological:      General: No focal deficit present.      Mental Status: He is alert.      Cranial Nerves: No cranial nerve deficit.   Psychiatric:         Mood and Affect: Mood normal.       Additional Data:   Labs:  Results from last 7 days   Lab Units 03/17/24  0551 03/16/24  0457 03/15/24  0508 03/14/24  1549   WBC Thousand/uL 6.70 6.89 8.03  --    HEMOGLOBIN g/dL 9.5* 9.6* 10.0*  --    PLATELETS Thousands/uL 205 205 195  --    MCV fL 100* 100* 99*  --    INR   --   --   --  1.31*     Results from last 7 days   Lab Units 03/17/24  0551 03/16/24  0457 03/15/24  0508   SODIUM mmol/L 135 133* 132*   POTASSIUM mmol/L 3.3* 3.6 4.2   CHLORIDE mmol/L 96 99 100   CO2 mmol/L 25 24 22   ANION GAP mmol/L 14* 10 10   BUN mg/dL 73* 78* 75*   CREATININE mg/dL 2.45* 2.73* 2.64*   CALCIUM mg/dL 8.3* 8.7 9.4   ALBUMIN g/dL  --   --  3.5   TOTAL BILIRUBIN mg/dL  --   --  0.81   ALK PHOS U/L  --   --  58   ALT U/L  --   --  48   AST U/L  --   --  37   EGFR ml/min/1.73sq m 22 19 20   GLUCOSE RANDOM mg/dL 125 110 142*     Results from last 7 days   Lab Units 03/16/24  0457 03/15/24  0508 03/14/24  1507 03/14/24  0625 03/13/24  0624   MAGNESIUM mg/dL 2.2 2.5 2.6 2.3 2.0   PHOSPHORUS mg/dL  --  5.1*  --   --  3.6                                  * I Have Reviewed All Lab Data Listed Above.    Recent cultures:                    Lines/Drains:  Invasive Devices       Peripheral Intravenous Line  Duration             Peripheral IV 03/13/24 Left;Ventral (anterior) Forearm 4 days    Long-Dwell Peripheral IV (Midline) 03/14/24 Right Brachial 2 days                      Telemetry:  Telemetry Orders (From admission, onward)               24 Hour Telemetry Monitoring  Continuous x 24 Hours (Telem)        Question:  Reason for 24 Hour Telemetry  Answer:  PCI/EP study (including pacer and ICD implementation), Cardiac surgery, MI, abnormal cardiac cath, and chest pain- rule out MI                     Telemetry Reviewed: Normal Sinus Rhythm  Indication for Continued Telemetry Use: Arrthymias requiring medical therapy             Telemetry:   Telemetry Orders (From admission, onward)               24 Hour Telemetry Monitoring  Continuous x 24 Hours (Telem)        Question:  Reason for 24 Hour Telemetry  Answer:  PCI/EP study (including pacer and ICD implementation), Cardiac surgery, MI, abnormal cardiac cath, and chest pain- rule out MI                      Imaging:  Imaging Reports Reviewed Today Include:       NM lung ventilation / perfusion    Result Date: 3/6/2024  Impression: The probability for pulmonary embolus is low. Workstation performed: [id number]     XR chest 1 view portable    Result Date: 3/6/2024  Impression: No radiographic evidence of acute intrathoracic process. Workstation performed: [id number]       Scheduled Meds:  Current Facility-Administered Medications   Medication Dose Route Frequency Provider Last Rate    acetaminophen  650 mg Oral Q6H PRN Judith Spironello V, CRNP      aluminum-magnesium hydroxide-simethicone  30 mL Oral Q4H PRN Judith Spironello V, CRNP      Artificial Tears  1 drop Both Eyes Q4H PRN Judith Spironello V, CRNP      aspirin  81 mg Oral Daily Judith Spironello V, CRNP      bisacodyl  10 mg Rectal Daily PRN Judith Spironello V, CRNP      docusate sodium  100 mg Oral Daily Judith Spironello V,  JESSICA      ezetimibe  10 mg Oral Daily With Dinner Judith Spironello JESSICA CHANDLER      And    pravastatin  40 mg Oral Daily With Dinner Judith Spironello JESSICA CHANDLER      finasteride  5 mg Oral Daily Judith Spironello V, JESSICA      fish oil  2,000 mg Oral Daily Judith Spironello JESSICA CHANDLER      heparin (porcine)  5,000 Units Subcutaneous Q8H JESSICA Mccarthy      metoprolol succinate  25 mg Oral Daily Ivanna Scanlon DO      nitroglycerin  0.1 mg Transdermal Daily Judith Spironello JESSICA CHANDLER      pantoprazole  40 mg Oral Early Morning Judith Spiroarchanao JESSICA CHANDLER      polyethylene glycol  17 g Oral Daily Judith Spiroarchanao JESSICA CHANDLER      senna  1 tablet Oral HS Judith Spironello JESSICA CHANDLER      sodium bicarbonate  650 mg Oral TID after meals Judith Spironello JESSICA CHANDLER      ticagrelor  90 mg Oral Q12H JESSICA Mccarthy         Today, Patient Was Seen By: Bhupendra Fernandes DO    ** Please Note: Dictation voice to text software may have been used in the creation of this document. **

## 2024-03-17 NOTE — PROGRESS NOTES
NEPHROLOGY HOSPITAL PROGRESS NOTE   Vladimir Mathias 89 y.o. male MRN: [id number]  Unit/Bed#: [address] [place] [address] Encounter: [id number]  Reason for Consult: TUAN on CKD    ASSESSMENT and PLAN:  89-year-old male with history of CAD, A-fib status post Watchman device and CKD presented with chest pain and dyspnea.  We are consulted for management of TUAN on CKD.    1.  Acute kidney injury.  Baseline creatinine 1.5-1.7.  2 waves of TUAN this admission.  Admission creatinine 2.01.  Peak creatinine 2.65 on 03/06.  Yoandy creatinine 1.68 on 03/12.  Second wave of TUAN after administration of iodinated contrast with peak creatinine of 2.7.  Creatinine today 2.45.  Urinalysis with no RBC, urine sodium 17.  Kidney ultrasound did not show hydronephrosis.  Etiology of initial TUAN due to hemodynamic changes +/- hypotension in setting of NSTEMI.  Etiology of subsequent TUAN due to contrast associated nephropathy and bradycardia causing decreased renal perfusion.  He was given diuretics on 03/14 and 03/15 and currently appears euvolemic on examination.  Continue to hold diuretics.  Trend BMP.      2.  CKD stage IIIb.  Outpatient nephrologist is Dr. Barrientos at [facility].  Baseline creatinine is 1.5-1.7.  Etiology most likely in setting of hypertension and age-related nephron loss.  We will likely end up with a new baseline during 2 episodes of TUAN during this admission.    3.  Hypertension.  Blood pressure is currently within normal limits.  He was on metoprolol which has been discontinued due to heart block.    4.  NSTEMI.  Status postcardiac catheterization that showed patent ROGERS to LAD, patent vein graft to his marginal with a severe three-vessel disease.  Thrombus was noted in RCA which was causing intermittent AV block and a temporary pacemaker was placed.  Temporary pacemaker was discontinued and he was in heart block again and now is now status post PPM on 03/15.    5.  Anemia in CKD.  Hemoglobin 9.5 today.  Transfuse to keep  hemoglobin more than 7.    6.  Hyperchloremic acidosis.  Most likely due to TUAN on CKD.  This is currently resolved on sodium bicarbonate 650 mg 3 times daily.  Continue current dose of sodium bicarbonate.    7.  Hyponatremia.  Urine sodium level now normalized to 135.  Serum osmolality was 306 in setting of elevated BUN.  Still consider hypotonic hyponatremia.  Urine osmolality of 392 consistent with ADH dependent hyponatremia.  Urine sodium of 15 consistent with active RAAS system in setting of volume overload.  Serum sodium is now normalized with improvement in volume status and use of diuretics.    Discussed with internal medicine team.  After discussion, we agreed that serum creatinine has peaked and is now trending down and that volume status is currently stable and to hold off on diuretic for another 24 hours.    SUBJECTIVE / 24H INTERVAL HISTORY:  Urine output recorded as 1900 cc.  Denies dyspnea.  Denies leg swelling.  He had a choking accident this morning.  He currently feels well.    OBJECTIVE:  Current Weight: Weight - Scale: 94.1 kg (207 lb 6.4 oz)  Vitals:    03/16/24 2218 03/17/24 0241 03/17/24 0600 03/17/24 0843   BP: 118/61 107/58  115/58   Pulse: 79 68  82   Resp: 16 18     Temp: 97.5 °F (36.4 °C) 97.7 °F (36.5 °C)     TempSrc:       SpO2: 96% 96%  97%   Weight:   94.1 kg (207 lb 6.4 oz)    Height:           Intake/Output Summary (Last 24 hours) at 3/17/2024 1131  Last data filed at 3/17/2024 0544  Gross per 24 hour   Intake 420 ml   Output 1650 ml   Net -1230 ml     Review of Systems   Constitutional:  Negative for chills and fever.   HENT:  Negative for ear pain and sore throat.    Eyes:  Negative for pain and visual disturbance.   Respiratory:  Negative for cough and shortness of breath.    Cardiovascular:  Negative for chest pain and palpitations.   Gastrointestinal:  Negative for abdominal pain and vomiting.   Genitourinary:  Negative for dysuria and hematuria.   Musculoskeletal:  Negative  for arthralgias and back pain.   Skin:  Negative for color change and rash.   Neurological:  Negative for seizures and syncope.   All other systems reviewed and are negative.    Physical Exam  Vitals and nursing note reviewed.   Constitutional:       General: He is not in acute distress.     Appearance: He is well-developed.   HENT:      Head: Normocephalic and atraumatic.   Eyes:      Conjunctiva/sclera: Conjunctivae normal.   Cardiovascular:      Rate and Rhythm: Normal rate and regular rhythm.      Pulses: Normal pulses.      Heart sounds: Normal heart sounds. No murmur heard.  Pulmonary:      Effort: Pulmonary effort is normal. No respiratory distress.      Breath sounds: Normal breath sounds.   Abdominal:      Palpations: Abdomen is soft.      Tenderness: There is no abdominal tenderness.   Musculoskeletal:         General: No swelling.      Cervical back: Neck supple.      Right lower leg: No edema.      Left lower leg: No edema.   Skin:     General: Skin is warm and dry.      Capillary Refill: Capillary refill takes less than 2 seconds.   Neurological:      Mental Status: He is alert.   Psychiatric:         Mood and Affect: Mood normal.       Medications:    Current Facility-Administered Medications:     acetaminophen (TYLENOL) tablet 650 mg, 650 mg, Oral, Q6H PRN, Judith Spironello V, CRNP, 650 mg at 03/15/24 2100    aluminum-magnesium hydroxide-simethicone (MAALOX) oral suspension 30 mL, 30 mL, Oral, Q4H PRN, Judith Spironello V, CRNP, 30 mL at 03/16/24 2143    Artificial Tears ophthalmic solution 1 drop, 1 drop, Both Eyes, Q4H PRN, Judith Spironello V, CRNP, 1 drop at 03/16/24 2020    aspirin chewable tablet 81 mg, 81 mg, Oral, Daily, Judith Spironello V, CRNP, 81 mg at 03/17/24 0846    bisacodyl (DULCOLAX) rectal suppository 10 mg, 10 mg, Rectal, Daily PRN, Judith Spironello V, CRNP    docusate sodium (COLACE) capsule 100 mg, 100 mg, Oral, Daily, Judith Spironello V, CRNP, 100 mg at 03/17/24  0845    ezetimibe (ZETIA) tablet 10 mg, 10 mg, Oral, Daily With Dinner, 10 mg at 03/16/24 1728 **AND** pravastatin (PRAVACHOL) tablet 40 mg, 40 mg, Oral, Daily With Dinner, Judith Langfordnello V, CRNP, 40 mg at 03/16/24 1728    finasteride (PROSCAR) tablet 5 mg, 5 mg, Oral, Daily, Judith Langfordnello V, CRNP, 5 mg at 03/17/24 0846    fish oil capsule 2,000 mg, 2,000 mg, Oral, Daily, Judith Spironello V, CRNP, 2,000 mg at 03/17/24 0845    heparin (porcine) subcutaneous injection 5,000 Units, 5,000 Units, Subcutaneous, Q8H GUSTAVO, JESSICA Morocho, 5,000 Units at 03/17/24 0544    metoprolol succinate (TOPROL-XL) 24 hr tablet 25 mg, 25 mg, Oral, Daily, Ivanna Scanlon DO, 25 mg at 03/17/24 0845    nitroglycerin (NITRODUR) 0.1 mg/hr TD 24 hr patch, 0.1 mg, Transdermal, Daily, Judith Bragao V, CRNP, 0.1 mg at 03/17/24 0848    pantoprazole (PROTONIX) EC tablet 40 mg, 40 mg, Oral, Early Morning, Judith Langfordnello V, CRNP, 40 mg at 03/17/24 0544    polyethylene glycol (MIRALAX) packet 17 g, 17 g, Oral, Daily, Judith Langfordnello V, CRNP, 17 g at 03/16/24 0955    potassium chloride (Klor-Con M20) CR tablet 40 mEq, 40 mEq, Oral, Once, Zaki Grimm MD    senna (SENOKOT) tablet 8.6 mg, 1 tablet, Oral, HS, Judith Spironello V, CRNP, 8.6 mg at 03/15/24 2100    sodium bicarbonate tablet 650 mg, 650 mg, Oral, TID after meals, Judith Langfordnello V, CRNP, 650 mg at 03/17/24 0845    ticagrelor (BRILINTA) tablet 90 mg, 90 mg, Oral, Q12H Seda CHEN CRNP, 90 mg at 03/17/24 0845    Laboratory Results:  Results from last 7 days   Lab Units 03/17/24  0551 03/16/24  0457 03/15/24  0508 03/14/24  1507 03/14/24  0625 03/13/24  0624 03/12/24  0530 03/11/24  1547 03/11/24  0448   WBC Thousand/uL 6.70 6.89 8.03  --  8.60 7.05 6.61  --  6.34   HEMOGLOBIN g/dL 9.5* 9.6* 10.0*  --  10.4* 9.9* 9.5*  --  10.6*   HEMATOCRIT % 28.1* 28.5* 29.8*  --  32.4* 29.8* 27.7*  --  31.6*   PLATELETS Thousands/uL 205 205 195  --   "170 159 127*  --  145*   POTASSIUM mmol/L 3.3* 3.6 4.2 4.8 4.2 4.4 3.6   < > 4.0   CHLORIDE mmol/L 96 99 100 98 102 103 109*   < > 103   CO2 mmol/L 25 24 22 19* 17* 19* 19*   < > 27   BUN mg/dL 73* 78* 75* 71* 65* 53* 39*   < > 42*   CREATININE mg/dL 2.45* 2.73* 2.64* 2.53* 2.32* 2.11* 1.68*   < > 1.93*   CALCIUM mg/dL 8.3* 8.7 9.4 9.3 8.7 8.8 7.9*   < > 8.8   MAGNESIUM mg/dL  --  2.2 2.5 2.6 2.3 2.0  --   --   --    PHOSPHORUS mg/dL  --   --  5.1*  --   --  3.6  --   --   --     < > = values in this interval not displayed.       Portions of the record may have been created with voice recognition software. Occasional wrong word or \"sound a like\" substitutions may have occurred due to the inherent limitations of voice recognition software. Read the chart carefully and recognize, using context, where substitutions have occurred. If you have any questions, please contact the dictating provider.    "

## 2024-03-17 NOTE — ASSESSMENT & PLAN NOTE
TUAN on CKD 3b being followed by nephrology due to NSTEMI/hypotension and contrast  Baseline creatinine 1.5-1.7.  Slowly improving.  Being followed by nephrology.  Holding further diuretics.    Results from last 7 days   Lab Units 03/17/24  0551 03/16/24  0457 03/15/24  0508 03/14/24  1507 03/14/24  0625 03/13/24  0624 03/12/24  0530 03/11/24  1547 03/11/24  0448   BUN mg/dL 73* 78* 75* 71* 65* 53* 39* 42* 42*   CREATININE mg/dL 2.45* 2.73* 2.64* 2.53* 2.32* 2.11* 1.68* 1.82* 1.93*   EGFR ml/min/1.73sq m 22 19 20 21 23 26 34 31 29

## 2024-03-17 NOTE — PLAN OF CARE
Problem: PAIN - ADULT  Goal: Verbalizes/displays adequate comfort level or baseline comfort level  Description: Interventions:  - Encourage patient to monitor pain and request assistance  - Assess pain using appropriate pain scale  - Administer analgesics based on type and severity of pain and evaluate response  - Implement non-pharmacological measures as appropriate and evaluate response  - Consider cultural and social influences on pain and pain management  - Notify physician/advanced practitioner if interventions unsuccessful or patient reports new pain  Outcome: Progressing     Problem: INFECTION - ADULT  Goal: Absence or prevention of progression during hospitalization  Description: INTERVENTIONS:  - Assess and monitor for signs and symptoms of infection  - Monitor lab/diagnostic results  - Monitor all insertion sites, i.e. indwelling lines, tubes, and drains  - Monitor endotracheal if appropriate and nasal secretions for changes in amount and color  - [place] appropriate cooling/warming therapies per order  - Administer medications as ordered  - Instruct and encourage patient and family to use good hand hygiene technique  - Identify and instruct in appropriate isolation precautions for identified infection/condition  Outcome: Progressing  Goal: Absence of fever/infection during neutropenic period  Description: INTERVENTIONS:  - Monitor WBC    Outcome: Progressing     Problem: SAFETY ADULT  Goal: Patient will remain free of falls  Description: INTERVENTIONS:  - Educate patient/family on patient safety including physical limitations  - Instruct patient to call for assistance with activity   - Consult OT/PT to assist with strengthening/mobility   - Keep Call bell within reach  - Keep bed low and locked with side rails adjusted as appropriate  - Keep care items and personal belongings within reach  - Initiate and maintain comfort rounds  - Make Fall Risk Sign visible to staff  - Offer Toileting every 2 Hours,  in advance of need  - Initiate/Maintain bed alarm  - Apply yellow socks and bracelet for high fall risk patients  - Consider moving patient to room near nurses station  Outcome: Progressing  Goal: Maintain or return to baseline ADL function  Description: INTERVENTIONS:  -  Assess patient's ability to carry out ADLs; assess patient's baseline for ADL function and identify physical deficits which impact ability to perform ADLs (bathing, care of mouth/teeth, toileting, grooming, dressing, etc.)  - Assess/evaluate cause of self-care deficits   - Assess range of motion  - Assess patient's mobility; develop plan if impaired  - Assess patient's need for assistive devices and provide as appropriate  - Encourage maximum independence but intervene and supervise when necessary  - Involve family in performance of ADLs  - Assess for home care needs following discharge   - Consider OT consult to assist with ADL evaluation and planning for discharge  - Provide patient education as appropriate  Outcome: Progressing  Goal: Maintains/Returns to pre admission functional level  Description: INTERVENTIONS:  - Perform AM-PAC 6 Click Basic Mobility/ Daily Activity assessment daily.  - Set and communicate daily mobility goal to care team and patient/family/caregiver.   - Collaborate with rehabilitation services on mobility goals if consulted  - Perform Range of Motion 4 times a day.  - Reposition patient every 2 hours.  - Record patient progress and toleration of activity level   Outcome: Progressing     Problem: DISCHARGE PLANNING  Goal: Discharge to home or other facility with appropriate resources  Description: INTERVENTIONS:  - Identify barriers to discharge w/patient and caregiver  - Arrange for needed discharge resources and transportation as appropriate  - Identify discharge learning needs (meds, wound care, etc.)  - Arrange for interpretive services to assist at discharge as needed  - Refer to Case Management Department for  coordinating discharge planning if the patient needs post-hospital services based on physician/advanced practitioner order or complex needs related to functional status, cognitive ability, or social support system  Outcome: Progressing     Problem: Knowledge Deficit  Goal: Patient/family/caregiver demonstrates understanding of disease process, treatment plan, medications, and discharge instructions  Description: Complete learning assessment and assess knowledge base.  Interventions:  - Provide teaching at level of understanding  - Provide teaching via preferred learning methods  Outcome: Progressing     Problem: Prexisting or High Potential for Compromised Skin Integrity  Goal: Skin integrity is maintained or improved  Description: INTERVENTIONS:  - Identify patients at risk for skin breakdown  - Assess and monitor skin integrity  - Assess and monitor nutrition and hydration status  - Monitor labs   - Assess for incontinence   - Turn and reposition patient  - Assist with mobility/ambulation  - Relieve pressure over bony prominences  - Avoid friction and shearing  - Provide appropriate hygiene as needed including keeping skin clean and dry  - Evaluate need for skin moisturizer/barrier cream  - Collaborate with interdisciplinary team   - Patient/family teaching  - Consider wound care consult   Outcome: Progressing     Problem: Nutrition/Hydration-ADULT  Goal: Nutrient/Hydration intake appropriate for improving, restoring or maintaining nutritional needs  Description: Monitor and assess patient's nutrition/hydration status for malnutrition. Collaborate with interdisciplinary team and initiate plan and interventions as ordered.  Monitor patient's weight and dietary intake as ordered or per policy. Utilize nutrition screening tool and intervene as necessary. Determine patient's food preferences and provide high-protein, high-caloric foods as appropriate.     INTERVENTIONS:  - Monitor oral intake, urinary output, labs,  and treatment plans  - Assess nutrition and hydration status and recommend course of action  - Evaluate amount of meals eaten  - Assist patient with eating if necessary   - Allow adequate time for meals  - Recommend/ encourage appropriate diets, oral nutritional supplements, and vitamin/mineral supplements  - Order, calculate, and assess calorie counts as needed  -- Assess need for intravenous fluids  - Provide specific nutrition/hydration education as appropriate  - Include patient/family/caregiver in decisions related to nutrition  Outcome: Progressing

## 2024-03-17 NOTE — PROGRESS NOTES
Daily Cardiology Progress Note              LOS: 12 days     Assessment/Plan     Principal Problem:    NSTEMI (non-ST elevated myocardial infarction) ([facility])  Active Problems:    BPH (benign prostatic hyperplasia)    CAD (coronary artery disease)    Acute renal failure superimposed on stage 3b chronic kidney disease (HCC)    Essential hypertension    Hyperlipidemia    Nonrheumatic aortic valve stenosis    Atrial flutter (HCC)    Third degree heart block (HCC)    Hyponatremia    Metabolic acidosis    NSTEMI type 1 with troponin >22k.  Cardiac catheterization showed thrombus in RCA.  He is free of chest pain.   Continue ASA and Brilinta.    CAD with previous CABG - patent grafts  TUAN - creatinine further increased today.  Nephrology is following patient as well.  Paroxysmal atrial fibrillation -patient with Watchman device after developing bleeding from Xarelto and Eliquis - rhythm appears to be sinus with episodes of atrial flutter.  He is ventricular paced.   Aortic stenosis with previous TAVR  Third degree AV heart block - patient underwent permanent pacemaker insertion -pressure dressing was removed today.  Chest x-ray reviewed.  No pneumothorax present.     Plan:  - Continue ASA and Brilinta    - Continue metoprolol           Subjective     Interval History:  He had difficulty swallowing medicine today.  No chest pain or shortness of breath.    Objective     Vital signs in last 24 hours:  Temp:  [97.2 °F (36.2 °C)-98 °F (36.7 °C)] 97.7 °F (36.5 °C)  HR:  [68-82] 82  Resp:  [16-18] 18  BP: (107-118)/(58-61) 115/58  Weight (last 2 days)       Date/Time Weight    03/17/24 0600 94.1 (207.4)    03/16/24 0600 102 (223.99)    03/15/24 0500 102 (223.99)             Intake/Output last 3 shifts:  I/O last 3 completed shifts:  In: 725 [P.O.:625; I.V.:50; IV Piggyback:50]  Out: 2925 [Urine:2925]  Intake/Output this shift:  No intake/output data recorded.      Physical Exam:  Physical Exam   Constitutional: He appears healthy.  No distress.   Eyes: Pupils are equal, round, and reactive to light. Conjunctivae are normal.   Neck: No JVD present.   Cardiovascular: Normal rate, regular rhythm and normal heart sounds. Exam reveals no gallop and no friction rub.   No murmur heard.  Pulmonary/Chest: Effort normal and breath sounds normal. He has no wheezes. He has no rales.   Pacemaker site without hematoma   Musculoskeletal:         General: No tenderness, deformity or edema.      Cervical back: Normal range of motion and neck supple.   Neurological: He is alert and oriented to person, place, and time.   Skin: Skin is warm and dry.          Lab Results: I have personally reviewed pertinent lab results.    Imaging: I have personally reviewed pertinent films in PACS  EKG/Tele: Reviewed.  Sinus rhythm with ventricular pacing  atrial flutter with ventricular pacing

## 2024-03-18 PROBLEM — E87.20 METABOLIC ACIDOSIS: Status: RESOLVED | Noted: 2024-03-14 | Resolved: 2024-03-18

## 2024-03-18 PROBLEM — E87.1 HYPONATREMIA: Status: RESOLVED | Noted: 2024-03-14 | Resolved: 2024-03-18

## 2024-03-18 LAB
ANION GAP SERPL CALCULATED.3IONS-SCNC: 9 MMOL/L (ref 4–13)
BUN SERPL-MCNC: 67 MG/DL (ref 5–25)
CALCIUM SERPL-MCNC: 8.4 MG/DL (ref 8.4–10.2)
CHLORIDE SERPL-SCNC: 100 MMOL/L (ref 96–108)
CO2 SERPL-SCNC: 25 MMOL/L (ref 21–32)
CREAT SERPL-MCNC: 2.28 MG/DL (ref 0.6–1.3)
ERYTHROCYTE [DISTWIDTH] IN BLOOD BY AUTOMATED COUNT: 14.6 % (ref 11.6–15.1)
GFR SERPL CREATININE-BSD FRML MDRD: 24 ML/MIN/1.73SQ M
GLUCOSE SERPL-MCNC: 110 MG/DL (ref 65–140)
HCT VFR BLD AUTO: 28.1 % (ref 36.5–49.3)
HGB BLD-MCNC: 9.3 G/DL (ref 12–17)
MCH RBC QN AUTO: 33.3 PG (ref 26.8–34.3)
MCHC RBC AUTO-ENTMCNC: 33.1 G/DL (ref 31.4–37.4)
MCV RBC AUTO: 101 FL (ref 82–98)
PLATELET # BLD AUTO: 216 THOUSANDS/UL (ref 149–390)
PMV BLD AUTO: 10.3 FL (ref 8.9–12.7)
POTASSIUM SERPL-SCNC: 3.7 MMOL/L (ref 3.5–5.3)
RBC # BLD AUTO: 2.79 MILLION/UL (ref 3.88–5.62)
SODIUM SERPL-SCNC: 134 MMOL/L (ref 135–147)
WBC # BLD AUTO: 6.55 THOUSAND/UL (ref 4.31–10.16)

## 2024-03-18 PROCEDURE — 97110 THERAPEUTIC EXERCISES: CPT

## 2024-03-18 PROCEDURE — 80048 BASIC METABOLIC PNL TOTAL CA: CPT | Performed by: INTERNAL MEDICINE

## 2024-03-18 PROCEDURE — 85027 COMPLETE CBC AUTOMATED: CPT | Performed by: INTERNAL MEDICINE

## 2024-03-18 PROCEDURE — 99232 SBSQ HOSP IP/OBS MODERATE 35: CPT | Performed by: STUDENT IN AN ORGANIZED HEALTH CARE EDUCATION/TRAINING PROGRAM

## 2024-03-18 PROCEDURE — 97535 SELF CARE MNGMENT TRAINING: CPT

## 2024-03-18 PROCEDURE — 97530 THERAPEUTIC ACTIVITIES: CPT

## 2024-03-18 PROCEDURE — 99024 POSTOP FOLLOW-UP VISIT: CPT | Performed by: INTERNAL MEDICINE

## 2024-03-18 RX ORDER — SODIUM BICARBONATE 650 MG/1
650 TABLET ORAL DAILY
Status: DISCONTINUED | OUTPATIENT
Start: 2024-03-18 | End: 2024-03-19

## 2024-03-18 RX ORDER — TORSEMIDE 10 MG/1
10 TABLET ORAL DAILY
Status: DISCONTINUED | OUTPATIENT
Start: 2024-03-18 | End: 2024-03-20 | Stop reason: HOSPADM

## 2024-03-18 RX ADMIN — FINASTERIDE 5 MG: 5 TABLET, FILM COATED ORAL at 09:48

## 2024-03-18 RX ADMIN — OMEGA-3 FATTY ACIDS CAP 1000 MG 2000 MG: 1000 CAP at 09:49

## 2024-03-18 RX ADMIN — SODIUM BICARBONATE 650 MG TABLET 650 MG: at 09:48

## 2024-03-18 RX ADMIN — POLYETHYLENE GLYCOL 3350 17 G: 17 POWDER, FOR SOLUTION ORAL at 09:49

## 2024-03-18 RX ADMIN — TORSEMIDE 10 MG: 10 TABLET ORAL at 09:49

## 2024-03-18 RX ADMIN — TICAGRELOR 90 MG: 90 TABLET ORAL at 21:34

## 2024-03-18 RX ADMIN — PRAVASTATIN SODIUM 40 MG: 40 TABLET ORAL at 17:43

## 2024-03-18 RX ADMIN — TICAGRELOR 90 MG: 90 TABLET ORAL at 09:49

## 2024-03-18 RX ADMIN — EZETIMIBE 10 MG: 10 TABLET ORAL at 17:43

## 2024-03-18 RX ADMIN — METOPROLOL SUCCINATE 25 MG: 25 TABLET, EXTENDED RELEASE ORAL at 09:49

## 2024-03-18 RX ADMIN — HEPARIN SODIUM 5000 UNITS: 5000 INJECTION INTRAVENOUS; SUBCUTANEOUS at 14:35

## 2024-03-18 RX ADMIN — HEPARIN SODIUM 5000 UNITS: 5000 INJECTION INTRAVENOUS; SUBCUTANEOUS at 21:35

## 2024-03-18 RX ADMIN — NITROGLYCERIN 0.1 MG: 0.1 PATCH TRANSDERMAL at 09:49

## 2024-03-18 RX ADMIN — HEPARIN SODIUM 5000 UNITS: 5000 INJECTION INTRAVENOUS; SUBCUTANEOUS at 05:33

## 2024-03-18 RX ADMIN — DOCUSATE SODIUM 100 MG: 100 CAPSULE, LIQUID FILLED ORAL at 09:49

## 2024-03-18 RX ADMIN — ASPIRIN 81 MG 81 MG: 81 TABLET ORAL at 09:49

## 2024-03-18 RX ADMIN — PANTOPRAZOLE SODIUM 40 MG: 40 TABLET, DELAYED RELEASE ORAL at 05:33

## 2024-03-18 NOTE — PLAN OF CARE
Problem: PAIN - ADULT  Goal: Verbalizes/displays adequate comfort level or baseline comfort level  Description: Interventions:  - Encourage patient to monitor pain and request assistance  - Assess pain using appropriate pain scale  - Administer analgesics based on type and severity of pain and evaluate response  - Implement non-pharmacological measures as appropriate and evaluate response  - Consider cultural and social influences on pain and pain management  - Notify physician/advanced practitioner if interventions unsuccessful or patient reports new pain  Outcome: Progressing     Problem: INFECTION - ADULT  Goal: Absence or prevention of progression during hospitalization  Description: INTERVENTIONS:  - Assess and monitor for signs and symptoms of infection  - Monitor lab/diagnostic results  - Monitor all insertion sites, i.e. indwelling lines, tubes, and drains  - Monitor endotracheal if appropriate and nasal secretions for changes in amount and color  - [place] appropriate cooling/warming therapies per order  - Administer medications as ordered  - Instruct and encourage patient and family to use good hand hygiene technique  - Identify and instruct in appropriate isolation precautions for identified infection/condition  Outcome: Progressing  Goal: Absence of fever/infection during neutropenic period  Description: INTERVENTIONS:  - Monitor WBC    Outcome: Progressing     Problem: SAFETY ADULT  Goal: Patient will remain free of falls  Description: INTERVENTIONS:  - Educate patient/family on patient safety including physical limitations  - Instruct patient to call for assistance with activity   - Consult OT/PT to assist with strengthening/mobility   - Keep Call bell within reach  - Keep bed low and locked with side rails adjusted as appropriate  - Keep care items and personal belongings within reach  - Initiate and maintain comfort rounds  - Make Fall Risk Sign visible to staff  - Offer Toileting every 2 Hours,  in advance of need  - Initiate/Maintain bed alarm  - Apply yellow socks and bracelet for high fall risk patients  - Consider moving patient to room near nurses station  Outcome: Progressing  Goal: Maintain or return to baseline ADL function  Description: INTERVENTIONS:  -  Assess patient's ability to carry out ADLs; assess patient's baseline for ADL function and identify physical deficits which impact ability to perform ADLs (bathing, care of mouth/teeth, toileting, grooming, dressing, etc.)  - Assess/evaluate cause of self-care deficits   - Assess range of motion  - Assess patient's mobility; develop plan if impaired  - Assess patient's need for assistive devices and provide as appropriate  - Encourage maximum independence but intervene and supervise when necessary  - Involve family in performance of ADLs  - Assess for home care needs following discharge   - Consider OT consult to assist with ADL evaluation and planning for discharge  - Provide patient education as appropriate  Outcome: Progressing  Goal: Maintains/Returns to pre admission functional level  Description: INTERVENTIONS:  - Perform AM-PAC 6 Click Basic Mobility/ Daily Activity assessment daily.  - Set and communicate daily mobility goal to care team and patient/family/caregiver.   - Collaborate with rehabilitation services on mobility goals if consulted  - Perform Range of Motion 4 times a day.  - Reposition patient every 2 hours.  - Record patient progress and toleration of activity level   Outcome: Progressing     Problem: DISCHARGE PLANNING  Goal: Discharge to home or other facility with appropriate resources  Description: INTERVENTIONS:  - Identify barriers to discharge w/patient and caregiver  - Arrange for needed discharge resources and transportation as appropriate  - Identify discharge learning needs (meds, wound care, etc.)  - Arrange for interpretive services to assist at discharge as needed  - Refer to Case Management Department for  coordinating discharge planning if the patient needs post-hospital services based on physician/advanced practitioner order or complex needs related to functional status, cognitive ability, or social support system  Outcome: Progressing

## 2024-03-18 NOTE — PLAN OF CARE
Problem: OCCUPATIONAL THERAPY ADULT  Goal: Performs self-care activities at highest level of function for planned discharge setting.  See evaluation for individualized goals.  Description: Treatment Interventions: ADL retraining, Functional transfer training, UE strengthening/ROM, Endurance training, Patient/family training, Equipment evaluation/education, Activityengagement, Energy conservation, Compensatory technique education          See flowsheet documentation for full assessment, interventions and recommendations.   Outcome: Progressing  Note: Limitation: Decreased ADL status, Decreased UE strength, Decreased Safe judgement during ADL, Decreased endurance, Decreased self-care trans, Decreased high-level ADLs (decreased balance and mobility)  Prognosis: Good  Assessment: Pt seen for OT treatment session with focus on self-care and improving activity tolerance. Pt progressing to min A for LB ADLs with +time. Pt able to complete sit<>stand and fxl mobility using RW with min A, benefitting from intermittent verbal cues for hand placement and safety. Pt limited by fatigue and general deconditioning. Pt would benefit from cont OT services to maximize safety and fxl performance of ADLs.     Rehab Resource Intensity Level, OT: II (Moderate Resource Intensity)

## 2024-03-18 NOTE — WOUND OSTOMY CARE
Progress Note - Wound   Vladimirtrey Mathias 89 y.o. male MRN: [id number]  Unit/Bed#: [address] [place] [address] [id number] Encounter: [id number]      Assessment:   This is a 89 year old male patient admitted on 3/5/24 with NSTEMI. Patient received on 4 North sitting in reclining chair. He was AAO x 3 and agreeable to have wound visit done. He was able to stand with assist of 1 person and walker for examination of sacrobuttocks. He is continent of urine and stool.     Assessment Findings:  1-Left sacrobuttock remains intact with blanchable erythema, light purple discoloration and scar tissue. There is also intact discoloration to periwound due to moisture. The patient stated on last visit that he had a skin condition for a number of years in the past to this area and it was treated by a dermatologist with Acetonide (Triamcinolone). He cannot recall what the skin condition was or if he had a procedure done but area is intact. Orders in place for skin care and for prevention.   2-Cluster of 2 full thickness traumatic wounds to mid-anterior chest with pink granulation tissue, yellow slough and scant, serous drainage. Patient stated these occurred when EKG leads removed. Orders in place for wound care.  3-Bilateral heels intact with blanchable erythema - orders in place for skin care and for prevention.     Plan:   Skin care plans:  1-Cleanse traumatic wound cluster to mid-anterior chest with NSS & pat dry. Apply thin layer of Normlgel to wounds and cover with small bordered foam dressing. Change every other day & prn soilage/dislodgement.  2-Hydraguard to bilateral sacrum, buttock and heels BID and PRN - please do not apply foam dressings to sacrobuttocks to prevent entrapment of moisture.  3-Float heels on 2 pillows to offload pressure so heels are not in contact with mattress or pillows.  4-Ehob pressure redistribution cushion in chair when out of bed. Avoid prolonged sitting.  5-Moisturize skin daily with skin nourishing cream.  6-Turn/reposition  q2h or when medically stable for pressure re-distribution on skin.     Wound 03/11/24 Other (comment) Buttocks Left (Active)   Wound Image   03/18/24 1321   Wound Description Pink;Light purple;Other (Comment) 03/18/24 1321   Pressure Injury Stage O 03/18/24 1321   Gabriela-wound Assessment Other (Comment);Scar Tissue 03/18/24 1321   Wound Length (cm) 1.5 cm 03/18/24 1321   Wound Width (cm) 3 cm 03/18/24 1321   Wound Depth (cm) 0 cm 03/18/24 1321   Wound Surface Area (cm^2) 4.5 cm^2 03/18/24 1321   Wound Volume (cm^3) 0 cm^3 03/18/24 1321   Calculated Wound Volume (cm^3) 0 cm^3 03/18/24 1321   Drainage Amount None 03/18/24 1321   Non-staged Wound Description Not applicable 03/18/24 1321   Treatments Cleansed 03/18/24 1321   Dressing Protective barrier 03/18/24 1321   Dressing Changed New 03/18/24 1321   Dressing Status Intact 03/18/24 1321       Wound 03/18/24 Traumatic Sternum Proximal;Distal (Active)   Wound Image   03/18/24 1321   Wound Description Granulation tissue;Pink;Slough;Yellow 03/18/24 1321   Gabriela-wound Assessment Intact 03/18/24 1321   Wound Length (cm) 9.5 cm 03/18/24 1321   Wound Width (cm) 2 cm 03/18/24 1321   Wound Depth (cm) 0.1 cm 03/18/24 1321   Wound Surface Area (cm^2) 19 cm^2 03/18/24 1321   Wound Volume (cm^3) 1.9 cm^3 03/18/24 1321   Calculated Wound Volume (cm^3) 1.9 cm^3 03/18/24 1321   Drainage Amount Scant 03/18/24 1321   Drainage Description Serous 03/18/24 1321   Treatments Cleansed 03/18/24 1321   Dressing Foam;Normlgel 03/18/24 1321   Wound packed? No 03/18/24 1321   Dressing Changed New 03/18/24 1321   Dressing Status Clean;Dry;Intact 03/18/24 1321     Discussed assessment findings, and plan of care/recommendations with Barry WEINSTEIN and Sendy WEINSTEIN.    Wound care will follow along with patient throughout admission, please call or tiger text with questions and concerns,    Recommendations written as orders.  Christy Gonzalez MSN, RN, CWON

## 2024-03-18 NOTE — CASE MANAGEMENT
Case Management Discharge Planning Note    Patient name Vladimir RUST  Location [address] [place] [address]/[address] [place] [address]-* MRN [id number]  : 12/3/1932 Date 3/18/2024       Current Admission Date: 3/5/2024  Current Admission Diagnosis:NSTEMI (non-ST elevated myocardial infarction) ([facility])   Patient Active Problem List    Diagnosis Date Noted    Hyponatremia 2024    Metabolic acidosis 2024    Third degree heart block (HCC) 2024    BPH (benign prostatic hyperplasia) 2024    CAD (coronary artery disease) 2024    Acute renal failure superimposed on stage 3b chronic kidney disease ([facility]) 2024    Essential hypertension 2024    Atrial flutter (HCC) 2024    NSTEMI (non-ST elevated myocardial infarction) ([facility]) 2024    Nonrheumatic aortic valve stenosis 2017    S/P CABG (coronary artery bypass graft) 2016    Hyperlipidemia 2016      LOS (days): 13  Geometric Mean LOS (GMLOS) (days): 2.4  Days to GMLOS:-10.3     OBJECTIVE:  Risk of Unplanned Readmission Score: 17.81      Current admission status: Inpatient   Preferred Pharmacy:   PATIENT/FAMILY REPORTS NO PREFERRED PHARMACY  No address on file      [facility] [id number] [place], [place] - [address] [place] [id number]  Phone: [phone] Fax: [phone]    Primary Care Provider: No primary care provider on file.    Primary Insurance: [employer] REP  Secondary Insurance:     DISCHARGE DETAILS:    Discharge planning discussed with:: Patient, Zeus Gilbert  [place] of Choice: Yes  Comments - Freedom of Choice: SW met bedside with patient to discuss choice for STr. Patient states his #1 choice is now [facility]. Patient does not have interest in going to [facility] anymore. Zeus Gilbert verbalized agreement with patient's choice.  CM contacted family/caregiver?: Yes  Were Treatment Team discharge recommendations reviewed with patient/caregiver?: Yes  Did patient/caregiver verbalize understanding of  patient care needs?: N/A- going to facility  Were patient/caregiver advised of the risks associated with not following Treatment Team discharge recommendations?: Yes    Contacts  Relationship to Patient:: Family  Contact Method: Phone  Phone Number: [phone]  Reason/Outcome: Emergency Contact, Discharge Planning, Continuity of Care    Other Referral/Resources/Interventions Provided:  Interventions: Short Term Rehab  Referral Comments: CCB reserved in [place]. CMDS to be tasked to submit auth request once updated PT/OT notes are available.     Treatment Team Recommendation: Short Term Rehab  Discharge Destination Plan:: Short Term Rehab     IMM Given (Date):: 03/18/24  IMM Given to:: Patient (IMM #2 reviewed with pt. Pt verbalized understanding. Pt signed IMM and copy given. Copy also placed in scan bin for chart.)

## 2024-03-18 NOTE — PHYSICAL THERAPY NOTE
PT TREATMENT     03/18/24 1210   Note Type   Note Type Treatment   Pain Assessment   Pain Assessment Tool Sosa-Baker FACES   Sosa-Baker FACES Pain Rating 4  (B knee areas)   Restrictions/Precautions   Other Precautions Chair Alarm;Bed Alarm;Fall Risk;Pain  (lightheadedness/SOB)   General   Chart Reviewed Yes   Family/Caregiver Present No   Cognition   Arousal/Participation Cooperative   Subjective   Subjective patient states feeling weak and unsteady with gait at this time   Bed Mobility   Additional Comments Patient sitting OOB in chair upon arrival by therapist   Transfers   Sit to Stand 4  Minimal assistance   Additional items Assist x 1;Verbal cues   Stand to Sit 4  Minimal assistance   Additional items Assist x 1;Verbal cues   Additional Comments patient required static standing prior to gait due to lightheadedness upon standing   Ambulation/Elevation   Gait Assistance 3  Moderate assist   Additional items Assist x 1;Verbal cues;Tactile cues  (lightheadedness with standing activity this session)   Assistive Device Rolling walker   Distance 30 feet with change in direction, slow gait patterning and shortened stride length   Balance   Static Sitting Fair   Dynamic Sitting Fair -   Static Standing Fair -   Dynamic Standing Fair -   Ambulatory Fair -   Activity Tolerance   Activity Tolerance Patient limited by fatigue;Treatment limited secondary to medical complications (Comment)   Nurse Made Aware yes   Exercises   Hamstring Stretch Sitting;5 reps;PROM;Bilateral   Hip Flexion Sitting;15 reps;Bilateral  (alternating)   Hip Abduction Sitting;15 reps;Bilateral  (alternating)   Knee AROM Long Arc Quad Sitting;15 reps;Bilateral  (alternating)   Ankle Pumps Sitting;20 reps;Bilateral   Marching Standing;15 reps;Bilateral   Balance training  sidestepping and backward stepping completed for balance and coordination   Assessment   Assessment patient cooperative and motivated to return to independence. Patient  demonstrating improving gait balance and endurance although remain limited and requiring assist for transfers and gait. patient also required max assist with clothing management (pants) for toileting. Patient will benefit from continued PT with progression as tolerated and increasing functional mobility with clinical staff as well. The patient's -[facility] Basic Mobility Inpatient Short Form Raw Score is 14. A Raw score of less than or equal to 16 suggests the patient may benefit from discharge to post-acute rehabilitation services. Please also refer to the recommendation of the Physical Therapist for safe discharge planning.         Plan   Treatment/Interventions ADL retraining;Functional transfer training;LE strengthening/ROM   PT Frequency Other (Comment)  (5x/week)   Discharge Recommendation   Rehab Resource Intensity Level, PT II (Moderate Resource Intensity)   AM-PAC Basic Mobility Inpatient   Turning in Flat Bed Without Bedrails 3   Lying on Back to Sitting on Edge of Flat Bed Without Bedrails 2   Moving Bed to Chair 2   Standing Up From Chair Using Arms 3   Walk in Room 2   Climb 3-5 Stairs With Railing 2   Basic Mobility Inpatient Raw Score 14   Basic Mobility Standardized Score 35.55   [facility] Highest Level Of Mobility   -HLM Goal 4: Move to chair/commode   -HLM Achieved 6: Walk 10 steps or more   Education   Patient Demonstrates acceptance/verbal understanding;Explanation/teachback used;Demonstrates verbal understanding   Licensure   [place] License Number  Denise Meng PT [id number]

## 2024-03-18 NOTE — PROGRESS NOTES
[facility]  Progress Note  Name: Vladimir Mathias I  MRN: [id number]  Unit/Bed#: 4 [address] 415-01 I Date of Admission: 3/5/2024   Date of Service: 3/18/2024 I Hospital Day: 13    Assessment/Plan   * NSTEMI (non-ST elevated myocardial infarction) (HCC)  Assessment & Plan  History of atrial flutter CKD 3 CAD hypertension presented to the hospital with chest pain found to have NSTEMI.    Cardiac catheterization 3/11/2024 with RCA thrombus.  Continue aspirin and brilinta.  d/W by cardiology  Continue Toprol-XL 25 mg once a day   continue Vytorin 10/40 mg daily   continue aspirin 81 mg once a day   continue nitroglycerin patch 0.1 mg    Lab Results   Component Value Date    HSTNI 11,569 (H) 03/14/2024    HSTNI >22,973 (H) 03/07/2024    HSTNI >22,973 (H) 03/06/2024       Acute renal failure superimposed on stage 3b chronic kidney disease (HCC)  Assessment & Plan  TUAN on CKD 3b being followed by nephrology due to NSTEMI/hypotension and contrast  Baseline creatinine 1.5-1.7.  Slowly improving.  Being followed by nephrology  Restart diuretic  follow-up with nephrology on discharge     Results from last 7 days   Lab Units 03/18/24  0359 03/17/24  0551 03/16/24  0457 03/15/24  0508 03/14/24  1507 03/14/24  0625 03/13/24  0624 03/12/24  0530   BUN mg/dL 67* 73* 78* 75* 71* 65* 53* 39*   CREATININE mg/dL 2.28* 2.45* 2.73* 2.64* 2.53* 2.32* 2.11* 1.68*   EGFR ml/min/1.73sq m 24 22 19 20 21 23 26 34         Third degree heart block (HCC)  Assessment & Plan  Complete heart block underwent pacemaker placement this admission.    Atrial flutter (HCC)  Assessment & Plan  Paroxysmal atrial fibrillation/flutter status post Watchman device due to recurrent GI bleeding  Continue metoprolol  [id number]    Nonrheumatic aortic valve stenosis  Assessment & Plan  Status post Medtronic evolute valve in 2022    Hyperlipidemia  Assessment & Plan  Continue ezetimibe and pravastatin    Essential hypertension  Assessment &  Plan  With stable continue metoprolol.  ARB held due to kidney injury    CAD (coronary artery disease)  Assessment & Plan  CAD with history CABG .  Cardiac catheterization this admission for NSTEMI with RCA thrombus.    BPH (benign prostatic hyperplasia)  Assessment & Plan  Continue finasteride           VTE Pharmacologic Prophylaxis: VTE Score: 4 Moderate Risk (Score 3-4) - Pharmacological DVT Prophylaxis Ordered: heparin.    Mobility:   Basic Mobility Inpatient Raw Score: 14  JH-HLM Goal: 4: Move to chair/commode  JH-HLM Achieved: 6: Walk 10 steps or more  JH-HLM Goal achieved. Continue to encourage appropriate mobility.    Patient Centered Rounds: I performed bedside rounds with nursing staff today.   Discussions with Specialists or Other Care Team Provider: cardio, gi, nephro    Education and Discussions with Family / Patient: Patient declined call to .     Total Time Spent on Date of Encounter in care of patient: 45 mins. This time was spent on one or more of the following: performing physical exam; counseling and coordination of care; obtaining or reviewing history; documenting in the medical record; reviewing/ordering tests, medications or procedures; communicating with other healthcare professionals and discussing with patient's family/caregivers.    Current Length of Stay: 13 day(s)  Current Patient Status: Inpatient   Certification Statement: The patient will continue to require additional inpatient hospital stay due to clinical course and specialist recs  Discharge Plan: Anticipate discharge in 24-48 hrs to rehab facility.    Code Status: Level 3 - DNAR and DNI    Subjective:   Patient seen and examined at bedside, denying chest pain, palpitations or shortness of breath, reported that he wanted to go to rehab and he was ready.  Discussion with case management authorization pending.    Objective:     Vitals:   Temp (24hrs), Av.3 °F (36.3 °C), Min:97.3 °F (36.3 °C), Max:97.4 °F (36.3  °C)    Temp:  [97.3 °F (36.3 °C)-97.4 °F (36.3 °C)] 97.4 °F (36.3 °C)  HR:  [] 63  Resp:  [16-18] 18  BP: (115-121)/(55-74) 121/64  SpO2:  [95 %-97 %] 96 %  Body mass index is 26.97 kg/m².     Input and Output Summary (last 24 hours):     Intake/Output Summary (Last 24 hours) at 3/18/2024 1802  Last data filed at 3/18/2024 1300  Gross per 24 hour   Intake 440 ml   Output 600 ml   Net -160 ml       Physical Exam:   Physical Exam  Vitals and nursing note reviewed.   Constitutional:       General: He is not in acute distress.     Appearance: He is well-developed.   HENT:      Head: Normocephalic and atraumatic.   Eyes:      Conjunctiva/sclera: Conjunctivae normal.   Cardiovascular:      Rate and Rhythm: Normal rate and regular rhythm.      Heart sounds: No murmur heard.  Pulmonary:      Effort: Pulmonary effort is normal. No respiratory distress.      Breath sounds: Normal breath sounds. No wheezing or rhonchi.   Chest:      Comments: PPM bandage  Abdominal:      Palpations: Abdomen is soft.      Tenderness: There is no abdominal tenderness.   Musculoskeletal:         General: No swelling.      Cervical back: Neck supple.   Skin:     General: Skin is warm and dry.      Capillary Refill: Capillary refill takes less than 2 seconds.   Neurological:      Mental Status: He is alert.   Psychiatric:         Mood and Affect: Mood normal.          Additional Data:     Labs:  Results from last 7 days   Lab Units 03/18/24  0359 03/16/24 0457 03/15/24  0508   WBC Thousand/uL 6.55   < > 8.03   HEMOGLOBIN g/dL 9.3*   < > 10.0*   HEMATOCRIT % 28.1*   < > 29.8*   PLATELETS Thousands/uL 216   < > 195   NEUTROS PCT %  --   --  81*   LYMPHS PCT %  --   --  8*   MONOS PCT %  --   --  9   EOS PCT %  --   --  1    < > = values in this interval not displayed.     Results from last 7 days   Lab Units 03/18/24  0359 03/16/24 0457 03/15/24  0508   SODIUM mmol/L 134*   < > 132*   POTASSIUM mmol/L 3.7   < > 4.2   CHLORIDE mmol/L 100   < >  100   CO2 mmol/L 25   < > 22   BUN mg/dL 67*   < > 75*   CREATININE mg/dL 2.28*   < > 2.64*   ANION GAP mmol/L 9   < > 10   CALCIUM mg/dL 8.4   < > 9.4   ALBUMIN g/dL  --   --  3.5   TOTAL BILIRUBIN mg/dL  --   --  0.81   ALK PHOS U/L  --   --  58   ALT U/L  --   --  48   AST U/L  --   --  37   GLUCOSE RANDOM mg/dL 110   < > 142*    < > = values in this interval not displayed.     Results from last 7 days   Lab Units 03/14/24  1549   INR  1.31*                   Lines/Drains:  Invasive Devices       Peripheral Intravenous Line  Duration             Peripheral IV 03/13/24 Left;Ventral (anterior) Forearm 5 days    Long-Dwell Peripheral IV (Midline) 03/14/24 Right Brachial 3 days                  maging: Reviewed radiology reports from this admission including: chest xray US    Recent Cultures (last 7 days):         Last 24 Hours Medication List:   Current Facility-Administered Medications   Medication Dose Route Frequency Provider Last Rate    acetaminophen  650 mg Oral Q6H PRN Judith Spironello V, CRNP      aluminum-magnesium hydroxide-simethicone  30 mL Oral Q4H PRN Judith Spironello V, CRNP      Artificial Tears  1 drop Both Eyes Q4H PRN Judith Spironello V, CRNP      aspirin  81 mg Oral Daily Judith Spironello V, CRNP      bisacodyl  10 mg Rectal Daily PRN Judith Spironello V, CRNP      docusate sodium  100 mg Oral Daily Judith Spironello V, CRNP      ezetimibe  10 mg Oral Daily With Dinner Judith Spironello V, CRNP      And    pravastatin  40 mg Oral Daily With Dinner Judith Spironello V, CRNP      finasteride  5 mg Oral Daily Judith Spironello V, CRNP      fish oil  2,000 mg Oral Daily Judith Spironello V, CRNP      heparin (porcine)  5,000 Units Subcutaneous Q8H JESSICA Mccarthy      metoprolol succinate  25 mg Oral Daily Ivanna Scanlon DO      nitroglycerin  0.1 mg Transdermal Daily Judith Spironello V, CRNP      pantoprazole  40 mg Oral Early Morning Judith Spironello V, CRNP       polyethylene glycol  17 g Oral Daily JESSICA Story      senna  1 tablet Oral HS JESSICA Story      sodium bicarbonate  650 mg Oral Daily Joselyn Reyes Bahamonde, MD      ticagrelor  90 mg Oral Q12H [facility] JESSICA Morocho      torsemide  10 mg Oral Daily Joselyn Reyes Bahamonde, MD          Today, Patient Was Seen By: Jacinda Kent MD    **Please Note: This note may have been constructed using a voice recognition system.**

## 2024-03-18 NOTE — OCCUPATIONAL THERAPY NOTE
"OT TREATMENT         03/18/24 1035   OT Last Visit   OT Visit Date 03/18/24   Note Type   Note Type Treatment for insurance authorization   Pain Assessment   Pain Assessment Tool 0-10   Pain Score No Pain   Restrictions/Precautions   Other Precautions Chair Alarm;Bed Alarm;Fall Risk   ADL   Eating Assistance 5  Supervision/Setup   Grooming Assistance 5  Supervision/Setup   UB Bathing Assistance 4  Minimal Assistance   LB Bathing Assistance 4  Minimal Assistance   UB Dressing Assistance 4  Minimal Assistance   LB Dressing Assistance 4  Minimal Assistance   Toileting Assistance  4  Minimal Assistance   Bed Mobility   Supine to Sit Unable to assess   Sit to Supine Unable to assess   Additional Comments pt sitting OOB in chair upon OT arrival   Transfers   Sit to Stand 4  Minimal assistance   Additional items Assist x 1;Verbal cues   Stand to Sit 4  Minimal assistance   Additional items Assist x 1;Verbal cues   Toilet transfer 4  Minimal assistance   Additional items Assist x 1;Verbal cues   Functional Mobility   Functional Mobility 4  Minimal assistance   Additional Comments engaged in fxl mobility using RW within room and hallway min A   Additional items Rolling walker   Subjective   Subjective \"I want to get moving.\"   Cognition   Overall Cognitive Status WFL   Arousal/Participation Alert;Cooperative   Attention Within functional limits   Orientation Level Oriented X4   Memory Within functional limits   Following Commands Follows multistep commands with increased time or repetition   Activity Tolerance   Activity Tolerance Patient tolerated treatment well   Assessment   Assessment Pt seen for OT treatment session with focus on self-care and improving activity tolerance. Pt progressing to min A for LB ADLs with +time. Pt able to complete sit<>stand and fxl mobility using RW with min A, benefitting from intermittent verbal cues for hand placement and safety. Pt limited by fatigue and general deconditioning. Pt would " benefit from cont OT services to maximize safety and fxl performance of ADLs. The patient's raw score on the -PAC Daily Activity Inpatient Short Form is 18. A raw score of less than 19 suggests the patient may benefit from discharge to post-acute rehabilitation services. Please refer to the recommendation of the Occupational Therapist for safe discharge planning.   Plan   Treatment Interventions ADL retraining;Functional transfer training;UE strengthening/ROM;Endurance training;Patient/family training;Equipment evaluation/education;Activityengagement;Compensatory technique education   OT Frequency 3-5x/wk   Discharge Recommendation   Rehab Resource Intensity Level, OT II (Moderate Resource Intensity)   -PAC Daily Activity Inpatient   Lower Body Dressing 3   Bathing 3   Toileting 3   Upper Body Dressing 3   Grooming 3   Eating 3   Daily Activity Raw Score 18   Daily Activity Standardized Score (Calc for Raw Score >=11) 38.66   AM-PAC Applied Cognition Inpatient   Following a Speech/Presentation 4   Understanding Ordinary Conversation 4   Taking Medications 4   Remembering Where Things Are Placed or Put Away 4   Remembering List of 4-5 Errands 4   Taking Care of Complicated Tasks 4   Applied Cognition Raw Score 24   Applied Cognition Standardized Score 62.21   End of Consult   Patient Position at End of Consult Bedside chair;Bed/Chair alarm activated;All needs within reach   Licensure   [place] License Number  Debra Hodges OTR/L [id number]

## 2024-03-18 NOTE — PROGRESS NOTES
NEPHROLOGY PROGRESS NOTE   Vladimir Mathias 89 y.o. male MRN: [id number]  Unit/Bed#: [address] [place] [address] Encounter: [id number]  Reason for Consult: TUAN    ASSESSMENT AND PLAN:  89 yo with PMH of CAD, A-fib status post watchman, CKD G3b with baseline creatinine 1.5 to 1.7 mg/dL p/w chest pain and shortness of breath.  Nephrology is consulted for management of TUAN    PLAN:    #Non-Oliguric KDIGO TUAN stage 2 on CKD G3b  Etiology: Likely secondary to hemodynamic changes in the settings of hypotension, NSTEMI, bradycardia  Baseline creatinine 1.5 to 1.7 mg/dL  Peak creatinine: 2.7 mg/dL  Current creatinine: 2.2 mg/dL, slowly trending down  UA: No hematuria, leukocyturia  Renal imaging : No hydronephrosis  Treatment:  No urgent indication of dialysis at this time  Maintain MAP:  Over 65 mmHg if possible/avoid hypoperfusion:  Hold parameters on blood pressure medications  Avoid nephrotoxic agents such as NSAIDs, and IV contrast if possible. Avoid opioids   Adjust medications to GFR  Patient will require follow-up with nephrology on discharge    #CKD G3b  Baseline creatinine: 1.5 to 1.7 mg/dL  Etiology: Likely secondary to nephrosclerosis incidence of cardiovascular disease      #Acid-base Disorder  serum HCO3 25 mmol/L  Decrease sodium bicarbonate to once a day    #Volume status/hypertension:  Volume: Hypervolemia with lower extremity edema  Blood pressure: Normotensive, /73, goal less than 140/90  Recommend:  Low-sodium diet  Start torsemide 10 mg daily  Monitor urinary output and daily weight    # Hyponatremia  Serum sodium 134 mEq/L  Likely secondary to hypovolemia   Torsemide as above    #Anemia:  Current hemoglobin: 9.3 mg/dL  Treatment:  Transfuse for hemoglobin less than 7.0 per primary service    No indication of Epogen at this time    # NSTEMI  Status postcardiac catheterization  Follow-up by cardiology    # A-fib  Status post watchman and Eliquis  Management as per cardiology        SUBJECTIVE:  Patient seen and  examined at bedside. No chest pain, shortness of breath, nausea, vomiting, abdominal pain or diarrhea.     OBJECTIVE:  Current Weight: Weight - Scale: 94 kg (207 lb 3.2 oz)  Vitals:    03/18/24 0731   BP: 118/73   Pulse: (!) 107   Resp: 18   Temp: (!) 97.4 °F (36.3 °C)   SpO2: 96%       Intake/Output Summary (Last 24 hours) at 3/18/2024 0849  Last data filed at 3/18/2024 0701  Gross per 24 hour   Intake --   Output 650 ml   Net -650 ml     Wt Readings from Last 3 Encounters:   03/18/24 94 kg (207 lb 3.2 oz)   08/14/23 88.1 kg (194 lb 3.2 oz)   07/12/22 88.5 kg (195 lb)     Temp Readings from Last 3 Encounters:   03/18/24 (!) 97.4 °F (36.3 °C)   08/14/23 97.9 °F (36.6 °C) (Temporal)   07/12/22 97.5 °F (36.4 °C) (Temporal)     BP Readings from Last 3 Encounters:   03/18/24 118/73   07/12/22 118/72   07/05/22 114/72     Pulse Readings from Last 3 Encounters:   03/18/24 (!) 107   07/12/22 99   07/05/22 97      General:  no acute distress at this time  Skin:  No acute rash  Eyes:  No scleral icterus and noninjected  ENT:  mucous membranes moist  Neck:  no carotid bruits  Chest:  Clear to auscultation percussion, good respiratory effort, no use of accessory respiratory muscles  CVS:  Regular rate and rhythm without rub   Abdomen:  soft and nontender   Extremities: lower extremity edema  Neuro:  No gross focality  Psych:  Alert , cooperative       Medications:    Current Facility-Administered Medications:     acetaminophen (TYLENOL) tablet 650 mg, 650 mg, Oral, Q6H PRN, Judith Spironello V, CRNP, 650 mg at 03/15/24 2100    aluminum-magnesium hydroxide-simethicone (MAALOX) oral suspension 30 mL, 30 mL, Oral, Q4H PRN, Judith Spironello V, CRNP, 30 mL at 03/16/24 2143    Artificial Tears ophthalmic solution 1 drop, 1 drop, Both Eyes, Q4H PRN, JESSICA Story, 1 drop at 03/17/24 1823    aspirin chewable tablet 81 mg, 81 mg, Oral, Daily, JESSICA Story, 81 mg at 03/17/24 0846    bisacodyl (DULCOLAX)  rectal suppository 10 mg, 10 mg, Rectal, Daily PRN, Judith Spironello V, CRNP    docusate sodium (COLACE) capsule 100 mg, 100 mg, Oral, Daily, Judith Spironello V, CRNP, 100 mg at 03/17/24 0845    ezetimibe (ZETIA) tablet 10 mg, 10 mg, Oral, Daily With Dinner, 10 mg at 03/17/24 1706 **AND** pravastatin (PRAVACHOL) tablet 40 mg, 40 mg, Oral, Daily With Dinner, Judith Spironello V, CRNP, 40 mg at 03/17/24 1706    finasteride (PROSCAR) tablet 5 mg, 5 mg, Oral, Daily, Judith Spironello V, CRNP, 5 mg at 03/17/24 0846    fish oil capsule 2,000 mg, 2,000 mg, Oral, Daily, Judith Spironello V, CRNP, 2,000 mg at 03/16/24 0954    heparin (porcine) subcutaneous injection 5,000 Units, 5,000 Units, Subcutaneous, Q8H GUSTAVO, JESSICA Morocho, 5,000 Units at 03/18/24 0533    metoprolol succinate (TOPROL-XL) 24 hr tablet 25 mg, 25 mg, Oral, Daily, Ivanna Scanlon DO, 25 mg at 03/17/24 0845    nitroglycerin (NITRODUR) 0.1 mg/hr TD 24 hr patch, 0.1 mg, Transdermal, Daily, Judith Bragao V, CRNP, 0.1 mg at 03/17/24 0848    pantoprazole (PROTONIX) EC tablet 40 mg, 40 mg, Oral, Early Morning, Judith Spironello V, CRNP, 40 mg at 03/18/24 0533    polyethylene glycol (MIRALAX) packet 17 g, 17 g, Oral, Daily, Judith Spironello V, CRNP, 17 g at 03/16/24 0955    senna (SENOKOT) tablet 8.6 mg, 1 tablet, Oral, HS, Judith Spironello V, CRNP, 8.6 mg at 03/15/24 2100    sodium bicarbonate tablet 650 mg, 650 mg, Oral, Daily, Joselyn Reyes Bahamonde, MD    ticagrelor (BRILINTA) tablet 90 mg, 90 mg, Oral, Q12H GUSTAVO, JESSICA Morocho, 90 mg at 03/17/24 2223    torsemide (DEMADEX) tablet 10 mg, 10 mg, Oral, Daily, Joselyn Reyes Bahamonde, MD    Laboratory Results:  Results from last 7 days   Lab Units 03/18/24  0359 03/17/24  0551 03/16/24  0457 03/15/24  0508 03/14/24  1507 03/14/24  0625 03/13/24  0624 03/12/24  0530   WBC Thousand/uL 6.55 6.70 6.89 8.03  --  8.60 7.05 6.61   HEMOGLOBIN g/dL 9.3* 9.5* 9.6* 10.0*  --   10.4* 9.9* 9.5*   HEMATOCRIT % 28.1* 28.1* 28.5* 29.8*  --  32.4* 29.8* 27.7*   PLATELETS Thousands/uL 216 205 205 195  --  170 159 127*   SODIUM mmol/L 134* 135 133* 132* 128* 131* 132* 136   POTASSIUM mmol/L 3.7 3.3* 3.6 4.2 4.8 4.2 4.4 3.6   CHLORIDE mmol/L 100 96 99 100 98 102 103 109*   CO2 mmol/L 25 25 24 22 19* 17* 19* 19*   BUN mg/dL 67* 73* 78* 75* 71* 65* 53* 39*   CREATININE mg/dL 2.28* 2.45* 2.73* 2.64* 2.53* 2.32* 2.11* 1.68*   CALCIUM mg/dL 8.4 8.3* 8.7 9.4 9.3 8.7 8.8 7.9*   MAGNESIUM mg/dL  --   --  2.2 2.5 2.6 2.3 2.0  --    PHOSPHORUS mg/dL  --   --   --  5.1*  --   --  3.6  --        XR chest 2 views   Final Result by Dayan Leonardo MD (03/16 0945)      Left subclavian pacemaker leads in right atrial appendage and right ventricular septum with no pneumothorax.      Mild pulmonary venous congestion with trace effusions.               Workstation performed: [id number]         XR chest portable   Final Result by Dayan Leonardo MD (03/16 0659)      Pacemaker well-positioned with no pneumothorax.      Trace left effusion.      Workstation performed: [id number]         XR chest portable   Final Result by Sabra Pena MD (03/15 1318)      Small pleural effusions.      Enlarged cardiac silhouette.      Workstation performed: [id number]         US kidney and bladder   Final Result by Thierno Osborne MD (03/15 0519)      No hydronephrosis.   Small volume ascites in the right upper quadrant.               Workstation performed: [id number]         US kidney and bladder   Final Result by Luis Krause MD (03/06 1646)      Normal.            Workstation performed: [id number]         NM lung ventilation / perfusion   Final Result by Usman Taveras MD (03/06 1623)      The probability for pulmonary embolus is low.      Workstation performed: [id number]         XR chest 1 view portable   Final Result by Yogi Robertson MD (03/06 0605)      No radiographic evidence of acute  "intrathoracic process.            Workstation performed: [id number]             Portions of the record may have been created with voice recognition software. Occasional wrong word or \"sound a like\" substitutions may have occurred due to the inherent limitations of voice recognition software. Read the chart carefully and recognize, using context, where substitutions have occurred.    "

## 2024-03-18 NOTE — PLAN OF CARE
Problem: PAIN - ADULT  Goal: Verbalizes/displays adequate comfort level or baseline comfort level  Description: Interventions:  - Encourage patient to monitor pain and request assistance  - Assess pain using appropriate pain scale  - Administer analgesics based on type and severity of pain and evaluate response  - Implement non-pharmacological measures as appropriate and evaluate response  - Consider cultural and social influences on pain and pain management  - Notify physician/advanced practitioner if interventions unsuccessful or patient reports new pain  Outcome: Progressing     Problem: INFECTION - ADULT  Goal: Absence or prevention of progression during hospitalization  Description: INTERVENTIONS:  - Assess and monitor for signs and symptoms of infection  - Monitor lab/diagnostic results  - Monitor all insertion sites, i.e. indwelling lines, tubes, and drains  - Monitor endotracheal if appropriate and nasal secretions for changes in amount and color  - [place] appropriate cooling/warming therapies per order  - Administer medications as ordered  - Instruct and encourage patient and family to use good hand hygiene technique  - Identify and instruct in appropriate isolation precautions for identified infection/condition  Outcome: Progressing  Goal: Absence of fever/infection during neutropenic period  Description: INTERVENTIONS:  - Monitor WBC    Outcome: Progressing     Problem: SAFETY ADULT  Goal: Patient will remain free of falls  Description: INTERVENTIONS:  - Educate patient/family on patient safety including physical limitations  - Instruct patient to call for assistance with activity   - Consult OT/PT to assist with strengthening/mobility   - Keep Call bell within reach  - Keep bed low and locked with side rails adjusted as appropriate  - Keep care items and personal belongings within reach  - Initiate and maintain comfort rounds  - Make Fall Risk Sign visible to staff  - Offer Toileting every 2 Hours,  in advance of need  - Initiate/Maintain bed alarm  - Apply yellow socks and bracelet for high fall risk patients  - Consider moving patient to room near nurses station  Outcome: Progressing  Goal: Maintain or return to baseline ADL function  Description: INTERVENTIONS:  -  Assess patient's ability to carry out ADLs; assess patient's baseline for ADL function and identify physical deficits which impact ability to perform ADLs (bathing, care of mouth/teeth, toileting, grooming, dressing, etc.)  - Assess/evaluate cause of self-care deficits   - Assess range of motion  - Assess patient's mobility; develop plan if impaired  - Assess patient's need for assistive devices and provide as appropriate  - Encourage maximum independence but intervene and supervise when necessary  - Involve family in performance of ADLs  - Assess for home care needs following discharge   - Consider OT consult to assist with ADL evaluation and planning for discharge  - Provide patient education as appropriate  Outcome: Progressing  Goal: Maintains/Returns to pre admission functional level  Description: INTERVENTIONS:  - Perform AM-PAC 6 Click Basic Mobility/ Daily Activity assessment daily.  - Set and communicate daily mobility goal to care team and patient/family/caregiver.   - Collaborate with rehabilitation services on mobility goals if consulted  - Perform Range of Motion 4 times a day.  - Reposition patient every 2 hours.  - Record patient progress and toleration of activity level   Outcome: Progressing     Problem: DISCHARGE PLANNING  Goal: Discharge to home or other facility with appropriate resources  Description: INTERVENTIONS:  - Identify barriers to discharge w/patient and caregiver  - Arrange for needed discharge resources and transportation as appropriate  - Identify discharge learning needs (meds, wound care, etc.)  - Arrange for interpretive services to assist at discharge as needed  - Refer to Case Management Department for  coordinating discharge planning if the patient needs post-hospital services based on physician/advanced practitioner order or complex needs related to functional status, cognitive ability, or social support system  Outcome: Progressing     Problem: Knowledge Deficit  Goal: Patient/family/caregiver demonstrates understanding of disease process, treatment plan, medications, and discharge instructions  Description: Complete learning assessment and assess knowledge base.  Interventions:  - Provide teaching at level of understanding  - Provide teaching via preferred learning methods  Outcome: Progressing     Problem: Prexisting or High Potential for Compromised Skin Integrity  Goal: Skin integrity is maintained or improved  Description: INTERVENTIONS:  - Identify patients at risk for skin breakdown  - Assess and monitor skin integrity  - Assess and monitor nutrition and hydration status  - Monitor labs   - Assess for incontinence   - Turn and reposition patient  - Assist with mobility/ambulation  - Relieve pressure over bony prominences  - Avoid friction and shearing  - Provide appropriate hygiene as needed including keeping skin clean and dry  - Evaluate need for skin moisturizer/barrier cream  - Collaborate with interdisciplinary team   - Patient/family teaching  - Consider wound care consult   Outcome: Progressing     Problem: Nutrition/Hydration-ADULT  Goal: Nutrient/Hydration intake appropriate for improving, restoring or maintaining nutritional needs  Description: Monitor and assess patient's nutrition/hydration status for malnutrition. Collaborate with interdisciplinary team and initiate plan and interventions as ordered.  Monitor patient's weight and dietary intake as ordered or per policy. Utilize nutrition screening tool and intervene as necessary. Determine patient's food preferences and provide high-protein, high-caloric foods as appropriate.     INTERVENTIONS:  - Monitor oral intake, urinary output, labs,  and treatment plans  - Assess nutrition and hydration status and recommend course of action  - Evaluate amount of meals eaten  - Assist patient with eating if necessary   - Allow adequate time for meals  - Recommend/ encourage appropriate diets, oral nutritional supplements, and vitamin/mineral supplements  - Order, calculate, and assess calorie counts as needed  -- Assess need for intravenous fluids  - Provide specific nutrition/hydration education as appropriate  - Include patient/family/caregiver in decisions related to nutrition  Outcome: Progressing

## 2024-03-18 NOTE — ASSESSMENT & PLAN NOTE
Paroxysmal atrial fibrillation/flutter status post Watchman device due to recurrent GI bleeding  Continue metoprolol  [id number]

## 2024-03-18 NOTE — PROGRESS NOTES
Progress Note - Cardiology   [facility]     Vladimir Mathias 89 y.o. male MRN: [id number]  : 12/3/1932  Unit/Bed#: [address] [place] [address] Encounter: [id number]    Assessment and Plan:   1. AV heart block: patient experience intermittent AV block during right coronary artery injection and after.    -   Temporary trans venous pacemaker placed via right groin venous sheath    -   heart rate 70 with MA 2.5 ms and removed on 3/12/2024    -   3/15/2024: patient for placement of a [employer] dual chambered pacemaker.    -   No pneumothorax noted on post procedural chest x-rays     2. TUAN on chronic kidney disease: review records from [facility], appears his baseline creatinine is 1.4 - 1.7    -   follows with nephrologist at [place]    -   seen by nephrology and consultation appreciated    -   patient started on torsemide 10 mg daily    3. MI type I RV Infarction:  hs trop: 1276 (0hr), 1601 (2hr), 1808 (4hr)    -   hs trops random:  3126, 5951,11,193, >22973 x2    -   patient initially hesitant for IV anticoagulation secondary to history of epitaxsis, but was agreeable after long conversation and IV heparin was initiated on 3/6/2024 at approximately 1 PM. Completed heparin 3/9/2024    -   3/6/2024 TTE: EF visibly estimated at 55% with disconnect except him but otherwise abnormal wall motion. IVS is consistent with postoperative status, there is systolic flattening consistent with RV pressure overload. RV cavity is moderately dilated with moderate to severe reduction in systolic function. Left atrium is mildly dilated, right atrium severely dilated and mild mitral and tricuspid valve regurgitation. Bio prosthetic valve in the aortic position appears to be well seated with no evidence of para valvular regurgitation and gradient within expected range.    -   3/6/2024 VQ Scan: low probability of PE    -   Continue Toprol-XL 25 mg once a day    -   continue Vytorin 10/40 mg daily    -   continue aspirin 81  mg once a day    -   continue nitroglycerin patch 0.1 mg per hour on in the a.m. often the p.m.    -   3/8/2024 Lexiscan nuclear stress test: partially reversible inferior defect noted. Patient with known native RCA disease which was not bypass.    -   3/11/2024 LH: Lima to LAD was patent, SVG to circumflex was also noted be patent. Native LAD had a 100% mid occlusion. Patient noted to have a clot sitting in the sinus of the right coronary artery which has disrupted the flow to the RCA not amenable to intervention    -   patient transition from IV heparin to aspirin 81 mg once a day and Brilinta 90 mg BID on 3/13/2024    -   3/14/2024 limited TTE: LV ejection fraction of 60% with normal systolic function and grade 2 diastolic dysfunction. RV cavity remains moderately dilated with severely reduced systolic function.     4. Coronary artery disease with history of CABG: a history of CABG times two in 2009 (Lima to LAD and saphenous vein graft to marginal)    -   follows with Dr. Steven Walton at [facility] in [place],  [phone].    -   12/15/2021 cardiac CTA: noted both bypass grafts were patent with chronic total occlusion of his LAD and diffuse plaque in both left circumflex and RCA.    -   Continue Toprol-XL 25 mg once a day    -   continue Vytorin 10/40 mg daily    -   continue aspirin 81 mg once a day    -   continue nitroglycerin patch 0.1 mg per hour on in the a.m. off in the p.m.    -   care as per #3     5. Hypertension: blood pressure stable    -   continue Toprol and nitroglycerin with close monitoring     6. Paroxysmal atrial fibrillation flutter: patient has watchmen device    -   telemetry reviewed on initial admission, patient appears to be changing between Mobitz II type 1 and Mobitz II Type 2    -   3/8/2024 telemetry reviewed and patient appears to be sinus rhythm with first-degree AV heart block.    -   Patient received pacemaker on 3/15/2024     7. Aortic stenosis status post  "TAVR: TAVR performed in 3/31/2021 at [facility]    -   3/6/2024 TTE: Bio prosthetic valve in the aortic position appears to be well seated with no evidence of para valvular regurgitation and gradient within expected range.     8. History of epitaxis with anticoagulation (Xarelto): patient has watchman device    Subjective / Objective:   Patient seen and examined. No cardiac complaints offered. He is anxious to get started with his rehabilitation on discharge.    Vitals: Blood pressure 118/73, pulse (!) 107, temperature (!) 97.4 °F (36.3 °C), resp. rate 18, height 6' 1.5\" (1.867 m), weight 94 kg (207 lb 3.2 oz), SpO2 96%.  Vitals:    03/17/24 0600 03/18/24 0600   Weight: 94.1 kg (207 lb 6.4 oz) 94 kg (207 lb 3.2 oz)     Body mass index is 26.97 kg/m².  BP Readings from Last 3 Encounters:   03/18/24 118/73   07/12/22 118/72   07/05/22 114/72     Orthostatic Blood Pressures      Flowsheet Row Most Recent Value   Blood Pressure 118/73 filed at 03/18/2024 0731   Patient Position - Orthostatic VS Lying filed at 03/15/2024 2000          I/O         03/16 0701  03/17 0700 03/17 0701  03/18 0700 03/18 0701  03/19 0700    P.O. 625      I.V. (mL/kg) 50 (0.5)      IV Piggyback       Total Intake(mL/kg) 675 (7.2)      Urine (mL/kg/hr) 1900 (0.8) 500 (0.2) 300 (0.8)    Stool 0      Total Output 1900 500 300    Net -1225 -500 -300           Unmeasured Stool Occurrence 1 x            Invasive Devices       Peripheral Intravenous Line  Duration             Peripheral IV 03/13/24 Left;Ventral (anterior) Forearm 5 days    Long-Dwell Peripheral IV (Midline) 03/14/24 Right Brachial 3 days                      Intake/Output Summary (Last 24 hours) at 3/18/2024 1048  Last data filed at 3/18/2024 0701  Gross per 24 hour   Intake --   Output 650 ml   Net -650 ml         Physical Exam:   Physical Exam  Vitals and nursing note reviewed.   Constitutional:       General: He is not in acute distress.     Appearance: Normal appearance. He is " normal weight.   HENT:      Right Ear: External ear normal.      Left Ear: External ear normal.   Eyes:      General: No scleral icterus.        Right eye: No discharge.         Left eye: No discharge.   Cardiovascular:      Rate and Rhythm: Normal rate and regular rhythm.      Pulses: Normal pulses.      Heart sounds: Murmur heard.   Pulmonary:      Effort: Pulmonary effort is normal. No accessory muscle usage or respiratory distress.      Breath sounds: Examination of the right-lower field reveals decreased breath sounds. Examination of the left-lower field reveals decreased breath sounds. Decreased breath sounds present.   Abdominal:      General: Bowel sounds are normal. There is no distension.      Palpations: Abdomen is soft.   Musculoskeletal:      Right lower leg: No edema.      Left lower leg: No edema.   Skin:     General: Skin is warm and dry.      Capillary Refill: Capillary refill takes less than 2 seconds.   Neurological:      General: No focal deficit present.      Mental Status: He is alert and oriented to person, place, and time. Mental status is at baseline.   Psychiatric:         Mood and Affect: Mood normal.              Medications/ Allergies:     Current Facility-Administered Medications   Medication Dose Route Frequency Provider Last Rate    acetaminophen  650 mg Oral Q6H PRN Judith Spironello V, CRNP      aluminum-magnesium hydroxide-simethicone  30 mL Oral Q4H PRN Judith Spironello V, CRNP      Artificial Tears  1 drop Both Eyes Q4H PRN Judith Spironello V, CRNP      aspirin  81 mg Oral Daily Judith Spironello V, CRNP      bisacodyl  10 mg Rectal Daily PRN Judith Spironello V, CRNP      docusate sodium  100 mg Oral Daily Judith Spironello V, CRNP      ezetimibe  10 mg Oral Daily With Dinner Judith Spironello V, CRNP      And    pravastatin  40 mg Oral Daily With Dinner Judith Spironello V, CRNP      finasteride  5 mg Oral Daily Judith Spironello V, CRNP      fish oil  2,000  mg Oral Daily Judith Spironello V, JESSICA      heparin (porcine)  5,000 Units Subcutaneous Q8H [facility] JESSICA Morocho      metoprolol succinate  25 mg Oral Daily Ivanna Scanlon DO      nitroglycerin  0.1 mg Transdermal Daily Judith Spironello V, JESSICA      pantoprazole  40 mg Oral Early Morning Judith Spironello V, CRLISA      polyethylene glycol  17 g Oral Daily Judith Spironello V, JESSICA      senna  1 tablet Oral HS Judith Spironello V, JESSICA      sodium bicarbonate  650 mg Oral Daily Joselyn Reyes Bahamonde, MD      ticagrelor  90 mg Oral Q12H [facility] JESSICA Morocho      torsemide  10 mg Oral Daily Joselyn Reyes Bahamonde, MD       acetaminophen, 650 mg, Q6H PRN  aluminum-magnesium hydroxide-simethicone, 30 mL, Q4H PRN  Artificial Tears, 1 drop, Q4H PRN  bisacodyl, 10 mg, Daily PRN      No Known Allergies    VTE Pharmacologic Prophylaxis:   Sequential compression device (Venodyne)     Labs:   Troponins:  Results from last 7 days   Lab Units 03/14/24  1507   HS TNI RAND ng/L 11,569*     CBC with diff:  Results from last 7 days   Lab Units 03/18/24  0359 03/17/24  0551 03/16/24  0457 03/15/24  0508 03/14/24  0625 03/13/24  0624 03/12/24  0530   WBC Thousand/uL 6.55 6.70 6.89 8.03 8.60 7.05 6.61   HEMOGLOBIN g/dL 9.3* 9.5* 9.6* 10.0* 10.4* 9.9* 9.5*   HEMATOCRIT % 28.1* 28.1* 28.5* 29.8* 32.4* 29.8* 27.7*   MCV fL 101* 100* 100* 99* 104* 100* 100*   PLATELETS Thousands/uL 216 205 205 195 170 159 127*   RBC Million/uL 2.79* 2.82* 2.85* 3.01* 3.12* 2.99* 2.77*   MCH pg 33.3 33.7 33.7 33.2 33.3 33.1 34.3   MCHC g/dL 33.1 33.8 33.7 33.6 32.1 33.2 34.3   RDW % 14.6 14.7 14.7 14.7 14.9 14.7 14.6   MPV fL 10.3 10.7 10.8 11.1 11.3 10.9 10.6   NRBC AUTO /100 WBCs  --   --   --  0  --  0  --      CMP:  Results from last 7 days   Lab Units 03/18/24  0359 03/17/24  0551 03/16/24  0457 03/15/24  0508 03/14/24  1507 03/14/24  0625 03/13/24  0624   SODIUM mmol/L 134* 135 133* 132* 128* 131* 132*   POTASSIUM mmol/L 3.7  3.3* 3.6 4.2 4.8 4.2 4.4   CHLORIDE mmol/L 100 96 99 100 98 102 103   CO2 mmol/L 25 25 24 22 19* 17* 19*   ANION GAP mmol/L 9 14* 10 10 11 12 10   BUN mg/dL 67* 73* 78* 75* 71* 65* 53*   CREATININE mg/dL 2.28* 2.45* 2.73* 2.64* 2.53* 2.32* 2.11*   CALCIUM mg/dL 8.4 8.3* 8.7 9.4 9.3 8.7 8.8   AST U/L  --   --   --  37  --   --   --    ALT U/L  --   --   --  48  --   --   --    ALK PHOS U/L  --   --   --  58  --   --   --    TOTAL PROTEIN g/dL  --   --   --  6.5  --   --   --    ALBUMIN g/dL  --   --   --  3.5  --   --   --    TOTAL BILIRUBIN mg/dL  --   --   --  0.81  --   --   --    EGFR ml/min/1.73sq m 24 22 19 20 21 23 26     Magnesium:  Results from last 7 days   Lab Units 03/16/24  0457 03/15/24  0508 03/14/24  1507 03/14/24  0625 03/13/24  0624   MAGNESIUM mg/dL 2.2 2.5 2.6 2.3 2.0     Coags:  Results from last 7 days   Lab Units 03/15/24  0604 03/14/24  2130 03/14/24  1549 03/13/24  0624 03/13/24  0020 03/12/24  0530 03/11/24  2308   PTT seconds 102* 64* 34 58* 64* 47* 62*   INR   --   --  1.31*  --   --   --   --        Imaging & Testing   I have personally reviewed pertinent reports.    XR chest 2 views    Result Date: 3/16/2024  Narrative: CHEST INDICATION:   Patient s/p Pacemaker/ICD Insertion. COMPARISON: CXR 3/15/2024. EXAM PERFORMED/VIEWS: AP AND LATERAL CHEST RADIOGRAPHS. FINDINGS: Moderate cardiomegaly with left subclavian pacemaker leads in the right atrial appendage and right ventricular septum. Median sternotomy. TAVR. Mild pulmonary venous congestion with trace effusions. No pneumothorax. Upper abdomen normal. Bones normal for age.     Impression: Left subclavian pacemaker leads in right atrial appendage and right ventricular septum with no pneumothorax. Mild pulmonary venous congestion with trace effusions. Workstation performed: [id number]     XR chest portable    Result Date: 3/16/2024  Narrative: XR CHEST PORTABLE INDICATION: Patient s/p Pacemaker/ICD Insertion. COMPARISON: CXR 3/15/2024.  FINDINGS: Clear lungs. No pneumothorax. Trace left effusion. Moderate cardiomegaly, CABG, left subclavian pacemaker leads in right atrium and right ventricle. Bones are unremarkable for age. Normal upper abdomen.     Impression: Pacemaker well-positioned with no pneumothorax. Trace left effusion. Workstation performed: [id number]     XR chest portable    Result Date: 3/15/2024  Narrative: XR CHEST PORTABLE INDICATION: Dyspnea. COMPARISON: 3/5/2024 FINDINGS: Sternotomy. Post-CABG changes. Clear lungs. Small pleural effusions. No pneumothorax. Enlarged cardiac silhouette, unchanged. No acute osseous abnormality within the limitations of portable radiography. Normal upper abdomen.     Impression: Small pleural effusions. Enlarged cardiac silhouette. Workstation performed: [id number]     US kidney and bladder    Result Date: 3/15/2024  Narrative: RENAL ULTRASOUND INDICATION: TUAN. COMPARISON: Renal ultrasound March 6, 2024 TECHNIQUE: Ultrasound of the retroperitoneum was performed with a curvilinear transducer utilizing volumetric sweeps and still imaging techniques. FINDINGS: KIDNEYS: Symmetric and normal size. Right kidney: 9.4 x 5.1 x 4.9 cm. Volume 122.3 mL Left kidney: 10.2 x 4.5 x 4.0 cm. Volume 96.1 mL Right kidney Normal echogenicity and contour. No mass is identified. No hydronephrosis. No shadowing calculi. No perinephric fluid collections. Left kidney Normal echogenicity and contour. No mass is identified. No hydronephrosis. No shadowing calculi. No perinephric fluid collections. URETERS: Nonvisualized. BLADDER: Normally distended. No focal thickening or mass lesions. Bilateral ureteral jets detected. OTHER: Small volume ascites in the right upper quadrant.     Impression: No hydronephrosis. Small volume ascites in the right upper quadrant. Workstation performed: [id number]     Echo follow up/limited w/ contrast if indicated    Result Date: 3/14/2024  Narrative:   Left Ventricle: Left ventricular cavity size is  lower normal Wall thickness is moderately increased. The left ventricular ejection fraction is 60% by visual estimation. Systolic function is normal. Dyskinetic septum otherwise normal wall motion Diastolic function is moderately abnormal, consistent with grade II (pseudonormal) relaxation.   IVS: There is abnormal septal motion consistent with post-operative status.   Right Ventricle: Right ventricular cavity size is moderately dilated. Systolic function is severely reduced. Wall motion is abnormal.   Left Atrium: The atrium is mildly dilated.   Right Atrium: The atrium is severely dilated.   Aortic Valve: There is a TAVR bioprosthetic valve. The prosthetic valve appears well-seated. There is no evidence of paravalvular regurgitation. The gradient recorded across the prosthetic aortic valve is within the expected range.   Mitral Valve: There is mild regurgitation.   Tricuspid Valve: There is mild to moderate regurgitation. The right ventricular systolic pressure is normal. The estimated right ventricular systolic pressure is 17.00 mmHg.   Pulmonic Valve: There is mild regurgitation.     Cardiac catheterization    Result Date: 3/11/2024  Narrative:   Mid LAD lesion is 100% stenosed.   1st Mrg lesion is 100% stenosed.   Ost RCA to Prox RCA lesion is 90% stenosed.  Which had thrombus sitting in the mouth which temporarily decrease the flow in the right coronary artery.   Since patient has intermittent AV block a temporary pacemaker was placed without any complications.     NM Myocardial Perfusion Spect (Pharmacological Induced Stress and/or Rest)    Result Date: 3/9/2024  Narrative:   Stress ECG: Right bundle branch block was seen. The stress ECG is equivocal for ischemia after pharmacologic vasodilation.   Perfusion: There is a left ventricular perfusion defect present in the mid to basal inferior and inferoseptal location(s) that is partially reversible.   Stress Combined Conclusion: Left ventricular perfusion is  abnormal.   Stress Function: Left ventricular function post-stress is normal. Stress ejection fraction is 65%. Abnormal study, Perfusion: There is a left ventricular perfusion defect present in the mid to basal inferior and inferoseptal location(s) that is partially reversible. SSS 7 SRS 5 SDS 2     Stress strip    Result Date: 3/8/2024  Narrative: Confirmed by MAISHA FREEMAN ([id number]),  Marilu Rothman ([id number]) on 3/8/2024 10:37:07 AM    US kidney and bladder    Result Date: 3/6/2024  Narrative: RENAL ULTRASOUND INDICATION: Renal failure. COMPARISON: None TECHNIQUE: Ultrasound of the retroperitoneum was performed with a curvilinear transducer utilizing volumetric sweeps and still imaging techniques. FINDINGS: KIDNEYS: Symmetric and normal size. Right kidney: 11.0 x 5.0 x 5.2 cm. Volume 149.8 mL Left kidney: 10.8 x 5.4 x 4.5 cm. Volume 136.3 mL Right kidney Normal echogenicity and contour. No mass is identified. No hydronephrosis. No shadowing calculi. Trace perinephric simple free fluid. Left kidney Normal echogenicity and contour. No mass is identified. No hydronephrosis. No shadowing calculi. No perinephric fluid collections. URETERS: Nonvisualized. BLADDER: Normally distended. No focal thickening or mass lesions. The right ureteral jet is seen. The left ureteral jet is not visualized.     Impression: Normal. Workstation performed: [id number]     NM lung ventilation / perfusion    Result Date: 3/6/2024  Narrative: VENTILATION AND PERFUSION SCAN INDICATION: RV enlargement on Echo - new,  Question PE COMPARISON:  Chest radiograph 3/5/2023 TECHNIQUE:  Posterior ventilation imaging was performed after the inhalation of 30.1 mCi nebulized Tc-99m DTPA with deposition of less than 1 mCi of activity within the lungs. Multiplanar perfusion imaging was next performed following the intravenous administration of 4.35 mCi Tc-99m labeled MAA. FINDINGS: Ventilation imaging demonstrates partial deposition of radiopharmaceutical  activity within the central bronchi. Perfusion imaging demonstrates a matching subsegmental defect in the posterior aspect of the right lower lobe. There are no suspicious ventilation/perfusion mismatches.     Impression: The probability for pulmonary embolus is low. Workstation performed: [id number]     Echo complete    Result Date: 3/6/2024  Narrative:   Left Ventricle: Left ventricular cavity size is lower normal Wall thickness is moderately increased. The left ventricular ejection fraction is 55%. Systolic function is normal. Dyskinetic septum otherwise normal wall motion Unable to assess diastolic function due to atrial flutter.   IVS: There is abnormal septal motion consistent with post-operative status. There is systolic flattening of the interventricular septum consistent with right ventricle pressure overload.   Right Ventricle: Right ventricular cavity size is moderately dilated. Systolic function is moderately to severely reduced.   Left Atrium: The atrium is mildly dilated.   Right Atrium: The atrium is severely dilated.   Aortic Valve: There is a TAVR bioprosthetic valve. The prosthetic valve appears well-seated. There is no evidence of paravalvular regurgitation. The gradient recorded across the prosthetic aortic valve is within the expected range. The aortic valve peak velocity is 1.57 m/s. The aortic valve mean gradient is 6 mmHg.   Mitral Valve: There is mild regurgitation.   Tricuspid Valve: There is mild regurgitation. Abnormal study, compared to prior echo report increased RV size and decreased systolic function. Elevated pulmonary pressures are suggested by LV systolic flattening.     XR chest 1 view portable    Result Date: 3/6/2024  Narrative: XR CHEST PORTABLE INDICATION: chest pain. COMPARISON: None FINDINGS: No airspace consolidation, pneumothorax, pulmonary edema, or pleural effusion. Trace left apical scarring.. Prior CABG. Mild cardiomegaly allowing for portable AP technique. Aortic  "calcification is present. Mild degenerative changes of bilateral shoulders. Normal upper abdomen.     Impression: No radiographic evidence of acute intrathoracic process. Workstation performed: [id number]     XR chest 1 view    Result Date: 3/3/2024  Narrative: CHEST X-RAY  SINGLE VIEW: HISTORY: chest pain;   PRIORS: Chest radiograph on October 8, 2010. FINDINGS: LUNGS: Clear.  No pleural abnormality seen. HEART: Mild cardiomegaly. Sternotomy. MEDIASTINUM: Normal. OTHER FINDINGS:  None.        Impression:  No acute pulmonary abnormality.          Roula LOPEZ  Cardiology      \"This note was completed in part utilizing [employer] direct voice recognition software.   Grammatical errors, random word insertion, spelling mistakes, and incomplete sentences may be an occasional consequence of the system secondary to software limitations, ambient noise and hardware issues.    Please read the chart carefully and recognize, using context, where substitutions have occurred.  If you have any questions or concerns about the context, text or information contained within the body of this dictation, please contact myself, the provider, for further clarification.\"  "

## 2024-03-18 NOTE — CASE MANAGEMENT
[facility] received request for authorization from Care Manager.  Authorization request for: [facility]  Facility Name: [facility]  NPI: [id number]  Facility MD:  Dr. Parson   NPI: [id number]  Authorization initiated by contacting insurance:  [employer]  Via: [employer] submitted via: fax #  [phone]  Pending Reference #: [id number]    Care Manager notified:  Marilu Jacobs

## 2024-03-18 NOTE — ASSESSMENT & PLAN NOTE
History of atrial flutter CKD 3 CAD hypertension presented to the hospital with chest pain found to have NSTEMI.    Cardiac catheterization 3/11/2024 with RCA thrombus.  Continue aspirin and brilinta.  d/W by cardiology  Continue Toprol-XL 25 mg once a day   continue Vytorin 10/40 mg daily   continue aspirin 81 mg once a day   continue nitroglycerin patch 0.1 mg    Lab Results   Component Value Date    HSTNI 11,569 (H) 03/14/2024    HSTNI >22,973 (H) 03/07/2024    HSTNI >22,973 (H) 03/06/2024

## 2024-03-18 NOTE — ASSESSMENT & PLAN NOTE
TUAN on CKD 3b being followed by nephrology due to NSTEMI/hypotension and contrast  Baseline creatinine 1.5-1.7.  Slowly improving.  Being followed by nephrology  Restart diuretic  follow-up with nephrology on discharge     Results from last 7 days   Lab Units 03/18/24  0359 03/17/24  0551 03/16/24  0457 03/15/24  0508 03/14/24  1507 03/14/24  0625 03/13/24  0624 03/12/24  0530   BUN mg/dL 67* 73* 78* 75* 71* 65* 53* 39*   CREATININE mg/dL 2.28* 2.45* 2.73* 2.64* 2.53* 2.32* 2.11* 1.68*   EGFR ml/min/1.73sq m 24 22 19 20 21 23 26 34

## 2024-03-19 PROBLEM — I21.9 TYPE 1 MYOCARDIAL INFARCTION (HCC): Status: ACTIVE | Noted: 2024-03-03

## 2024-03-19 LAB
ALBUMIN SERPL BCP-MCNC: 3.3 G/DL (ref 3.5–5)
ALP SERPL-CCNC: 54 U/L (ref 34–104)
ALT SERPL W P-5'-P-CCNC: 14 U/L (ref 7–52)
ANION GAP SERPL CALCULATED.3IONS-SCNC: 8 MMOL/L (ref 4–13)
AST SERPL W P-5'-P-CCNC: 25 U/L (ref 13–39)
BASOPHILS # BLD AUTO: 0.03 THOUSANDS/ÂΜL (ref 0–0.1)
BASOPHILS NFR BLD AUTO: 0 % (ref 0–1)
BILIRUB SERPL-MCNC: 0.69 MG/DL (ref 0.2–1)
BUN SERPL-MCNC: 63 MG/DL (ref 5–25)
CALCIUM ALBUM COR SERPL-MCNC: 9.2 MG/DL (ref 8.3–10.1)
CALCIUM SERPL-MCNC: 8.6 MG/DL (ref 8.4–10.2)
CHLORIDE SERPL-SCNC: 100 MMOL/L (ref 96–108)
CO2 SERPL-SCNC: 27 MMOL/L (ref 21–32)
CREAT SERPL-MCNC: 2.32 MG/DL (ref 0.6–1.3)
EOSINOPHIL # BLD AUTO: 0.43 THOUSAND/ÂΜL (ref 0–0.61)
EOSINOPHIL NFR BLD AUTO: 6 % (ref 0–6)
ERYTHROCYTE [DISTWIDTH] IN BLOOD BY AUTOMATED COUNT: 14.8 % (ref 11.6–15.1)
GFR SERPL CREATININE-BSD FRML MDRD: 23 ML/MIN/1.73SQ M
GLUCOSE SERPL-MCNC: 120 MG/DL (ref 65–140)
HCT VFR BLD AUTO: 28.2 % (ref 36.5–49.3)
HGB BLD-MCNC: 9.6 G/DL (ref 12–17)
IMM GRANULOCYTES # BLD AUTO: 0.03 THOUSAND/UL (ref 0–0.2)
IMM GRANULOCYTES NFR BLD AUTO: 0 % (ref 0–2)
LYMPHOCYTES # BLD AUTO: 0.44 THOUSANDS/ÂΜL (ref 0.6–4.47)
LYMPHOCYTES NFR BLD AUTO: 6 % (ref 14–44)
MCH RBC QN AUTO: 34.2 PG (ref 26.8–34.3)
MCHC RBC AUTO-ENTMCNC: 34 G/DL (ref 31.4–37.4)
MCV RBC AUTO: 100 FL (ref 82–98)
MONOCYTES # BLD AUTO: 0.58 THOUSAND/ÂΜL (ref 0.17–1.22)
MONOCYTES NFR BLD AUTO: 8 % (ref 4–12)
NEUTROPHILS # BLD AUTO: 6.08 THOUSANDS/ÂΜL (ref 1.85–7.62)
NEUTS SEG NFR BLD AUTO: 80 % (ref 43–75)
NRBC BLD AUTO-RTO: 0 /100 WBCS
PLATELET # BLD AUTO: 219 THOUSANDS/UL (ref 149–390)
PMV BLD AUTO: 10.5 FL (ref 8.9–12.7)
POTASSIUM SERPL-SCNC: 3.8 MMOL/L (ref 3.5–5.3)
PROT SERPL-MCNC: 6.2 G/DL (ref 6.4–8.4)
RBC # BLD AUTO: 2.81 MILLION/UL (ref 3.88–5.62)
SODIUM SERPL-SCNC: 135 MMOL/L (ref 135–147)
WBC # BLD AUTO: 7.59 THOUSAND/UL (ref 4.31–10.16)

## 2024-03-19 PROCEDURE — 80053 COMPREHEN METABOLIC PANEL: CPT | Performed by: STUDENT IN AN ORGANIZED HEALTH CARE EDUCATION/TRAINING PROGRAM

## 2024-03-19 PROCEDURE — 99024 POSTOP FOLLOW-UP VISIT: CPT | Performed by: INTERNAL MEDICINE

## 2024-03-19 PROCEDURE — 99232 SBSQ HOSP IP/OBS MODERATE 35: CPT | Performed by: FAMILY MEDICINE

## 2024-03-19 PROCEDURE — 85025 COMPLETE CBC W/AUTO DIFF WBC: CPT | Performed by: STUDENT IN AN ORGANIZED HEALTH CARE EDUCATION/TRAINING PROGRAM

## 2024-03-19 PROCEDURE — 99232 SBSQ HOSP IP/OBS MODERATE 35: CPT | Performed by: STUDENT IN AN ORGANIZED HEALTH CARE EDUCATION/TRAINING PROGRAM

## 2024-03-19 RX ADMIN — POLYETHYLENE GLYCOL 3350 17 G: 17 POWDER, FOR SOLUTION ORAL at 08:34

## 2024-03-19 RX ADMIN — DOCUSATE SODIUM 100 MG: 100 CAPSULE, LIQUID FILLED ORAL at 08:34

## 2024-03-19 RX ADMIN — HEPARIN SODIUM 5000 UNITS: 5000 INJECTION INTRAVENOUS; SUBCUTANEOUS at 21:37

## 2024-03-19 RX ADMIN — TICAGRELOR 90 MG: 90 TABLET ORAL at 08:34

## 2024-03-19 RX ADMIN — TORSEMIDE 10 MG: 10 TABLET ORAL at 08:34

## 2024-03-19 RX ADMIN — HEPARIN SODIUM 5000 UNITS: 5000 INJECTION INTRAVENOUS; SUBCUTANEOUS at 14:57

## 2024-03-19 RX ADMIN — METOPROLOL SUCCINATE 25 MG: 25 TABLET, EXTENDED RELEASE ORAL at 08:38

## 2024-03-19 RX ADMIN — EZETIMIBE 10 MG: 10 TABLET ORAL at 15:59

## 2024-03-19 RX ADMIN — FINASTERIDE 5 MG: 5 TABLET, FILM COATED ORAL at 08:34

## 2024-03-19 RX ADMIN — PANTOPRAZOLE SODIUM 40 MG: 40 TABLET, DELAYED RELEASE ORAL at 05:03

## 2024-03-19 RX ADMIN — OMEGA-3 FATTY ACIDS CAP 1000 MG 2000 MG: 1000 CAP at 08:34

## 2024-03-19 RX ADMIN — NITROGLYCERIN 0.1 MG: 0.1 PATCH TRANSDERMAL at 08:35

## 2024-03-19 RX ADMIN — TICAGRELOR 90 MG: 90 TABLET ORAL at 21:37

## 2024-03-19 RX ADMIN — ASPIRIN 81 MG 81 MG: 81 TABLET ORAL at 08:34

## 2024-03-19 RX ADMIN — PRAVASTATIN SODIUM 40 MG: 40 TABLET ORAL at 15:59

## 2024-03-19 RX ADMIN — HEPARIN SODIUM 5000 UNITS: 5000 INJECTION INTRAVENOUS; SUBCUTANEOUS at 05:03

## 2024-03-19 NOTE — ASSESSMENT & PLAN NOTE
Paroxysmal atrial fibrillation/flutter status post Watchman device due to recurrent GI bleeding  Continue metoprolol.  [id number]

## 2024-03-19 NOTE — PLAN OF CARE
Problem: PAIN - ADULT  Goal: Verbalizes/displays adequate comfort level or baseline comfort level  Description: Interventions:  - Encourage patient to monitor pain and request assistance  - Assess pain using appropriate pain scale  - Administer analgesics based on type and severity of pain and evaluate response  - Implement non-pharmacological measures as appropriate and evaluate response  - Consider cultural and social influences on pain and pain management  - Notify physician/advanced practitioner if interventions unsuccessful or patient reports new pain  Outcome: Progressing     Problem: INFECTION - ADULT  Goal: Absence or prevention of progression during hospitalization  Description: INTERVENTIONS:  - Assess and monitor for signs and symptoms of infection  - Monitor lab/diagnostic results  - Monitor all insertion sites, i.e. indwelling lines, tubes, and drains  - Monitor endotracheal if appropriate and nasal secretions for changes in amount and color  - [place] appropriate cooling/warming therapies per order  - Administer medications as ordered  - Instruct and encourage patient and family to use good hand hygiene technique  - Identify and instruct in appropriate isolation precautions for identified infection/condition  Outcome: Progressing  Goal: Absence of fever/infection during neutropenic period  Description: INTERVENTIONS:  - Monitor WBC    Outcome: Progressing     Problem: SAFETY ADULT  Goal: Patient will remain free of falls  Description: INTERVENTIONS:  - Educate patient/family on patient safety including physical limitations  - Instruct patient to call for assistance with activity   - Consult OT/PT to assist with strengthening/mobility   - Keep Call bell within reach  - Keep bed low and locked with side rails adjusted as appropriate  - Keep care items and personal belongings within reach  - Initiate and maintain comfort rounds  - Make Fall Risk Sign visible to staff  - Offer Toileting every 2 Hours,  in advance of need  - Initiate/Maintain bed alarm  - Apply yellow socks and bracelet for high fall risk patients  - Consider moving patient to room near nurses station  Outcome: Progressing  Goal: Maintain or return to baseline ADL function  Description: INTERVENTIONS:  -  Assess patient's ability to carry out ADLs; assess patient's baseline for ADL function and identify physical deficits which impact ability to perform ADLs (bathing, care of mouth/teeth, toileting, grooming, dressing, etc.)  - Assess/evaluate cause of self-care deficits   - Assess range of motion  - Assess patient's mobility; develop plan if impaired  - Assess patient's need for assistive devices and provide as appropriate  - Encourage maximum independence but intervene and supervise when necessary  - Involve family in performance of ADLs  - Assess for home care needs following discharge   - Consider OT consult to assist with ADL evaluation and planning for discharge  - Provide patient education as appropriate  Outcome: Progressing  Goal: Maintains/Returns to pre admission functional level  Description: INTERVENTIONS:  - Perform AM-PAC 6 Click Basic Mobility/ Daily Activity assessment daily.  - Set and communicate daily mobility goal to care team and patient/family/caregiver.   - Collaborate with rehabilitation services on mobility goals if consulted  - Perform Range of Motion 4 times a day.  - Reposition patient every 2 hours.  - Record patient progress and toleration of activity level   Outcome: Progressing

## 2024-03-19 NOTE — ASSESSMENT & PLAN NOTE
TUAN on CKD 3b being followed by nephrology due to NSTEMI/hypotension, bradycardia and contrast  Baseline creatinine 1.5-1.7.  Creatinine plateauing at 2s range  Being followed by nephrology  Repeat lab work in a.m.  follow-up with nephrology on discharge     Results from last 7 days   Lab Units 03/19/24  0503 03/18/24  0359 03/17/24  0551 03/16/24  0457 03/15/24  0508 03/14/24  1507 03/14/24  0625 03/13/24  0624   BUN mg/dL 63* 67* 73* 78* 75* 71* 65* 53*   CREATININE mg/dL 2.32* 2.28* 2.45* 2.73* 2.64* 2.53* 2.32* 2.11*   EGFR ml/min/1.73sq m 23 24 22 19 20 21 23 26

## 2024-03-19 NOTE — PROGRESS NOTES
NEPHROLOGY PROGRESS NOTE   Vladimir Mathias 89 y.o. male MRN: [id number]  Unit/Bed#: [address] [place] [address] Encounter: [id number]  Reason for Consult: TUAN    ASSESSMENT AND PLAN:  89 yo with PMH of CAD, A-fib status post watchman, CKD G3b with baseline creatinine 1.5 to 1.7 mg/dL p/w chest pain and shortness of breath.  Nephrology is consulted for management of TUAN     PLAN:     #Non-Oliguric KDIGO TUAN stage 2 on CKD G3b with evidence of kidney recovery  Etiology: Likely secondary to hemodynamic changes in the settings of hypotension, NSTEMI, bradycardia  Baseline creatinine 1.5 to 1.7 mg/dL  Peak creatinine: 2.7 mg/dL  Current creatinine: 2.3 mg/dL, plateauing, close to baseline  UA: No hematuria, leukocyturia  Renal imaging : No hydronephrosis  Treatment:  No indication of dialysis at this time.  Will continue to monitor kidney function   maintain MAP:  Over 65 mmHg if possible/avoid hypoperfusion:  Hold parameters on blood pressure medications  Avoid nephrotoxic agents such as NSAIDs, and IV contrast if possible. Avoid opioids   Adjust medications to GFR  Recommend follow-up with nephrology on discharge     #CKD G3b  Baseline creatinine: 1.5 to 1.7 mg/dL  Etiology: Likely secondary to nephrosclerosis incidence of cardiovascular disease        #Acid-base Disorder  serum HCO3 27 mmol/L  Metabolic alkalosis secondary to oral bicarb  Discontinue sodium bicarbonate     #Volume status/hypertension:  Volume: Hypervolemia  Blood pressure: Normotensive, /61, goal less than 140/90  Recommend:  Low-sodium diet  Continue torsemide 10 mg  Monitor urinary output and daily weight     # Hyponatremia  Serum sodium 134 mEq/L  Likely secondary to hypovolemia   Torsemide as above    # Hyponatremia   Serum sodium 134 before diuretics now 135  Secondary to hypervolemia  Monitor BMP     #Anemia:  Current hemoglobin: 9.6 mg/dL  Treatment:  Transfuse for hemoglobin less than 7.0 per primary service    No indication of Epogen at this time     #  NSTEMI  Status postcardiac catheterization  Follow-up by cardiology     # A-fib  Status post watchman and Eliquis  Management as per cardiology      SUBJECTIVE:  Patient seen and examined at bedside. No chest pain, shortness of breath,      OBJECTIVE:  Current Weight: Weight - Scale: 94 kg (207 lb 4.8 oz)  Vitals:    03/18/24 2208   BP: 113/61   Pulse: 62   Resp: 18   Temp: 97.8 °F (36.6 °C)   SpO2: 98%       Intake/Output Summary (Last 24 hours) at 3/19/2024 0827  Last data filed at 3/19/2024 0333  Gross per 24 hour   Intake 200 ml   Output 500 ml   Net -300 ml     Wt Readings from Last 3 Encounters:   03/19/24 94 kg (207 lb 4.8 oz)   08/14/23 88.1 kg (194 lb 3.2 oz)   07/12/22 88.5 kg (195 lb)     Temp Readings from Last 3 Encounters:   03/18/24 97.8 °F (36.6 °C)   08/14/23 97.9 °F (36.6 °C) (Temporal)   07/12/22 97.5 °F (36.4 °C) (Temporal)     BP Readings from Last 3 Encounters:   03/18/24 113/61   07/12/22 118/72   07/05/22 114/72     Pulse Readings from Last 3 Encounters:   03/18/24 62   07/12/22 99   07/05/22 97      General:  no acute distress at this time  Skin:  No acute rash  Eyes:  No scleral icterus and noninjected  ENT:  mucous membranes moist  Neck:  no carotid bruits  Chest:  Clear to auscultation percussion, good respiratory effort, no use of accessory respiratory muscles  CVS:  Regular rate and rhythm without rub   Abdomen:  soft and nontender   Extremities: lower extremity edema  Neuro:  No gross focality  Psych:  Alert , cooperative       Medications:    Current Facility-Administered Medications:     acetaminophen (TYLENOL) tablet 650 mg, 650 mg, Oral, Q6H PRN, Judith Spironello V, CRNP, 650 mg at 03/15/24 2100    aluminum-magnesium hydroxide-simethicone (MAALOX) oral suspension 30 mL, 30 mL, Oral, Q4H PRN, Judith Spironello V, CRNP, 30 mL at 03/16/24 2143    Artificial Tears ophthalmic solution 1 drop, 1 drop, Both Eyes, Q4H URIELN, JESSICA Story, 1 drop at 03/17/24 1828     aspirin chewable tablet 81 mg, 81 mg, Oral, Daily, Judith Bragao V, CRNP, 81 mg at 03/18/24 0949    bisacodyl (DULCOLAX) rectal suppository 10 mg, 10 mg, Rectal, Daily PRN, Judith Bragao V, CRNP    docusate sodium (COLACE) capsule 100 mg, 100 mg, Oral, Daily, Judith Bragao V, CRNP, 100 mg at 03/18/24 0949    ezetimibe (ZETIA) tablet 10 mg, 10 mg, Oral, Daily With Dinner, 10 mg at 03/18/24 1743 **AND** pravastatin (PRAVACHOL) tablet 40 mg, 40 mg, Oral, Daily With Dinner, Judith Bragao V, CRNP, 40 mg at 03/18/24 1743    finasteride (PROSCAR) tablet 5 mg, 5 mg, Oral, Daily, Judith Bragao V, CRNP, 5 mg at 03/18/24 0948    fish oil capsule 2,000 mg, 2,000 mg, Oral, Daily, Judith Bragao V, CRNP, 2,000 mg at 03/18/24 0949    heparin (porcine) subcutaneous injection 5,000 Units, 5,000 Units, Subcutaneous, Q8H GUSTAVO, JESSICA Morocho, 5,000 Units at 03/19/24 0503    metoprolol succinate (TOPROL-XL) 24 hr tablet 25 mg, 25 mg, Oral, Daily, Ivanna Scanlon DO, 25 mg at 03/18/24 0949    nitroglycerin (NITRODUR) 0.1 mg/hr TD 24 hr patch, 0.1 mg, Transdermal, Daily, Judith Katz V CRNP, 0.1 mg at 03/18/24 0949    pantoprazole (PROTONIX) EC tablet 40 mg, 40 mg, Oral, Early Morning, Judith Bragao V, CRNP, 40 mg at 03/19/24 0503    polyethylene glycol (MIRALAX) packet 17 g, 17 g, Oral, Daily, Judith Spironello V, CRNP, 17 g at 03/18/24 0949    senna (SENOKOT) tablet 8.6 mg, 1 tablet, Oral, HS, JESSICA Story, 8.6 mg at 03/15/24 2100    ticagrelor (BRILINTA) tablet 90 mg, 90 mg, Oral, Q12H [facility], JESSICA Morocho, 90 mg at 03/18/24 2134    torsemide (DEMADEX) tablet 10 mg, 10 mg, Oral, Daily, Joselyn Reyes Bahamonde, MD, 10 mg at 03/18/24 0949    Laboratory Results:  Results from last 7 days   Lab Units 03/19/24  0503 03/18/24  0359 03/17/24  0551 03/16/24  0457 03/15/24  0508 03/14/24  1507 03/14/24  0625 03/13/24  0624   WBC Thousand/uL 7.59 6.55 6.70 6.89  8.03  --  8.60 7.05   HEMOGLOBIN g/dL 9.6* 9.3* 9.5* 9.6* 10.0*  --  10.4* 9.9*   HEMATOCRIT % 28.2* 28.1* 28.1* 28.5* 29.8*  --  32.4* 29.8*   PLATELETS Thousands/uL 219 216 205 205 195  --  170 159   SODIUM mmol/L 135 134* 135 133* 132* 128* 131* 132*   POTASSIUM mmol/L 3.8 3.7 3.3* 3.6 4.2 4.8 4.2 4.4   CHLORIDE mmol/L 100 100 96 99 100 98 102 103   CO2 mmol/L 27 25 25 24 22 19* 17* 19*   BUN mg/dL 63* 67* 73* 78* 75* 71* 65* 53*   CREATININE mg/dL 2.32* 2.28* 2.45* 2.73* 2.64* 2.53* 2.32* 2.11*   CALCIUM mg/dL 8.6 8.4 8.3* 8.7 9.4 9.3 8.7 8.8   MAGNESIUM mg/dL  --   --   --  2.2 2.5 2.6 2.3 2.0   PHOSPHORUS mg/dL  --   --   --   --  5.1*  --   --  3.6       XR chest 2 views   Final Result by Dayan Leonardo MD (03/16 0945)      Left subclavian pacemaker leads in right atrial appendage and right ventricular septum with no pneumothorax.      Mild pulmonary venous congestion with trace effusions.               Workstation performed: [id number]         XR chest portable   Final Result by Dayan Leonardo MD (03/16 0659)      Pacemaker well-positioned with no pneumothorax.      Trace left effusion.      Workstation performed: [id number]         XR chest portable   Final Result by Sabra Pena MD (03/15 1318)      Small pleural effusions.      Enlarged cardiac silhouette.      Workstation performed: [id number]         US kidney and bladder   Final Result by Thierno Osborne MD (03/15 0519)      No hydronephrosis.   Small volume ascites in the right upper quadrant.               Workstation performed: [id number]         US kidney and bladder   Final Result by Luis Krause MD (03/06 1646)      Normal.            Workstation performed: [id number]         NM lung ventilation / perfusion   Final Result by Usman Taveras MD (03/06 1623)      The probability for pulmonary embolus is low.      Workstation performed: [id number]         XR chest 1 view portable   Final Result by Yogi Brooks  "MD Marcelo (03/06 0605)      No radiographic evidence of acute intrathoracic process.            Workstation performed: [id number]             Portions of the record may have been created with voice recognition software. Occasional wrong word or \"sound a like\" substitutions may have occurred due to the inherent limitations of voice recognition software. Read the chart carefully and recognize, using context, where substitutions have occurred.    "

## 2024-03-19 NOTE — PROGRESS NOTES
Progress Note - Cardiology   [facility]     Vladimir Mathias 89 y.o. male MRN: [id number]  : 12/3/1932  Unit/Bed#: [address] [place] [address] Encounter: [id number]    Assessment and Plan:   1. AV heart block: patient experience intermittent AV block during right coronary artery injection and after.    -   Temporary trans venous pacemaker placed via right groin venous sheath    -   heart rate 70 with MA 2.5 ms and removed on 3/12/2024    -   3/15/2024: patient for placement of a Medtronic dual chambered pacemaker.    -   No pneumothorax noted on post procedural chest x-rays     2. TUAN on chronic kidney disease: review records from [facility], appears his baseline creatinine is 1.4 - 1.7    -   follows with nephrologist at [place]    -   seen by nephrology and consultation appreciated    -   patient started on torsemide 10 mg daily per nephrology, may need to accept higher creatinine to keep patient you bulimic    3. MI type I / RV Infarction:  hs trop: 1276 (0hr), 1601 (2hr), 1808 (4hr)    -   hs trops random:  3126, 5951,11,193, >22973 x2    -   patient initially hesitant for IV anticoagulation secondary to history of epitaxsis, but was agreeable after long conversation and IV heparin was initiated on 3/6/2024 at approximately 1 PM. Completed heparin 3/9/2024    -   3/6/2024 TTE: EF visibly estimated at 55% with disconnect except him but otherwise abnormal wall motion. IVS is consistent with postoperative status, there is systolic flattening consistent with RV pressure overload. RV cavity is moderately dilated with moderate to severe reduction in systolic function. Left atrium is mildly dilated, right atrium severely dilated and mild mitral and tricuspid valve regurgitation. Bio prosthetic valve in the aortic position appears to be well seated with no evidence of para valvular regurgitation and gradient within expected range.    -   3/6/2024 VQ Scan: low probability of PE    -   Continue Toprol-XL  25 mg once a day    -   continue Vytorin 10/40 mg daily    -   continue aspirin 81 mg once a day    -   continue nitroglycerin patch 0.1 mg per hour on in the a.m. often the p.m.    -   3/8/2024 Lexiscan nuclear stress test: partially reversible inferior defect noted. Patient with known native RCA disease which was not bypass.    -   3/11/2024 LHC: Lima to LAD was patent, SVG to circumflex was also noted be patent. Native LAD had a 100% mid occlusion. Patient noted to have a clot sitting in the sinus of the right coronary artery which has disrupted the flow to the RCA not amenable to intervention    -   patient transition from IV heparin to aspirin 81 mg once a day and Brilinta 90 mg BID on 3/13/2024    -   3/14/2024 limited TTE: LV ejection fraction of 60% with normal systolic function and grade 2 diastolic dysfunction. RV cavity remains moderately dilated with severely reduced systolic function.     4. Coronary artery disease with history of CABG: a history of CABG times two in 2009 (Lima to LAD and saphenous vein graft to marginal)    -   follows with Dr. Steven Waltno at [facility] in [place],  [phone].    -   12/15/2021 cardiac CTA: noted both bypass grafts were patent with chronic total occlusion of his LAD and diffuse plaque in both left circumflex and RCA.    -   Continue Toprol-XL 25 mg once a day    -   continue Vytorin 10/40 mg daily    -   continue aspirin 81 mg once a day    -   continue nitroglycerin patch 0.1 mg per hour on in the a.m. off in the p.m.    -   care as per #3     5. Hypertension: blood pressure stable    -   continue Toprol and nitroglycerin with close monitoring     6. Paroxysmal atrial fibrillation flutter: patient has [employer] device    -   telemetry reviewed on initial admission, patient appears to be changing between Mobitz II type 1 and Mobitz II Type 2    -   3/8/2024 telemetry reviewed and patient appears to be sinus rhythm with first-degree AV heart block.    " -   Patient received pacemaker on 3/15/2024     7. Aortic stenosis status post TAVR: [facility]AVR performed in 3/31/2021 at [facility]    -   3/6/2024 TTE: Bio prosthetic valve in the aortic position appears to be well seated with no evidence of para valvular regurgitation and gradient within expected range.     8. History of epitaxis with anticoagulation (Xarelto): patient has watchman device       Subjective / Objective:       Vitals: Blood pressure 129/64, pulse 74, temperature 97.8 °F (36.6 °C), resp. rate 18, height 6' 1.5\" (1.867 m), weight 94 kg (207 lb 4.8 oz), SpO2 96%.  Vitals:    03/18/24 0600 03/19/24 0546   Weight: 94 kg (207 lb 3.2 oz) 94 kg (207 lb 4.8 oz)     Body mass index is 26.98 kg/m².  BP Readings from Last 3 Encounters:   03/19/24 129/64   07/12/22 118/72   07/05/22 114/72     Orthostatic Blood Pressures      Flowsheet Row Most Recent Value   Blood Pressure 129/64 filed at 03/19/2024 0834   Patient Position - Orthostatic VS Lying filed at 03/15/2024 2000          I/O         03/17 0701  03/18 0700 03/18 0701  03/19 0700 03/19 0701  03/20 0700    P.O.  440     I.V. (mL/kg)       Total Intake(mL/kg)  440 (4.7)     Urine (mL/kg/hr) 500 (0.2) 800 (0.4)     Stool       Total Output 500 800     Net -500 -360                  Invasive Devices       Peripheral Intravenous Line  Duration             Peripheral IV 03/13/24 Left;Ventral (anterior) Forearm 6 days    Long-Dwell Peripheral IV (Midline) 03/14/24 Right Brachial 4 days                      Intake/Output Summary (Last 24 hours) at 3/19/2024 1012  Last data filed at 3/19/2024 0333  Gross per 24 hour   Intake 200 ml   Output 500 ml   Net -300 ml         Physical Exam:   Physical Exam  Vitals and nursing note reviewed.   Constitutional:       General: He is not in acute distress.     Appearance: Normal appearance. He is normal weight.   HENT:      Right Ear: External ear normal.      Left Ear: External ear normal.   Eyes:      General: No scleral " icterus.        Right eye: No discharge.         Left eye: No discharge.   Cardiovascular:      Rate and Rhythm: Normal rate and regular rhythm.      Pulses: Normal pulses.      Heart sounds: Murmur heard.   Pulmonary:      Effort: Pulmonary effort is normal. No respiratory distress.      Breath sounds: Normal breath sounds.   Abdominal:      General: Bowel sounds are normal. There is no distension.      Palpations: Abdomen is soft.   Musculoskeletal:      Right lower leg: No edema.      Left lower leg: No edema.   Skin:     General: Skin is warm and dry.      Capillary Refill: Capillary refill takes less than 2 seconds.   Neurological:      General: No focal deficit present.      Mental Status: He is alert and oriented to person, place, and time. Mental status is at baseline.   Psychiatric:         Mood and Affect: Mood normal.              Medications/ Allergies:     Current Facility-Administered Medications   Medication Dose Route Frequency Provider Last Rate    acetaminophen  650 mg Oral Q6H PRN Judith Spironello V, CRNP      aluminum-magnesium hydroxide-simethicone  30 mL Oral Q4H PRN Judith Spironello V, CRNP      Artificial Tears  1 drop Both Eyes Q4H PRN Judith Spironello V, CRNP      aspirin  81 mg Oral Daily Judith Spironello V, CRNP      bisacodyl  10 mg Rectal Daily PRN Judith Spironello V, CRNP      docusate sodium  100 mg Oral Daily Judith Spironello V, CRNP      ezetimibe  10 mg Oral Daily With Dinner Judith Spironello V, CRNP      And    pravastatin  40 mg Oral Daily With Dinner Judith Spironello V, CRNP      finasteride  5 mg Oral Daily Judith Spironello V, CRNP      fish oil  2,000 mg Oral Daily Judith Spironello V, CRNP      heparin (porcine)  5,000 Units Subcutaneous Q8H [facility] Seda Santos, SHAYNP      metoprolol succinate  25 mg Oral Daily Ivanna Scanlon DO      nitroglycerin  0.1 mg Transdermal Daily Judith Spironello V, CRNP      pantoprazole  40 mg Oral Early Morning  Judith Katz V, JESSICA      polyethylene glycol  17 g Oral Daily Judith Katz V, JESSICA      senna  1 tablet Oral HS Judith Katz V, JESSICA      ticagrelor  90 mg Oral Q12H [facility] JESSICA Morocho      torsemide  10 mg Oral Daily Joselyn Reyes Bahamonde, MD       acetaminophen, 650 mg, Q6H PRN  aluminum-magnesium hydroxide-simethicone, 30 mL, Q4H PRN  Artificial Tears, 1 drop, Q4H PRN  bisacodyl, 10 mg, Daily PRN      No Known Allergies    VTE Pharmacologic Prophylaxis:   Sequential compression device (Venodyne)     Labs:   Troponins:  Results from last 7 days   Lab Units 03/14/24  1507   HS TNI RAND ng/L 11,569*     CBC with diff:  Results from last 7 days   Lab Units 03/19/24  0503 03/18/24  0359 03/17/24  0551 03/16/24  0457 03/15/24  0508 03/14/24  0625 03/13/24  0624   WBC Thousand/uL 7.59 6.55 6.70 6.89 8.03 8.60 7.05   HEMOGLOBIN g/dL 9.6* 9.3* 9.5* 9.6* 10.0* 10.4* 9.9*   HEMATOCRIT % 28.2* 28.1* 28.1* 28.5* 29.8* 32.4* 29.8*   MCV fL 100* 101* 100* 100* 99* 104* 100*   PLATELETS Thousands/uL 219 216 205 205 195 170 159   RBC Million/uL 2.81* 2.79* 2.82* 2.85* 3.01* 3.12* 2.99*   MCH pg 34.2 33.3 33.7 33.7 33.2 33.3 33.1   MCHC g/dL 34.0 33.1 33.8 33.7 33.6 32.1 33.2   RDW % 14.8 14.6 14.7 14.7 14.7 14.9 14.7   MPV fL 10.5 10.3 10.7 10.8 11.1 11.3 10.9   NRBC AUTO /100 WBCs 0  --   --   --  0  --  0     CMP:  Results from last 7 days   Lab Units 03/19/24  0503 03/18/24  0359 03/17/24  0551 03/16/24  0457 03/15/24  0508 03/14/24  1507 03/14/24  0625   SODIUM mmol/L 135 134* 135 133* 132* 128* 131*   POTASSIUM mmol/L 3.8 3.7 3.3* 3.6 4.2 4.8 4.2   CHLORIDE mmol/L 100 100 96 99 100 98 102   CO2 mmol/L 27 25 25 24 22 19* 17*   ANION GAP mmol/L 8 9 14* 10 10 11 12   BUN mg/dL 63* 67* 73* 78* 75* 71* 65*   CREATININE mg/dL 2.32* 2.28* 2.45* 2.73* 2.64* 2.53* 2.32*   CALCIUM mg/dL 8.6 8.4 8.3* 8.7 9.4 9.3 8.7   AST U/L 25  --   --   --  37  --   --    ALT U/L 14  --   --   --  48  --   --    ALK  PHOS U/L 54  --   --   --  58  --   --    TOTAL PROTEIN g/dL 6.2*  --   --   --  6.5  --   --    ALBUMIN g/dL 3.3*  --   --   --  3.5  --   --    TOTAL BILIRUBIN mg/dL 0.69  --   --   --  0.81  --   --    EGFR ml/min/1.73sq m 23 24 22 19 20 21 23     Magnesium:  Results from last 7 days   Lab Units 03/16/24  0457 03/15/24  0508 03/14/24  1507 03/14/24  0625 03/13/24  0624   MAGNESIUM mg/dL 2.2 2.5 2.6 2.3 2.0     Coags:  Results from last 7 days   Lab Units 03/15/24  0604 03/14/24  2130 03/14/24  1549 03/13/24  0624 03/13/24  0020   PTT seconds 102* 64* 34 58* 64*   INR   --   --  1.31*  --   --        Imaging & Testing   I have personally reviewed pertinent reports.    Cardiac ep lab eps/ablations    Result Date: 3/19/2024  Narrative: Images from the original result were not included. Cardiac Pacemaker Placement Operative Report Vladimir Mathias [id number] 3/15/2024 @PCP@ Surgeon: Anisha Rehman MD Procedure(s): Dual Chamber Permanent Pacemaker Implantation; Cardiac Fluoroscopy Indications: Complete heart block Description of procedure:  The risks, benefits, complications,  alternative treatment options, and expected outcomes were discussed with the patient. The complications of infection, pneumothorax, cardiac perforation, cardiac tamponade, and even death, but not limited to it were discussed with patient the family, before the patient was brought to cath lab. Patient and family were explained about lead dislodgement and need for repeat procedures.  Patient and family were told that that is not an complication lead dislodgement can happen.  The patient and/or family concurred with the proposed plan, giving informed consent. Patient was prepped and draped in the usual strict sterile fashion. Antibiotic infusion was started.  About  20 cc Sensorcaine was infiltrated into the area just medial to the left deltopectoral groove. Access was obtained in left subclavian vein with modified Seldinger technique with ultrasound  guidance and a guidewire was retained.  Sheath was placed over this guidewire and a 2nd guidewire was was passed.  Sheath was removed and the two guidewires  were retained.  Then a new sheath was placed over one of  guidewire and a 2nd guidewire was retained.  Using a #15 scalpel, an incision was made. The incision was extended to the prepectoral fascia using blunt dissection as well as electrocautery. A small pocket was made and complete hemostasis was achieved. A antibiotic-soaked gauze was placed in the cavity.  Patient tolerated this procedure very well  A 7F sheath passed over first wire.  And 2nd wire was retained.  Guiding catheter was placed through the sheath over the wire.  With the help of J-tip wire guiding catheter was guided to the right ventricle.  Once it was noted to be in septal area close to bundle of his.  A HIS lead was placed to the guiding catheter while making sure that guiding catheter is RV septal area close to the bundle of HIS.  Thresholds were checked and once we were sure that we are close to to the fascicles lead was screwed in. Position of the lead was confirmed in both CASILLAS and JUAN views Appropriate sensing and thresholds were noted.  If all the numbers were acceptable the guiding catheter was carefully slit open and again the number were tested again.  Adequate sensing and capture threshold and lead impedance was conformed again.  Seven Pashto sheath was carefully split open.  The lead was tied down to the prepectoral fascia and muscle.  Thresholds were tested multiple times.. Appropriate sensing and thresholds were obtained. No diaphragmatic pacing occurred at 10 V and 1.5 ms. If needed pacing was performed from this newly placed lead.   A second sheath was advanced over the second  guidewire. The dilator and guidewire were removed. A atrial pacemaker lead was advanced to the heart under fluoroscopic guidance. The lead was fixated to the right atrial appendage. Appropriate sensing  "and thresholds were obtained. No diaphragmatic pacing occurred at 10 V and 1.5 ms. The sheath was peeled away. The leads were then sutured to the pectoralis muscle using silk sutures. A second suture was placed around the lead sleeves. The leads were attached to the generator. The system was placed in the pocket in the antibiotic pouch.  Pocket was washed with large amount of antibiotic saline.  And hemostasis was achieved.  The pacemaker and leads system were visualized under fluoroscopy. Appropriate redundancy/slack in the leads were noted. The pins of the leads were beyond the set screws. Hemostasis was reverified. The pocket was then closed with 2 running layers of the dissolvable sutures . Steri-Strips, a gauze dressing, and a pressure dressing was applied at operative site.   Thereafter the device was interrogated and proper sensing and thresholds through the device were confirmed.  Pacemaker Data: Pacemaker is dual-chamber Medtronic with LBBB lead Measured Data: Estimated Blood Loss:  Less than 5 cc      Complications: No complications Disposition: Patient was transferred to holding area in stable condition.        Condition: Stable Impression: Successful placement of dual chamber pacemaker without any complications. Plan see as ordered. Dr. Anisha Rehman MD [facility] \"This note was completed in part utilizing [employer] fluency direct voice recognition software.   Grammatical errors, random word insertion, spelling mistakes, and incomplete sentences may be an occasional consequence of the system secondary to software limitations, ambient noise and hardware issues. Please read the chart carefully and recognize, using context, where substitutions have occurred.  If you have any questions or concerns about the context, text or information contained within the body of this dictation, please contact myself, the provider, for further clarification.\"     XR chest 2 views    Result Date: 3/16/2024  Narrative: CHEST " INDICATION:   Patient s/p Pacemaker/ICD Insertion. COMPARISON: CXR 3/15/2024. EXAM PERFORMED/VIEWS: AP AND LATERAL CHEST RADIOGRAPHS. FINDINGS: Moderate cardiomegaly with left subclavian pacemaker leads in the right atrial appendage and right ventricular septum. Median sternotomy. TAVR. Mild pulmonary venous congestion with trace effusions. No pneumothorax. Upper abdomen normal. Bones normal for age.     Impression: Left subclavian pacemaker leads in right atrial appendage and right ventricular septum with no pneumothorax. Mild pulmonary venous congestion with trace effusions. Workstation performed: [id number]     XR chest portable    Result Date: 3/16/2024  Narrative: XR CHEST PORTABLE INDICATION: Patient s/p Pacemaker/ICD Insertion. COMPARISON: CXR 3/15/2024. FINDINGS: Clear lungs. No pneumothorax. Trace left effusion. Moderate cardiomegaly, CABG, left subclavian pacemaker leads in right atrium and right ventricle. Bones are unremarkable for age. Normal upper abdomen.     Impression: Pacemaker well-positioned with no pneumothorax. Trace left effusion. Workstation performed: [id number]     XR chest portable    Result Date: 3/15/2024  Narrative: XR CHEST PORTABLE INDICATION: Dyspnea. COMPARISON: 3/5/2024 FINDINGS: Sternotomy. Post-CABG changes. Clear lungs. Small pleural effusions. No pneumothorax. Enlarged cardiac silhouette, unchanged. No acute osseous abnormality within the limitations of portable radiography. Normal upper abdomen.     Impression: Small pleural effusions. Enlarged cardiac silhouette. Workstation performed: [id number]     US kidney and bladder    Result Date: 3/15/2024  Narrative: RENAL ULTRASOUND INDICATION: TUAN. COMPARISON: Renal ultrasound March 6, 2024 TECHNIQUE: Ultrasound of the retroperitoneum was performed with a curvilinear transducer utilizing volumetric sweeps and still imaging techniques. FINDINGS: KIDNEYS: Symmetric and normal size. Right kidney: 9.4 x 5.1 x 4.9 cm. Volume 122.3 mL  Left kidney: 10.2 x 4.5 x 4.0 cm. Volume 96.1 mL Right kidney Normal echogenicity and contour. No mass is identified. No hydronephrosis. No shadowing calculi. No perinephric fluid collections. Left kidney Normal echogenicity and contour. No mass is identified. No hydronephrosis. No shadowing calculi. No perinephric fluid collections. URETERS: Nonvisualized. BLADDER: Normally distended. No focal thickening or mass lesions. Bilateral ureteral jets detected. OTHER: Small volume ascites in the right upper quadrant.     Impression: No hydronephrosis. Small volume ascites in the right upper quadrant. Workstation performed: [id number]     Echo follow up/limited w/ contrast if indicated    Result Date: 3/14/2024  Narrative:   Left Ventricle: Left ventricular cavity size is lower normal Wall thickness is moderately increased. The left ventricular ejection fraction is 60% by visual estimation. Systolic function is normal. Dyskinetic septum otherwise normal wall motion Diastolic function is moderately abnormal, consistent with grade II (pseudonormal) relaxation.   IVS: There is abnormal septal motion consistent with post-operative status.   Right Ventricle: Right ventricular cavity size is moderately dilated. Systolic function is severely reduced. Wall motion is abnormal.   Left Atrium: The atrium is mildly dilated.   Right Atrium: The atrium is severely dilated.   Aortic Valve: There is a TAVR bioprosthetic valve. The prosthetic valve appears well-seated. There is no evidence of paravalvular regurgitation. The gradient recorded across the prosthetic aortic valve is within the expected range.   Mitral Valve: There is mild regurgitation.   Tricuspid Valve: There is mild to moderate regurgitation. The right ventricular systolic pressure is normal. The estimated right ventricular systolic pressure is 17.00 mmHg.   Pulmonic Valve: There is mild regurgitation.     Cardiac catheterization    Result Date: 3/11/2024  Narrative:   Mid  LAD lesion is 100% stenosed.   1st Mrg lesion is 100% stenosed.   Ost RCA to Prox RCA lesion is 90% stenosed.  Which had thrombus sitting in the mouth which temporarily decrease the flow in the right coronary artery.   Since patient has intermittent AV block a temporary pacemaker was placed without any complications.     NM Myocardial Perfusion Spect (Pharmacological Induced Stress and/or Rest)    Result Date: 3/9/2024  Narrative:   Stress ECG: Right bundle branch block was seen. The stress ECG is equivocal for ischemia after pharmacologic vasodilation.   Perfusion: There is a left ventricular perfusion defect present in the mid to basal inferior and inferoseptal location(s) that is partially reversible.   Stress Combined Conclusion: Left ventricular perfusion is abnormal.   Stress Function: Left ventricular function post-stress is normal. Stress ejection fraction is 65%. Abnormal study, Perfusion: There is a left ventricular perfusion defect present in the mid to basal inferior and inferoseptal location(s) that is partially reversible. SSS 7 SRS 5 SDS 2     Stress strip    Result Date: 3/8/2024  Narrative: Confirmed by MAISHA FREEMAN ([id number]),  Marilu Rothman ([id number]) on 3/8/2024 10:37:07 AM    US kidney and bladder    Result Date: 3/6/2024  Narrative: RENAL ULTRASOUND INDICATION: Renal failure. COMPARISON: None TECHNIQUE: Ultrasound of the retroperitoneum was performed with a curvilinear transducer utilizing volumetric sweeps and still imaging techniques. FINDINGS: KIDNEYS: Symmetric and normal size. Right kidney: 11.0 x 5.0 x 5.2 cm. Volume 149.8 mL Left kidney: 10.8 x 5.4 x 4.5 cm. Volume 136.3 mL Right kidney Normal echogenicity and contour. No mass is identified. No hydronephrosis. No shadowing calculi. Trace perinephric simple free fluid. Left kidney Normal echogenicity and contour. No mass is identified. No hydronephrosis. No shadowing calculi. No perinephric fluid collections. URETERS: Nonvisualized.  BLADDER: Normally distended. No focal thickening or mass lesions. The right ureteral jet is seen. The left ureteral jet is not visualized.     Impression: Normal. Workstation performed: [id number]     NM lung ventilation / perfusion    Result Date: 3/6/2024  Narrative: VENTILATION AND PERFUSION SCAN INDICATION: RV enlargement on Echo - new,  Question PE COMPARISON:  Chest radiograph 3/5/2023 TECHNIQUE:  Posterior ventilation imaging was performed after the inhalation of 30.1 mCi nebulized Tc-99m DTPA with deposition of less than 1 mCi of activity within the lungs. Multiplanar perfusion imaging was next performed following the intravenous administration of 4.35 mCi Tc-99m labeled MAA. FINDINGS: Ventilation imaging demonstrates partial deposition of radiopharmaceutical activity within the central bronchi. Perfusion imaging demonstrates a matching subsegmental defect in the posterior aspect of the right lower lobe. There are no suspicious ventilation/perfusion mismatches.     Impression: The probability for pulmonary embolus is low. Workstation performed: [id number]     Echo complete    Result Date: 3/6/2024  Narrative:   Left Ventricle: Left ventricular cavity size is lower normal Wall thickness is moderately increased. The left ventricular ejection fraction is 55%. Systolic function is normal. Dyskinetic septum otherwise normal wall motion Unable to assess diastolic function due to atrial flutter.   IVS: There is abnormal septal motion consistent with post-operative status. There is systolic flattening of the interventricular septum consistent with right ventricle pressure overload.   Right Ventricle: Right ventricular cavity size is moderately dilated. Systolic function is moderately to severely reduced.   Left Atrium: The atrium is mildly dilated.   Right Atrium: The atrium is severely dilated.   Aortic Valve: There is a TAVR bioprosthetic valve. The prosthetic valve appears well-seated. There is no evidence of  "paravalvular regurgitation. The gradient recorded across the prosthetic aortic valve is within the expected range. The aortic valve peak velocity is 1.57 m/s. The aortic valve mean gradient is 6 mmHg.   Mitral Valve: There is mild regurgitation.   Tricuspid Valve: There is mild regurgitation. Abnormal study, compared to prior echo report increased RV size and decreased systolic function. Elevated pulmonary pressures are suggested by LV systolic flattening.     XR chest 1 view portable    Result Date: 3/6/2024  Narrative: XR CHEST PORTABLE INDICATION: chest pain. COMPARISON: None FINDINGS: No airspace consolidation, pneumothorax, pulmonary edema, or pleural effusion. Trace left apical scarring.. Prior CABG. Mild cardiomegaly allowing for portable AP technique. Aortic calcification is present. Mild degenerative changes of bilateral shoulders. Normal upper abdomen.     Impression: No radiographic evidence of acute intrathoracic process. Workstation performed: [id number]           Roula LOPEZ  Cardiology      \"This note was completed in part utilizing [employer] direct voice recognition software.   Grammatical errors, random word insertion, spelling mistakes, and incomplete sentences may be an occasional consequence of the system secondary to software limitations, ambient noise and hardware issues.    Please read the chart carefully and recognize, using context, where substitutions have occurred.  If you have any questions or concerns about the context, text or information contained within the body of this dictation, please contact myself, the provider, for further clarification.\"  "

## 2024-03-19 NOTE — CASE MANAGEMENT
[facility] has received APPROVED authorization.  Insurance:   [employer]  Called in by Rep: Chris HUTCHINSON#  [phone]  Authorization received for: [facility]  Facility: [facility]   Authorization #:  [id number]   Start of Care: 3/18  Next Review Date: 3/20  Continued Stay Care Coordinator:  Gladys HUTCHINSON#: [phone]  Submit next review to: [phone]     Care Manager notified: Marilu Jacobs

## 2024-03-19 NOTE — CASE MANAGEMENT
Case Management Discharge Planning Note    Patient name Vladimir [facility]  Location [address] [address]/[address]-* MRN [id number]  : 12/3/1932 Date 3/19/2024       Current Admission Date: 3/5/2024  Current Admission Diagnosis:NSTEMI (non-ST elevated myocardial infarction) ([facility])   Patient Active Problem List    Diagnosis Date Noted    Third degree heart block ([facility]) 2024    BPH (benign prostatic hyperplasia) 2024    CAD (coronary artery disease) 2024    Acute renal failure superimposed on stage 3b chronic kidney disease ([facility]) 2024    Essential hypertension 2024    Atrial flutter ([facility]) 2024    NSTEMI (non-ST elevated myocardial infarction) ([facility]) 2024    Nonrheumatic aortic valve stenosis 2017    S/P CABG (coronary artery bypass graft) 2016    Hyperlipidemia 2016      LOS (days): 14  Geometric Mean LOS (GMLOS) (days): 2.4  Days to GMLOS:-11.1     OBJECTIVE:  Risk of Unplanned Readmission Score: 15.75         Current admission status: Inpatient   Preferred Pharmacy:   PATIENT/FAMILY REPORTS NO PREFERRED PHARMACY  No address on file      [id number] - [place], [place] - [address] [place] [id number]  Phone: [phone] Fax: [phone]    Primary Care Provider: No primary care provider on file.    Primary Insurance: [facility] REP  Secondary Insurance:     DISCHARGE DETAILS:                                                                                                               Facility Insurance Auth Number: [id number]

## 2024-03-19 NOTE — ASSESSMENT & PLAN NOTE
Complete heart block underwent pacemaker placement this admission.  Chest x-ray negative for pneumothorax

## 2024-03-19 NOTE — ASSESSMENT & PLAN NOTE
History of atrial flutter CKD 3 CAD hypertension presented to the hospital with chest pain found to have elevated troponins  Type I MI with RV infarction    Cardiac catheterization 3/11/2024 with RCA thrombus.  Continue aspirin and brilinta.  Per cardiology  Continue Toprol-XL 25 mg once a day   continue Vytorin 10/40 mg daily   continue aspirin 81 mg once a day   continue nitroglycerin patch 0.1 mg    Lab Results   Component Value Date    HSTNI 11,569 (H) 03/14/2024    HSTNI >22,973 (H) 03/07/2024    HSTNI >22,973 (H) 03/06/2024

## 2024-03-19 NOTE — PROGRESS NOTES
[facility]  Progress Note  Name: Vladimir Mathias I  MRN: [id number]  Unit/Bed#: [address]01 I Date of Admission: 3/5/2024   Date of Service: 3/19/2024 I Hospital Day: 14    Assessment/Plan   * Type 1 myocardial infarction (HCC)  Assessment & Plan  History of atrial flutter CKD 3 CAD hypertension presented to the hospital with chest pain found to have elevated troponins  Type I MI with RV infarction    Cardiac catheterization 3/11/2024 with RCA thrombus.  Continue aspirin and brilinta.  Per cardiology  Continue Toprol-XL 25 mg once a day   continue Vytorin 10/40 mg daily   continue aspirin 81 mg once a day   continue nitroglycerin patch 0.1 mg    Lab Results   Component Value Date    HSTNI 11,569 (H) 03/14/2024    HSTNI >22,973 (H) 03/07/2024    HSTNI >22,973 (H) 03/06/2024       Acute renal failure superimposed on stage 3b chronic kidney disease (HCC)  Assessment & Plan  TUAN on CKD 3b being followed by nephrology due to NSTEMI/hypotension, bradycardia and contrast  Baseline creatinine 1.5-1.7.  Creatinine plateauing at 2s range  Being followed by nephrology  Repeat lab work in a.m.  follow-up with nephrology on discharge     Results from last 7 days   Lab Units 03/19/24  0503 03/18/24  0359 03/17/24  0551 03/16/24  0457 03/15/24  0508 03/14/24  1507 03/14/24  0625 03/13/24  0624   BUN mg/dL 63* 67* 73* 78* 75* 71* 65* 53*   CREATININE mg/dL 2.32* 2.28* 2.45* 2.73* 2.64* 2.53* 2.32* 2.11*   EGFR ml/min/1.73sq m 23 24 22 19 20 21 23 26           Third degree heart block (HCC)  Assessment & Plan  Complete heart block underwent pacemaker placement this admission.  Chest x-ray negative for pneumothorax    Atrial flutter (HCC)  Assessment & Plan  Paroxysmal atrial fibrillation/flutter status post Watchman device due to recurrent GI bleeding  Continue metoprolol.  [id number]    Nonrheumatic aortic valve stenosis  Assessment & Plan  Status post Medtronic evolute valve in  .    Hyperlipidemia  Assessment & Plan  Continue ezetimibe, statin    Essential hypertension  Assessment & Plan  Continue metoprolol.  ARB held due to kidney injury    CAD (coronary artery disease)  Assessment & Plan  CAD with history CABG .  Cardiac catheterization this admission for NSTEMI with RCA thrombus    BPH (benign prostatic hyperplasia)  Assessment & Plan  Continue finasteride.               VTE Pharmacologic Prophylaxis: VTE Score: 4 Moderate Risk (Score 3-4) - Pharmacological DVT Prophylaxis Ordered: heparin.    Mobility:   Basic Mobility Inpatient Raw Score: 15  JH-HLM Goal: 4: Move to chair/commode  JH-HLM Achieved: 6: Walk 10 steps or more  JH-HLM Goal achieved. Continue to encourage appropriate mobility.    Patient Centered Rounds: I performed bedside rounds with nursing staff today.   Discussions with Specialists or Other Care Team Provider: yes - cardio, renal    Education and Discussions with Family / Patient: Updated  (son) via phone.    Total Time Spent on Date of Encounter in care of patient: This time was spent on one or more of the following: performing physical exam; counseling and coordination of care; obtaining or reviewing history; documenting in the medical record; reviewing/ordering tests, medications or procedures; communicating with other healthcare professionals and discussing with patient's family/caregivers.    Current Length of Stay: 14 day(s)  Current Patient Status: Inpatient   Certification Statement: The patient will continue to require additional inpatient hospital stay due to TUAN on CKD requiring monitoring of lab work  Discharge Plan: Anticipate discharge in 24-48 hrs to rehab facility.    Code Status: Level 3 - DNAR and DNI    Subjective:     Patient denies any chest pain, shortness of breath    Objective:     Vitals:   Temp (24hrs), Av.9 °F (36.6 °C), Min:97.8 °F (36.6 °C), Max:98 °F (36.7 °C)    Temp:  [97.8 °F (36.6 °C)-98 °F (36.7 °C)] 98 °F  (36.7 °C)  HR:  [60-74] 67  Resp:  [18] 18  BP: ()/(58-64) 93/58  SpO2:  [96 %-100 %] 98 %  Body mass index is 26.98 kg/m².     Input and Output Summary (last 24 hours):     Intake/Output Summary (Last 24 hours) at 3/19/2024 1947  Last data filed at 3/19/2024 1701  Gross per 24 hour   Intake 720 ml   Output 800 ml   Net -80 ml       Physical Exam:   Physical Exam  Vitals reviewed.   Constitutional:       General: He is not in acute distress.     Appearance: He is not toxic-appearing.   HENT:      Head: Normocephalic and atraumatic.   Eyes:      General:         Right eye: No discharge.         Left eye: No discharge.   Cardiovascular:      Rate and Rhythm: Normal rate and regular rhythm.      Heart sounds: Murmur heard.   Pulmonary:      Effort: Pulmonary effort is normal. No respiratory distress.      Breath sounds: Normal breath sounds. No wheezing or rales.   Abdominal:      General: Bowel sounds are normal. There is no distension.      Palpations: Abdomen is soft.      Tenderness: There is no abdominal tenderness.   Neurological:      Mental Status: He is alert and oriented to person, place, and time.          Additional Data:     Labs:  Results from last 7 days   Lab Units 03/19/24  0503   WBC Thousand/uL 7.59   HEMOGLOBIN g/dL 9.6*   HEMATOCRIT % 28.2*   PLATELETS Thousands/uL 219   NEUTROS PCT % 80*   LYMPHS PCT % 6*   MONOS PCT % 8   EOS PCT % 6     Results from last 7 days   Lab Units 03/19/24  0503   SODIUM mmol/L 135   POTASSIUM mmol/L 3.8   CHLORIDE mmol/L 100   CO2 mmol/L 27   BUN mg/dL 63*   CREATININE mg/dL 2.32*   ANION GAP mmol/L 8   CALCIUM mg/dL 8.6   ALBUMIN g/dL 3.3*   TOTAL BILIRUBIN mg/dL 0.69   ALK PHOS U/L 54   ALT U/L 14   AST U/L 25   GLUCOSE RANDOM mg/dL 120     Results from last 7 days   Lab Units 03/14/24  1549   INR  1.31*                   Lines/Drains:  Invasive Devices       Peripheral Intravenous Line  Duration             Peripheral IV 03/13/24 Left;Ventral (anterior)  Forearm 6 days    Long-Dwell Peripheral IV (Midline) 03/14/24 Right Brachial 5 days                          Imaging: Reviewed radiology reports from this admission including: chest xray    Recent Cultures (last 7 days):         Last 24 Hours Medication List:   Current Facility-Administered Medications   Medication Dose Route Frequency Provider Last Rate    acetaminophen  650 mg Oral Q6H PRN Judith Spironello V, CRNP      aluminum-magnesium hydroxide-simethicone  30 mL Oral Q4H PRN Judith Spironello V, CRNP      Artificial Tears  1 drop Both Eyes Q4H PRN Judith Spironello V, CRNP      aspirin  81 mg Oral Daily Judith Spironello V, CRNP      bisacodyl  10 mg Rectal Daily PRN Judith Spironello V, CRNP      docusate sodium  100 mg Oral Daily Judith Spironello V, CRNP      ezetimibe  10 mg Oral Daily With Dinner Judith Spironello V, CRNP      And    pravastatin  40 mg Oral Daily With Dinner Judith Spironello V, CRNP      finasteride  5 mg Oral Daily Judith Spironello V, CRNP      fish oil  2,000 mg Oral Daily Judith Spironello V, CRNP      heparin (porcine)  5,000 Units Subcutaneous Q8H GUSTAVO JESSICA Morocho      metoprolol succinate  25 mg Oral Daily Ivanna Scanlon DO      nitroglycerin  0.1 mg Transdermal Daily Judith Spironello V, CRNP      pantoprazole  40 mg Oral Early Morning Judith Spironello V, CRNP      polyethylene glycol  17 g Oral Daily Judith Spironello V, CRNP      senna  1 tablet Oral HS Judith Spironello V, CRNP      ticagrelor  90 mg Oral Q12H GUSTAVO WhaleySedaJESSICA Moreno      torsemide  10 mg Oral Daily Joselyn Reyes Bahamonde, MD          Today, Patient Was Seen By: Gisselle Ferrara DO    **Please Note: This note may have been constructed using a voice recognition system.**

## 2024-03-20 VITALS
DIASTOLIC BLOOD PRESSURE: 54 MMHG | BODY MASS INDEX: 26.6 KG/M2 | TEMPERATURE: 97.8 F | WEIGHT: 207.23 LBS | RESPIRATION RATE: 16 BRPM | SYSTOLIC BLOOD PRESSURE: 103 MMHG | HEART RATE: 69 BPM | HEIGHT: 74 IN | OXYGEN SATURATION: 97 %

## 2024-03-20 LAB
ALBUMIN SERPL BCP-MCNC: 3.2 G/DL (ref 3.5–5)
ALP SERPL-CCNC: 48 U/L (ref 34–104)
ALT SERPL W P-5'-P-CCNC: 16 U/L (ref 7–52)
ANION GAP SERPL CALCULATED.3IONS-SCNC: 8 MMOL/L (ref 4–13)
AST SERPL W P-5'-P-CCNC: 30 U/L (ref 13–39)
BASOPHILS # BLD AUTO: 0.03 THOUSANDS/ÂΜL (ref 0–0.1)
BASOPHILS NFR BLD AUTO: 0 % (ref 0–1)
BILIRUB SERPL-MCNC: 0.79 MG/DL (ref 0.2–1)
BUN SERPL-MCNC: 64 MG/DL (ref 5–25)
CALCIUM ALBUM COR SERPL-MCNC: 9 MG/DL (ref 8.3–10.1)
CALCIUM SERPL-MCNC: 8.4 MG/DL (ref 8.4–10.2)
CHLORIDE SERPL-SCNC: 101 MMOL/L (ref 96–108)
CO2 SERPL-SCNC: 26 MMOL/L (ref 21–32)
CREAT SERPL-MCNC: 2.33 MG/DL (ref 0.6–1.3)
EOSINOPHIL # BLD AUTO: 0.31 THOUSAND/ÂΜL (ref 0–0.61)
EOSINOPHIL NFR BLD AUTO: 4 % (ref 0–6)
ERYTHROCYTE [DISTWIDTH] IN BLOOD BY AUTOMATED COUNT: 14.6 % (ref 11.6–15.1)
GFR SERPL CREATININE-BSD FRML MDRD: 23 ML/MIN/1.73SQ M
GLUCOSE SERPL-MCNC: 113 MG/DL (ref 65–140)
HCT VFR BLD AUTO: 27.8 % (ref 36.5–49.3)
HGB BLD-MCNC: 9.3 G/DL (ref 12–17)
IMM GRANULOCYTES # BLD AUTO: 0.06 THOUSAND/UL (ref 0–0.2)
IMM GRANULOCYTES NFR BLD AUTO: 1 % (ref 0–2)
LYMPHOCYTES # BLD AUTO: 0.54 THOUSANDS/ÂΜL (ref 0.6–4.47)
LYMPHOCYTES NFR BLD AUTO: 7 % (ref 14–44)
MCH RBC QN AUTO: 33.2 PG (ref 26.8–34.3)
MCHC RBC AUTO-ENTMCNC: 33.5 G/DL (ref 31.4–37.4)
MCV RBC AUTO: 99 FL (ref 82–98)
MONOCYTES # BLD AUTO: 0.56 THOUSAND/ÂΜL (ref 0.17–1.22)
MONOCYTES NFR BLD AUTO: 8 % (ref 4–12)
NEUTROPHILS # BLD AUTO: 5.83 THOUSANDS/ÂΜL (ref 1.85–7.62)
NEUTS SEG NFR BLD AUTO: 80 % (ref 43–75)
NRBC BLD AUTO-RTO: 0 /100 WBCS
PLATELET # BLD AUTO: 203 THOUSANDS/UL (ref 149–390)
PMV BLD AUTO: 10.5 FL (ref 8.9–12.7)
POTASSIUM SERPL-SCNC: 3.8 MMOL/L (ref 3.5–5.3)
PROT SERPL-MCNC: 6 G/DL (ref 6.4–8.4)
RBC # BLD AUTO: 2.8 MILLION/UL (ref 3.88–5.62)
SODIUM SERPL-SCNC: 135 MMOL/L (ref 135–147)
WBC # BLD AUTO: 7.33 THOUSAND/UL (ref 4.31–10.16)

## 2024-03-20 PROCEDURE — 85025 COMPLETE CBC W/AUTO DIFF WBC: CPT | Performed by: STUDENT IN AN ORGANIZED HEALTH CARE EDUCATION/TRAINING PROGRAM

## 2024-03-20 PROCEDURE — 99239 HOSP IP/OBS DSCHRG MGMT >30: CPT | Performed by: FAMILY MEDICINE

## 2024-03-20 PROCEDURE — 99233 SBSQ HOSP IP/OBS HIGH 50: CPT | Performed by: STUDENT IN AN ORGANIZED HEALTH CARE EDUCATION/TRAINING PROGRAM

## 2024-03-20 PROCEDURE — 80053 COMPREHEN METABOLIC PANEL: CPT | Performed by: STUDENT IN AN ORGANIZED HEALTH CARE EDUCATION/TRAINING PROGRAM

## 2024-03-20 RX ORDER — DOCUSATE SODIUM 100 MG/1
100 CAPSULE, LIQUID FILLED ORAL DAILY
Start: 2024-03-21

## 2024-03-20 RX ORDER — POLYETHYLENE GLYCOL 3350 17 G/17G
17 POWDER, FOR SOLUTION ORAL DAILY PRN
Start: 2024-03-20

## 2024-03-20 RX ORDER — NITROGLYCERIN 0.1MG/HR
1 PATCH, TRANSDERMAL 24 HOURS TRANSDERMAL DAILY
Start: 2024-03-21

## 2024-03-20 RX ORDER — PANTOPRAZOLE SODIUM 40 MG/1
40 TABLET, DELAYED RELEASE ORAL
Start: 2024-03-21

## 2024-03-20 RX ORDER — TORSEMIDE 10 MG/1
10 TABLET ORAL DAILY
Start: 2024-03-21

## 2024-03-20 RX ADMIN — NITROGLYCERIN 0.1 MG: 0.1 PATCH TRANSDERMAL at 09:05

## 2024-03-20 RX ADMIN — OMEGA-3 FATTY ACIDS CAP 1000 MG 2000 MG: 1000 CAP at 09:05

## 2024-03-20 RX ADMIN — FINASTERIDE 5 MG: 5 TABLET, FILM COATED ORAL at 09:05

## 2024-03-20 RX ADMIN — TICAGRELOR 90 MG: 90 TABLET ORAL at 09:05

## 2024-03-20 RX ADMIN — HEPARIN SODIUM 5000 UNITS: 5000 INJECTION INTRAVENOUS; SUBCUTANEOUS at 05:45

## 2024-03-20 RX ADMIN — ASPIRIN 81 MG 81 MG: 81 TABLET ORAL at 09:05

## 2024-03-20 RX ADMIN — PANTOPRAZOLE SODIUM 40 MG: 40 TABLET, DELAYED RELEASE ORAL at 05:45

## 2024-03-20 RX ADMIN — TORSEMIDE 10 MG: 10 TABLET ORAL at 09:05

## 2024-03-20 NOTE — ASSESSMENT & PLAN NOTE
History of atrial flutter CKD 3 CAD hypertension presented to the hospital with chest pain found to have elevated troponins  Type I MI with RV infarction  Cardiac catheterization 3/11/2024 showed LIMA to LAD was patent, SVG to circumflex was also patent.  Native LAD had 100% mid occlusion.  RCA thrombus noted.  Continue aspirin and brilinta.  Per cardiology  Continue Toprol-XL 25 mg once a day   continue Vytorin 10/40 mg daily   continue aspirin 81 mg once a day, Brilinta   continue nitroglycerin patch 0.1 mg    Lab Results   Component Value Date    HSTNI 11,569 (H) 03/14/2024    HSTNI >22,973 (H) 03/07/2024    HSTNI >22,973 (H) 03/06/2024

## 2024-03-20 NOTE — [place] UNIVERSAL TRANSFER FORM
"[place] UNIVERSAL TRANSFER FORM  (ALL ITEMS MUST BE COMPLETED)    1. TRANSFER FROM: [facility]      TRANSFER TO: [facility]    2. DATE OF TRANSFER: 3/20/2024                        TIME OF TRANSFER: 1430    3. PATIENT NAME: Vladimir Mathias,        YOB: 1932                             GENDER: male    4. LANGUAGE:   English    5. PHYSICIAN NAME:  Gisselle Ferrara DO                   PHONE: [phone]    6. CODE STATUS: Level 3 - DNAR and DNI        Out of Hospital DNR Attached: yes    7. :                                      :  Extended Emergency Contact Information  Primary Emergency Contact: Jr Mathias Neil  Address: [address] [place] [address]  Home Phone: [phone]  Mobile Phone: [phone]  Relation: Child  Secondary Emergency Contact: Catherine Fernandez  Mobile Phone: [phone]  Relation: Daughter           Health Care Representative/Proxy:  yes           Legal Guardian:  yes             NAME OF:           HEALTH CARE REPRESENTATIVE/PROXY:                                         OR           LEGAL GUARDIAN, IF NOT :                                               PHONE:  (Day)           (Night)                        (Cell)    8. REASON FOR TRANSFER: STR            V/S: /54   Pulse 69   Temp 97.8 °F (36.6 °C)   Resp 16   Ht 6' 1.5\" (1.867 m)   Wt 94 kg (207 lb 3.7 oz)   SpO2 97%   BMI 26.97 kg/m²           PAIN: No    9. PRIMARY DIAGNOSIS: Type 1 myocardial infarction (HCC)      Secondary Diagnosis:         Pacemaker: yes   Internal Defib: No          Mental Health Diagnosis (if Applicable): No    10. RESTRAINTS: No    11. RESPIRATORY NEEDS: No    12. ISOLATION/PRECAUTION: No    13. ALLERGY: Patient has no known allergies.    14. SENSORY: None           15. SKIN CONDITION:  Blanchable redness to sacrum.    16. DIET: Cardiac     17. IV ACCESS: yes    18. " PERSONAL ITEMS SENT WITH PATIENT:  Cellular phone and , glasses, ring watch, wallet.pant, shirt keys     19. ATTACHED DOCUMENTS: MUST ATTACH CURRENT MEDICATION INFORMATION Discharge form    20. AT RISK ALERTS:Fall risk        HARM TO: None    21. WEIGHT BEARING STATUS:         Left Leg: Full        Right Leg: full    22. MENTAL STATUS:alert    23. FUNCTION:        Walk: with assistance        Transfer: With help        Toilet: with assistance        Feed: feeds self    24. IMMUNIZATIONS/SCREENING:     Immunization History   Administered Date(s) Administered    COVID-19 MODERNA VACC 0.5 ML IM 01/25/2021, 02/22/2021       25. BOWEL: Continent    26. BLADDER: Continent    27. SENDING FACILITY CONTACT: Bel Garcia                  Title: RN        Unit: [address] [place] [address]        Phone: [phone]          REC'G FACILITY CONTACT (if known):        Title:        Unit:         Phone:         FORM PREFILLED BY (if applicable)       Title:       Unit:        Phone:         FORM COMPLETED BY Bel Garcia RN      Title: [facility]      Phone: [phone]

## 2024-03-20 NOTE — DISCHARGE SUMMARY
[facility]  Discharge- Vladimir Mathias 12/3/1932, 89 y.o. male MRN: [id number]  Unit/Bed#: [address] [place] [address] Encounter: [id number]  Primary Care Provider: No primary care provider on file.   Date and time admitted to hospital: 3/5/2024  6:15 PM    * Type 1 myocardial infarction (HCC)  Assessment & Plan  History of atrial flutter CKD 3 CAD hypertension presented to the hospital with chest pain found to have elevated troponins  Type I MI with RV infarction  Cardiac catheterization 3/11/2024 showed LIMA to LAD was patent, SVG to circumflex was also patent.  Native LAD had 100% mid occlusion.  RCA thrombus noted.  Continue aspirin and brilinta.  Per cardiology  Continue Toprol-XL 25 mg once a day   continue Vytorin 10/40 mg daily   continue aspirin 81 mg once a day, Brilinta   continue nitroglycerin patch 0.1 mg    Lab Results   Component Value Date    HSTNI 11,569 (H) 03/14/2024    HSTNI >22,973 (H) 03/07/2024    HSTNI >22,973 (H) 03/06/2024       Acute renal failure superimposed on stage 3b chronic kidney disease (HCC)  Assessment & Plan  TUAN on CKD 3b being followed by nephrology due to NSTEMI/hypotension, bradycardia and contrast  Baseline creatinine 1.5-1.7.  Creatinine plateaued at 2s range  Being followed by nephrology  Repeat lab work after discharge  follow-up with nephrology on discharge     Results from last 7 days   Lab Units 03/20/24  0546 03/19/24  0503 03/18/24  0359 03/17/24  0551 03/16/24  0457 03/15/24  0508 03/14/24  1507 03/14/24  0625   BUN mg/dL 64* 63* 67* 73* 78* 75* 71* 65*   CREATININE mg/dL 2.33* 2.32* 2.28* 2.45* 2.73* 2.64* 2.53* 2.32*   EGFR ml/min/1.73sq m 23 23 24 22 19 20 21 23           Third degree heart block (HCC)  Assessment & Plan  Complete heart block underwent pacemaker placement this admission.  Chest x-ray negative for pneumothorax    Atrial flutter (HCC)  Assessment & Plan  Paroxysmal atrial fibrillation/flutter status post Watchman device due to recurrent GI  bleeding  Continue metoprolol  [id number]    Nonrheumatic aortic valve stenosis  Assessment & Plan  Status post Medtronic evolute valve in 2022    Hyperlipidemia  Assessment & Plan  Continue ezetimibe, statin.    Essential hypertension  Assessment & Plan  Continue metoprolol.  ARB held due to kidney injury  Demadex added by nephrology    CAD (coronary artery disease)  Assessment & Plan  CAD with history CABG 2009.  Cardiac catheterization this admission for NSTEMI with RCA thrombus.  Medications as noted above    BPH (benign prostatic hyperplasia)  Assessment & Plan  Continue Avodart      Medical Problems       Resolved Problems  Date Reviewed: 3/19/2024            Resolved    Hyponatremia 3/18/2024     Resolved by  Jacinda Kent MD    Metabolic acidosis 3/18/2024     Resolved by  Jacinda Kent MD        Discharging Physician / Practitioner: Gisselle Ferrara DO  PCP: No primary care provider on file.  Admission Date:   Admission Orders (From admission, onward)       Ordered        03/05/24 1932  INPATIENT ADMISSION  Once                          Discharge Date: 03/20/24    Consultations During Hospital Stay:  Cardiology  Nephrology  ICU    Procedures Performed:   Echo  Cardiac catheterization  Stress test  Pacemaker placement    Significant Findings / Test Results:   See above    Incidental Findings:   None    Test Results Pending at Discharge (will require follow up):   None     Outpatient Tests Requested:  BMP, CBC    Complications: Third-degree heart block    Reason for Admission: Type I MI    Hospital Course:   Vladimir Mathias is a 89 y.o. male patient who originally presented to the hospital on 3/5/2024 due to chest pain, shortness of breath.  Given aspirin by EMS and chest pain resolved.  At was previously on Lasix but stopped taking it because he did not have any further edema.  Lab work in the ER showed elevated troponins.  Patient was admitted and seen by cardiology while here.  Underwent cardiac workup  "including cardiac catheterization.  During cardiac catheterization patient noted to have third-degree heart block and was then transferred to ICU.  Patient underwent pacemaker placement.  He was also seen by nephrology for TUAN on CKD.  Creatinine has now plateaued.  Patient cleared by all consultants involved in his care prior to discharge.  Discharge plan discussed in details with patient    Please see above list of diagnoses and related plan for additional information.     Condition at Discharge: stable    Discharge Day Visit / Exam:   Subjective: Patient denies any shortness of breath, chest pain    Vitals: Blood Pressure: 103/54 (03/20/24 0753)  Pulse: 69 (03/20/24 0753)  Temperature: 97.8 °F (36.6 °C) (03/20/24 0753)  Temp Source: Oral (03/16/24 0307)  Respirations: 16 (03/20/24 0753)  Height: 6' 1.5\" (186.7 cm) (03/11/24 0945)  Weight - Scale: 94 kg (207 lb 3.7 oz) (03/20/24 0600)  SpO2: 97 % (03/20/24 0753)  Exam:   Physical Exam  Vitals reviewed.   Constitutional:       General: He is not in acute distress.     Appearance: He is not toxic-appearing.   HENT:      Head: Normocephalic and atraumatic.   Eyes:      General:         Right eye: No discharge.         Left eye: No discharge.   Cardiovascular:      Rate and Rhythm: Normal rate and regular rhythm.      Heart sounds: Murmur heard.   Pulmonary:      Effort: Pulmonary effort is normal. No respiratory distress.      Breath sounds: Normal breath sounds. No wheezing or rales.   Abdominal:      General: Bowel sounds are normal. There is no distension.      Palpations: Abdomen is soft.      Tenderness: There is no abdominal tenderness.   Neurological:      Mental Status: He is alert and oriented to person, place, and time.          Discussion with Family: Updated  (son) via phone.    Discharge instructions/Information to patient and family:   See after visit summary for information provided to patient and family.      Provisions for Follow-Up " Care:  See after visit summary for information related to follow-up care and any pertinent home health orders.      Mobility at time of Discharge:   Basic Mobility Inpatient Raw Score: 15  JH-HLM Goal: 4: Move to chair/commode  JH-HLM Achieved: 6: Walk 10 steps or more  HLM Goal achieved. Continue to encourage appropriate mobility.     Disposition:   Other Skilled Nursing Facility at [facility]    Planned Readmission: no     Discharge Statement:  I spent > 30 minutes discharging the patient. This time was spent on the day of discharge. I had direct contact with the patient on the day of discharge. Greater than 50% of the total time was spent examining patient, answering all patient questions, arranging and discussing plan of care with patient as well as directly providing post-discharge instructions.  Additional time then spent on discharge activities.    Discharge Medications:  See after visit summary for reconciled discharge medications provided to patient and/or family.      **Please Note: This note may have been constructed using a voice recognition system**

## 2024-03-20 NOTE — ASSESSMENT & PLAN NOTE
Paroxysmal atrial fibrillation/flutter status post Watchman device due to recurrent GI bleeding  Continue metoprolol  [id number]

## 2024-03-20 NOTE — PROGRESS NOTES
NEPHROLOGY PROGRESS NOTE   Vladimir Mathias 89 y.o. male MRN: [id number]  Unit/Bed#: [address] [place] [address] Encounter: [id number]  Reason for Consult: TUAN    ASSESSMENT AND PLAN:  89 yo with PMH of CAD, A-fib status post watchman, CKD G3b with baseline creatinine 1.5 to 1.7 mg/dL p/w chest pain and shortness of breath.  Nephrology is consulted for management of TUAN     PLAN:     #Non-Oliguric KDIGO TUAN stage 2 on CKD G3b with evidence of kidney recovery  Etiology: Likely secondary to hemodynamic changes in the settings of hypotension, NSTEMI, bradycardia  Baseline creatinine 1.5 to 1.7 mg/dL  Peak creatinine: 2.7 mg/dL  Current creatinine: Plateauing at 2.3 mg/dL.  No changes  UA: No hematuria, leukocyturia  Renal imaging : No hydronephrosis  Treatment:  Kidney function is stable.  Not back to baseline yet but there is no indication of urgent dialysis at this time  maintain MAP:  Over 65 mmHg if possible/avoid hypoperfusion:  Hold parameters on blood pressure medications  Avoid nephrotoxic agents such as NSAIDs, and IV contrast if possible. Avoid opioids   Adjust medications to GFR  Patient can continue follow-up with nephrology on discharge.  No other changes at this time     #CKD G3b  Baseline creatinine: 1.5 to 1.7 mg/dL  Etiology: Likely secondary to nephrosclerosis incidence of cardiovascular disease        #Acid-base Disorder  serum HCO3 26 mmol/L  Off sodium bicarb     #Volume status/hypertension:  Volume: Euvolemic  Blood pressure: Tendency to hypotension, /54   Recommend:  Low-sodium diet  Metoprolol 25 mg daily  Continue torsemide 10 mg  Monitor urinary output and daily weight     # Hyponatremia  Serum sodium 135 mEq/L  30 secondary to hypovolemia now improved    #Anemia:  Current hemoglobin: 9.3mg/dL  Treatment:  Transfuse for hemoglobin less than 7.0 per primary service    No indication of Epogen at this time     # NSTEMI  Status postcardiac catheterization  Follow-up by cardiology     # A-fib  Status post  watchman and Eliquis  Management as per cardiology    Nephrology will sign off at this time. Thank you for involving us in this case. Please call us if any question.       The highlighted and/or bolded points in my assessment, plan, and disposition were discussed with the primary team and they agree with those points and the plan.  Previous records were personally reviewed by me to obtain a baseline creatinine.   The images (CXR) were personally reviewed by me in PACS      SUBJECTIVE:  Patient seen and examined at bedside. No chest pain, shortness of breath,     OBJECTIVE:  Current Weight: Weight - Scale: 94 kg (207 lb 3.7 oz)  Vitals:    03/20/24 0753   BP: 103/54   Pulse: 69   Resp: 16   Temp: 97.8 °F (36.6 °C)   SpO2: 97%       Intake/Output Summary (Last 24 hours) at 3/20/2024 0802  Last data filed at 3/20/2024 0236  Gross per 24 hour   Intake 720 ml   Output 900 ml   Net -180 ml     Wt Readings from Last 3 Encounters:   03/20/24 94 kg (207 lb 3.7 oz)   08/14/23 88.1 kg (194 lb 3.2 oz)   07/12/22 88.5 kg (195 lb)     Temp Readings from Last 3 Encounters:   03/20/24 97.8 °F (36.6 °C)   08/14/23 97.9 °F (36.6 °C) (Temporal)   07/12/22 97.5 °F (36.4 °C) (Temporal)     BP Readings from Last 3 Encounters:   03/20/24 103/54   07/12/22 118/72   07/05/22 114/72     Pulse Readings from Last 3 Encounters:   03/20/24 69   07/12/22 99   07/05/22 97      General:  no acute distress at this time  Skin:  No acute rash  Eyes:  No scleral icterus and noninjected  ENT:  mucous membranes moist  Neck:  no carotid bruits  Chest:  Clear to auscultation percussion, good respiratory effort, no use of accessory respiratory muscles  CVS:  Regular rate and rhythm without rub   Abdomen:  soft and nontender   Extremities: no significant lower extremity edema  Neuro:  No gross focality  Psych:  Alert , cooperative       Medications:    Current Facility-Administered Medications:     acetaminophen (TYLENOL) tablet 650 mg, 650 mg, Oral, Q6H  PRN, Judith Spironello V, CRNP, 650 mg at 03/15/24 2100    aluminum-magnesium hydroxide-simethicone (MAALOX) oral suspension 30 mL, 30 mL, Oral, Q4H PRN, Judith Spironello V, CRNP, 30 mL at 03/16/24 2143    Artificial Tears ophthalmic solution 1 drop, 1 drop, Both Eyes, Q4H PRN, Judith Spironello V, CRNP, 1 drop at 03/17/24 1823    aspirin chewable tablet 81 mg, 81 mg, Oral, Daily, Judith Spironello V, CRNP, 81 mg at 03/19/24 0834    bisacodyl (DULCOLAX) rectal suppository 10 mg, 10 mg, Rectal, Daily PRN, Judith Bragao V, CRNP    docusate sodium (COLACE) capsule 100 mg, 100 mg, Oral, Daily, Judith Spironello V, CRNP, 100 mg at 03/19/24 0834    ezetimibe (ZETIA) tablet 10 mg, 10 mg, Oral, Daily With Dinner, 10 mg at 03/19/24 1559 **AND** pravastatin (PRAVACHOL) tablet 40 mg, 40 mg, Oral, Daily With Dinner, Judith Langfordnello V, CRNP, 40 mg at 03/19/24 1559    finasteride (PROSCAR) tablet 5 mg, 5 mg, Oral, Daily, Judith Spironello V, CRNP, 5 mg at 03/19/24 0834    fish oil capsule 2,000 mg, 2,000 mg, Oral, Daily, Judith Spironello V, CRNP, 2,000 mg at 03/19/24 0834    heparin (porcine) subcutaneous injection 5,000 Units, 5,000 Units, Subcutaneous, Q8H [facility], 5,000 Units at 03/20/24 0545    metoprolol succinate (TOPROL-XL) 24 hr tablet 25 mg, 25 mg, Oral, Daily, Ivanna Scanlon DO, 25 mg at 03/19/24 0838    nitroglycerin (NITRODUR) 0.1 mg/hr TD 24 hr patch, 0.1 mg, Transdermal, Daily, Judithsusie Katz V, CRNP, 0.1 mg at 03/19/24 0835    pantoprazole (PROTONIX) EC tablet 40 mg, 40 mg, Oral, Early Morning, Judith Bragao V, CRNP, 40 mg at 03/20/24 0545    polyethylene glycol (MIRALAX) packet 17 g, 17 g, Oral, Daily, Judith Bragao V, CRNP, 17 g at 03/19/24 0834    senna (SENOKOT) tablet 8.6 mg, 1 tablet, Oral, HS, Judith Katz V, CRNP, 8.6 mg at 03/15/24 2100    ticagrelor (BRILINTA) tablet 90 mg, 90 mg, Oral, Q12H GUSTAVO, JESSICA Morocho, 90 mg at  03/19/24 2137    torsemide (DEMADEX) tablet 10 mg, 10 mg, Oral, Daily, Joselyn Reyes Bahamonde, MD, 10 mg at 03/19/24 0834    Laboratory Results:  Results from last 7 days   Lab Units 03/20/24  0546 03/19/24  0503 03/18/24  0359 03/17/24  0551 03/16/24  0457 03/15/24  0508 03/14/24  1507 03/14/24  0625   WBC Thousand/uL 7.33 7.59 6.55 6.70 6.89 8.03  --  8.60   HEMOGLOBIN g/dL 9.3* 9.6* 9.3* 9.5* 9.6* 10.0*  --  10.4*   HEMATOCRIT % 27.8* 28.2* 28.1* 28.1* 28.5* 29.8*  --  32.4*   PLATELETS Thousands/uL 203 219 216 205 205 195  --  170   SODIUM mmol/L 135 135 134* 135 133* 132* 128* 131*   POTASSIUM mmol/L 3.8 3.8 3.7 3.3* 3.6 4.2 4.8 4.2   CHLORIDE mmol/L 101 100 100 96 99 100 98 102   CO2 mmol/L 26 27 25 25 24 22 19* 17*   BUN mg/dL 64* 63* 67* 73* 78* 75* 71* 65*   CREATININE mg/dL 2.33* 2.32* 2.28* 2.45* 2.73* 2.64* 2.53* 2.32*   CALCIUM mg/dL 8.4 8.6 8.4 8.3* 8.7 9.4 9.3 8.7   MAGNESIUM mg/dL  --   --   --   --  2.2 2.5 2.6 2.3   PHOSPHORUS mg/dL  --   --   --   --   --  5.1*  --   --        XR chest 2 views   Final Result by Dayan Leonardo MD (03/16 0945)      Left subclavian pacemaker leads in right atrial appendage and right ventricular septum with no pneumothorax.      Mild pulmonary venous congestion with trace effusions.               Workstation performed: [id number]         XR chest portable   Final Result by Dayan Leonardo MD (03/16 0659)      Pacemaker well-positioned with no pneumothorax.      Trace left effusion.      Workstation performed: [id number]         XR chest portable   Final Result by Sabra Pena MD (03/15 1318)      Small pleural effusions.      Enlarged cardiac silhouette.      Workstation performed: [id number]         US kidney and bladder   Final Result by Thierno Osborne MD (03/15 0519)      No hydronephrosis.   Small volume ascites in the right upper quadrant.               Workstation performed: [id number]         US kidney and bladder   Final Result by Luis  "Osiel Krause MD (03/06 1646)      Normal.            Workstation performed: [id number]         NM lung ventilation / perfusion   Final Result by Usman Taveras MD (03/06 1623)      The probability for pulmonary embolus is low.      Workstation performed: [id number]         XR chest 1 view portable   Final Result by Yogi Robertson MD (03/06 0605)      No radiographic evidence of acute intrathoracic process.            Workstation performed: [id number]             Portions of the record may have been created with voice recognition software. Occasional wrong word or \"sound a like\" substitutions may have occurred due to the inherent limitations of voice recognition software. Read the chart carefully and recognize, using context, where substitutions have occurred.    "

## 2024-03-20 NOTE — ASSESSMENT & PLAN NOTE
TUAN on CKD 3b being followed by nephrology due to NSTEMI/hypotension, bradycardia and contrast  Baseline creatinine 1.5-1.7.  Creatinine plateaued at 2s range  Being followed by nephrology  Repeat lab work after discharge  follow-up with nephrology on discharge     Results from last 7 days   Lab Units 03/20/24  0546 03/19/24  0503 03/18/24  0359 03/17/24  0551 03/16/24  0457 03/15/24  0508 03/14/24  1507 03/14/24  0625   BUN mg/dL 64* 63* 67* 73* 78* 75* 71* 65*   CREATININE mg/dL 2.33* 2.32* 2.28* 2.45* 2.73* 2.64* 2.53* 2.32*   EGFR ml/min/1.73sq m 23 23 24 22 19 20 21 23

## 2024-03-20 NOTE — ASSESSMENT & PLAN NOTE
CAD with history CABG 2009.  Cardiac catheterization this admission for NSTEMI with RCA thrombus.  Medications as noted above

## 2024-03-20 NOTE — CASE MANAGEMENT
Case Management Discharge Planning Note    Patient name Vladimir [facility]  Location [address] [place] [facility]/[address] [place] 415-* MRN [id number]  : 12/3/1932 Date 3/20/2024       Current Admission Date: 3/5/2024  Current Admission Diagnosis:Type 1 myocardial infarction (HCC)   Patient Active Problem List    Diagnosis Date Noted    Third degree heart block (HCC) 2024    BPH (benign prostatic hyperplasia) 2024    CAD (coronary artery disease) 2024    Acute renal failure superimposed on stage 3b chronic kidney disease (HCC) 2024    Essential hypertension 2024    Atrial flutter (HCC) 2024    Type 1 myocardial infarction (HCC) 2024    Nonrheumatic aortic valve stenosis 2017    S/P CABG (coronary artery bypass graft) 2016    Hyperlipidemia 2016      LOS (days): 15  Geometric Mean LOS (GMLOS) (days): 2.4  Days to GMLOS:-12.3     OBJECTIVE:  Risk of Unplanned Readmission Score: 15.99      Current admission status: Inpatient   Preferred Pharmacy:   PATIENT/FAMILY REPORTS NO PREFERRED PHARMACY  No address on file      [id number] [address] [address]  Phone: [phone] Fax: [phone]    Primary Care Provider: No primary care provider on file.    Primary Insurance: [employer] REP  Secondary Insurance:     DISCHARGE DETAILS:    Discharge planning discussed with:: Patient     CM contacted family/caregiver?: No- see comments (Patient declined call to contact. Stated he will call son himself to advise of transport time and to coordinate visit.)    Other Referral/Resources/Interventions Provided:  Interventions: Transportation, Short Term Rehab  Referral Comments: SW forwarded STR auth information to [facility] via [facility]. Confirmed ability to accept patient today.     Treatment Team Recommendation: Short Term Rehab  Discharge Destination Plan:: Short Term Rehab  Transport at Discharge : Wheelchair van     Number/Name of Dispatcher:  SLETS  Transported by (Company and Unit #): [facility]  ETA of Transport (Date): 03/20/24  ETA of Transport (Time): 1400    Accepting Facility Name, City & State : [facility]  Receiving Facility/Agency Phone Number: [phone]    Attending, unit nurse, patient, and facility aware of scheduled transport time.

## 2024-03-21 ENCOUNTER — TELEPHONE (OUTPATIENT)
Dept: NEPHROLOGY | Facility: CLINIC | Age: 89
End: 2024-03-21

## 2024-03-21 DIAGNOSIS — N17.9 ACUTE RENAL FAILURE SUPERIMPOSED ON STAGE 3B CHRONIC KIDNEY DISEASE, UNSPECIFIED ACUTE RENAL FAILURE TYPE (HCC): ICD-10-CM

## 2024-03-21 DIAGNOSIS — N18.32 ACUTE RENAL FAILURE SUPERIMPOSED ON STAGE 3B CHRONIC KIDNEY DISEASE, UNSPECIFIED ACUTE RENAL FAILURE TYPE (HCC): ICD-10-CM

## 2024-03-21 DIAGNOSIS — I10 ESSENTIAL HYPERTENSION: Primary | ICD-10-CM

## 2024-03-21 NOTE — TELEPHONE ENCOUNTER
----- Message from Sheela Miles sent at 3/21/2024 12:26 PM EDT -----  Please fax BMP order to Juliette Ariadne at [phone]  ----- Message -----  From: Mary Dunbar  Sent: 3/20/2024  12:14 PM EDT  To: Nephrology Keshawn Mascorro      ----- Message -----  From: Joselyn Reyes Bahamonde, MD  Sent: 3/20/2024   8:06 AM EDT  To: Nephrology [facility]    Good morning,       Pat will be discharged from [facility] and will require a follow-up with nephrology within 4 to 5 weeks with BMP      Thank you    ABDIFATAH

## 2024-05-13 ENCOUNTER — TELEPHONE (OUTPATIENT)
Dept: CARDIAC REHAB | Facility: CLINIC | Age: 89
End: 2024-05-13

## 2024-07-12 ENCOUNTER — TELEPHONE (OUTPATIENT)
Age: 89
End: 2024-07-12

## 2024-07-12 NOTE — TELEPHONE ENCOUNTER
Caller: patient    Doctor:     Reason for call: called to schedule an appointment for knee pain, call  got disconnected     Call back#: [phone]

## 2025-02-27 NOTE — PLAN OF CARE
Problem: PAIN - ADULT  Goal: Verbalizes/displays adequate comfort level or baseline comfort level  Description: Interventions:  - Encourage patient to monitor pain and request assistance  - Assess pain using appropriate pain scale  - Administer analgesics based on type and severity of pain and evaluate response  - Implement non-pharmacological measures as appropriate and evaluate response  - Consider cultural and social influences on pain and pain management  - Notify physician/advanced practitioner if interventions unsuccessful or patient reports new pain  Outcome: Progressing     Problem: INFECTION - ADULT  Goal: Absence or prevention of progression during hospitalization  Description: INTERVENTIONS:  - Assess and monitor for signs and symptoms of infection  - Monitor lab/diagnostic results  - Monitor all insertion sites, i.e. indwelling lines, tubes, and drains  - Monitor endotracheal if appropriate and nasal secretions for changes in amount and color  - [place] appropriate cooling/warming therapies per order  - Administer medications as ordered  - Instruct and encourage patient and family to use good hand hygiene technique  - Identify and instruct in appropriate isolation precautions for identified infection/condition  Outcome: Progressing  Goal: Absence of fever/infection during neutropenic period  Description: INTERVENTIONS:  - Monitor WBC    Outcome: Progressing     Problem: SAFETY ADULT  Goal: Patient will remain free of falls  Description: INTERVENTIONS:  - Educate patient/family on patient safety including physical limitations  - Instruct patient to call for assistance with activity   - Consult OT/PT to assist with strengthening/mobility   - Keep Call bell within reach  - Keep bed low and locked with side rails adjusted as appropriate  - Keep care items and personal belongings within reach  - Initiate and maintain comfort rounds  - Make Fall Risk Sign visible to staff  - Offer Toileting every 2 Hours,  Mounjaro was submitted and denied in a separate encounter. If another PA is needed please submit in a different encounter.     Thank you.   in advance of need  - Initiate/Maintain bed alarm  - Apply yellow socks and bracelet for high fall risk patients  - Consider moving patient to room near nurses station  Outcome: Progressing  Goal: Maintain or return to baseline ADL function  Description: INTERVENTIONS:  -  Assess patient's ability to carry out ADLs; assess patient's baseline for ADL function and identify physical deficits which impact ability to perform ADLs (bathing, care of mouth/teeth, toileting, grooming, dressing, etc.)  - Assess/evaluate cause of self-care deficits   - Assess range of motion  - Assess patient's mobility; develop plan if impaired  - Assess patient's need for assistive devices and provide as appropriate  - Encourage maximum independence but intervene and supervise when necessary  - Involve family in performance of ADLs  - Assess for home care needs following discharge   - Consider OT consult to assist with ADL evaluation and planning for discharge  - Provide patient education as appropriate  Outcome: Progressing  Goal: Maintains/Returns to pre admission functional level  Description: INTERVENTIONS:  - Perform AM-PAC 6 Click Basic Mobility/ Daily Activity assessment daily.  - Set and communicate daily mobility goal to care team and patient/family/caregiver.   - Collaborate with rehabilitation services on mobility goals if consulted  - Perform Range of Motion 4 times a day.  - Reposition patient every 2 hours.  - Record patient progress and toleration of activity level   Outcome: Progressing     Problem: DISCHARGE PLANNING  Goal: Discharge to home or other facility with appropriate resources  Description: INTERVENTIONS:  - Identify barriers to discharge w/patient and caregiver  - Arrange for needed discharge resources and transportation as appropriate  - Identify discharge learning needs (meds, wound care, etc.)  - Arrange for interpretive services to assist at discharge as needed  - Refer to Case Management Department for  coordinating discharge planning if the patient needs post-hospital services based on physician/advanced practitioner order or complex needs related to functional status, cognitive ability, or social support system  Outcome: Progressing     Problem: Knowledge Deficit  Goal: Patient/family/caregiver demonstrates understanding of disease process, treatment plan, medications, and discharge instructions  Description: Complete learning assessment and assess knowledge base.  Interventions:  - Provide teaching at level of understanding  - Provide teaching via preferred learning methods  Outcome: Progressing     Problem: Prexisting or High Potential for Compromised Skin Integrity  Goal: Skin integrity is maintained or improved  Description: INTERVENTIONS:  - Identify patients at risk for skin breakdown  - Assess and monitor skin integrity  - Assess and monitor nutrition and hydration status  - Monitor labs   - Assess for incontinence   - Turn and reposition patient  - Assist with mobility/ambulation  - Relieve pressure over bony prominences  - Avoid friction and shearing  - Provide appropriate hygiene as needed including keeping skin clean and dry  - Evaluate need for skin moisturizer/barrier cream  - Collaborate with interdisciplinary team   - Patient/family teaching  - Consider wound care consult   Outcome: Progressing     Problem: Nutrition/Hydration-ADULT  Goal: Nutrient/Hydration intake appropriate for improving, restoring or maintaining nutritional needs  Description: Monitor and assess patient's nutrition/hydration status for malnutrition. Collaborate with interdisciplinary team and initiate plan and interventions as ordered.  Monitor patient's weight and dietary intake as ordered or per policy. Utilize nutrition screening tool and intervene as necessary. Determine patient's food preferences and provide high-protein, high-caloric foods as appropriate.     INTERVENTIONS:  - Monitor oral intake, urinary output, labs,  and treatment plans  - Assess nutrition and hydration status and recommend course of action  - Evaluate amount of meals eaten  - Assist patient with eating if necessary   - Allow adequate time for meals  - Recommend/ encourage appropriate diets, oral nutritional supplements, and vitamin/mineral supplements  - Order, calculate, and assess calorie counts as needed  -- Assess need for intravenous fluids  - Provide specific nutrition/hydration education as appropriate  - Include patient/family/caregiver in decisions related to nutrition  Outcome: Progressing

## (undated) DEVICE — PINNACLE INTRODUCER SHEATH: Brand: PINNACLE

## (undated) DEVICE — CATH DIAG 5FR IMPULSE 100CM FR4

## (undated) DEVICE — CATH DIAG 5FR IMPULSE 100CM FL4

## (undated) DEVICE — PACK CUSTOM C V DRAPE SCV11CDSLA

## (undated) DEVICE — STERILE ICS MINOR PACK: Brand: CARDINAL HEALTH

## (undated) DEVICE — 3M™ IOBAN™ 2 ANTIMICROBIAL INCISE DRAPE 6650EZ: Brand: IOBAN™ 2

## (undated) DEVICE — DGW .035 FC J3MM 150CM TEF: Brand: EMERALD

## (undated) DEVICE — PERCUTANEOUS ENTRY THINWALL NEEDLE  ONE-PART: Brand: COOK

## (undated) DEVICE — INTRO SHEATH SAFE 7FR ULTRA TEAR AWAY

## (undated) DEVICE — MANIFOLD KIT NETWORK - CCL

## (undated) DEVICE — SLITTER ADJUSTABLE

## (undated) DEVICE — CATH GUIDING FIXED SHAPE 43CM

## (undated) DEVICE — Device

## (undated) DEVICE — IMMOBILIZER SHOULDER UNIVERAL

## (undated) DEVICE — MICROPUNCTURE INTRODUCER SET SILHOUETTE TRANSITIONLESS PUSH-PLUS DESIGN - STIFFENED CANNULA WITH NITINOL WIRE GUIDE: Brand: MICROPUNCTURE

## (undated) DEVICE — DGW .035 FC J3MM 260CM TEF: Brand: EMERALD

## (undated) DEVICE — X-RAY DETECTABLE SPONGES,16 PLY: Brand: VISTEC